# Patient Record
Sex: MALE | Race: WHITE | NOT HISPANIC OR LATINO | URBAN - METROPOLITAN AREA
[De-identification: names, ages, dates, MRNs, and addresses within clinical notes are randomized per-mention and may not be internally consistent; named-entity substitution may affect disease eponyms.]

---

## 2017-10-27 ENCOUNTER — OFFICE (OUTPATIENT)
Dept: URBAN - METROPOLITAN AREA CLINIC 33 | Facility: CLINIC | Age: 62
End: 2017-10-27

## 2017-10-27 VITALS
HEIGHT: 71 IN | WEIGHT: 189 LBS | HEART RATE: 64 BPM | DIASTOLIC BLOOD PRESSURE: 95 MMHG | SYSTOLIC BLOOD PRESSURE: 154 MMHG | TEMPERATURE: 97.7 F

## 2017-10-27 DIAGNOSIS — R10.13 EPIGASTRIC PAIN: ICD-10-CM

## 2017-10-27 DIAGNOSIS — R19.5 OTHER FECAL ABNORMALITIES: ICD-10-CM

## 2017-10-27 PROCEDURE — 00031: CPT

## 2017-10-27 PROCEDURE — 99214 OFFICE O/P EST MOD 30 MIN: CPT

## 2017-10-27 RX ORDER — OMEPRAZOLE 20 MG/1
CAPSULE, DELAYED RELEASE ORAL
Qty: 30 | Refills: 11 | Status: COMPLETED
Start: 2017-10-27 | End: 2021-11-10

## 2017-10-27 NOTE — SERVICEHPINOTES
JUDY MENDOZA   is a   62   male who complains of stomach pain and blood in stool. Patient states he did a take home Life Screen test and it came back positive for blood in the stool. He denies seeing blood in the stool or melena. He is also complaining of intermittent epigastric pain, which he has been experiencing for years but reports it has become more frequent recently. He reports he has a hx of a peptic ulcer when he was a teenager. He takes Zantac prn for heartburn, which he needs usually daily.  He states he was drinking apple cider vinegar which seemed to help his heartburn symptoms but has not taken it recently. He states the pain is worse when he doesn't eat. Denies dysphagia, N/V.  Denies any NSAID use. BRSmokes 1 ppd.He had an OV to get a colonoscopy in 2015 but never got one, he doesn't remember why. Denies family history of colon cancer or polyps. BM once/daily. Denies any weight loss, fever, chills.

## 2017-11-08 ENCOUNTER — ON CAMPUS - OUTPATIENT (OUTPATIENT)
Dept: URBAN - METROPOLITAN AREA HOSPITAL 14 | Facility: HOSPITAL | Age: 62
End: 2017-11-08
Payer: COMMERCIAL

## 2017-11-08 DIAGNOSIS — K20.8 OTHER ESOPHAGITIS: ICD-10-CM

## 2017-11-08 DIAGNOSIS — K63.5 POLYP OF COLON: ICD-10-CM

## 2017-11-08 DIAGNOSIS — K29.80 DUODENITIS WITHOUT BLEEDING: ICD-10-CM

## 2017-11-08 DIAGNOSIS — K29.60 OTHER GASTRITIS WITHOUT BLEEDING: ICD-10-CM

## 2017-11-08 DIAGNOSIS — R10.13 EPIGASTRIC PAIN: ICD-10-CM

## 2017-11-08 DIAGNOSIS — D12.2 BENIGN NEOPLASM OF ASCENDING COLON: ICD-10-CM

## 2017-11-08 DIAGNOSIS — Z12.11 ENCOUNTER FOR SCREENING FOR MALIGNANT NEOPLASM OF COLON: ICD-10-CM

## 2017-11-08 DIAGNOSIS — D12.5 BENIGN NEOPLASM OF SIGMOID COLON: ICD-10-CM

## 2017-11-08 PROCEDURE — 45380 COLONOSCOPY AND BIOPSY: CPT | Mod: 59

## 2017-11-08 PROCEDURE — 43239 EGD BIOPSY SINGLE/MULTIPLE: CPT

## 2017-11-08 PROCEDURE — 45385 COLONOSCOPY W/LESION REMOVAL: CPT

## 2020-11-29 ENCOUNTER — HOSPITAL ENCOUNTER (EMERGENCY)
Facility: HOSPITAL | Age: 65
Discharge: HOME | End: 2020-11-29
Attending: EMERGENCY MEDICINE
Payer: COMMERCIAL

## 2020-11-29 ENCOUNTER — APPOINTMENT (EMERGENCY)
Dept: CT IMAGING | Facility: HOSPITAL | Age: 65
End: 2020-11-29
Payer: COMMERCIAL

## 2020-11-29 ENCOUNTER — APPOINTMENT (EMERGENCY)
Dept: RADIOLOGY | Facility: HOSPITAL | Age: 65
End: 2020-11-29
Payer: COMMERCIAL

## 2020-11-29 VITALS
RESPIRATION RATE: 18 BRPM | OXYGEN SATURATION: 97 % | HEART RATE: 70 BPM | DIASTOLIC BLOOD PRESSURE: 84 MMHG | TEMPERATURE: 98.3 F | SYSTOLIC BLOOD PRESSURE: 149 MMHG

## 2020-11-29 DIAGNOSIS — J12.82 PNEUMONIA DUE TO COVID-19 VIRUS: Primary | ICD-10-CM

## 2020-11-29 DIAGNOSIS — U07.1 PNEUMONIA DUE TO COVID-19 VIRUS: Primary | ICD-10-CM

## 2020-11-29 LAB
ALBUMIN SERPL BCP-MCNC: 3.4 G/DL (ref 3.5–5)
ALP SERPL-CCNC: 67 U/L (ref 46–116)
ALT SERPL W P-5'-P-CCNC: 29 U/L (ref 12–78)
ANION GAP SERPL CALCULATED.3IONS-SCNC: 7 MMOL/L (ref 4–13)
APTT PPP: 27 SECONDS (ref 23–37)
AST SERPL W P-5'-P-CCNC: 17 U/L (ref 5–45)
ATRIAL RATE: 87 BPM
BASOPHILS # BLD AUTO: 0.02 THOUSANDS/ΜL (ref 0–0.1)
BASOPHILS NFR BLD AUTO: 1 % (ref 0–1)
BILIRUB SERPL-MCNC: 0.29 MG/DL (ref 0.2–1)
BUN SERPL-MCNC: 15 MG/DL (ref 5–25)
CALCIUM ALBUM COR SERPL-MCNC: 8.7 MG/DL (ref 8.3–10.1)
CALCIUM SERPL-MCNC: 8.2 MG/DL (ref 8.3–10.1)
CHLORIDE SERPL-SCNC: 103 MMOL/L (ref 100–108)
CO2 SERPL-SCNC: 27 MMOL/L (ref 21–32)
CREAT SERPL-MCNC: 0.88 MG/DL (ref 0.6–1.3)
D DIMER PPP FEU-MCNC: 0.41 UG/ML FEU
EOSINOPHIL # BLD AUTO: 0.11 THOUSAND/ΜL (ref 0–0.61)
EOSINOPHIL NFR BLD AUTO: 3 % (ref 0–6)
ERYTHROCYTE [DISTWIDTH] IN BLOOD BY AUTOMATED COUNT: 12 % (ref 11.6–15.1)
FLUAV RNA RESP QL NAA+PROBE: NEGATIVE
FLUBV RNA RESP QL NAA+PROBE: NEGATIVE
GFR SERPL CREATININE-BSD FRML MDRD: 91 ML/MIN/1.73SQ M
GLUCOSE SERPL-MCNC: 255 MG/DL (ref 65–140)
HCT VFR BLD AUTO: 45.5 % (ref 36.5–49.3)
HGB BLD-MCNC: 15 G/DL (ref 12–17)
IMM GRANULOCYTES # BLD AUTO: 0.01 THOUSAND/UL (ref 0–0.2)
IMM GRANULOCYTES NFR BLD AUTO: 0 % (ref 0–2)
INR PPP: 0.94 (ref 0.84–1.19)
LACTATE SERPL-SCNC: 1.5 MMOL/L (ref 0.5–2)
LYMPHOCYTES # BLD AUTO: 1.11 THOUSANDS/ΜL (ref 0.6–4.47)
LYMPHOCYTES NFR BLD AUTO: 34 % (ref 14–44)
MCH RBC QN AUTO: 33 PG (ref 26.8–34.3)
MCHC RBC AUTO-ENTMCNC: 33 G/DL (ref 31.4–37.4)
MCV RBC AUTO: 100 FL (ref 82–98)
MONOCYTES # BLD AUTO: 0.51 THOUSAND/ΜL (ref 0.17–1.22)
MONOCYTES NFR BLD AUTO: 16 % (ref 4–12)
NEUTROPHILS # BLD AUTO: 1.53 THOUSANDS/ΜL (ref 1.85–7.62)
NEUTS SEG NFR BLD AUTO: 46 % (ref 43–75)
NRBC BLD AUTO-RTO: 0 /100 WBCS
NT-PROBNP SERPL-MCNC: 20 PG/ML
P AXIS: 55 DEGREES
PLATELET # BLD AUTO: 166 THOUSANDS/UL (ref 149–390)
PMV BLD AUTO: 9.6 FL (ref 8.9–12.7)
POTASSIUM SERPL-SCNC: 3.9 MMOL/L (ref 3.5–5.3)
PR INTERVAL: 152 MS
PROT SERPL-MCNC: 7 G/DL (ref 6.4–8.2)
PROTHROMBIN TIME: 12.7 SECONDS (ref 11.6–14.5)
QRS AXIS: -52 DEGREES
QRSD INTERVAL: 94 MS
QT INTERVAL: 352 MS
QTC INTERVAL: 416 MS
RBC # BLD AUTO: 4.54 MILLION/UL (ref 3.88–5.62)
RSV RNA RESP QL NAA+PROBE: NEGATIVE
SARS-COV-2 RNA RESP QL NAA+PROBE: POSITIVE
SODIUM SERPL-SCNC: 137 MMOL/L (ref 136–145)
T WAVE AXIS: 29 DEGREES
TROPONIN I SERPL-MCNC: <0.02 NG/ML
VENTRICULAR RATE: 84 BPM
WBC # BLD AUTO: 3.29 THOUSAND/UL (ref 4.31–10.16)

## 2020-11-29 PROCEDURE — 84484 ASSAY OF TROPONIN QUANT: CPT | Performed by: EMERGENCY MEDICINE

## 2020-11-29 PROCEDURE — 71250 CT THORAX DX C-: CPT

## 2020-11-29 PROCEDURE — 36415 COLL VENOUS BLD VENIPUNCTURE: CPT

## 2020-11-29 PROCEDURE — 85025 COMPLETE CBC W/AUTO DIFF WBC: CPT | Performed by: EMERGENCY MEDICINE

## 2020-11-29 PROCEDURE — 71045 X-RAY EXAM CHEST 1 VIEW: CPT

## 2020-11-29 PROCEDURE — 99285 EMERGENCY DEPT VISIT HI MDM: CPT

## 2020-11-29 PROCEDURE — 87040 BLOOD CULTURE FOR BACTERIA: CPT | Performed by: EMERGENCY MEDICINE

## 2020-11-29 PROCEDURE — 0241U HB NFCT DS VIR RESP RNA 4 TRGT: CPT | Performed by: EMERGENCY MEDICINE

## 2020-11-29 PROCEDURE — 99285 EMERGENCY DEPT VISIT HI MDM: CPT | Performed by: EMERGENCY MEDICINE

## 2020-11-29 PROCEDURE — G1004 CDSM NDSC: HCPCS

## 2020-11-29 PROCEDURE — 83605 ASSAY OF LACTIC ACID: CPT | Performed by: EMERGENCY MEDICINE

## 2020-11-29 PROCEDURE — 85610 PROTHROMBIN TIME: CPT | Performed by: EMERGENCY MEDICINE

## 2020-11-29 PROCEDURE — 85730 THROMBOPLASTIN TIME PARTIAL: CPT | Performed by: EMERGENCY MEDICINE

## 2020-11-29 PROCEDURE — 85379 FIBRIN DEGRADATION QUANT: CPT | Performed by: EMERGENCY MEDICINE

## 2020-11-29 PROCEDURE — 93005 ELECTROCARDIOGRAM TRACING: CPT

## 2020-11-29 PROCEDURE — 83880 ASSAY OF NATRIURETIC PEPTIDE: CPT | Performed by: EMERGENCY MEDICINE

## 2020-11-29 PROCEDURE — 80053 COMPREHEN METABOLIC PANEL: CPT | Performed by: EMERGENCY MEDICINE

## 2020-11-29 PROCEDURE — 93010 ELECTROCARDIOGRAM REPORT: CPT | Performed by: INTERNAL MEDICINE

## 2020-11-29 RX ORDER — ZINC SULFATE 50(220)MG
220 CAPSULE ORAL DAILY
Qty: 14 CAPSULE | Refills: 0 | Status: SHIPPED | OUTPATIENT
Start: 2020-11-29 | End: 2021-07-07

## 2020-11-29 RX ORDER — MELATONIN
1000 DAILY
Qty: 14 TABLET | Refills: 0 | Status: SHIPPED | OUTPATIENT
Start: 2020-11-29 | End: 2021-07-07

## 2020-11-29 RX ORDER — MULTIVIT WITH MINERALS/LUTEIN
1000 TABLET ORAL DAILY
Qty: 14 TABLET | Refills: 0 | Status: SHIPPED | OUTPATIENT
Start: 2020-11-29 | End: 2021-07-07

## 2020-11-29 RX ORDER — ALBUTEROL SULFATE 90 UG/1
2 AEROSOL, METERED RESPIRATORY (INHALATION) ONCE
Status: COMPLETED | OUTPATIENT
Start: 2020-11-29 | End: 2020-11-29

## 2020-11-29 RX ORDER — AZITHROMYCIN 250 MG/1
500 TABLET, FILM COATED ORAL ONCE
Status: COMPLETED | OUTPATIENT
Start: 2020-11-29 | End: 2020-11-29

## 2020-11-29 RX ORDER — AZITHROMYCIN 250 MG/1
250 TABLET, FILM COATED ORAL DAILY
Qty: 4 TABLET | Refills: 0 | Status: SHIPPED | OUTPATIENT
Start: 2020-11-30 | End: 2020-12-04

## 2020-11-29 RX ADMIN — ALBUTEROL SULFATE 2 PUFF: 90 AEROSOL, METERED RESPIRATORY (INHALATION) at 03:34

## 2020-11-29 RX ADMIN — AZITHROMYCIN MONOHYDRATE 500 MG: 250 TABLET ORAL at 05:47

## 2020-12-04 LAB
BACTERIA BLD CULT: NORMAL
BACTERIA BLD CULT: NORMAL

## 2021-01-31 ENCOUNTER — HOSPITAL ENCOUNTER (EMERGENCY)
Facility: HOSPITAL | Age: 66
Discharge: HOME | End: 2021-01-31
Attending: EMERGENCY MEDICINE
Payer: COMMERCIAL

## 2021-01-31 VITALS
TEMPERATURE: 97.9 F | DIASTOLIC BLOOD PRESSURE: 68 MMHG | HEART RATE: 87 BPM | SYSTOLIC BLOOD PRESSURE: 143 MMHG | OXYGEN SATURATION: 98 % | RESPIRATION RATE: 20 BRPM | WEIGHT: 249.56 LBS

## 2021-01-31 DIAGNOSIS — S13.9XXA ACUTE SPRAIN OF LIGAMENT OF NECK, INITIAL ENCOUNTER: Primary | ICD-10-CM

## 2021-01-31 DIAGNOSIS — M62.838 TRAPEZIUS MUSCLE SPASM: ICD-10-CM
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
## 2021-01-31 PROCEDURE — 99283 EMERGENCY DEPT VISIT LOW MDM: CPT

## 2021-01-31 PROCEDURE — 99282 EMERGENCY DEPT VISIT SF MDM: CPT | Performed by: EMERGENCY MEDICINE

## 2021-01-31 RX ORDER — EMPAGLIFLOZIN 10 MG/1
25 TABLET, FILM COATED ORAL DAILY
COMMUNITY

## 2021-01-31 RX ORDER — LISINOPRIL 10 MG/1
10 TABLET ORAL DAILY
COMMUNITY
End: 2021-07-20 | Stop reason: HOSPADM

## 2021-01-31 RX ORDER — ROSUVASTATIN CALCIUM 10 MG/1
10 TABLET, COATED ORAL
COMMUNITY
End: 2021-07-07

## 2021-01-31 RX ORDER — EXENATIDE 2 MG/.85ML
INJECTION, SUSPENSION, EXTENDED RELEASE SUBCUTANEOUS WEEKLY
COMMUNITY
End: 2021-07-20 | Stop reason: HOSPADM
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
## 2021-07-07 ENCOUNTER — HOSPITAL ENCOUNTER (INPATIENT)
Facility: HOSPITAL | Age: 66
LOS: 1 days | DRG: 282 | End: 2021-07-09
Attending: EMERGENCY MEDICINE | Admitting: INTERNAL MEDICINE
Payer: COMMERCIAL

## 2021-07-07 ENCOUNTER — HOSPITAL ENCOUNTER (OUTPATIENT)
Dept: RADIOLOGY | Facility: HOSPITAL | Age: 66
Discharge: HOME | End: 2021-07-07
Attending: INTERNAL MEDICINE
Payer: COMMERCIAL

## 2021-07-07 ENCOUNTER — APPOINTMENT (OUTPATIENT)
Dept: LAB | Facility: HOSPITAL | Age: 66
End: 2021-07-07
Attending: INTERNAL MEDICINE
Payer: COMMERCIAL

## 2021-07-07 DIAGNOSIS — I25.10 ATHEROSCLEROSIS OF NATIVE CORONARY ARTERY WITHOUT ANGINA PECTORIS, UNSPECIFIED WHETHER NATIVE OR TRANSPLANTED HEART: ICD-10-CM

## 2021-07-07 DIAGNOSIS — R07.9 CHEST PAIN, UNSPECIFIED TYPE: ICD-10-CM

## 2021-07-07 DIAGNOSIS — E13.9 DIABETES MELLITUS OF OTHER TYPE WITHOUT COMPLICATION, UNSPECIFIED WHETHER LONG TERM INSULIN USE (HCC): ICD-10-CM

## 2021-07-07 DIAGNOSIS — R07.9 CHEST PAIN: Primary | ICD-10-CM

## 2021-07-07 DIAGNOSIS — E78.5 HYPERLIPIDEMIA, UNSPECIFIED HYPERLIPIDEMIA TYPE: ICD-10-CM

## 2021-07-07 DIAGNOSIS — I10 ESSENTIAL HYPERTENSION, MALIGNANT: ICD-10-CM

## 2021-07-07 DIAGNOSIS — R77.8 ELEVATED TROPONIN: ICD-10-CM

## 2021-07-07 PROBLEM — E11.9 TYPE 2 DIABETES MELLITUS (HCC): Status: ACTIVE | Noted: 2021-07-07

## 2021-07-07 PROBLEM — R79.89 ELEVATED TROPONIN: Status: ACTIVE | Noted: 2021-07-07

## 2021-07-07 LAB
ALBUMIN SERPL BCP-MCNC: 4.1 G/DL (ref 3.4–4.8)
ALP SERPL-CCNC: 45.8 U/L (ref 10–129)
ALT SERPL W P-5'-P-CCNC: 19 U/L (ref 5–63)
ANION GAP SERPL CALCULATED.3IONS-SCNC: 8 MMOL/L (ref 4–13)
APTT PPP: 24 SECONDS (ref 23–37)
AST SERPL W P-5'-P-CCNC: 14 U/L (ref 15–41)
ATRIAL RATE: 103 BPM
BASOPHILS # BLD AUTO: 0.02 THOUSANDS/ΜL (ref 0–0.1)
BASOPHILS NFR BLD AUTO: 0 % (ref 0–1)
BILIRUB SERPL-MCNC: 0.55 MG/DL (ref 0.3–1.2)
BNP SERPL-MCNC: 31.8 PG/ML (ref 1–100)
BUN SERPL-MCNC: 20 MG/DL (ref 6–20)
CALCIUM SERPL-MCNC: 9.4 MG/DL (ref 8.4–10.2)
CHLORIDE SERPL-SCNC: 102 MMOL/L (ref 96–108)
CHOLEST SERPL-MCNC: 210 MG/DL
CO2 SERPL-SCNC: 27 MMOL/L (ref 22–33)
CREAT SERPL-MCNC: 0.86 MG/DL (ref 0.5–1.2)
D DIMER PPP FEU-MCNC: 0.33 UG/ML FEU
EOSINOPHIL # BLD AUTO: 0.15 THOUSAND/ΜL (ref 0–0.61)
EOSINOPHIL NFR BLD AUTO: 2 % (ref 0–6)
ERYTHROCYTE [DISTWIDTH] IN BLOOD BY AUTOMATED COUNT: 12.6 % (ref 11.6–15.1)
EST. AVERAGE GLUCOSE BLD GHB EST-MCNC: 212 MG/DL
GFR SERPL CREATININE-BSD FRML MDRD: 92 ML/MIN/1.73SQ M
GLUCOSE SERPL-MCNC: 190 MG/DL (ref 65–140)
GLUCOSE SERPL-MCNC: 191 MG/DL (ref 65–140)
HBA1C MFR BLD: 9 %
HCT VFR BLD AUTO: 44.6 % (ref 36.5–49.3)
HDLC SERPL-MCNC: 44 MG/DL
HGB BLD-MCNC: 14.9 G/DL (ref 12–17)
IMM GRANULOCYTES # BLD AUTO: 0.02 THOUSAND/UL (ref 0–0.2)
IMM GRANULOCYTES NFR BLD AUTO: 0 % (ref 0–2)
INR PPP: 0.93 (ref 0.84–1.19)
LDLC SERPL CALC-MCNC: 123 MG/DL (ref 0–100)
LYMPHOCYTES # BLD AUTO: 1.59 THOUSANDS/ΜL (ref 0.6–4.47)
LYMPHOCYTES NFR BLD AUTO: 23 % (ref 14–44)
MCH RBC QN AUTO: 32.7 PG (ref 26.8–34.3)
MCHC RBC AUTO-ENTMCNC: 33.4 G/DL (ref 31.4–37.4)
MCV RBC AUTO: 98 FL (ref 82–98)
MONOCYTES # BLD AUTO: 0.77 THOUSAND/ΜL (ref 0.17–1.22)
MONOCYTES NFR BLD AUTO: 11 % (ref 4–12)
NEUTROPHILS # BLD AUTO: 4.38 THOUSANDS/ΜL (ref 1.85–7.62)
NEUTS SEG NFR BLD AUTO: 64 % (ref 43–75)
NONHDLC SERPL-MCNC: 166 MG/DL
P AXIS: 50 DEGREES
PLATELET # BLD AUTO: 235 THOUSANDS/UL (ref 149–390)
PMV BLD AUTO: 9.5 FL (ref 8.9–12.7)
POTASSIUM SERPL-SCNC: 4.3 MMOL/L (ref 3.5–5)
PR INTERVAL: 154 MS
PROT SERPL-MCNC: 7.1 G/DL (ref 6.4–8.3)
PROTHROMBIN TIME: 12.6 SECONDS (ref 11.6–14.5)
QRS AXIS: -58 DEGREES
QRSD INTERVAL: 94 MS
QT INTERVAL: 328 MS
QTC INTERVAL: 421 MS
RBC # BLD AUTO: 4.56 MILLION/UL (ref 3.88–5.62)
SODIUM SERPL-SCNC: 137 MMOL/L (ref 133–145)
T WAVE AXIS: 16 DEGREES
TRIGL SERPL-MCNC: 215.8 MG/DL
TROPONIN I SERPL-MCNC: 0.11 NG/ML
TROPONIN I SERPL-MCNC: 0.17 NG/ML (ref 0–0.07)
TSH SERPL DL<=0.05 MIU/L-ACNC: 2.49 UIU/ML (ref 0.34–5.6)
VENTRICULAR RATE: 99 BPM
WBC # BLD AUTO: 6.93 THOUSAND/UL (ref 4.31–10.16)

## 2021-07-07 PROCEDURE — 93005 ELECTROCARDIOGRAM TRACING: CPT

## 2021-07-07 PROCEDURE — 99285 EMERGENCY DEPT VISIT HI MDM: CPT | Performed by: EMERGENCY MEDICINE

## 2021-07-07 PROCEDURE — 80053 COMPREHEN METABOLIC PANEL: CPT

## 2021-07-07 PROCEDURE — 83880 ASSAY OF NATRIURETIC PEPTIDE: CPT

## 2021-07-07 PROCEDURE — 83036 HEMOGLOBIN GLYCOSYLATED A1C: CPT

## 2021-07-07 PROCEDURE — 85025 COMPLETE CBC W/AUTO DIFF WBC: CPT

## 2021-07-07 PROCEDURE — 99285 EMERGENCY DEPT VISIT HI MDM: CPT

## 2021-07-07 PROCEDURE — 84484 ASSAY OF TROPONIN QUANT: CPT | Performed by: EMERGENCY MEDICINE

## 2021-07-07 PROCEDURE — 85610 PROTHROMBIN TIME: CPT | Performed by: EMERGENCY MEDICINE

## 2021-07-07 PROCEDURE — 82948 REAGENT STRIP/BLOOD GLUCOSE: CPT

## 2021-07-07 PROCEDURE — 99220 PR INITIAL OBSERVATION CARE/DAY 70 MINUTES: CPT | Performed by: PHYSICIAN ASSISTANT

## 2021-07-07 PROCEDURE — 84443 ASSAY THYROID STIM HORMONE: CPT

## 2021-07-07 PROCEDURE — 85730 THROMBOPLASTIN TIME PARTIAL: CPT | Performed by: EMERGENCY MEDICINE

## 2021-07-07 PROCEDURE — 71046 X-RAY EXAM CHEST 2 VIEWS: CPT

## 2021-07-07 PROCEDURE — 85379 FIBRIN DEGRADATION QUANT: CPT | Performed by: EMERGENCY MEDICINE

## 2021-07-07 PROCEDURE — 84484 ASSAY OF TROPONIN QUANT: CPT

## 2021-07-07 PROCEDURE — 80061 LIPID PANEL: CPT

## 2021-07-07 PROCEDURE — 93010 ELECTROCARDIOGRAM REPORT: CPT | Performed by: INTERNAL MEDICINE

## 2021-07-07 PROCEDURE — 36415 COLL VENOUS BLD VENIPUNCTURE: CPT

## 2021-07-07 RX ORDER — ASPIRIN 81 MG/1
81 TABLET ORAL DAILY
Status: DISCONTINUED | OUTPATIENT
Start: 2021-07-08 | End: 2021-07-09

## 2021-07-07 RX ORDER — ASPIRIN 81 MG/1
324 TABLET, CHEWABLE ORAL ONCE
Status: COMPLETED | OUTPATIENT
Start: 2021-07-07 | End: 2021-07-07

## 2021-07-07 RX ORDER — NITROGLYCERIN 0.4 MG/1
0.4 TABLET SUBLINGUAL
Status: DISCONTINUED | OUTPATIENT
Start: 2021-07-07 | End: 2021-07-09

## 2021-07-07 RX ORDER — EMPAGLIFLOZIN 25 MG/1
25 TABLET, FILM COATED ORAL DAILY
COMMUNITY
Start: 2021-04-11 | End: 2021-07-07

## 2021-07-07 RX ORDER — HEPARIN SODIUM 5000 [USP'U]/ML
5000 INJECTION, SOLUTION INTRAVENOUS; SUBCUTANEOUS EVERY 8 HOURS SCHEDULED
Status: DISCONTINUED | OUTPATIENT
Start: 2021-07-07 | End: 2021-07-08

## 2021-07-07 RX ORDER — ROSUVASTATIN CALCIUM 20 MG/1
1 TABLET, COATED ORAL
COMMUNITY
End: 2021-07-07

## 2021-07-07 RX ORDER — ASPIRIN 81 MG/1
81 TABLET ORAL DAILY
COMMUNITY
End: 2021-07-20 | Stop reason: HOSPADM

## 2021-07-07 RX ORDER — PRAVASTATIN SODIUM 80 MG/1
80 TABLET ORAL DAILY
Status: DISCONTINUED | OUTPATIENT
Start: 2021-07-08 | End: 2021-07-09

## 2021-07-07 RX ORDER — PRAVASTATIN SODIUM 20 MG
20 TABLET ORAL DAILY
COMMUNITY
End: 2021-07-20 | Stop reason: HOSPADM

## 2021-07-07 RX ADMIN — ASPIRIN 324 MG: 81 TABLET, CHEWABLE ORAL at 19:59

## 2021-07-07 RX ADMIN — METOPROLOL TARTRATE 25 MG: 25 TABLET, FILM COATED ORAL at 22:09

## 2021-07-07 RX ADMIN — HEPARIN SODIUM 5000 UNITS: 5000 INJECTION INTRAVENOUS; SUBCUTANEOUS at 22:10

## 2021-07-07 RX ADMIN — INSULIN LISPRO 1 UNITS: 100 INJECTION, SOLUTION INTRAVENOUS; SUBCUTANEOUS at 22:12

## 2021-07-07 NOTE — ED PROVIDER NOTES
History  Chief Complaint   Patient presents with    Chest Pain     Patient sent from PCP for abnormal troponin  Pt c/o chest pain and sob since last week     This is a 61 y o  old male who presents to the ED for evaluation of chest pain  Exertional dyspnea with cutting grass - thought it was due to exercise intolerance  A few days ago, he felt the same way, but it felt worse  Mild chest discomfort with it, nonradiating, midsternal  Took break an improved, but has taken more breaks recently  Did have covid in November  No CP now  Has not taken any medications for this  PCP ordered labs and cardiac work up, troponin came back positive so he was sent to the ED  Otherwise, patient denies fevers, chills, night sweats, cough, congestion, rhinorrhea, abdominal pain, nausea, vomiting, diarrhea, constipation, urinary symptoms, leg pain or swelling  Prior to Admission Medications   Prescriptions Last Dose Informant Patient Reported? Taking? Empagliflozin (Jardiance) 10 MG TABS Past Month at Unknown time  Yes Yes   Sig: Take 10 mg by mouth daily    Exenatide ER (Bydureon BCise) 2 MG/0 85ML AUIJ Past Week at Unknown time  Yes Yes   Sig: once a week WEEKLY ON Monday   aspirin (ECOTRIN LOW STRENGTH) 81 mg EC tablet 7/6/2021 at Unknown time  Yes Yes   Sig: Take 81 mg by mouth daily   lisinopril (ZESTRIL) 10 mg tablet 7/7/2021 at Unknown time  Yes Yes   Sig: Take 10 mg by mouth daily    metFORMIN (GLUCOPHAGE) 1000 MG tablet 7/7/2021 at Unknown time  Yes Yes   Sig: Take 500 mg by mouth 2 (two) times a day with meals    pravastatin (PRAVACHOL) 20 mg tablet 7/7/2021 at Unknown time  Yes Yes   Sig: Take 20 mg by mouth daily      Facility-Administered Medications: None     Past Medical History:   Diagnosis Date    Diabetes mellitus ([facility] [address] )      History reviewed  No pertinent surgical history  History reviewed  No pertinent family history  I have reviewed and agree with the history as documented      E-Cigarette/Vaping    E-Cigarette Use Never User      E-Cigarette/Vaping Substances    Nicotine No     THC No     CBD No     Flavoring No     Other No     Unknown No      Social History     Tobacco Use    Smoking status: Current Some Day Smoker     Types: Cigars    Smokeless tobacco: Never Used   Vaping Use    Vaping Use: Never used   Substance Use Topics    Alcohol use: Yes    Drug use: Not on file     Review of Systems   Constitutional: Negative for chills, fatigue, fever and unexpected weight change  HENT: Negative for congestion, rhinorrhea and sore throat  Eyes: Negative for redness and visual disturbance  Respiratory: Positive for shortness of breath  Negative for cough  Cardiovascular: Positive for chest pain  Negative for leg swelling  Gastrointestinal: Negative for abdominal pain, constipation, diarrhea, nausea and vomiting  Endocrine: Negative for cold intolerance and heat intolerance  Genitourinary: Negative for dysuria, frequency and urgency  Musculoskeletal: Negative for back pain  Skin: Negative for rash  Neurological: Negative for dizziness, syncope and numbness  All other systems reviewed and are negative  Physical Exam  Physical Exam  Vitals and nursing note reviewed  Constitutional:       General: He is not in acute distress  Appearance: He is well-developed  He is not diaphoretic  HENT:      Head: Normocephalic and atraumatic  Right Ear: External ear normal       Left Ear: External ear normal       Nose: Nose normal       Mouth/Throat:      Mouth: Mucous membranes are moist    Eyes:      Conjunctiva/sclera: Conjunctivae normal       Pupils: Pupils are equal, round, and reactive to light  Cardiovascular:      Rate and Rhythm: Normal rate and regular rhythm  Heart sounds: Normal heart sounds  No murmur heard  No gallop  Pulmonary:      Effort: Pulmonary effort is normal  No respiratory distress  Breath sounds: Normal breath sounds  No wheezing or rales  Abdominal:      General: Bowel sounds are normal  There is no distension  Palpations: Abdomen is soft  There is no mass  Tenderness: There is no abdominal tenderness  There is no guarding or rebound  Musculoskeletal:         General: No deformity  Normal range of motion  Cervical back: Normal range of motion and neck supple  Lymphadenopathy:      Cervical: No cervical adenopathy  Skin:     General: Skin is warm and dry  Findings: No erythema or rash  Neurological:      Mental Status: He is alert and oriented to person, place, and time  Cranial Nerves: No cranial nerve deficit         Vital Signs  ED Triage Vitals   Temperature Pulse Respirations Blood Pressure SpO2   07/07/21 1906 07/07/21 1906 07/07/21 1906 07/07/21 1903 07/07/21 1906   98 8 °F (37 1 °C) 105 18 142/99 100 %      Temp Source Heart Rate Source Patient Position - Orthostatic VS BP Location FiO2 (%)   07/07/21 1906 07/07/21 1906 07/07/21 1903 07/07/21 1903 --   Oral Monitor Lying Right arm       Pain Score       07/07/21 2000 1         ED Medications  Medications   nitroglycerin (NITROSTAT) SL tablet 0 4 mg (has no administration in time range)   aspirin (ECOTRIN LOW STRENGTH) EC tablet 81 mg (81 mg Oral Given 7/8/21 0817)   pravastatin (PRAVACHOL) tablet 80 mg (80 mg Oral Given 7/8/21 0816)   nicotine (NICODERM CQ) 7 mg/24hr TD 24 hr patch 1 patch (1 patch Transdermal Not Given 7/8/21 0817)   insulin lispro (HumaLOG) 100 units/mL subcutaneous injection 1-6 Units (1 Units Subcutaneous Not Given 7/8/21 0603)   insulin lispro (HumaLOG) 100 units/mL subcutaneous injection 1-5 Units (1 Units Subcutaneous Given 7/7/21 2212)   heparin (porcine) 25,000 units in 0 45% NaCl 250 mL infusion (premix) (11 1 Units/kg/hr × 90 kg (Order-Specific) Intravenous New Bag 7/8/21 0728)   metoprolol tartrate (LOPRESSOR) partial tablet 12 5 mg (has no administration in time range)   aspirin chewable tablet 243 mg (has no administration in time range)   aspirin chewable tablet 324 mg (324 mg Oral Given 7/7/21 1959)     Diagnostic Studies  Results Reviewed     Procedure Component Value Units Date/Time    Troponin I [[id number]]  (Abnormal) Collected: 07/07/21 1948    Lab Status: Final result Specimen: Blood from Arm, Left Updated: 07/07/21 2024     Troponin I 0 11 ng/mL     D-dimer, quantitative [[id number]]  (Normal) Collected: 07/07/21 1948    Lab Status: Final result Specimen: Blood from Arm, Left Updated: 07/07/21 2019     D-Dimer, Quant 0 33 ug/ml FEU     Protime-INR [[id number]]  (Normal) Collected: 07/07/21 1948    Lab Status: Final result Specimen: Blood from Arm, Left Updated: 07/07/21 2018     Protime 12 6 seconds      INR 0 93    APTT [[id number]]  (Normal) Collected: 07/07/21 1948    Lab Status: Final result Specimen: Blood from Arm, Left Updated: 07/07/21 2018     PTT 24 seconds         No orders to display     Procedures  ECG 12 Lead Documentation Only    Date/Time: 7/8/2021 7:16 PM  Performed by: Reilly Velasco MD  Authorized by: Reilly Velasco MD     Indications / Diagnosis:  Chest pain  ECG reviewed by me, the ED Provider: yes    Patient location:  ED  Comments:      NSR 99, normal intervals, normal axis, no acute ST Twave changes  Compared to Nov 29 2020, no changes  ED Course       A/P: This is a 61 y o  male who presents to the ED for evaluation of chest pain  OP trop is positive  Add coags, repeat Trop now  EKG is nonischemic which is reassuring  Will admit to medicine, full dose asa  EKG non ischemic now, trop down trending, Does not need heparin - asa only       HEART Risk Score      Most Recent Value   Heart Score Risk Calculator   History  2 Filed at: 07/07/2021 1949   ECG  0 Filed at: 07/07/2021 1949   Age  1 Filed at: 07/07/2021 1949   Risk Factors  2 Filed at: 07/07/2021 1949   Troponin  1 Filed at: 07/07/2021 1949   HEART Score  6 Filed at: 07/07/2021 1949          MDM    Disposition  Final diagnoses:   Chest pain Elevated troponin     Time reflects when diagnosis was documented in both MDM as applicable and the Disposition within this note     Time User Action Codes Description Comment    7/7/2021  8:38 PM Brett Pool Add [R07 9] Chest pain     7/7/2021  8:38 PM Brett Pool Add [R77 8] Elevated troponin       ED Disposition     ED Disposition Condition Date/Time Comment    Admit Stable Wed Jul 7, 2021  8:38 PM Case was discussed with ARELIS and the patient's admission status was agreed to be Admission Status: observation status to the service of Dr Talbert Sicard  Follow-up Information    None       Current Discharge Medication List      CONTINUE these medications which have NOT CHANGED    Details   aspirin (ECOTRIN LOW STRENGTH) 81 mg EC tablet Take 81 mg by mouth daily      Empagliflozin (Jardiance) 10 MG TABS Take 10 mg by mouth daily       Exenatide ER (Bydureon BCise) 2 MG/0 85ML AUIJ once a week WEEKLY ON Monday      lisinopril (ZESTRIL) 10 mg tablet Take 10 mg by mouth daily       metFORMIN (GLUCOPHAGE) 1000 MG tablet Take 500 mg by mouth 2 (two) times a day with meals       pravastatin (PRAVACHOL) 20 mg tablet Take 20 mg by mouth daily           No discharge procedures on file      PDMP Review       Value Time User    PDMP Reviewed  Yes 11/29/2020  3:11 AM Markel Costello MD          ED Provider  Electronically Signed by           Mert Mcginnis MD  07/08/21 6734

## 2021-07-08 ENCOUNTER — APPOINTMENT (OUTPATIENT)
Dept: NON INVASIVE DIAGNOSTICS | Facility: HOSPITAL | Age: 66
DRG: 282 | End: 2021-07-08
Payer: COMMERCIAL

## 2021-07-08 ENCOUNTER — APPOINTMENT (OUTPATIENT)
Dept: NON INVASIVE DIAGNOSTICS | Facility: HOSPITAL | Age: 66
DRG: 282 | End: 2021-07-08
Attending: INTERNAL MEDICINE
Payer: COMMERCIAL

## 2021-07-08 ENCOUNTER — HOSPITAL ENCOUNTER (OUTPATIENT)
Dept: NON INVASIVE DIAGNOSTICS | Facility: HOSPITAL | Age: 66
Discharge: HOME | End: 2021-07-08

## 2021-07-08 LAB
ANION GAP SERPL CALCULATED.3IONS-SCNC: 5 MMOL/L (ref 4–13)
APTT PPP: 25 SECONDS (ref 23–37)
APTT PPP: 42 SECONDS (ref 23–37)
BUN SERPL-MCNC: 18 MG/DL (ref 5–25)
CALCIUM SERPL-MCNC: 8.4 MG/DL (ref 8.3–10.1)
CHLORIDE SERPL-SCNC: 107 MMOL/L (ref 100–108)
CO2 SERPL-SCNC: 28 MMOL/L (ref 21–32)
CREAT SERPL-MCNC: 0.87 MG/DL (ref 0.6–1.3)
ERYTHROCYTE [DISTWIDTH] IN BLOOD BY AUTOMATED COUNT: 12.2 % (ref 11.6–15.1)
GFR SERPL CREATININE-BSD FRML MDRD: 91 ML/MIN/1.73SQ M
GLUCOSE P FAST SERPL-MCNC: 154 MG/DL (ref 65–99)
GLUCOSE SERPL-MCNC: 131 MG/DL (ref 65–140)
GLUCOSE SERPL-MCNC: 148 MG/DL (ref 65–140)
GLUCOSE SERPL-MCNC: 154 MG/DL (ref 65–140)
GLUCOSE SERPL-MCNC: 184 MG/DL (ref 65–140)
GLUCOSE SERPL-MCNC: 203 MG/DL (ref 65–140)
GLUCOSE SERPL-MCNC: 227 MG/DL (ref 65–140)
HCT VFR BLD AUTO: 41.8 % (ref 36.5–49.3)
HGB BLD-MCNC: 13.6 G/DL (ref 12–17)
MCH RBC QN AUTO: 33.2 PG (ref 26.8–34.3)
MCHC RBC AUTO-ENTMCNC: 32.5 G/DL (ref 31.4–37.4)
MCV RBC AUTO: 102 FL (ref 82–98)
PLATELET # BLD AUTO: 187 THOUSANDS/UL (ref 149–390)
PLATELET # BLD AUTO: 193 THOUSANDS/UL (ref 149–390)
PMV BLD AUTO: 9.4 FL (ref 8.9–12.7)
PMV BLD AUTO: 9.5 FL (ref 8.9–12.7)
POTASSIUM SERPL-SCNC: 3.8 MMOL/L (ref 3.5–5.3)
RBC # BLD AUTO: 4.1 MILLION/UL (ref 3.88–5.62)
SODIUM SERPL-SCNC: 140 MMOL/L (ref 136–145)
TROPONIN I SERPL-MCNC: 0.18 NG/ML
TROPONIN I SERPL-MCNC: 0.23 NG/ML
TROPONIN I SERPL-MCNC: 0.26 NG/ML
WBC # BLD AUTO: 6.65 THOUSAND/UL (ref 4.31–10.16)

## 2021-07-08 PROCEDURE — C1894 INTRO/SHEATH, NON-LASER: HCPCS | Performed by: INTERNAL MEDICINE

## 2021-07-08 PROCEDURE — 85730 THROMBOPLASTIN TIME PARTIAL: CPT | Performed by: INTERNAL MEDICINE

## 2021-07-08 PROCEDURE — 99225 PR SBSQ OBSERVATION CARE/DAY 25 MINUTES: CPT | Performed by: INTERNAL MEDICINE

## 2021-07-08 PROCEDURE — 99153 MOD SED SAME PHYS/QHP EA: CPT | Performed by: INTERNAL MEDICINE

## 2021-07-08 PROCEDURE — 93454 CORONARY ARTERY ANGIO S&I: CPT | Performed by: INTERNAL MEDICINE

## 2021-07-08 PROCEDURE — 80048 BASIC METABOLIC PNL TOTAL CA: CPT | Performed by: PHYSICIAN ASSISTANT

## 2021-07-08 PROCEDURE — 93005 ELECTROCARDIOGRAM TRACING: CPT

## 2021-07-08 PROCEDURE — C1769 GUIDE WIRE: HCPCS | Performed by: INTERNAL MEDICINE

## 2021-07-08 PROCEDURE — 99205 OFFICE O/P NEW HI 60 MIN: CPT | Performed by: INTERNAL MEDICINE

## 2021-07-08 PROCEDURE — 82948 REAGENT STRIP/BLOOD GLUCOSE: CPT

## 2021-07-08 PROCEDURE — 99152 MOD SED SAME PHYS/QHP 5/>YRS: CPT | Performed by: INTERNAL MEDICINE

## 2021-07-08 PROCEDURE — 93306 TTE W/DOPPLER COMPLETE: CPT | Performed by: INTERNAL MEDICINE

## 2021-07-08 PROCEDURE — B2111ZZ FLUOROSCOPY OF MULTIPLE CORONARY ARTERIES USING LOW OSMOLAR CONTRAST: ICD-10-PCS | Performed by: INTERNAL MEDICINE

## 2021-07-08 PROCEDURE — 84484 ASSAY OF TROPONIN QUANT: CPT | Performed by: PHYSICIAN ASSISTANT

## 2021-07-08 PROCEDURE — 85049 AUTOMATED PLATELET COUNT: CPT | Performed by: PHYSICIAN ASSISTANT

## 2021-07-08 PROCEDURE — 93306 TTE W/DOPPLER COMPLETE: CPT

## 2021-07-08 PROCEDURE — 84484 ASSAY OF TROPONIN QUANT: CPT | Performed by: FAMILY MEDICINE

## 2021-07-08 PROCEDURE — 85027 COMPLETE CBC AUTOMATED: CPT | Performed by: PHYSICIAN ASSISTANT

## 2021-07-08 RX ORDER — FENTANYL CITRATE 50 UG/ML
INJECTION, SOLUTION INTRAMUSCULAR; INTRAVENOUS CODE/TRAUMA/SEDATION MEDICATION
Status: COMPLETED | OUTPATIENT
Start: 2021-07-08 | End: 2021-07-08

## 2021-07-08 RX ORDER — ACETAMINOPHEN 325 MG/1
650 TABLET ORAL EVERY 6 HOURS PRN
Status: DISCONTINUED | OUTPATIENT
Start: 2021-07-08 | End: 2021-07-09

## 2021-07-08 RX ORDER — NITROGLYCERIN 20 MG/100ML
INJECTION INTRAVENOUS CODE/TRAUMA/SEDATION MEDICATION
Status: COMPLETED | OUTPATIENT
Start: 2021-07-08 | End: 2021-07-08

## 2021-07-08 RX ORDER — VERAPAMIL HCL 2.5 MG/ML
AMPUL (ML) INTRAVENOUS CODE/TRAUMA/SEDATION MEDICATION
Status: COMPLETED | OUTPATIENT
Start: 2021-07-08 | End: 2021-07-08

## 2021-07-08 RX ORDER — ASPIRIN 81 MG/1
243 TABLET, CHEWABLE ORAL ONCE
Status: COMPLETED | OUTPATIENT
Start: 2021-07-08 | End: 2021-07-08

## 2021-07-08 RX ORDER — ACETAMINOPHEN 325 MG/1
650 TABLET ORAL EVERY 6 HOURS PRN
Status: CANCELLED | OUTPATIENT
Start: 2021-07-08

## 2021-07-08 RX ORDER — HEPARIN SODIUM 10000 [USP'U]/100ML
3-20 INJECTION, SOLUTION INTRAVENOUS
Status: CANCELLED | OUTPATIENT
Start: 2021-07-08

## 2021-07-08 RX ORDER — PRAVASTATIN SODIUM 80 MG/1
80 TABLET ORAL DAILY
Status: CANCELLED | OUTPATIENT
Start: 2021-07-09

## 2021-07-08 RX ORDER — HEPARIN SODIUM 10000 [USP'U]/100ML
3-20 INJECTION, SOLUTION INTRAVENOUS
Status: DISCONTINUED | OUTPATIENT
Start: 2021-07-08 | End: 2021-07-09

## 2021-07-08 RX ORDER — SODIUM CHLORIDE 9 MG/ML
100 INJECTION, SOLUTION INTRAVENOUS CONTINUOUS
Status: DISPENSED | OUTPATIENT
Start: 2021-07-08 | End: 2021-07-08

## 2021-07-08 RX ORDER — MIDAZOLAM HYDROCHLORIDE 2 MG/2ML
INJECTION, SOLUTION INTRAMUSCULAR; INTRAVENOUS CODE/TRAUMA/SEDATION MEDICATION
Status: COMPLETED | OUTPATIENT
Start: 2021-07-08 | End: 2021-07-08

## 2021-07-08 RX ORDER — HEPARIN SODIUM 1000 [USP'U]/ML
INJECTION, SOLUTION INTRAVENOUS; SUBCUTANEOUS CODE/TRAUMA/SEDATION MEDICATION
Status: COMPLETED | OUTPATIENT
Start: 2021-07-08 | End: 2021-07-08

## 2021-07-08 RX ORDER — ASPIRIN 81 MG/1
243 TABLET, CHEWABLE ORAL DAILY
Status: DISCONTINUED | OUTPATIENT
Start: 2021-07-08 | End: 2021-07-08

## 2021-07-08 RX ORDER — LIDOCAINE HYDROCHLORIDE 10 MG/ML
INJECTION, SOLUTION EPIDURAL; INFILTRATION; INTRACAUDAL; PERINEURAL CODE/TRAUMA/SEDATION MEDICATION
Status: COMPLETED | OUTPATIENT
Start: 2021-07-08 | End: 2021-07-08

## 2021-07-08 RX ORDER — ASPIRIN 81 MG/1
81 TABLET ORAL DAILY
Status: CANCELLED | OUTPATIENT
Start: 2021-07-09

## 2021-07-08 RX ORDER — NITROGLYCERIN 0.4 MG/1
0.4 TABLET SUBLINGUAL
Status: CANCELLED | OUTPATIENT
Start: 2021-07-08

## 2021-07-08 RX ADMIN — FENTANYL CITRATE 25 MCG: 50 INJECTION, SOLUTION INTRAMUSCULAR; INTRAVENOUS at 15:20

## 2021-07-08 RX ADMIN — HEPARIN SODIUM 11.1 UNITS/KG/HR: 10000 INJECTION, SOLUTION INTRAVENOUS at 18:59

## 2021-07-08 RX ADMIN — MIDAZOLAM HYDROCHLORIDE 2 MG: 1 INJECTION, SOLUTION INTRAMUSCULAR; INTRAVENOUS at 15:09

## 2021-07-08 RX ADMIN — SODIUM CHLORIDE 100 ML/HR: 0.9 INJECTION, SOLUTION INTRAVENOUS at 16:05

## 2021-07-08 RX ADMIN — IOHEXOL 75 ML: 350 INJECTION, SOLUTION INTRAVENOUS at 15:34

## 2021-07-08 RX ADMIN — ASPIRIN 243 MG: 81 TABLET, CHEWABLE ORAL at 10:41

## 2021-07-08 RX ADMIN — HEPARIN SODIUM 4000 UNITS: 1000 INJECTION INTRAVENOUS; SUBCUTANEOUS at 15:23

## 2021-07-08 RX ADMIN — NITROGLYCERIN 200 MCG: 20 INJECTION INTRAVENOUS at 15:23

## 2021-07-08 RX ADMIN — Medication 12.5 MG: at 22:03

## 2021-07-08 RX ADMIN — ASPIRIN 81 MG: 81 TABLET, COATED ORAL at 08:17

## 2021-07-08 RX ADMIN — HEPARIN SODIUM 5000 UNITS: 5000 INJECTION INTRAVENOUS; SUBCUTANEOUS at 06:04

## 2021-07-08 RX ADMIN — MIDAZOLAM HYDROCHLORIDE 1 MG: 1 INJECTION, SOLUTION INTRAMUSCULAR; INTRAVENOUS at 15:20

## 2021-07-08 RX ADMIN — PRAVASTATIN SODIUM 80 MG: 80 TABLET ORAL at 08:16

## 2021-07-08 RX ADMIN — INSULIN LISPRO 2 UNITS: 100 INJECTION, SOLUTION INTRAVENOUS; SUBCUTANEOUS at 00:14

## 2021-07-08 RX ADMIN — VERAPAMIL HYDROCHLORIDE 1.25 MG: 2.5 INJECTION, SOLUTION INTRAVENOUS at 15:23

## 2021-07-08 RX ADMIN — HEPARIN SODIUM 11.1 UNITS/KG/HR: 10000 INJECTION, SOLUTION INTRAVENOUS at 07:28

## 2021-07-08 RX ADMIN — LIDOCAINE HYDROCHLORIDE 0.1 ML: 10 INJECTION, SOLUTION EPIDURAL; INFILTRATION; INTRACAUDAL; PERINEURAL at 15:20

## 2021-07-08 RX ADMIN — FENTANYL CITRATE 50 MCG: 50 INJECTION, SOLUTION INTRAMUSCULAR; INTRAVENOUS at 15:09

## 2021-07-08 RX ADMIN — INSULIN LISPRO 2 UNITS: 100 INJECTION, SOLUTION INTRAVENOUS; SUBCUTANEOUS at 22:02

## 2021-07-08 NOTE — ASSESSMENT & PLAN NOTE
Lab Results   Component Value Date    HGBA1C 9 0 (H) 07/07/2021     · Very uncontrolled diabetes on oral medications  · Would recommend switching patient to long-acting insulin with t i d  Coverage  · For now will cover with sliding scale  · She will need follow-up with endocrinology

## 2021-07-08 NOTE — ASSESSMENT & PLAN NOTE
· Continue statin    · This will likely need to be increased given patient's uncontrolled lipid panel  · Recent lipid panel today showed:  · Cholesterol 210, triglycerides 215 8, , HDL 44

## 2021-07-08 NOTE — H&P
Erin  H&P- Keily Melendez 1955, 61 y o  male MRN: [id number]  Unit/Bed#: ED 26 Encounter: [id number]  Primary Care Provider: Wing Bharti MD   Date and time admitted to hospital: 7/7/2021  7:01 PM    * Elevated troponin  Assessment & Plan   Troponin elevation present on admission   Patient Presentation:  Presents from primary physician office secondary to elevated troponins   Likely etiology:  Unclear etiology at this time, however will rule out ACS given elevated troponin   MEDINA: 3   Initial Troponin:  0 14--0 11  o Trend x3 or to peak   Unclear etiology at this time, however we will rule out NSTEMI verses non MI troponin elevation   Initial EKG:  Nonischemic  o Monitor on telemetry   Pain Control:  Non   Consult cardiology    Npo for possible cath tomorrow      Admit under Observation Status   Check echocardiogram for possible myocarditis given patient's symptoms began shortly after he had COVID in November   Start beta-blocker    Hypertension  Assessment & Plan  · Reviewed and stable  · Will discontinue lisinopril to avoid contrast induced a KI  · Start beta-blocker 25 mg q 12 hours  Type 2 diabetes mellitus ([facility] [address] )  Assessment & Plan    Lab Results   Component Value Date    HGBA1C 9 0 (H) 07/07/2021     · Very uncontrolled diabetes on oral medications  · Would recommend switching patient to long-acting insulin with t i d  Coverage  · For now will cover with sliding scale  · She will need follow-up with endocrinology  Hyperlipidemia  Assessment & Plan  · Continue statin  · This will likely need to be increased given patient's uncontrolled lipid panel  · Recent lipid panel today showed:  · Cholesterol 210, triglycerides 215 8, , HDL 44      VTE Pharmacologic Prophylaxis: VTE Score: 4 Moderate Risk (Score 3-4) - Pharmacological DVT Prophylaxis Ordered: heparin    Code Status: No Order full code  Discussion with family: Patient declined call to   Anticipated Length of Stay: Patient will be admitted on an observation basis with an anticipated length of stay of less than 2 midnights secondary to Chest pain with elevated troponin  Total Time for Visit, including Counseling / Coordination of Care: 70 minutes Greater than 50% of this total time spent on direct patient counseling and coordination of care  Chief Complaint:  My doctor told me to come in    History of Present Illness:  Marce Chan is a 61 y o  male with a PMH of type 2 diabetes with elevated A1c, hyperlipidemia, hypertension who presents with elevated troponin  Patient was sent in by outpatient PCP after he went there for an episode of shortness of breath and chest tightness while he was mowing his grass  Patient states that symptoms of shortness of breath been ongoing since November when he had COVID-19 but was not hospitalized for it  Patient states that his shortness of breath has become increasingly worse and is progressed to slight chest pressure which he 1st noticed a couple days ago while mowing the grass thought that he was out of shape  Does have significant family history for cardiac disease including father was unsure of what  Denies smoking history  Review of Systems:  Review of Systems   Constitutional: Negative for chills, fatigue, fever and unexpected weight change  Respiratory: Positive for shortness of breath  Negative for cough and chest tightness  Cardiovascular: Positive for chest pain  Negative for palpitations and leg swelling  Gastrointestinal: Negative for abdominal pain, diarrhea, nausea and vomiting  Genitourinary: Negative for decreased urine volume, dysuria, frequency and urgency  Musculoskeletal: Negative for arthralgias  Neurological: Negative for dizziness, syncope, light-headedness and headaches  All other systems reviewed and are negative        Past Medical and Surgical History:   Past Medical History:   Diagnosis Date    Diabetes mellitus ([facility] [address] )        History reviewed  No pertinent surgical history  Meds/Allergies:  Prior to Admission medications    Medication Sig Start Date End Date Taking? Authorizing Provider   aspirin (ECOTRIN LOW STRENGTH) 81 mg EC tablet Take 81 mg by mouth daily   Yes Historical Provider, MD   Empagliflozin (Jardiance) 10 MG TABS Take 10 mg by mouth daily    Yes Historical Provider, MD   Exenatide ER (Bydureon BCise) 2 MG/0 85ML AUIJ once a week WEEKLY ON Monday   Yes Historical Provider, MD   lisinopril (ZESTRIL) 10 mg tablet Take 10 mg by mouth daily    Yes Historical Provider, MD   metFORMIN (GLUCOPHAGE) 1000 MG tablet Take 500 mg by mouth 2 (two) times a day with meals    Yes Historical Provider, MD   pravastatin (PRAVACHOL) 20 mg tablet Take 20 mg by mouth daily   Yes Historical Provider, MD   Ascorbic Acid (vitamin C) 1000 MG tablet Take 1 tablet (1,000 mg total) by mouth daily for 14 days 11/29/20 7/7/21  Gian Pappas MD   cholecalciferol (VITAMIN D3) 1,000 units tablet Take 1 tablet (1,000 Units total) by mouth daily for 14 days 11/29/20 7/7/21  Gian Pappas MD   Jardiance 25 MG TABS Take 25 mg by mouth daily 4/11/21 7/7/21  Historical Provider, MD   metFORMIN (GLUCOPHAGE) 500 mg tablet metformin 500 mg tablet  7/7/21  Historical Provider, MD   rosuvastatin (CRESTOR) 10 MG tablet Take 10 mg by mouth  7/7/21  Historical Provider, MD   rosuvastatin (CRESTOR) 20 MG tablet Take 1 tablet by mouth daily at bedtime  7/7/21  Historical Provider, MD   zinc sulfate (ZINCATE) 220 mg capsule Take 1 capsule (220 mg total) by mouth daily for 14 days 11/29/20 7/7/21  Gian Pappas MD     I have reviewed home medications with patient personally      Allergies: No Known Allergies    Social History:  Marital Status:    Occupation: unknown   Patient Pre-hospital Living Situation: Home  Patient Pre-hospital Level of Mobility: walks  Patient Pre-hospital Diet Restrictions: npo Substance Use History:   Social History     Substance and Sexual Activity   Alcohol Use Yes     Social History     Tobacco Use   Smoking Status Current Some Day Smoker    Types: Cigars   Smokeless Tobacco Never Used     Social History     Substance and Sexual Activity   Drug Use Not on file       Family History:  History reviewed  No pertinent family history  Physical Exam:     Vitals:   Blood Pressure: 119/71 (07/07/21 2015)  Pulse: 98 (07/07/21 2015)  Temperature: 98 8 °F (37 1 °C) (07/07/21 1906)  Temp Source: Oral (07/07/21 1906)  Respirations: 18 (07/07/21 2015)  Height: 5' 10 5" (179 1 cm) (07/07/21 2000)  Weight - Scale: 117 kg (259 lb) (07/07/21 2000)  SpO2: 96 % (07/07/21 2015)    Physical Exam  Constitutional:       General: He is not in acute distress  Appearance: He is obese  He is not diaphoretic  HENT:      Head: Normocephalic and atraumatic  Mouth/Throat:      Mouth: Mucous membranes are moist       Pharynx: Oropharynx is clear  No oropharyngeal exudate  Eyes:      General:         Right eye: No discharge  Left eye: No discharge  Conjunctiva/sclera: Conjunctivae normal       Pupils: Pupils are equal, round, and reactive to light  Cardiovascular:      Rate and Rhythm: Normal rate and regular rhythm  Pulses: Normal pulses  Heart sounds: Normal heart sounds  No murmur heard  Pulmonary:      Effort: Pulmonary effort is normal  No respiratory distress  Breath sounds: Normal breath sounds  No wheezing or rales  Abdominal:      General: Abdomen is flat  There is no distension  Palpations: Abdomen is soft  Tenderness: There is no abdominal tenderness  Musculoskeletal:         General: Normal range of motion  Cervical back: Neck supple  No muscular tenderness  Right lower leg: No edema  Left lower leg: No edema  Skin:     General: Skin is warm and dry  Capillary Refill: Capillary refill takes less than 2 seconds  Coloration: Skin is not jaundiced  Neurological:      General: No focal deficit present  Mental Status: He is alert and oriented to person, place, and time  Cranial Nerves: No cranial nerve deficit  Psychiatric:         Mood and Affect: Mood normal          Additional Data:     Lab Results:  Results from last 7 days   Lab Units 07/07/21  1257   WBC Thousand/uL 6 93   HEMOGLOBIN g/dL 14 9   HEMATOCRIT % 44 6   PLATELETS Thousands/uL 235   NEUTROS PCT % 64   LYMPHS PCT % 23   MONOS PCT % 11   EOS PCT % 2     Results from last 7 days   Lab Units 07/07/21  1257   SODIUM mmol/L 137   POTASSIUM mmol/L 4 3   CHLORIDE mmol/L 102   CO2 mmol/L 27   BUN mg/dL 20   CREATININE mg/dL 0 86   ANION GAP mmol/L 8   CALCIUM mg/dL 9 4   ALBUMIN g/dL 4 1   TOTAL BILIRUBIN mg/dL 0 55   ALK PHOS U/L 45 8   ALT U/L 19   AST U/L 14*   GLUCOSE RANDOM mg/dL 191*     Results from last 7 days   Lab Units 07/07/21  1948   INR  0 93         Results from last 7 days   Lab Units 07/07/21  1257   HEMOGLOBIN A1C % 9 0*           Imaging: Reviewed radiology reports from this admission including: chest xray  No orders to display       EKG and Other Studies Reviewed on Admission:   · EKG: NSR  HR 99  T wave inversion in II      ** Please Note: This note has been constructed using a voice recognition system   **

## 2021-07-08 NOTE — CASE MANAGEMENT
CM completed [facility] for ALS transportation, Pt will transfer to [place] today for triple vessel cardio/thoracic surgery

## 2021-07-08 NOTE — ASSESSMENT & PLAN NOTE
 Troponin elevation present on admission   Patient Presentation:  Presents from primary physician office secondary to elevated troponins   Likely etiology:  Unclear etiology at this time, however will rule out ACS given elevated troponin   MEDINA: 3   Initial Troponin:  0 14--0 11  o Trend x3 or to peak   Unclear etiology at this time, however we will rule out NSTEMI verses non MI troponin elevation   Initial EKG:  Nonischemic  o Monitor on telemetry   Pain Control:  Non   Consult cardiology    Npo for possible cath tomorrow      Admit under Observation Status     Check echocardiogram for possible myocarditis given patient's symptoms began shortly after he had COVID in November   Start beta-blocker

## 2021-07-08 NOTE — PLAN OF CARE
Problem: Potential for Falls  Goal: Patient will remain free of falls  Description: INTERVENTIONS:  - Educate patient/family on patient safety including physical limitations  - Instruct patient to call for assistance with activity   - Consult OT/PT to assist with strengthening/mobility   - Keep Call bell within reach  - Keep bed low and locked with side rails adjusted as appropriate  - Keep care items and personal belongings within reach  - Apply yellow socks and bracelet for high fall risk patients  - Consider moving patient to room near nurses station  Outcome: Progressing     Problem: PAIN - ADULT  Goal: Verbalizes/displays adequate comfort level or baseline comfort level  Description: Interventions:  - Encourage patient to monitor pain and request assistance  - Assess pain using appropriate pain scale  - Administer analgesics based on type and severity of pain and evaluate response  - Implement non-pharmacological measures as appropriate and evaluate response  - Consider cultural and social influences on pain and pain management  - Notify physician/advanced practitioner if interventions unsuccessful or patient reports new pain  Outcome: Progressing     Problem: INFECTION - ADULT  Goal: Absence or prevention of progression during hospitalization  Description: INTERVENTIONS:  - Assess and monitor for signs and symptoms of infection  - Monitor lab/diagnostic results  - Monitor all insertion sites, i e  indwelling lines, tubes, and drains  - Monitor endotracheal if appropriate and nasal secretions for changes in amount and color  - Wells appropriate cooling/warming therapies per order  - Administer medications as ordered  - Instruct and encourage patient and family to use good hand hygiene technique  - Identify and instruct in appropriate isolation precautions for identified infection/condition  Outcome: Progressing  Goal: Absence of fever/infection during neutropenic period  Description: INTERVENTIONS:  - Monitor WBC    Outcome: Progressing     Problem: SAFETY ADULT  Goal: Patient will remain free of falls  Description: INTERVENTIONS:  - Educate patient/family on patient safety including physical limitations  - Instruct patient to call for assistance with activity   - Consult OT/PT to assist with strengthening/mobility   - Keep Call bell within reach  - Keep bed low and locked with side rails adjusted as appropriate  - Keep care items and personal belongings within reach  - Initiate and maintain comfort rounds  - Make Fall Risk Sign visible to staff  - Apply yellow socks and bracelet for high fall risk patients  - Consider moving patient to room near nurses station  Outcome: Progressing  Goal: Maintain or return to baseline ADL function  Description: INTERVENTIONS:  -  Assess patient's ability to carry out ADLs; assess patient's baseline for ADL function and identify physical deficits which impact ability to perform ADLs (bathing, care of mouth/teeth, toileting, grooming, dressing, etc )  - Assess/evaluate cause of self-care deficits   - Assess range of motion  - Assess patient's mobility; develop plan if impaired  - Assess patient's need for assistive devices and provide as appropriate  - Encourage maximum independence but intervene and supervise when necessary  - Involve family in performance of ADLs  - Assess for home care needs following discharge   - Consider OT consult to assist with ADL evaluation and planning for discharge  - Provide patient education as appropriate  Outcome: Progressing  Goal: Maintains/Returns to pre admission functional level  Description: INTERVENTIONS:  - Perform BMAT or MOVE assessment daily    - Set and communicate daily mobility goal to care team and patient/family/caregiver     - Record patient progress and toleration of activity level   Outcome: Progressing     Problem: DISCHARGE PLANNING  Goal: Discharge to home or other facility with appropriate resources  Description: INTERVENTIONS:  - Identify barriers to discharge w/patient and caregiver  - Arrange for needed discharge resources and transportation as appropriate  - Identify discharge learning needs (meds, wound care, etc )  - Arrange for interpretive services to assist at discharge as needed  - Refer to Case Management Department for coordinating discharge planning if the patient needs post-hospital services based on physician/advanced practitioner order or complex needs related to functional status, cognitive ability, or social support system  Outcome: Progressing     Problem: Knowledge Deficit  Goal: Patient/family/caregiver demonstrates understanding of disease process, treatment plan, medications, and discharge instructions  Description: Complete learning assessment and assess knowledge base    Interventions:  - Provide teaching at level of understanding  - Provide teaching via preferred learning methods  Outcome: Progressing

## 2021-07-08 NOTE — ASSESSMENT & PLAN NOTE
· Reviewed and stable  · Will discontinue lisinopril to avoid contrast induced a KI  · Start beta-blocker 25 mg q 12 hours

## 2021-07-08 NOTE — UTILIZATION REVIEW
Initial Clinical Review    Admission: Date/Time/Statement:   Admission Orders (From admission, onward)     Ordered        07/07/21 2038  Place in Observation  Once                   Orders Placed This Encounter   Procedures    Place in Observation     Standing Status:   Standing     Number of Occurrences:   1     Order Specific Question:   Level of Care     Answer:   Med Surg [16]     ED Arrival Information     Expected Arrival Acuity    - 7/7/2021 18:57 Emergent         Means of arrival Escorted by Service Admission type    [facility] Emergency         Arrival complaint    abnormal labs        Chief Complaint   Patient presents with    Chest Pain     Patient sent from PCP for abnormal troponin  Pt c/o chest pain and sob since last week       Initial Presentation: 61 y o  male with a PMH of type 2 diabetes with elevated A1c, hyperlipidemia, hypertension who presents to ED from PCP with elevated troponin  Patient was sent in by outpatient PCP after he went there for an episode of shortness of breath and chest tightness while he was mowing his grass  Pt has had SOB since COVID in Nov 2020, it has become worse and progressed to chest pressure which was first notes a few days ago  On exam, pt has normal heart and lung sounds  Labs show elevated troponin of 0 17  Initial EKG:  Nonischemic  Pt admitted as OBS to telemetry with elevated troponin- of unclear etiology, r/o NSTEMI vs non MI elevation  Plan -telemetry, trend troponin, cardiology consult, NPO for possible cardiac cath tomorrow, echo, start Beta blocker, d/c Lisinopril, continue statin at increased dosage from 29 mg to 80 mg pravastatin  Date: 7/8   Day 2:   Medicine-NSTEMI-  Continue telemetry monitoring   Trending troponin- 0 17-->0 26 with latest troponin 0 23  Pt NPO for cath today by cardiology  , f/u echo   No SOB or chest pain    Cardiology-Likely NSTEMI - given elevated cardiac enzymes, ECG changes, and exertional symptoms, concerning for myocardial ischemia  Pt for cardiac cath today  7/8 post cath- Plan: transfer to [facility] for surgical consultation-severe 3 vessel disease    ED Triage Vitals   Temperature Pulse Respirations Blood Pressure SpO2   07/07/21 1906 07/07/21 1906 07/07/21 1906 07/07/21 1903 07/07/21 1906   98 8 °F (37 1 °C) 105 18 142/99 100 %      Temp Source Heart Rate Source Patient Position - Orthostatic VS BP Location FiO2 (%)   07/07/21 1906 07/07/21 1906 07/07/21 1903 07/07/21 1903 --   Oral Monitor Lying Right arm       Pain Score       07/07/21 2000 1          Wt Readings from Last 1 Encounters:   07/07/21 112 kg (246 lb 0 5 oz)     Additional Vital Signs:   Date/Time  Temp  Pulse  Resp  BP  MAP (mmHg)  SpO2    07/08/21 0738  98 4 °F (36 9 °C)  60  16  96/58  --  96 %    07/07/21 2156  98 6 °F (37 °C)  69  18  117/63  83  95 %    07/07/21 2120  --  92  --  127/79  --  97 %    07/07/21 2015  --  98  18  119/71  89  96 %    07/07/21 2000  --  99  18  129/86  98  96 %        Pertinent Labs/Diagnostic Test Results:   7/7  CXR-No acute cardiopulmonary disease  ECG-Normal sinus rhythm  Left axis deviation  Incomplete right bundle branch block  Anterior infarct , age undetermined  Abnormal ECG  7/8 echo-  LEFT VENTRICLE:  Size was normal   Systolic function was at the lower limits of normal  Ejection fraction was estimated to be 50 %  There was mild hypokinesis of the basal-mid inferior and inferolateral walls  Doppler parameters were consistent with abnormal left ventricular relaxation (grade 1 diastolic dysfunction)    RIGHT VENTRICLE:  The size was normal   Systolic function was normal    LEFT ATRIUM:  The atrium was mildly dilated    MITRAL VALVE:  There was trace to mild regurgitation  7/8  Cardiac cath-Summary:  Severe 3 vessel CAD  CORONARY VESSELS:   --  Left main: Normal   --  LAD: The vessel was normal sized  There was a 70% stenosis in the proximal vessel    --  Circumflex: The vessel was normal sized and gave rise to one major OM branch  There was a 95% proximal stenosis of the large OM branch  --  RCA: The vessel was normal sized and dominant, giving rise to the PDA and several posterolateral branches  There was a complex eccentric lesion in mid vessel with thrombus  There is MEDINA 3 flow into the distal vessel, but MEDINA 1 flow into the PDA  There was collateral flow to the PDA from the left system        Results from last 7 days   Lab Units 07/08/21  0601 07/08/21  0002 07/07/21  1257   WBC Thousand/uL 6 65  --  6 93   HEMOGLOBIN g/dL 13 6  --  14 9   HEMATOCRIT % 41 8  --  44 6   PLATELETS Thousands/uL 193 187 235   NEUTROS ABS Thousands/µL  --   --  4 38         Results from last 7 days   Lab Units 07/08/21  0601 07/07/21  1257   SODIUM mmol/L 140 137   POTASSIUM mmol/L 3 8 4 3   CHLORIDE mmol/L 107 102   CO2 mmol/L 28 27   ANION GAP mmol/L 5 8   BUN mg/dL 18 20   CREATININE mg/dL 0 87 0 86   EGFR ml/min/1 73sq m 91 92   CALCIUM mg/dL 8 4 9 4     Results from last 7 days   Lab Units 07/07/21  1257   AST U/L 14*   ALT U/L 19   ALK PHOS U/L 45 8   TOTAL PROTEIN g/dL 7 1   ALBUMIN g/dL 4 1   TOTAL BILIRUBIN mg/dL 0 55     Results from last 7 days   Lab Units 07/08/21  0603 07/08/21  0001 07/07/21  2153   POC GLUCOSE mg/dl 148* 203* 190*     Results from last 7 days   Lab Units 07/08/21  0601 07/07/21  1257   GLUCOSE RANDOM mg/dL 154* 191*         Results from last 7 days   Lab Units 07/07/21  1257   HEMOGLOBIN A1C % 9 0*   EAG mg/dl 212       Results from last 7 days   Lab Units 07/08/21  0601 07/08/21  0306 07/08/21  0002 07/07/21  1948 07/07/21  1257   TROPONIN I ng/mL 0 23* 0 26* 0 18* 0 11* 0 17*     Results from last 7 days   Lab Units 07/07/21 1948   D-DIMER QUANTITATIVE ug/ml FEU 0 33     Results from last 7 days   Lab Units 07/08/21  0710 07/07/21 1948   PROTIME seconds  --  12 6   INR   --  0 93   PTT seconds 25 24     Results from last 7 days   Lab Units 07/07/21  1257   TSH 3RD GENERATON uIU/mL 2 490                     Results from last 7 days   Lab Units 07/07/21  1257   BNP pg/mL 31 8             ED Treatment:   Medication Administration from 07/07/2021 1857 to 07/07/2021 2140       Date/Time Order Dose Route Action     07/07/2021 1959 aspirin chewable tablet 324 mg 324 mg Oral Given        Past Medical History:   Diagnosis Date    Diabetes mellitus ([facility] [address] )      Present on Admission:   NSTEMI      Admitting Diagnosis: Chest pain [R07 9]  Elevated troponin [R77 8]  Abnormal laboratory test result [R89 9]  Age/Sex: 61 y o  male  Admission Orders:  Scheduled Medications:  aspirin, 81 mg, Oral, Daily  insulin lispro, 1-5 Units, Subcutaneous, HS  insulin lispro, 1-6 Units, Subcutaneous, Q6H JAIMEE  metoprolol tartrate, 25 mg, Oral, Q12H [address]  nicotine, 1 patch, Transdermal, Daily  pravastatin, 80 mg, Oral, Daily      Continuous IV Infusions:  heparin (porcine), 3-20 Units/kg/hr (Order-Specific), Intravenous, Titrated      PRN Meds:  nitroglycerin, 0 4 mg, Sublingual, Q5 Min PRN    poct glucose q6h  Telemetry  OOb as tolerates  NPO     IP CONSULT TO CARDIOLOGY    Network Utilization Review Department  ATTENTION: Please call with any questions or concerns to [phone] and carefully listen to the prompts so that you are directed to the right person  All voicemails are confidential   Lakisha Query all requests for admission clinical reviews, approved or denied determinations and any other requests to dedicated fax number below belonging to the campus where the patient is receiving treatment   List of dedicated fax numbers for the Facilities:  [address] DENIALS (Administrative/Medical Necessity) [phone]   [address] (Maternity/NICU/Pediatrics) 261 [facility] [place] [address] [address] [address] [place] [address] Melissa Schmidt 1481 [address] [phone]

## 2021-07-08 NOTE — ASSESSMENT & PLAN NOTE
Lab Results   Component Value Date    HGBA1C 9 0 (H) 07/07/2021     · Very uncontrolled diabetes on oral medications  · Would recommend switching patient to long-acting insulin with t i d  Coverage  · For now cover with sliding scale  · Will need follow-up with endocrinology

## 2021-07-08 NOTE — ASSESSMENT & PLAN NOTE
POA, chest pain with elevated troponin     MEDINA: 3   EKG, no ischemic changes   Monitor on telemetry   Continue to trend troponin   Follow-up on echo   Started beta-blocker on admission  Samreen Soriano Cardiology consulted for possible catheterization    Patient is currently NPO

## 2021-07-08 NOTE — UTILIZATION REVIEW
Observation Admission Authorization Request   NOTIFICATION OF OBSERVATION ADMISSION/OBSERVATION AUTHORIZATION REQUEST   SERVICING FACILITY:   [address]  Tax ID: [id number]  NPI: [id number]  Place of Service: On [facility] Code: 22  CPT Code for Observation: CPT   CPT 29685     ATTENDING PROVIDER:  Attending Name and NPI#: Netta Jansen Md [[id number]]  Address: [address]  Phone: [phone]     UTILIZATION REVIEW CONTACT:  [facility] Utilization   Network Utilization Review Department  Phone: [phone]  Fax: [phone]  Email: [email or web address]     PHYSICIAN ADVISORY SERVICES:  FOR QOLI-ZL-HEXC REVIEW - MEDICAL NECESSITY DENIAL  Phone: [phone]  Fax: [phone]  Email: [email or web address]     TYPE OF REQUEST:  Observation  Status     ADMISSION INFORMATION:  ADMISSION DATE/TIME:   PATIENT DIAGNOSIS CODE/DESCRIPTION:  Chest pain [R07 9]  Elevated troponin [R77 8]  Abnormal laboratory test result [R89 9]  DISCHARGE DATE/TIME: No discharge date for patient encounter  DISCHARGE DISPOSITION (IF DISCHARGED): Home/Self Care     IMPORTANT INFORMATION:  Please contact the [facility] directly with any questions or concerns regarding this request  Department voicemails are confidential     Send requests for admission clinical reviews, concurrent reviews, approvals, and administrative denials due to lack of clinical to fax [phone]

## 2021-07-08 NOTE — CONSULTS
Consultation - Cardiology Team One  Quita Rizvi 61 y o  male MRN: [id number]  Unit/Bed#: S -01 Encounter: [id number]    Inpatient consult to Cardiology  Consult performed by: SANDRA Domínguez  Consult ordered by: Matteo Vilchis PA-C      Physician Requesting Consult: Marilu Barrientos MD  Reason for Consult / Principal Problem: chest pain, elevated troponin    Assessment/ Plan    1  Chest pain with elevated troponin concerning for NSTEMI  Exertional SOB for a few months, developed chest pain and dyspnea while mowing grass Monday  Troponin peak 0 26  ECG-NSR with inferior TWI   Continue IV heparin  Continue ASA and increased statin dose  Decrease metoprolol to 12 5 mg BID due to bradycardia  Check echocardiogram  Cardiac catheterization later today    2  HTN- controlled, average /76  Home lisinopril on hold pre cardiac cath  3  HLD- , , HDL 44,   On pravastatin 20 mg at home, agree with increasing to pravastatin 80 mg     4  Type 2 DM- poorly controlled, A1c 9 0%  Defer management to primary team      History of Present Illness   HPI: Quita Rizvi is a 61y o  year old male with HTN, hyperlipidemia, and type 2 diabetes who presented to the ED yesterday after being sent in from his PCP's office due to complaints of exertional chest pain and shortness of breath  He reports exertional shortness of breath over the past few months  He thought this was deconditioning or possibly related to delayed symptoms from COVID infection in November  On Monday, he developed burning chest pain and significant dyspnea while mowing the grass so he became more concerned  PCP saw him and ordered a troponin which was elevated so he was sent to the ED  Initial troponin 0 17 which peaked at 0 26  ECG shows NSR with nonspecific T wave abnormality which is more pronounced in inferior leads on repeat ECG  He was given 324 mg ASA and started on IV heparin    Cardiology has been consulted for further evaluation and management of possible NSTEMI  EKG reviewed personally:  NSR with inferior TWI    Telemetry reviewed personally: NSR, occasional sinus bradycardia     Review of Systems   Constitutional: Positive for malaise/fatigue  Negative for chills, diaphoresis and weight gain  Cardiovascular: Positive for chest pain and dyspnea on exertion  Negative for leg swelling, orthopnea, palpitations and syncope  Respiratory: Negative for cough, sleep disturbances due to breathing and sputum production  Gastrointestinal: Negative for bloating, nausea and vomiting  Neurological: Negative for dizziness, light-headedness and weakness  Psychiatric/Behavioral: Negative for altered mental status  All other systems reviewed and are negative  Historical Information   Past Medical History:   Diagnosis Date    Diabetes mellitus ([facility] [address] )      History reviewed  No pertinent surgical history  Social History     Substance and Sexual Activity   Alcohol Use Yes     Social History     Substance and Sexual Activity   Drug Use Not on file     Social History     Tobacco Use   Smoking Status Current Some Day Smoker    Types: Cigars   Smokeless Tobacco Never Used     Family History: History reviewed  No pertinent family history      Meds/Allergies   all current active meds have been reviewed and current meds:   Current Facility-Administered Medications   Medication Dose Route Frequency    aspirin (ECOTRIN LOW STRENGTH) EC tablet 81 mg  81 mg Oral Daily    heparin (porcine) 25,000 units in 0 45% NaCl 250 mL infusion (premix)  3-20 Units/kg/hr (Order-Specific) Intravenous Titrated    insulin lispro (HumaLOG) 100 units/mL subcutaneous injection 1-5 Units  1-5 Units Subcutaneous HS    insulin lispro (HumaLOG) 100 units/mL subcutaneous injection 1-6 Units  1-6 Units Subcutaneous Q6H [address]    metoprolol tartrate (LOPRESSOR) tablet 25 mg  25 mg Oral Q12H [address]    nicotine (NICODERM CQ) 7 mg/24hr TD 24 hr patch 1 patch  1 patch Transdermal Daily    nitroglycerin (NITROSTAT) SL tablet 0 4 mg  0 4 mg Sublingual Q5 Min PRN    pravastatin (PRAVACHOL) tablet 80 mg  80 mg Oral Daily     heparin (porcine), 3-20 Units/kg/hr (Order-Specific), Last Rate: 11 1 Units/kg/hr (07/08/21 0728)      No Known Allergies    Objective   Vitals: Blood pressure 96/58, pulse 60, temperature 98 4 °F (36 9 °C), temperature source Oral, resp  rate 16, height 5' 10 5" (1 791 m), weight 112 kg (246 lb 0 5 oz), SpO2 96 %  , Body mass index is 34 8 kg/m²  ,     Systolic (62VDC), PDM:740 , Min:96 , MSE:771     Diastolic (14IOE), JYQ:92, Min:58, Max:99    Intake/Output Summary (Last 24 hours) at 7/8/2021 0821  Last data filed at 7/8/2021 0738  Gross per 24 hour   Intake 0 ml   Output 0 ml   Net 0 ml     Wt Readings from Last 3 Encounters:   07/07/21 112 kg (246 lb 0 5 oz)   01/31/21 113 kg (249 lb 9 oz)     Invasive Devices     Peripheral Intravenous Line            Peripheral IV 07/07/21 Left Arm <1 day              Physical Exam  Vitals reviewed  Constitutional:       General: He is not in acute distress  Appearance: Normal appearance  Neck:      Vascular: No hepatojugular reflux or JVD  Cardiovascular:      Rate and Rhythm: Regular rhythm  Bradycardia present  Pulses: Normal pulses  Heart sounds: Normal heart sounds  No murmur heard  No friction rub  No gallop  Pulmonary:      Effort: Pulmonary effort is normal  No respiratory distress  Breath sounds: No rales  Comments: Clear, room air  Abdominal:      General: Bowel sounds are normal  There is no distension  Palpations: Abdomen is soft  Tenderness: There is no abdominal tenderness  Musculoskeletal:         General: No tenderness  Normal range of motion  Cervical back: Neck supple  Right lower leg: No edema  Left lower leg: No edema  Skin:     General: Skin is warm and dry  Capillary Refill: Capillary refill takes less than 2 seconds  Findings: No erythema  Neurological:      Mental Status: He is alert and oriented to person, place, and time  Psychiatric:         Mood and Affect: Mood normal      LABORATORY RESULTS:  Results from last 7 days   Lab Units 07/08/21  0601 07/08/21  0306 07/08/21  0002   TROPONIN I ng/mL 0 23* 0 26* 0 18*     CBC with diff:   Results from last 7 days   Lab Units 07/08/21  0601 07/08/21  0002 07/07/21  1257   WBC Thousand/uL 6 65  --  6 93   HEMOGLOBIN g/dL 13 6  --  14 9   HEMATOCRIT % 41 8  --  44 6   MCV fL 102*  --  98   PLATELETS Thousands/uL 193 187 235   MCH pg 33 2  --  32 7   MCHC g/dL 32 5  --  33 4   RDW % 12 2  --  12 6   MPV fL 9 4 9 5 9 5     CMP:  Results from last 7 days   Lab Units 07/08/21  0601 07/07/21  1257   POTASSIUM mmol/L 3 8 4 3   CHLORIDE mmol/L 107 102   CO2 mmol/L 28 27   BUN mg/dL 18 20   CREATININE mg/dL 0 87 0 86   CALCIUM mg/dL 8 4 9 4   AST U/L  --  14*   ALT U/L  --  19   ALK PHOS U/L  --  45 8   EGFR ml/min/1 73sq m 91 92     BMP:  Results from last 7 days   Lab Units 07/08/21  0601 07/07/21  1257   POTASSIUM mmol/L 3 8 4 3   CHLORIDE mmol/L 107 102   CO2 mmol/L 28 27   BUN mg/dL 18 20   CREATININE mg/dL 0 87 0 86   CALCIUM mg/dL 8 4 9 4     Lab Results   Component Value Date    CREATININE 0 87 07/08/2021    CREATININE 0 86 07/07/2021    CREATININE 0 88 11/29/2020     Lab Results   Component Value Date    NTBNP 20 11/29/2020     Results from last 7 days   Lab Units 07/07/21  1257   HEMOGLOBIN A1C % 9 0*     Results from last 7 days   Lab Units 07/07/21  1257   TSH 3RD GENERATON uIU/mL 2 490       Results from last 7 days   Lab Units 07/07/21  1948   INR  0 93     Lipid Profile:   No results found for: CHOL  Lab Results   Component Value Date    HDL 44 07/07/2021     Lab Results   Component Value Date    LDLCALC 123 (H) 07/07/2021     Lab Results   Component Value Date    TRIG 215 8 (H) 07/07/2021     Imaging: I have personally reviewed pertinent reports     and I have personally reviewed pertinent films in PACS    Counseling / Coordination of Care  Total floor / unit time spent today 45 minutes  Greater than 50% of total time was spent with the patient and / or family counseling and / or coordination of care  A description of the counseling / coordination of care: Review of history, current assessment, development of a plan  Code Status: Level 1 - Full Code    ** Please Note: Dragon 360 Dictation voice to text software may have been used in the creation of this document   **

## 2021-07-08 NOTE — ASSESSMENT & PLAN NOTE
· Reviewed and stable  · discontinued lisinopril to avoid contrast induced VALENTINE  · Start beta-blocker 25 mg q 12 hours    · Monitor blood pressure

## 2021-07-08 NOTE — PLAN OF CARE
Problem: Potential for Falls  Goal: Patient will remain free of falls  Description: INTERVENTIONS:  - Educate patient/family on patient safety including physical limitations  - Instruct patient to call for assistance with activity   - Consult OT/PT to assist with strengthening/mobility   - Keep Call bell within reach  - Keep bed low and locked with side rails adjusted as appropriate  - Keep care items and personal belongings within reach  - Initiate and maintain comfort rounds  - Make Fall Risk Sign visible to staff  - Offer Toileting every  Hours, in advance of need  - Initiate/Maintain alarm  - Obtain necessary fall risk management equipment:   - Apply yellow socks and bracelet for high fall risk patients  - Consider moving patient to room near nurses station  Outcome: Progressing     Problem: PAIN - ADULT  Goal: Verbalizes/displays adequate comfort level or baseline comfort level  Description: Interventions:  - Encourage patient to monitor pain and request assistance  - Assess pain using appropriate pain scale  - Administer analgesics based on type and severity of pain and evaluate response  - Implement non-pharmacological measures as appropriate and evaluate response  - Consider cultural and social influences on pain and pain management  - Notify physician/advanced practitioner if interventions unsuccessful or patient reports new pain  Outcome: Progressing     Problem: INFECTION - ADULT  Goal: Absence or prevention of progression during hospitalization  Description: INTERVENTIONS:  - Assess and monitor for signs and symptoms of infection  - Monitor lab/diagnostic results  - Monitor all insertion sites, i e  indwelling lines, tubes, and drains  - Monitor endotracheal if appropriate and nasal secretions for changes in amount and color  - S[place] appropriate cooling/warming therapies per order  - Administer medications as ordered  - Instruct and encourage patient and family to use good hand hygiene technique  - Identify and instruct in appropriate isolation precautions for identified infection/condition  Outcome: Progressing  Goal: Absence of fever/infection during neutropenic period  Description: INTERVENTIONS:  - Monitor WBC    Outcome: Progressing     Problem: SAFETY ADULT  Goal: Patient will remain free of falls  Description: INTERVENTIONS:  - Educate patient/family on patient safety including physical limitations  - Instruct patient to call for assistance with activity   - Consult OT/PT to assist with strengthening/mobility   - Keep Call bell within reach  - Keep bed low and locked with side rails adjusted as appropriate  - Keep care items and personal belongings within reach  - Initiate and maintain comfort rounds  - Make Fall Risk Sign visible to staff  - Offer Toileting every  Hours, in advance of need  - Initiate/Maintain alarm  - Obtain necessary fall risk management equipment:   - Apply yellow socks and bracelet for high fall risk patients  - Consider moving patient to room near nurses station  Outcome: Progressing  Goal: Maintain or return to baseline ADL function  Description: INTERVENTIONS:  -  Assess patient's ability to carry out ADLs; assess patient's baseline for ADL function and identify physical deficits which impact ability to perform ADLs (bathing, care of mouth/teeth, toileting, grooming, dressing, etc )  - Assess/evaluate cause of self-care deficits   - Assess range of motion  - Assess patient's mobility; develop plan if impaired  - Assess patient's need for assistive devices and provide as appropriate  - Encourage maximum independence but intervene and supervise when necessary  - Involve family in performance of ADLs  - Assess for home care needs following discharge   - Consider OT consult to assist with ADL evaluation and planning for discharge  - Provide patient education as appropriate  Outcome: Progressing  Goal: Maintains/Returns to pre admission functional level  Description: INTERVENTIONS:  - Perform BMAT or MOVE assessment daily    - Set and communicate daily mobility goal to care team and patient/family/caregiver  - Collaborate with rehabilitation services on mobility goals if consulted  - Perform Range of Motion  times a day  - Reposition patient every  hours  - Dangle patient  times a day  - Stand patient  times a day  - Ambulate patient  times a day  - Out of bed to chair  times a day   - Out of bed for meals times a day  - Out of bed for toileting  - Record patient progress and toleration of activity level   Outcome: Progressing     Problem: DISCHARGE PLANNING  Goal: Discharge to home or other facility with appropriate resources  Description: INTERVENTIONS:  - Identify barriers to discharge w/patient and caregiver  - Arrange for needed discharge resources and transportation as appropriate  - Identify discharge learning needs (meds, wound care, etc )  - Arrange for interpretive services to assist at discharge as needed  - Refer to Case Management Department for coordinating discharge planning if the patient needs post-hospital services based on physician/advanced practitioner order or complex needs related to functional status, cognitive ability, or social support system  Outcome: Progressing     Problem: Knowledge Deficit  Goal: Patient/family/caregiver demonstrates understanding of disease process, treatment plan, medications, and discharge instructions  Description: Complete learning assessment and assess knowledge base    Interventions:  - Provide teaching at level of understanding  - Provide teaching via preferred learning methods  Outcome: Progressing

## 2021-07-08 NOTE — PROGRESS NOTES
[facility]  Progress Note - Moira Thornton 1955, 61 y o  male MRN: [id number]  Unit/Bed#: S -01 Encounter: [id number]  Primary Care Provider: Maci Aquino MD   Date and time admitted to hospital: 2021  7:01 PM    * NSTEMI  Assessment & Plan  POA, chest pain with elevated troponin     MEDINA: 3   EKG, no ischemic changes   Monitor on telemetry   Continue to trend troponin   Follow-up on echo   Started beta-blocker on admission  [facility] consulted for possible catheterization  Patient is currently NPO    Hypertension  Assessment & Plan  · Reviewed and stable  · discontinued lisinopril to avoid contrast induced VALENTINE  · Start beta-blocker 25 mg q 12 hours  · Monitor blood pressure    Type 2 diabetes mellitus ([facility] [address] )  Assessment & Plan    Lab Results   Component Value Date    HGBA1C 9 0 (H) 2021     · Very uncontrolled diabetes on oral medications  · Would recommend switching patient to long-acting insulin with t i d  Coverage  · For now cover with sliding scale  · Will need follow-up with endocrinology  Hyperlipidemia  Assessment & Plan  · Patient on pravastatin 20 mg, increased to 80 mg on admission        VTE Pharmacologic Prophylaxis:   VTE Score: 4 Moderate Risk (Score 3-4) - Pharmacological DVT Prophylaxis Ordered: Heparin Drip  Mechanical VTE Prophylaxis in Place: Yes    Patient Centered Rounds: Updated nursing on plan    Discussions with Specialists or Other Care Team Provider:  Nursing    Education and Discussions with Family / Patient: Patient declined call to   Current Length of Stay: 0 day(s)    Current Patient Status: Observation     Discharge Plan / Estimated Discharge Date: Anticipate discharge tomorrow to home      Code Status: Level 1 - Full Code      Subjective:   Patient denies any complaints     Objective:     Vitals:   Temp (24hrs), Av 6 °F (37 °C), Min:98 4 °F (36 9 °C), Max:98 8 °F (37 1 °C)    Temp:  [98 4 °F (36 9 °C)-98 8 °F (37 1 °C)] 98 4 °F (36 9 °C)  HR:  [] 60  Resp:  [16-18] 16  BP: ()/(58-99) 96/58  SpO2:  [95 %-100 %] 96 %  Body mass index is 34 8 kg/m²  Input and Output Summary (last 24 hours): Intake/Output Summary (Last 24 hours) at 7/8/2021 0839  Last data filed at 7/8/2021 0738  Gross per 24 hour   Intake 0 ml   Output 0 ml   Net 0 ml       Physical Exam:     Physical Exam  Vitals and nursing note reviewed  Constitutional:       General: He is not in acute distress  Appearance: He is not ill-appearing or diaphoretic  HENT:      Head: Normocephalic and atraumatic  Mouth/Throat:      Mouth: Mucous membranes are moist       Pharynx: Oropharynx is clear  Eyes:      General: No scleral icterus  Extraocular Movements: Extraocular movements intact  Conjunctiva/sclera: Conjunctivae normal    Cardiovascular:      Rate and Rhythm: Normal rate and regular rhythm  Heart sounds: No murmur heard  Pulmonary:      Effort: Pulmonary effort is normal  No respiratory distress  Breath sounds: Normal breath sounds  No wheezing or rales  Abdominal:      General: Bowel sounds are normal       Palpations: Abdomen is soft  Tenderness: There is no abdominal tenderness  Musculoskeletal:      Cervical back: Normal range of motion and neck supple  Right lower leg: No edema  Left lower leg: No edema  Skin:     General: Skin is warm and dry  Capillary Refill: Capillary refill takes less than 2 seconds  Neurological:      General: No focal deficit present  Mental Status: He is alert and oriented to person, place, and time     Psychiatric:         Mood and Affect: Mood normal          Behavior: Behavior normal           Additional Data:     Labs:  Results from last 7 days   Lab Units 07/08/21  0601 07/07/21  1257   WBC Thousand/uL 6 65 6 93   HEMOGLOBIN g/dL 13 6 14 9   HEMATOCRIT % 41 8 44 6   PLATELETS Thousands/uL 193 235   NEUTROS PCT %  --  64 LYMPHS PCT %  --  23   MONOS PCT %  --  11   EOS PCT %  --  2     Results from last 7 days   Lab Units 07/08/21  0601 07/07/21  1257   SODIUM mmol/L 140 137   POTASSIUM mmol/L 3 8 4 3   CHLORIDE mmol/L 107 102   CO2 mmol/L 28 27   BUN mg/dL 18 20   CREATININE mg/dL 0 87 0 86   ANION GAP mmol/L 5 8   CALCIUM mg/dL 8 4 9 4   ALBUMIN g/dL  --  4 1   TOTAL BILIRUBIN mg/dL  --  0 55   ALK PHOS U/L  --  45 8   ALT U/L  --  19   AST U/L  --  14*   GLUCOSE RANDOM mg/dL 154* 191*     Results from last 7 days   Lab Units 07/07/21  1948   INR  0 93     Results from last 7 days   Lab Units 07/08/21  0603 07/08/21  0001 07/07/21  2153   POC GLUCOSE mg/dl 148* 203* 190*     Results from last 7 days   Lab Units 07/07/21  1257   HEMOGLOBIN A1C % 9 0*           Imaging: Reviewed radiology reports from this admission including: xray(s)    Recent Cultures (last 7 days):           Lines/Drains:  Invasive Devices     Peripheral Intravenous Line            Peripheral IV 07/07/21 Left Arm <1 day                Telemetry:   Telemetry Orders (From admission, onward)             48 Hour Telemetry Monitoring  Continuous x 48 hours     Question:  Reason for 48 Hour Telemetry  Answer:  Acute MI, chest pain - R/O MI, or unstable angina                    Last 24 Hours Medication List:   Current Facility-Administered Medications   Medication Dose Route Frequency Provider Last Rate    aspirin  81 mg Oral Daily Alton Patiño PA-C      heparin (porcine)  3-20 Units/kg/hr (Order-Specific) Intravenous Titrated Avani Howard MD 11 1 Units/kg/hr (07/08/21 0728)    insulin lispro  1-5 Units Subcutaneous HS Alton Patiño PA-C      insulin lispro  1-6 Units Subcutaneous Q6H [facility] Alton Patiño PA-C      metoprolol tartrate  25 mg Oral Q12H [facility] Alton Patiño PA-C      nicotine  1 patch Transdermal Daily Alton Day PA-C      nitroglycerin  0 4 mg Sublingual Q5 Min PRN Alton Patiño PA-C      pravastatin  80 mg Oral Daily Eyad Parikh BESSIE Patiño          Today, Patient Was Seen By: Geovanna Ott MD    ** Please Note: This note has been constructed using a voice recognition system   **

## 2021-07-09 ENCOUNTER — HOSPITAL ENCOUNTER (INPATIENT)
Facility: HOSPITAL | Age: 66
LOS: 11 days | Discharge: HOME HEALTH | DRG: 235 | End: 2021-07-20
Attending: INTERNAL MEDICINE | Admitting: INTERNAL MEDICINE
Payer: COMMERCIAL

## 2021-07-09 VITALS
OXYGEN SATURATION: 98 % | BODY MASS INDEX: 34.44 KG/M2 | HEIGHT: 71 IN | WEIGHT: 246.03 LBS | TEMPERATURE: 98.3 F | HEART RATE: 65 BPM | RESPIRATION RATE: 18 BRPM | DIASTOLIC BLOOD PRESSURE: 67 MMHG | SYSTOLIC BLOOD PRESSURE: 120 MMHG

## 2021-07-09 DIAGNOSIS — E11.9 TYPE 2 DIABETES MELLITUS (HCC): ICD-10-CM

## 2021-07-09 DIAGNOSIS — Z95.1 S/P CABG (CORONARY ARTERY BYPASS GRAFT): ICD-10-CM

## 2021-07-09 DIAGNOSIS — I25.10 CAD (CORONARY ARTERY DISEASE): Primary | ICD-10-CM

## 2021-07-09 LAB
ANION GAP SERPL CALCULATED.3IONS-SCNC: 6 MMOL/L (ref 4–13)
APTT PPP: 38 SECONDS (ref 23–37)
APTT PPP: 49 SECONDS (ref 23–37)
APTT PPP: 49 SECONDS (ref 23–37)
BUN SERPL-MCNC: 13 MG/DL (ref 5–25)
CALCIUM SERPL-MCNC: 8.4 MG/DL (ref 8.3–10.1)
CHLORIDE SERPL-SCNC: 107 MMOL/L (ref 100–108)
CO2 SERPL-SCNC: 28 MMOL/L (ref 21–32)
CREAT SERPL-MCNC: 0.8 MG/DL (ref 0.6–1.3)
GFR SERPL CREATININE-BSD FRML MDRD: 94 ML/MIN/1.73SQ M
GLUCOSE P FAST SERPL-MCNC: 161 MG/DL (ref 65–99)
GLUCOSE SERPL-MCNC: 156 MG/DL (ref 65–140)
GLUCOSE SERPL-MCNC: 161 MG/DL (ref 65–140)
GLUCOSE SERPL-MCNC: 164 MG/DL (ref 65–140)
GLUCOSE SERPL-MCNC: 222 MG/DL (ref 65–140)
POTASSIUM SERPL-SCNC: 4.2 MMOL/L (ref 3.5–5.3)
SODIUM SERPL-SCNC: 141 MMOL/L (ref 136–145)

## 2021-07-09 PROCEDURE — 82948 REAGENT STRIP/BLOOD GLUCOSE: CPT

## 2021-07-09 PROCEDURE — 80048 BASIC METABOLIC PNL TOTAL CA: CPT | Performed by: INTERNAL MEDICINE

## 2021-07-09 PROCEDURE — 85730 THROMBOPLASTIN TIME PARTIAL: CPT | Performed by: INTERNAL MEDICINE

## 2021-07-09 PROCEDURE — 99232 SBSQ HOSP IP/OBS MODERATE 35: CPT | Performed by: NURSE PRACTITIONER

## 2021-07-09 PROCEDURE — 99239 HOSP IP/OBS DSCHRG MGMT >30: CPT | Performed by: INTERNAL MEDICINE

## 2021-07-09 RX ORDER — ACETAMINOPHEN 325 MG/1
650 TABLET ORAL EVERY 6 HOURS PRN
Status: DISCONTINUED | OUTPATIENT
Start: 2021-07-09 | End: 2021-07-16

## 2021-07-09 RX ORDER — HEPARIN SODIUM 10000 [USP'U]/100ML
3-20 INJECTION, SOLUTION INTRAVENOUS
Status: DISCONTINUED | OUTPATIENT
Start: 2021-07-09 | End: 2021-07-16 | Stop reason: HOSPADM

## 2021-07-09 RX ORDER — PRAVASTATIN SODIUM 80 MG/1
80 TABLET ORAL DAILY
Status: DISCONTINUED | OUTPATIENT
Start: 2021-07-10 | End: 2021-07-16 | Stop reason: HOSPADM

## 2021-07-09 RX ORDER — NITROGLYCERIN 0.4 MG/1
0.4 TABLET SUBLINGUAL
Status: DISCONTINUED | OUTPATIENT
Start: 2021-07-09 | End: 2021-07-16 | Stop reason: HOSPADM

## 2021-07-09 RX ORDER — ASPIRIN 81 MG/1
81 TABLET ORAL DAILY
Status: DISCONTINUED | OUTPATIENT
Start: 2021-07-10 | End: 2021-07-16 | Stop reason: HOSPADM

## 2021-07-09 RX ADMIN — HEPARIN SODIUM 16 UNITS/KG/HR: 10000 INJECTION, SOLUTION INTRAVENOUS at 11:10

## 2021-07-09 RX ADMIN — HEPARIN SODIUM 19 UNITS/KG/HR: 10000 INJECTION, SOLUTION INTRAVENOUS at 19:50

## 2021-07-09 RX ADMIN — Medication 12.5 MG: at 21:59

## 2021-07-09 RX ADMIN — Medication 12.5 MG: at 08:44

## 2021-07-09 RX ADMIN — ASPIRIN 81 MG: 81 TABLET, COATED ORAL at 08:44

## 2021-07-09 RX ADMIN — PRAVASTATIN SODIUM 80 MG: 80 TABLET ORAL at 08:44

## 2021-07-09 NOTE — PROGRESS NOTES
General Cardiology   Progress Note -  Team One   Lavaughn Rubinstein 61 y o  male MRN: [id number]  Unit/Bed#: S -01 Encounter: [id number]    Assessment/ Plan    1  NSTEMI with MVCAD  Troponin peak 0 26  ECG showed NSR with inferior TWI  [facility] 7/9/21- 70% pLAD, 95% proximal OM, and a complex lesion in mid RCA with thrombus with MEDINA 3 flow into distal RCA but MEDINA 1 flow into PDA with collateral flow from left  LVEF 50% with hypokinesis of basal-mid inferior wall and inferolateral walls  Continue IV heparin, ASA, statin, and beta blocker  Currently symptom free  Transfer to [facility] when bed available for CABG evaluation  2  HTN- controlled, average /65  Home lisinopril on hold  3  HLD- , pravastatin increased to 80 mg daily    4  Type 2 DM with peripheral neuropathy- A1c 9 0%, management per primary team    Subjective  No complaints offered this morning  No cardiac complaints overnight  Review of Systems   Constitutional: Negative for chills, diaphoresis, malaise/fatigue and weight gain  Cardiovascular: Negative for chest pain, dyspnea on exertion, leg swelling, orthopnea, palpitations and syncope  Respiratory: Negative for cough, shortness of breath and sputum production  Gastrointestinal: Negative for bloating, nausea and vomiting  Neurological: Negative for dizziness, light-headedness and weakness  All other systems reviewed and are negative  Objective:   Vitals: Blood pressure 106/62, pulse 67, temperature 98 °F (36 7 °C), temperature source Oral, resp  rate 16, height 5' 10 5" (1 791 m), weight 112 kg (246 lb 0 5 oz), SpO2 95 %  , Body mass index is 34 8 kg/m²  ,     Systolic (33IDU), HXN:051 , Min:100 , FVQ:866     Diastolic (81OVK), KKT:88, Min:56, Max:78    Intake/Output Summary (Last 24 hours) at 7/9/2021 0829  Last data filed at 7/9/2021 0708  Gross per 24 hour   Intake 0 ml   Output 0 ml   Net 0 ml     Wt Readings from Last 3 Encounters:   07/07/21 112 kg (246 lb 0 5 oz) 01/31/21 113 kg (249 lb 9 oz)     Telemetry Review: NSR    Physical Exam  Vitals reviewed  Constitutional:       General: He is not in acute distress  Appearance: Normal appearance  Neck:      Vascular: No hepatojugular reflux or JVD  Cardiovascular:      Rate and Rhythm: Normal rate and regular rhythm  Pulses: Normal pulses  Heart sounds: Normal heart sounds  No murmur heard  No friction rub  No gallop  Pulmonary:      Effort: Pulmonary effort is normal  No respiratory distress  Breath sounds: Normal breath sounds  No rales  Comments: Room air  Abdominal:      General: Bowel sounds are normal  There is no distension  Palpations: Abdomen is soft  Tenderness: There is no abdominal tenderness  Musculoskeletal:         General: No tenderness  Normal range of motion  Cervical back: Neck supple  Right lower leg: No edema  Left lower leg: No edema  Skin:     General: Skin is warm and dry  Capillary Refill: Capillary refill takes less than 2 seconds  Findings: No erythema  Comments: Right radial cath site dressing intact  No active bleeding, ecchymosis, or hematoma  Neurological:      Mental Status: He is alert and oriented to person, place, and time     Psychiatric:         Mood and Affect: Mood normal        LABORATORY RESULTS  Results from last 7 days   Lab Units 07/08/21  0601 07/08/21  0306 07/08/21  0002   TROPONIN I ng/mL 0 23* 0 26* 0 18*     CBC with diff:   Results from last 7 days   Lab Units 07/08/21  0601 07/08/21  0002 07/07/21  1257   WBC Thousand/uL 6 65  --  6 93   HEMOGLOBIN g/dL 13 6  --  14 9   HEMATOCRIT % 41 8  --  44 6   MCV fL 102*  --  98   PLATELETS Thousands/uL 193 187 235   MCH pg 33 2  --  32 7   MCHC g/dL 32 5  --  33 4   RDW % 12 2  --  12 6   MPV fL 9 4 9 5 9 5       CMP:  Results from last 7 days   Lab Units 07/09/21  0609 07/08/21  0601 07/07/21  1257   POTASSIUM mmol/L 4 2 3 8 4 3   CHLORIDE mmol/L 107 107 102   CO2 mmol/L 28 28 27   BUN mg/dL 13 18 20   CREATININE mg/dL 0 80 0 87 0 86   CALCIUM mg/dL 8 4 8 4 9 4   AST U/L  --   --  14*   ALT U/L  --   --  19   ALK PHOS U/L  --   --  45 8   EGFR ml/min/1 73sq m 94 91 92       BMP:  Results from last 7 days   Lab Units 21  0609 21  0601 21  1257   POTASSIUM mmol/L 4 2 3 8 4 3   CHLORIDE mmol/L 107 107 102   CO2 mmol/L 28 28 27   BUN mg/dL 13 18 20   CREATININE mg/dL 0 80 0 87 0 86   CALCIUM mg/dL 8 4 8 4 9 4       Lab Results   Component Value Date    CREATININE 0 80 2021    CREATININE 0 87 2021    CREATININE 0 86 2021       Lab Results   Component Value Date    NTBNP 20 2020                  Results from last 7 days   Lab Units 21  1257   HEMOGLOBIN A1C % 9 0*          Results from last 7 days   Lab Units 21  1257   TSH 3RD GENERATON uIU/mL 2 490       Results from last 7 days   Lab Units 21  1948   INR  0 93       Lipid Profile:   No results found for: CHOL  Lab Results   Component Value Date    HDL 44 2021     Lab Results   Component Value Date    LDLCALC 123 (H) 2021     Lab Results   Component Value Date    TRIG 215 8 (H) 2021       Cardiac testing:   Results for orders placed during the hospital encounter of 21    Echo complete with contrast if indicated    Narrative  [place] [address], [address] [facility]  [phone]    Transthoracic Echocardiogram  2D, M-mode, Doppler, and Color Doppler    Study date:  2021    Patient: Abdi Pineda  MR number: [id number]  Account number: [de-identified]  : 1955  Age: 61 years  Gender: Male  Status: Outpatient  Location: Bedside  Height: 70 in  Weight: 246 lb  BP: 110/ 66 mmHg    Indications: NSTEMI    Diagnoses: I21 4 - Non-ST elevation (NSTEMI) myocardial infarction    Sonographer:  VLADISLAV Stanford  Primary Physician:  Chepe Rodriguez MD  Referring Physician:  Raúl Dean PA-C  Group:  [facility]  Interpreting Physician:  Arlin Ramos MD    SUMMARY    LEFT VENTRICLE:  Size was normal   Systolic function was at the lower limits of normal  Ejection fraction was estimated to be 50 %  There was mild hypokinesis of the basal-mid inferior and inferolateral walls  Doppler parameters were consistent with abnormal left ventricular relaxation (grade 1 diastolic dysfunction)  RIGHT VENTRICLE:  The size was normal   Systolic function was normal     LEFT ATRIUM:  The atrium was mildly dilated  MITRAL VALVE:  There was trace to mild regurgitation  TRICUSPID VALVE:  There was trace regurgitation  HISTORY: PRIOR HISTORY: HTN, HLD, DM2    PROCEDURE: The procedure was performed at the bedside  This was a routine study  The transthoracic approach was used  The study included complete 2D imaging, M-mode, complete spectral Doppler, and color Doppler  The heart rate was 70 bpm,  at the start of the study  Images were obtained from the parasternal, apical, subcostal, and suprasternal notch acoustic windows  Echocardiographic views were limited due to lung interference  This was a technically difficult study  LEFT VENTRICLE: Size was normal  Systolic function was at the lower limits of normal  Ejection fraction was estimated to be 50 %  There was mild hypokinesis of the basal-mid inferior and inferolateral walls  Wall thickness was normal   DOPPLER: Doppler parameters were consistent with abnormal left ventricular relaxation (grade 1 diastolic dysfunction)  RIGHT VENTRICLE: The size was normal  Systolic function was normal  Wall thickness was normal     LEFT ATRIUM: The atrium was mildly dilated  RIGHT ATRIUM: Size was normal     MITRAL VALVE: Valve structure was normal  There was normal leaflet separation  DOPPLER: The transmitral velocity was within the normal range  There was no evidence for stenosis  There was trace to mild regurgitation      AORTIC VALVE: The valve was trileaflet  Leaflets exhibited mildly increased thickness, mild calcification, normal cuspal separation, and sclerosis  DOPPLER: Transaortic velocity was within the normal range  There was no evidence for  stenosis  There was no regurgitation  TRICUSPID VALVE: The valve structure was normal  There was normal leaflet separation  DOPPLER: The transtricuspid velocity was within the normal range  There was no evidence for stenosis  There was trace regurgitation  The tricuspid jet  envelope definition was inadequate for estimation of RV systolic pressure  There are no indirect findings suggestive of moderate or severe pulmonary hypertension  PULMONIC VALVE: Leaflets exhibited normal thickness, no calcification, and normal cuspal separation  DOPPLER: The transpulmonic velocity was within the normal range  There was no regurgitation  PERICARDIUM: There was no pericardial effusion  The pericardium was normal in appearance  AORTA: The root exhibited normal size  SYSTEMIC VEINS: IVC: The inferior vena cava was normal in size and course   Respirophasic changes were normal     SYSTEM MEASUREMENT TABLES    2D  %FS: 24 71 %  Ao Diam: 3 41 cm  EDV(Teich): 173 64 ml  EF(Teich): 48 23 %  ESV(Teich): 89 89 ml  HR_4Ch_Q: 66 42 BPM  IVSd: 1 14 cm  LA Area: 23 21 cm2  LA Diam: 3 7 cm  LVCO_4Ch_Q: 2 84 L/min  LVEF_4Ch_Q: 41 39 %  LVIDd: 5 91 cm  LVIDs: 4 45 cm  LVLd_4Ch_Q: 8 6 cm  LVLs_4Ch_Q: 7 4 cm  LVPWd: 0 97 cm  LVSV_4Ch_Q: 42 77 ml  LVVED_4Ch_Q: 103 33 ml  LVVES_4Ch_Q: 60 56 ml  RA Area: 18 36 cm2  RVIDd: 3 33 cm  SV(Teich): 83 75 ml    MM  TAPSE: 2 59 cm    PW  E' Sept: 0 08 m/s  E/E' Sept: 9 37  MV A Abiel: 0 89 m/s  MV Dec Adams: 3 68 m/s2  MV DecT: 197 59 ms  MV E Abiel: 0 73 m/s  MV E/A Ratio: 0 82    [employer] Accredited Echocardiography Laboratory    Prepared and electronically signed by    Emilio Echevarria MD  Signed 08-Jul-2021 14:41:28    Meds/Allergies   all current active meds have been reviewed and current meds:   Current Facility-Administered Medications   Medication Dose Route Frequency    acetaminophen (TYLENOL) tablet 650 mg  650 mg Oral Q6H PRN    aspirin (ECOTRIN LOW STRENGTH) EC tablet 81 mg  81 mg Oral Daily    heparin (porcine) 25,000 units in 0 45% NaCl 250 mL infusion (premix)  3-20 Units/kg/hr (Order-Specific) Intravenous Titrated    insulin lispro (HumaLOG) 100 units/mL subcutaneous injection 1-5 Units  1-5 Units Subcutaneous HS    insulin lispro (HumaLOG) 100 units/mL subcutaneous injection 1-6 Units  1-6 Units Subcutaneous Q6H [address]    metoprolol tartrate (LOPRESSOR) partial tablet 12 5 mg  12 5 mg Oral Q12H [address]    nicotine (NICODERM CQ) 7 mg/24hr TD 24 hr patch 1 patch  1 patch Transdermal Daily    nitroglycerin (NITROSTAT) SL tablet 0 4 mg  0 4 mg Sublingual Q5 Min PRN    pravastatin (PRAVACHOL) tablet 80 mg  80 mg Oral Daily     Medications Prior to Admission   Medication    aspirin (ECOTRIN LOW STRENGTH) 81 mg EC tablet    Empagliflozin (Jardiance) 10 MG TABS    Exenatide ER (Bydureon BCise) 2 MG/0 85ML AUIJ    lisinopril (ZESTRIL) 10 mg tablet    metFORMIN (GLUCOPHAGE) 1000 MG tablet    pravastatin (PRAVACHOL) 20 mg tablet     heparin (porcine), 3-20 Units/kg/hr (Order-Specific), Last Rate: 15 1 Units/kg/hr (07/09/21 0323)    Counseling / Coordination of Care  Total floor / unit time spent today 20 minutes  Greater than 50% of total time was spent with the patient and / or family counseling and / or coordination of care  ** Please Note: Dragon 360 Dictation voice to text software may have been used in the creation of this document   **

## 2021-07-09 NOTE — ASSESSMENT & PLAN NOTE
POA, chest pain with elevated troponin    · MEDINA: 3   EKG, ischemic changes on the inferior lead   Status post cardiac catheterization on 07/08/2021 showing severe three-vessel CAD  Patient to be transferred to [facility] for CABG     Monitor on telemetry   Continue heparin drip, beta blocker, aspirin   Appreciate cardiology recommendations

## 2021-07-09 NOTE — DISCHARGE SUMMARY
[facility]  Discharge- Elizabeth Mcduffie 1955, 61 y o  male MRN: [id number]  Unit/Bed#: S -01 Encounter: [id number]  Primary Care Provider: Marcy Durand MD   Date and time admitted to hospital: 7/7/2021  7:01 PM    * NSTEMI  Assessment & Plan  POA, chest pain with elevated troponin    · MEDINA: 3   EKG, ischemic changes on the inferior lead   Status post cardiac catheterization on 07/08/2021 showing severe three-vessel CAD  Patient to be transferred to [facility] for CABG   Monitor on telemetry   Continue heparin drip, beta blocker, aspirin   Appreciate cardiology recommendations    Hypertension  Assessment & Plan  · Reviewed and stable  · Lisinopril on hold  · Continue beta-blocker 25 mg q 12 hours  · Monitor blood pressure    Type 2 diabetes mellitus ([facility] [address] )  Assessment & Plan    Lab Results   Component Value Date    HGBA1C 9 0 (H) 07/07/2021     · Very uncontrolled diabetes on oral medications  · Would recommend switching patient to long-acting insulin with t i d  Coverage  · For now cover with sliding scale  · Will need adjustment of home regimen on discharge and outpatient follow-up with endocrinology      Hyperlipidemia  Assessment & Plan  · Patient on pravastatin 20 mg, increased to 80 mg on admission    Medical Problems     Resolved Problems  Date Reviewed: 7/9/2021    None              Discharging Resident: Angelic Solis MD  Discharging Attending: Chepe Lopez MD  PCP: Marcy Durand MD  Admission Date:   Admission Orders (From admission, onward)     Ordered        07/07/21 2038  Place in Observation  Once                   Discharge/Transfer Date: 07/09/21    Disposition:   Transfer to:  [facility]  Reason for Transfer:  Severe 3 vessel disease, need for CABG  Accepting Provider at [facility] [place]:     [address] [place] [address] Stay:  · Cardiology    Procedures Performed:   · Cardiac catheterization 07/08/2021    Significant Findings / Test Results:   · Cardiac catheterization showing severe three-vessel CAD  · Elevated troponin on admission that trended up    Incidental Findings:   · None     Test Results Pending at Discharge (will require follow up): · None     Outpatient Tests Requested:  · None    Complications:  None    Reason for Admission:  Shortness of breath    Hospital Course:   Lupis Olivarez is a 61 y o  male patient who originally presented to the hospital on 7/7/2021 due to shortness of breath on exertion  Patient noted to have inferior lead ischemic changes with elevated troponin and thought to have NSTEMI therefore he underwent cardiac catheterization showing severe three-vessel CAD therefore patient will be transferred to [facility] for CABG  Patient is currently on heparin drip and on tele med tree monitoring  He is hemodynamically stable and denies any pain or shortness of breath at the meantime  Please see above list of diagnoses and related plan for additional information  Condition at Discharge: stable    Discharge Day Visit / Exam:   Subjective:    Vitals: Blood Pressure: 100/57 (07/09/21 0300)  Pulse: 60 (07/09/21 0300)  Temperature: 97 8 °F (36 6 °C) (07/09/21 0300)  Temp Source: Oral (07/09/21 0300)  Respirations: 16 (07/09/21 0300)  Height: 5' 10 5" (179 1 cm) (07/07/21 2156)  Weight - Scale: 112 kg (246 lb 0 5 oz) (07/07/21 2156)  SpO2: 93 % (07/09/21 0300)  Exam:   Physical Exam  Vitals and nursing note reviewed  Constitutional:       General: He is not in acute distress  Appearance: He is not ill-appearing or diaphoretic  HENT:      Head: Normocephalic and atraumatic  Mouth/Throat:      Mouth: Mucous membranes are moist       Pharynx: Oropharynx is clear  Eyes:      General: No scleral icterus  Extraocular Movements: Extraocular movements intact  Conjunctiva/sclera: Conjunctivae normal    Cardiovascular:      Rate and Rhythm: Normal rate and regular rhythm  Heart sounds: No murmur heard  Pulmonary:      Effort: Pulmonary effort is normal  No respiratory distress  Breath sounds: Normal breath sounds  No wheezing or rales  Abdominal:      General: Bowel sounds are normal       Palpations: Abdomen is soft  Tenderness: There is no abdominal tenderness  Musculoskeletal:      Cervical back: Normal range of motion and neck supple  Right lower leg: No edema  Left lower leg: No edema  Skin:     General: Skin is warm and dry  Capillary Refill: Capillary refill takes less than 2 seconds  Neurological:      General: No focal deficit present  Mental Status: He is alert and oriented to person, place, and time  Psychiatric:         Mood and Affect: Mood normal          Behavior: Behavior normal          Discussion with Family: Patient declined call to   ** Please Note: This note may have been constructed using a voice recognition system  **

## 2021-07-09 NOTE — PROGRESS NOTES
[facility]  Progress Note - Versa Ou 1955, 61 y o  male MRN: [id number]  Unit/Bed#: S -01 Encounter: [id number]  Primary Care Provider: Malissa Collet, MD   Date and time admitted to hospital: 7/7/2021  7:01 PM    * NSTEMI  Assessment & Plan  POA, chest pain with elevated troponin    · MEDINA: 3   EKG, ischemic changes on the inferior lead   Status post cardiac catheterization on 07/08/2021 showing severe three-vessel CAD  Patient to be transferred to [facility] for CABG   Monitor on telemetry   Continue heparin drip, beta blocker, aspirin   Appreciate cardiology recommendations    Hypertension  Assessment & Plan  · Reviewed and stable  · Lisinopril on hold  · Continue beta-blocker 25 mg q 12 hours  · Monitor blood pressure    Type 2 diabetes mellitus ([facility] [address] )  Assessment & Plan    Lab Results   Component Value Date    HGBA1C 9 0 (H) 07/07/2021     · Very uncontrolled diabetes on oral medications  · Would recommend switching patient to long-acting insulin with t i d  Coverage  · For now cover with sliding scale  · Will need adjustment of home regimen on discharge and outpatient follow-up with endocrinology  Hyperlipidemia  Assessment & Plan  · Patient on pravastatin 20 mg, increased to 80 mg on admission          VTE Pharmacologic Prophylaxis:   VTE Score: 4 Moderate Risk (Score 3-4) - Pharmacological DVT Prophylaxis Ordered: Heparin Drip  Mechanical VTE Prophylaxis in Place: Yes    Patient Centered Rounds: I have performed bedside rounds with nursing staff today  Discussions with Specialists or Other Care Team Provider:  Nursing, case management, Cardiology    Education and Discussions with Family / Patient: Patient declined call to       Current Length of Stay: 0 day(s)    Current Patient Status: Observation     Discharge Plan / Estimated Discharge Date: Discharge pending bed availability at [place], transfer orders completed    Code Status: Level 1 - Full Code      Subjective:   Patient denies any complaints at this time  No chest pain or shortness of    Objective:     Vitals:   Temp (24hrs), Av 9 °F (36 6 °C), Min:97 6 °F (36 4 °C), Max:98 2 °F (36 8 °C)    Temp:  [97 6 °F (36 4 °C)-98 2 °F (36 8 °C)] 98 °F (36 7 °C)  HR:  [60-71] 71  Resp:  [16-18] 18  BP: (100-127)/(56-78) 114/71  SpO2:  [93 %-100 %] 97 %  Body mass index is 34 8 kg/m²  Input and Output Summary (last 24 hours): Intake/Output Summary (Last 24 hours) at 2021 1421  Last data filed at 2021 1300  Gross per 24 hour   Intake 540 ml   Output 0 ml   Net 540 ml       Physical Exam:     Physical Exam  Vitals and nursing note reviewed  Constitutional:       General: He is not in acute distress  Appearance: He is not ill-appearing or diaphoretic  HENT:      Head: Normocephalic and atraumatic  Mouth/Throat:      Mouth: Mucous membranes are moist       Pharynx: Oropharynx is clear  Eyes:      General: No scleral icterus  Extraocular Movements: Extraocular movements intact  Conjunctiva/sclera: Conjunctivae normal    Cardiovascular:      Rate and Rhythm: Normal rate and regular rhythm  Heart sounds: No murmur heard  Pulmonary:      Effort: Pulmonary effort is normal  No respiratory distress  Breath sounds: Normal breath sounds  No wheezing or rales  Abdominal:      General: Bowel sounds are normal       Palpations: Abdomen is soft  Tenderness: There is no abdominal tenderness  Musculoskeletal:      Cervical back: Normal range of motion and neck supple  Right lower leg: No edema  Left lower leg: No edema  Skin:     General: Skin is warm and dry  Capillary Refill: Capillary refill takes less than 2 seconds  Neurological:      General: No focal deficit present  Mental Status: He is alert and oriented to person, place, and time     Psychiatric:         Mood and Affect: Mood normal          Behavior: Behavior normal           Additional Data:     Labs:  Results from last 7 days   Lab Units 07/08/21  0601 07/07/21  1257   WBC Thousand/uL 6 65 6 93   HEMOGLOBIN g/dL 13 6 14 9   HEMATOCRIT % 41 8 44 6   PLATELETS Thousands/uL 193 235   NEUTROS PCT %  --  64   LYMPHS PCT %  --  23   MONOS PCT %  --  11   EOS PCT %  --  2     Results from last 7 days   Lab Units 07/09/21  0609 07/07/21  1257   SODIUM mmol/L 141 137   POTASSIUM mmol/L 4 2 4 3   CHLORIDE mmol/L 107 102   CO2 mmol/L 28 27   BUN mg/dL 13 20   CREATININE mg/dL 0 80 0 86   ANION GAP mmol/L 6 8   CALCIUM mg/dL 8 4 9 4   ALBUMIN g/dL  --  4 1   TOTAL BILIRUBIN mg/dL  --  0 55   ALK PHOS U/L  --  45 8   ALT U/L  --  19   AST U/L  --  14*   GLUCOSE RANDOM mg/dL 161* 191*     Results from last 7 days   Lab Units 07/07/21  1948   INR  0 93     Results from last 7 days   Lab Units 07/09/21  1116 07/09/21  0711 07/08/21  2201 07/08/21  1604 07/08/21  1046 07/08/21  0603 07/08/21  0001 07/07/21  2153   POC GLUCOSE mg/dl 222* 164* 227* 131 184* 148* 203* 190*     Results from last 7 days   Lab Units 07/07/21  1257   HEMOGLOBIN A1C % 9 0*           Imaging: No pertinent imaging reviewed      Recent Cultures (last 7 days):           Lines/Drains:  Invasive Devices     Peripheral Intravenous Line            Peripheral IV 07/07/21 Left Arm 1 day    Peripheral IV 07/08/21 Proximal;Right;Ventral (anterior) Forearm <1 day                Telemetry:   Telemetry Orders (From admission, onward)             48 Hour Telemetry Monitoring  Continuous x 48 hours     Question:  Reason for 48 Hour Telemetry  Answer:  Acute MI, chest pain - R/O MI, or unstable angina                    Last 24 Hours Medication List:   Current Facility-Administered Medications   Medication Dose Route Frequency Provider Last Rate    acetaminophen  650 mg Oral Q6H PRN Wythe Jason, SANDRA      aspirin  81 mg Oral Daily Alton Patiño PA-C      heparin (porcine)  3-20 Units/kg/hr (Order-Specific) Intravenous Titrated Sherin Figueroa MD 16 Units/kg/hr (07/09/21 1110)    insulin lispro  1-5 Units Subcutaneous HS Alton Patiño PA-C      insulin lispro  1-6 Units Subcutaneous Q6H [facility] Sherin Figueroa MD      metoprolol tartrate  12 5 mg Oral Q12H [facility] SANDRA [address]      nicotine  1 patch Transdermal Daily Alton Patiño PA-C      nitroglycerin  0 4 mg Sublingual Q5 Min PRN Alton Patiño PA-C      pravastatin  80 mg Oral Daily Alton Patiño PA-C          Today, Patient Was Seen By: Barrington Aase, MD    ** Please Note: This note has been constructed using a voice recognition system   **

## 2021-07-09 NOTE — ASSESSMENT & PLAN NOTE
POA, chest pain with elevated troponin    · MEDINA: 3   EKG, ischemic changes on the inferior lead   Status post cardiac catheterization on 07/08/2021 showing severe three-vessel CAD  Patient to be transferred to [address] [place] [address] for CABG     Monitor on telemetry   Continue heparin drip, beta blocker, aspirin   Appreciate cardiology recommendations

## 2021-07-09 NOTE — ASSESSMENT & PLAN NOTE
Lab Results   Component Value Date    HGBA1C 9 0 (H) 07/07/2021     · Very uncontrolled diabetes on oral medications  · Would recommend switching patient to long-acting insulin with t i d  Coverage  · For now cover with sliding scale  · Will need adjustment of home regimen on discharge and outpatient follow-up with endocrinology

## 2021-07-09 NOTE — ASSESSMENT & PLAN NOTE
· Reviewed and stable  · Lisinopril on hold  · Continue beta-blocker 25 mg q 12 hours    · Monitor blood pressure

## 2021-07-10 ENCOUNTER — APPOINTMENT (INPATIENT)
Dept: NON INVASIVE DIAGNOSTICS | Facility: HOSPITAL | Age: 66
DRG: 235 | End: 2021-07-10
Payer: COMMERCIAL

## 2021-07-10 PROBLEM — M62.838 MUSCLE SPASM: Status: ACTIVE | Noted: 2021-07-10

## 2021-07-10 PROBLEM — Z72.0 TOBACCO USE: Status: ACTIVE | Noted: 2021-07-10

## 2021-07-10 PROBLEM — Z86.16 HISTORY OF COVID-19: Status: ACTIVE | Noted: 2020-11-01

## 2021-07-10 LAB
ANION GAP SERPL CALCULATED.3IONS-SCNC: 4 MMOL/L (ref 4–13)
APTT PPP: 69 SECONDS (ref 23–37)
APTT PPP: 72 SECONDS (ref 23–37)
BASOPHILS # BLD AUTO: 0.02 THOUSANDS/ΜL (ref 0–0.1)
BASOPHILS NFR BLD AUTO: 0 % (ref 0–1)
BILIRUB UR QL STRIP: NEGATIVE
BUN SERPL-MCNC: 14 MG/DL (ref 5–25)
CALCIUM SERPL-MCNC: 9 MG/DL (ref 8.3–10.1)
CHLORIDE SERPL-SCNC: 106 MMOL/L (ref 100–108)
CLARITY UR: CLEAR
CO2 SERPL-SCNC: 28 MMOL/L (ref 21–32)
COLOR UR: YELLOW
CREAT SERPL-MCNC: 0.81 MG/DL (ref 0.6–1.3)
EOSINOPHIL # BLD AUTO: 0.2 THOUSAND/ΜL (ref 0–0.61)
EOSINOPHIL NFR BLD AUTO: 3 % (ref 0–6)
ERYTHROCYTE [DISTWIDTH] IN BLOOD BY AUTOMATED COUNT: 11.9 % (ref 11.6–15.1)
GFR SERPL CREATININE-BSD FRML MDRD: 94 ML/MIN/1.73SQ M
GLUCOSE SERPL-MCNC: 149 MG/DL (ref 65–140)
GLUCOSE SERPL-MCNC: 161 MG/DL (ref 65–140)
GLUCOSE SERPL-MCNC: 168 MG/DL (ref 65–140)
GLUCOSE SERPL-MCNC: 180 MG/DL (ref 65–140)
GLUCOSE SERPL-MCNC: 196 MG/DL (ref 65–140)
GLUCOSE UR STRIP-MCNC: NEGATIVE MG/DL
HCT VFR BLD AUTO: 42.5 % (ref 36.5–49.3)
HGB BLD-MCNC: 14.4 G/DL (ref 12–17)
HGB UR QL STRIP.AUTO: NEGATIVE
IMM GRANULOCYTES # BLD AUTO: 0.03 THOUSAND/UL (ref 0–0.2)
IMM GRANULOCYTES NFR BLD AUTO: 0 % (ref 0–2)
KETONES UR STRIP-MCNC: NEGATIVE MG/DL
LEUKOCYTE ESTERASE UR QL STRIP: NEGATIVE
LYMPHOCYTES # BLD AUTO: 1.94 THOUSANDS/ΜL (ref 0.6–4.47)
LYMPHOCYTES NFR BLD AUTO: 28 % (ref 14–44)
MAGNESIUM SERPL-MCNC: 1.9 MG/DL (ref 1.6–2.6)
MCH RBC QN AUTO: 33.2 PG (ref 26.8–34.3)
MCHC RBC AUTO-ENTMCNC: 33.9 G/DL (ref 31.4–37.4)
MCV RBC AUTO: 98 FL (ref 82–98)
MONOCYTES # BLD AUTO: 0.79 THOUSAND/ΜL (ref 0.17–1.22)
MONOCYTES NFR BLD AUTO: 11 % (ref 4–12)
NEUTROPHILS # BLD AUTO: 3.93 THOUSANDS/ΜL (ref 1.85–7.62)
NEUTS SEG NFR BLD AUTO: 58 % (ref 43–75)
NITRITE UR QL STRIP: NEGATIVE
NRBC BLD AUTO-RTO: 0 /100 WBCS
PH UR STRIP.AUTO: 7 [PH]
PHOSPHATE SERPL-MCNC: 3.6 MG/DL (ref 2.3–4.1)
PLATELET # BLD AUTO: 184 THOUSANDS/UL (ref 149–390)
PMV BLD AUTO: 9.4 FL (ref 8.9–12.7)
POTASSIUM SERPL-SCNC: 4 MMOL/L (ref 3.5–5.3)
PROT UR STRIP-MCNC: NEGATIVE MG/DL
RBC # BLD AUTO: 4.34 MILLION/UL (ref 3.88–5.62)
SARS-COV-2 RNA RESP QL NAA+PROBE: NEGATIVE
SODIUM SERPL-SCNC: 138 MMOL/L (ref 136–145)
SP GR UR STRIP.AUTO: 1.01 (ref 1–1.03)
UROBILINOGEN UR QL STRIP.AUTO: 0.2 E.U./DL
WBC # BLD AUTO: 6.91 THOUSAND/UL (ref 4.31–10.16)

## 2021-07-10 PROCEDURE — 99223 1ST HOSP IP/OBS HIGH 75: CPT | Performed by: THORACIC SURGERY (CARDIOTHORACIC VASCULAR SURGERY)

## 2021-07-10 PROCEDURE — 80048 BASIC METABOLIC PNL TOTAL CA: CPT | Performed by: INTERNAL MEDICINE

## 2021-07-10 PROCEDURE — 93971 EXTREMITY STUDY: CPT

## 2021-07-10 PROCEDURE — 93880 EXTRACRANIAL BILAT STUDY: CPT

## 2021-07-10 PROCEDURE — 83735 ASSAY OF MAGNESIUM: CPT | Performed by: INTERNAL MEDICINE

## 2021-07-10 PROCEDURE — U0003 INFECTIOUS AGENT DETECTION BY NUCLEIC ACID (DNA OR RNA); SEVERE ACUTE RESPIRATORY SYNDROME CORONAVIRUS 2 (SARS-COV-2) (CORONAVIRUS DISEASE [COVID-19]), AMPLIFIED PROBE TECHNIQUE, MAKING USE OF HIGH THROUGHPUT TECHNOLOGIES AS DESCRIBED BY CMS-2020-01-R: HCPCS | Performed by: PHYSICIAN ASSISTANT

## 2021-07-10 PROCEDURE — 82948 REAGENT STRIP/BLOOD GLUCOSE: CPT

## 2021-07-10 PROCEDURE — 81003 URINALYSIS AUTO W/O SCOPE: CPT | Performed by: PHYSICIAN ASSISTANT

## 2021-07-10 PROCEDURE — 87081 CULTURE SCREEN ONLY: CPT | Performed by: PHYSICIAN ASSISTANT

## 2021-07-10 PROCEDURE — 85730 THROMBOPLASTIN TIME PARTIAL: CPT | Performed by: INTERNAL MEDICINE

## 2021-07-10 PROCEDURE — 85025 COMPLETE CBC W/AUTO DIFF WBC: CPT | Performed by: INTERNAL MEDICINE

## 2021-07-10 PROCEDURE — NC001 PR NO CHARGE: Performed by: INTERNAL MEDICINE

## 2021-07-10 PROCEDURE — 99222 1ST HOSP IP/OBS MODERATE 55: CPT | Performed by: INTERNAL MEDICINE

## 2021-07-10 PROCEDURE — 84100 ASSAY OF PHOSPHORUS: CPT | Performed by: INTERNAL MEDICINE

## 2021-07-10 PROCEDURE — 99233 SBSQ HOSP IP/OBS HIGH 50: CPT | Performed by: INTERNAL MEDICINE

## 2021-07-10 PROCEDURE — U0005 INFEC AGEN DETEC AMPLI PROBE: HCPCS | Performed by: PHYSICIAN ASSISTANT

## 2021-07-10 RX ORDER — CHLORHEXIDINE GLUCONATE 0.12 MG/ML
15 RINSE ORAL EVERY 12 HOURS SCHEDULED
Status: DISCONTINUED | OUTPATIENT
Start: 2021-07-10 | End: 2021-07-16

## 2021-07-10 RX ORDER — CYCLOBENZAPRINE HCL 10 MG
5 TABLET ORAL 3 TIMES DAILY PRN
Status: DISCONTINUED | OUTPATIENT
Start: 2021-07-10 | End: 2021-07-16 | Stop reason: HOSPADM

## 2021-07-10 RX ADMIN — DICLOFENAC SODIUM 2 G: 10 GEL TOPICAL at 21:03

## 2021-07-10 RX ADMIN — CHLORHEXIDINE GLUCONATE 15 ML: 1.2 SOLUTION ORAL at 14:23

## 2021-07-10 RX ADMIN — DICLOFENAC SODIUM 2 G: 10 GEL TOPICAL at 08:08

## 2021-07-10 RX ADMIN — INSULIN LISPRO 1 UNITS: 100 INJECTION, SOLUTION INTRAVENOUS; SUBCUTANEOUS at 17:20

## 2021-07-10 RX ADMIN — INSULIN LISPRO 2 UNITS: 100 INJECTION, SOLUTION INTRAVENOUS; SUBCUTANEOUS at 21:07

## 2021-07-10 RX ADMIN — HEPARIN SODIUM 19 UNITS/KG/HR: 10000 INJECTION, SOLUTION INTRAVENOUS at 02:32

## 2021-07-10 RX ADMIN — CHLORHEXIDINE GLUCONATE 15 ML: 1.2 SOLUTION ORAL at 21:00

## 2021-07-10 RX ADMIN — HEPARIN SODIUM 19 UNITS/KG/HR: 10000 INJECTION, SOLUTION INTRAVENOUS at 19:15

## 2021-07-10 RX ADMIN — ASPIRIN 81 MG: 81 TABLET, COATED ORAL at 08:07

## 2021-07-10 RX ADMIN — Medication 12.5 MG: at 08:07

## 2021-07-10 RX ADMIN — INSULIN LISPRO 1 UNITS: 100 INJECTION, SOLUTION INTRAVENOUS; SUBCUTANEOUS at 13:18

## 2021-07-10 RX ADMIN — INSULIN LISPRO 1 UNITS: 100 INJECTION, SOLUTION INTRAVENOUS; SUBCUTANEOUS at 08:48

## 2021-07-10 RX ADMIN — MUPIROCIN 1 APPLICATION: 20 OINTMENT TOPICAL at 21:00

## 2021-07-10 RX ADMIN — PRAVASTATIN SODIUM 80 MG: 20 TABLET ORAL at 08:06

## 2021-07-10 RX ADMIN — Medication 12.5 MG: at 21:00

## 2021-07-10 RX ADMIN — MUPIROCIN 1 APPLICATION: 20 OINTMENT TOPICAL at 14:23

## 2021-07-10 NOTE — ASSESSMENT & PLAN NOTE
Lab Results   Component Value Date    HGBA1C 9 0 (H) 07/07/2021       Recent Labs     07/08/21  2201 07/09/21  0711 07/09/21  1116 07/09/21  2159   POCGLU 227* 164* 222* 156*       Blood Sugar Average: Last 72 hrs:     Patient has history of uncontrolled diabetes on oral medications  · Continue Algorithm 3, Humalog 100 units/ml, 1-6 units 4 times daily before meals  · Will need outpatient follow-up with endocrinology

## 2021-07-10 NOTE — ASSESSMENT & PLAN NOTE
Reviewed and Stable    · Holding lisinopril per cardiology recommendation   · Metoprolol 12 5 mg BID

## 2021-07-10 NOTE — PROGRESS NOTES
Progress Note - Cardiology   Ora Pastures 61 y o  male MRN: [id number]  Unit/Bed#: -01 Encounter: [id number]    Assessment:  Principal Problem:    CAD (coronary artery disease)  Active Problems:    Hyperlipidemia    Type 2 diabetes mellitus ([address] )    Hypertension    Muscle spasm    Tobacco use    HTN (hypertension)    Type 1 and non-STEMI with thrombus in the RCA  Multivessel disease was found on cardiac catheterization  Low normal EF with basal to mid inferior hypokinesis  Transferred for CT surgical evaluation  Plan:    Remains on IV heparin  Aspirin, high-intensity statin  Low-dose beta-blocker  Currently without any symptoms  No arrhythmias noted on telemetry  Preoperative evaluation and workup per the surgical service  Subjective/Objective     Subjective:  Denies any significant complaints  Transferred from the [facility] for CT surgery evaluation  Denies further chest pain  No shortness of breath    Objective:    Vitals: /68   Pulse 66   Temp 99 9 °F (37 7 °C)   Resp 18   Ht 5' 10" (1 778 m)   Wt 110 kg (242 lb 8 1 oz)   SpO2 95%   BMI 34 80 kg/m²   Vitals:    07/09/21 2311   Weight: 110 kg (242 lb 8 1 oz)     Orthostatic Blood Pressures      Most Recent Value   Blood Pressure  115/68 filed at 07/10/2021 1100   Patient Position - Orthostatic VS  Lying filed at 07/10/2021 8468            Intake/Output Summary (Last 24 hours) at 7/10/2021 1111  Last data filed at 7/10/2021 0300  Gross per 24 hour   Intake 63 27 ml   Output 250 ml   Net -186 73 ml     Physical Exam:   General appearance: alert and in no acute distress  Head: Normocephalic, without obvious abnormality, atraumatic  Neck: no carotid bruit, no JVD and supple, symmetrical, trachea midline  Lungs: clear to auscultation bilaterally  Normal air entry  Normal effort  Heart: S1, S2 normal and no S3 or S4  No murmurs    Abdomen: soft, non-tender; bowel sounds normal; no masses,  no organomegaly  Extremities: extremities normal, atraumatic, no cyanosis or edema  Pulses: 2+ and symmetric bilaterally  Skin: Skin color, texture, turgor normal  No rashes or lesions  Neurologic: Grossly normal  Alert and oriented      Medications:    Current Facility-Administered Medications:     acetaminophen (TYLENOL) tablet 650 mg, 650 mg, Oral, Q6H PRN, Damian Brumfield MD    aspirin (ECOTRIN LOW STRENGTH) EC tablet 81 mg, 81 mg, Oral, Daily, Damian Brumfield MD, 81 mg at 07/10/21 0807    cyclobenzaprine (FLEXERIL) tablet 5 mg, 5 mg, Oral, TID PRN, Akash Donald DO    Diclofenac Sodium (VOLTAREN) 1 % topical gel 2 g, 2 g, Topical, BID, Akash Donald DO, 2 g at 07/10/21 0808    heparin (porcine) 25,000 units in 0 45% NaCl 250 mL infusion (premix), 3-20 Units/kg/hr (Order-Specific), Intravenous, Titrated, Damian Brumfield MD, Last Rate: 17 1 mL/hr at 07/10/21 0232, 19 Units/kg/hr at 07/10/21 0232    insulin lispro (HumaLOG) 100 units/mL subcutaneous injection 1-6 Units, 1-6 Units, Subcutaneous, 4x Daily (AC & HS), 1 Units at 07/10/21 0848 **AND** [CANCELED] Fingerstick Glucose (POCT), , , 4x Daily AC and at bedtime, Tho Patel DO    metoprolol tartrate (LOPRESSOR) partial tablet 12 5 mg, 12 5 mg, Oral, Q12H [facility], Damian Brumfield MD, 12 5 mg at 07/10/21 0807    nicotine (NICODERM CQ) 7 mg/24hr TD 24 hr patch 1 patch, 1 patch, Transdermal, Daily, Damian Brumfield MD    nitroglycerin (NITROSTAT) SL tablet 0 4 mg, 0 4 mg, Sublingual, Q5 Min PRN, Damian Brumfield MD    pravastatin (PRAVACHOL) tablet 80 mg, 80 mg, Oral, Daily, Damian Brumfield MD, 80 mg at 07/10/21 0806    Lab Results:  Results from last 7 days   Lab Units 07/08/21  0601 07/08/21  0306 07/08/21  0002   TROPONIN I ng/mL 0 23* 0 26* 0 18*     Results from last 7 days   Lab Units 07/10/21  0211 07/08/21  0601 07/08/21  0002 07/07/21  1257   WBC Thousand/uL 6 91 6 65  --  6 93   HEMOGLOBIN g/dL 14 4 13 6  --  14 9   HEMATOCRIT % 42 5 41 8  --  44 6   PLATELETS Thousands/uL 184 193 187 235     Results from last 7 days   Lab Units 07/07/21  1257   TRIGLYCERIDES mg/dL 215 8*   HDL mg/dL 44     Results from last 7 days   Lab Units 07/10/21  0239 07/09/21  0609 07/08/21  0601 07/07/21  1257   SODIUM mmol/L 138 141 140 137   POTASSIUM mmol/L 4 0 4 2 3 8 4 3   CHLORIDE mmol/L 106 107 107 102   CO2 mmol/L 28 28 28 27   BUN mg/dL 14 13 18 20   CREATININE mg/dL 0 81 0 80 0 87 0 86   CALCIUM mg/dL 9 0 8 4 8 4 9 4   ALK PHOS U/L  --   --   --  45 8   ALT U/L  --   --   --  19   AST U/L  --   --   --  14*     Results from last 7 days   Lab Units 07/10/21  0815 07/10/21  0236 07/09/21  1815 07/07/21  1948   INR   --   --   --  0 93   PTT seconds 72* 69* 49* 24     Results from last 7 days   Lab Units 07/10/21  0239   MAGNESIUM mg/dL 1 9       Telemetry: Personally reviewed  No significant arrhythmias    Cardiac Cath:  CORONARY CIRCULATION:  Left main: Normal   LAD: The vessel was normal sized  There was a 70% stenosis in the proximal vessel  Circumflex: The vessel was normal sized and gave rise to one major OM branch  There was a 95% proximal stenosis of the large OM branch  RCA: The vessel was normal sized and dominant, giving rise to the PDA and several posterolateral branches  There was a complex eccentric lesion in mid vessel with thrombus  There is MEDINA 3 flow into the distal vessel, but MEDINA 1 flow into  the PDA  There was collateral flow to the PDA from the left system      REPORT ELEMENT SELECTION:  Right radial access  There were no complications      Summary:  Severe 3 vessel CAD  Plan: [facility] consultation  Echo:  LEFT VENTRICLE:  Size was normal   Systolic function was at the lower limits of normal  Ejection fraction was estimated to be 50 %  There was mild hypokinesis of the basal-mid inferior and inferolateral walls    Doppler parameters were consistent with abnormal left ventricular relaxation (grade 1 diastolic dysfunction)      RIGHT VENTRICLE:  The size was normal   Systolic function was normal      LEFT ATRIUM:  The atrium was mildly dilated      MITRAL VALVE:  There was trace to mild regurgitation      TRICUSPID VALVE:  There was trace regurgitation

## 2021-07-10 NOTE — ASSESSMENT & PLAN NOTE
Patient's lipid panel revealed cholesterol of 210, triglycerides 215 8, , HDL 44  · Continue pravastatin 80 mg

## 2021-07-10 NOTE — ASSESSMENT & PLAN NOTE
Patient has history of HTN, HLD, and DM who presented to [address] [place] [address] ED for chest pain, dyspnea on exertion, and elevated troponin  In ED EKG showed NSR with nonspecific T wave abnormality which is more pronounced in inferior leads on repeat EKG and troponin peaked at 0 26  Echo showed EF of 50%, and a cardiac cath showed severe triple vessel disease  [facility] cardiothoracic surgery evaluation for possible CABG     · Continue IV heparin, aspirin, metoprolol, nitroglycerin prn, and pravastatin  · Continue to hold lisinopril per Cardiology recommendation  · Consulted cardiology and cardiothoracic surgery for CABG evaluation  · Cardiac Diet  · Monitor telemetry  · OT/PT evaluation

## 2021-07-10 NOTE — H&P
INTERNAL MEDICINE RESIDENCY ADMISSION H&P     Name: Kristian Colmenares   Age & Sex: 61 y o  male   MRN: [id number]  Unit/Bed#: -01   Encounter: [id number]  Primary Care Provider: Jose Eduardo Carrero MD    Code Status: Level 1 - Full Code  Admission Status: INPATIENT   Disposition: Patient requires Med/Surg with Telemetry    Admit to team: SOD Team C     ASSESSMENT/PLAN     Principal Problem:    CAD (coronary artery disease)  Active Problems:    Hyperlipidemia    Type 2 diabetes mellitus ([address] )    Hypertension    Muscle spasm    Tobacco use      * CAD (coronary artery disease)  Assessment & Plan  Patient has history of HTN, HLD, and DM who presented to [address] [place] [address] ED for chest pain, dyspnea on exertion, and elevated troponin  In ED EKG showed NSR with nonspecific T wave abnormality which is more pronounced in inferior leads on repeat EKG and troponin peaked at 0 26  Echo showed EF of 50%, and a cardiac cath showed severe triple vessel disease  Patient was transferred to [facility] for cardiothoracic surgery evaluation for possible CABG  · Continue IV heparin, aspirin, metoprolol, nitroglycerin prn, and pravastatin  · Continue to hold lisinopril per Cardiology recommendation  · Consulted cardiology and cardiothoracic surgery for CABG evaluation  · Cardiac Diet  · Monitor telemetry  · OT/PT evaluation    Hypertension  Assessment & Plan  Reviewed and Stable    · Holding lisinopril per cardiology recommendation   · Metoprolol 12 5 mg BID    Type 2 diabetes mellitus [facility])  Assessment & Plan  Lab Results   Component Value Date    HGBA1C 9 0 (H) 07/07/2021       Recent Labs     07/08/21  2201 07/09/21  0711 07/09/21  1116 07/09/21  2159   POCGLU 227* 164* 222* 156*       Blood Sugar Average: Last 72 hrs:     Patient has history of uncontrolled diabetes on oral medications  · Continue Algorithm 3, Humalog 100 units/ml, 1-6 units 4 times daily before meals  · Will need outpatient follow-up with endocrinology    Hyperlipidemia  Assessment & Plan  Patient's lipid panel revealed cholesterol of 210, triglycerides 215 8, , HDL 44  · Continue pravastatin 80 mg    Muscle spasm  Assessment & Plan  Patient complains of chronic posterior neck and left shoulder tenderness and spasms that is relieved with muscle relaxant  · Voltaren gel 1% 2g BID  · Flexeril 5 mg PRN    Tobacco use  Assessment & Plan  Patient occasionally smokes cigars  · Counseled patient on smoking cessation  · Nicotine 7mg/24hr 1 patch daily if needed      VTE Pharmacologic Prophylaxis: Heparin  VTE Mechanical Prophylaxis: sequential compression device    CHIEF COMPLAINT    Chest Pain   HISTORY OF PRESENT ILLNESS   Mr Rhea Cruz is a 61-year-old male with a PMH of T2DM with elevated A1c, hyperlipidemia, and hypertension who presented to [address] [place] [address] because his PCP found his troponin to be elevated  Patient was seen as an outpatient by his PCP after he had increasing difficulty mowing his grass due to shortness of breath on exertion  He states that he started experiencing this shortness of breath after his COVID-19 diagnosis (November 2020), for which he was not hospitalized for, but it has been increasing in severity  Now his shortness of breath has presented with slight chest pressure after mowing the grass  Rest improved the pressure sensation  During the patient's hospital stay at  [facility], the patient was found to have NSR with nonspecific T wave abnormality, which is more pronounced in inferior leads, troponin levels peaking at 0 26, an echocardiogram showing EF of 50%, and a cardiac cath which showed triple vessel disease  Cardiology was consulted, and they recommended that he be transferred to [place] [address]  for cardiothoracic surgery evaluation for a possible CABG       Patient was transferred and at [place] he was found to have no complaints aside from chronic posterior neck and left shoulder pain/muscle spasms  He denied chest pain, shortness of breath, palpitations, abdominal pain, nausea, vomiting, diarrhea, and constipation  He is in no apparent distress, hemodynamically stable, and labs were unremarkable  REVIEW OF SYSTEMS     Review of Systems   Constitutional: Negative for chills, fatigue and fever  HENT: Negative for congestion, ear pain, sinus pain and sore throat  Eyes: Negative for pain and redness  Respiratory: Negative for chest tightness, shortness of breath and wheezing  Cardiovascular: Negative for chest pain, palpitations and leg swelling  Gastrointestinal: Negative for abdominal distention, abdominal pain, constipation, diarrhea, nausea and vomiting  Endocrine: Negative for cold intolerance, heat intolerance and polydipsia  Genitourinary: Negative for difficulty urinating, flank pain, frequency and urgency  Musculoskeletal: Positive for neck pain (chronic posterior neck and left shoulder pain)  Negative for arthralgias  Skin: Negative for color change and wound  Neurological: Negative for dizziness, syncope, weakness, light-headedness, numbness and headaches  Psychiatric/Behavioral: Negative for agitation, behavioral problems and confusion  OBJECTIVE     Vitals:    21 2311 07/10/21 0207   BP: 137/89 108/63   Pulse: 69 58   Resp: 18 18   Temp: 97 9 °F (36 6 °C) 97 8 °F (36 6 °C)   TempSrc: Oral    SpO2: 97% 95%   Weight: 110 kg (242 lb 8 1 oz)    Height: 5' 10" (1 778 m)       Temperature:   Temp (24hrs), Av °F (36 7 °C), Min:97 8 °F (36 6 °C), Max:98 3 °F (36 8 °C)    Temperature: 97 8 °F (36 6 °C)  Intake & Output:  I/O        07 -  0700 701 - 07/10 0700    I V  (mL/kg)  63 3 (0 6)    Total Intake(mL/kg)  63 3 (0 6)    Urine (mL/kg/hr)  250    Total Output  250    Net  -186 7              Weights:   IBW (Ideal Body Weight): 73 kg    Body mass index is 34 8 kg/m²    Weight (last 2 days)     Date/Time   Weight    21 23:11:43   110 (242 51)            Physical Exam  Constitutional:       General: He is not in acute distress  Appearance: Normal appearance  He is not diaphoretic  HENT:      Head: Normocephalic and atraumatic  Mouth/Throat:      Mouth: Mucous membranes are moist    Eyes:      General: No scleral icterus  Extraocular Movements: Extraocular movements intact  Conjunctiva/sclera: Conjunctivae normal       Pupils: Pupils are equal, round, and reactive to light  Cardiovascular:      Rate and Rhythm: Normal rate and regular rhythm  Pulses: Normal pulses  Heart sounds: Normal heart sounds  No murmur heard  Pulmonary:      Effort: Pulmonary effort is normal  No respiratory distress  Breath sounds: Normal breath sounds  No wheezing, rhonchi or rales  Abdominal:      General: Abdomen is flat  Bowel sounds are normal  There is no distension  Palpations: Abdomen is soft  There is no mass  Tenderness: There is no abdominal tenderness  Musculoskeletal:         General: No swelling or tenderness  Right lower leg: No edema  Left lower leg: No edema  Skin:     General: Skin is warm and dry  Capillary Refill: Capillary refill takes less than 2 seconds  Neurological:      General: No focal deficit present  Mental Status: He is alert and oriented to person, place, and time  Cranial Nerves: No cranial nerve deficit  Sensory: No sensory deficit  Motor: No weakness  PAST MEDICAL HISTORY     Past Medical History:   Diagnosis Date    Diabetes mellitus ([facility] )      PAST SURGICAL HISTORY   No past surgical history on file    SOCIAL & FAMILY HISTORY     Social History     Substance and Sexual Activity   Alcohol Use Yes    Alcohol/week: 3 0 standard drinks    Types: 3 Cans of beer per week    Comment: social on a weekend     Substance and Sexual Activity   Alcohol Use Yes    Alcohol/week: 3 0 standard drinks    Types: 3 Cans of beer per week Comment: social on a weekend        Substance and Sexual Activity   Drug Use Never     Social History     Tobacco Use   Smoking Status Current Some Day Smoker    Types: Cigars   Smokeless Tobacco Never Used     No family history on file  LABORATORY DATA     Labs: I have personally reviewed pertinent reports  Results from last 7 days   Lab Units 07/10/21  0211 07/08/21  0601 07/08/21  0002 07/07/21  1257   WBC Thousand/uL 6 91 6 65  --  6 93   HEMOGLOBIN g/dL 14 4 13 6  --  14 9   HEMATOCRIT % 42 5 41 8  --  44 6   PLATELETS Thousands/uL 184 193 187 235   NEUTROS PCT % 58  --   --  64   MONOS PCT % 11  --   --  11      Results from last 7 days   Lab Units 07/10/21  0239 07/09/21  0609 07/08/21  0601 07/07/21  1257   POTASSIUM mmol/L 4 0 4 2 3 8 4 3   CHLORIDE mmol/L 106 107 107 102   CO2 mmol/L 28 28 28 27   BUN mg/dL 14 13 18 20   CREATININE mg/dL 0 81 0 80 0 87 0 86   CALCIUM mg/dL 9 0 8 4 8 4 9 4   ALK PHOS U/L  --   --   --  45 8   ALT U/L  --   --   --  19   AST U/L  --   --   --  14*     Results from last 7 days   Lab Units 07/10/21  0239   MAGNESIUM mg/dL 1 9     Results from last 7 days   Lab Units 07/10/21  0239   PHOSPHORUS mg/dL 3 6      Results from last 7 days   Lab Units 07/10/21  0236 07/09/21  1815 07/09/21  0940 07/07/21  1948   INR   --   --   --  0 93   PTT seconds 69* 49* 49* 24         Results from last 7 days   Lab Units 07/08/21  0601 07/08/21  0306 07/08/21  0002   TROPONIN I ng/mL 0 23* 0 26* 0 18*     Micro:  Lab Results   Component Value Date    BLOODCX No Growth After 5 Days  11/29/2020    BLOODCX No Growth After 5 Days  11/29/2020     IMAGING & DIAGNOSTIC TESTS     Imaging: I have personally reviewed pertinent reports  No results found  EKG, Pathology, and Other Studies: I have personally reviewed pertinent reports  ALLERGIES   No Known Allergies  MEDICATIONS PRIOR TO ARRIVAL     Prior to Admission medications    Medication Sig Start Date End Date Taking?  Authorizing Provider aspirin (ECOTRIN LOW STRENGTH) 81 mg EC tablet Take 81 mg by mouth daily    Historical Provider, MD   Empagliflozin (Jardiance) 10 MG TABS Take 10 mg by mouth daily     Historical Provider, MD   Exenatide ER (Bydureon BCise) 2 MG/0 85ML AUIJ once a week WEEKLY ON Monday    Historical Provider, MD   lisinopril (ZESTRIL) 10 mg tablet Take 10 mg by mouth daily     Historical Provider, MD   metFORMIN (GLUCOPHAGE) 1000 MG tablet Take 500 mg by mouth 2 (two) times a day with meals     Historical Provider, MD   pravastatin (PRAVACHOL) 20 mg tablet Take 20 mg by mouth daily    Historical Provider, MD     MEDICATIONS ADMINISTERED IN LAST 24 HOURS     Medication Administration - last 24 hours from 07/09/2021 0452 to 07/10/2021 0452       Date/Time Order Dose Route Action Action by     07/10/2021 0232 heparin (porcine) 25,000 units in 0 45% NaCl 250 mL infusion (premix) 19 Units/kg/hr Intravenous New Bag Rubens Plaacio     07/09/2021 2318 heparin (porcine) 25,000 units in 0 45% NaCl 250 mL infusion (premix) 19 Units/kg/hr Intravenous Continued from [address]     07/09/2021 2345 insulin lispro (HumaLOG) 100 units/mL subcutaneous injection 1-6 Units   Subcutaneous Canceled Entry Thelma Conway Palacio     07/10/2021 0158 Diclofenac Sodium (VOLTAREN) 1 % topical gel 2 g 0 g Topical Hold Rubens Palacio        CURRENT MEDICATIONS     Current Facility-Administered Medications   Medication Dose Route Frequency Provider Last Rate    acetaminophen  650 mg Oral Q6H PRN Dheeraj Velasco MD      aspirin  81 mg Oral Daily Dheeraj Velasco MD      cyclobenzaprine  5 mg Oral TID PRN Elvira Bathe, DO      Diclofenac Sodium  2 g Topical BID Elvira Bathe, DO      heparin (porcine)  3-20 Units/kg/hr (Order-Specific) Intravenous Titrated Dheeraj Velasco MD 19 Units/kg/hr (07/10/21 0232)    insulin lispro  1-6 Units Subcutaneous 4x Daily (AC & HS) Red Nelson DO      metoprolol tartrate  12 5 mg Oral Q12H 321 Ellen Mcarthur MD      nicotine  1 patch Transdermal Daily Tod Oneill MD      nitroglycerin  0 4 mg Sublingual Q5 Min PRN Tod Oneill MD      pravastatin  80 mg Oral Daily Tod Oneill MD       heparin (porcine), 3-20 Units/kg/hr (Order-Specific), Last Rate: 19 Units/kg/hr (07/10/21 0232)      acetaminophen, 650 mg, Q6H PRN  cyclobenzaprine, 5 mg, TID PRN  nitroglycerin, 0 4 mg, Q5 Min PRN        Admission Time  I spent 30 minutes admitting the patient  This involved direct patient contact where I performed a full history and physical, reviewing previous records, and reviewing laboratory and other diagnostic studies  Portions of the record may have been created with voice recognition software  Occasional wrong word or "sound a like" substitutions may have occurred due to the inherent limitations of voice recognition software    Read the chart carefully and recognize, using context, where substitutions have occurred     ==  Cliff Humphrey, DO  [address]  Internal Medicine Residency PGY-1

## 2021-07-10 NOTE — UTILIZATION REVIEW
Continued Stay Review    OBSERVATION  7/7/21 @2038 CONVERTED TO INPATIENT ADMISSION 7/9/21 @2058 DUE TO CONTINUED STAY REQUIRED TO EVALUATE AND TREAT PATIENT WITH NSTEMI, SEVERE 3 VESSEL DISEASE WITH HEPARIN DRIP AND CONTINUED TELEMETRY MONITORING WHILE AWAITING TRANSFER FOR A HIGHER LEVEL OF CARE/ CARDIOTHORACIC SURGERY EVALUATION    Date: 7/9/21                        Admission Orders (From admission, onward)     Ordered        07/09/21 2058  Inpatient Admission  Once         07/07/21 2038  Place in Observation  Once                   Orders Placed This Encounter   Procedures    Inpatient Admission     Standing Status:   Standing     Number of Occurrences:   1     Order Specific Question:   Level of Care     Answer:   Med Surg [16]     Order Specific Question:   Estimated length of stay     Answer:   More than 2 Midnights     Order Specific Question:   Certification     Answer:   I certify that inpatient services are medically necessary for this patient for a duration of greater than two midnights  See H&P and MD Progress Notes for additional information about the patient's course of treatment  HPI:62 y o  male initially admitted on 7/7/21 as OBServation with elevated troponin NSTEMI  Pt went to cath lab 7/8-severe 3 vessel disease  Converted to IP 7/9    Assessment/Plan: Converted to IP 7/9  Pt remains on heparin drip, and continued telemetry monitoring awaiting available bed at [facility] for transfer for higher level of care/cardiac surgery consultation  Pt denied chest pain, no SOB  Telemetry- sinus rhythm      Vital Signs: /68 (BP Location: Right arm)   Pulse 67   Temp 98 3 °F (36 8 °C) (Oral)   Resp 18   Ht 5' 10 5" (1 791 m)   Wt 112 kg (246 lb 0 5 oz)   SpO2 98%   BMI 34 80 kg/m²       Pertinent Labs/Diagnostic Results:       Results from last 7 days   Lab Units 07/08/21  0601 07/08/21  0002 07/07/21  1257   WBC Thousand/uL 6 65  --  6 93   HEMOGLOBIN g/dL 13 6  --  14 9   HEMATOCRIT % 41  8  --  44 6   PLATELETS Thousands/uL 193 187 235   NEUTROS ABS Thousands/µL  --   --  4 38         Results from last 7 days   Lab Units 07/09/21  0609 07/08/21  0601 07/07/21  1257   SODIUM mmol/L 141 140 137   POTASSIUM mmol/L 4 2 3 8 4 3   CHLORIDE mmol/L 107 107 102   CO2 mmol/L 28 28 27   ANION GAP mmol/L 6 5 8   BUN mg/dL 13 18 20   CREATININE mg/dL 0 80 0 87 0 86   EGFR ml/min/1 73sq m 94 91 92   CALCIUM mg/dL 8 4 8 4 9 4     Results from last 7 days   Lab Units 07/07/21  1257   AST U/L 14*   ALT U/L 19   ALK PHOS U/L 45 8   TOTAL PROTEIN g/dL 7 1   ALBUMIN g/dL 4 1   TOTAL BILIRUBIN mg/dL 0 55     Results from last 7 days   Lab Units 07/09/21  1116 07/09/21  0711 07/08/21  2201 07/08/21  1604 07/08/21  1046 07/08/21  0603 07/08/21  0001 07/07/21  2153   POC GLUCOSE mg/dl 222* 164* 227* 131 184* 148* 203* 190*     Results from last 7 days   Lab Units 07/09/21  0609 07/08/21  0601 07/07/21  1257   GLUCOSE RANDOM mg/dL 161* 154* 191*         Results from last 7 days   Lab Units 07/07/21  1257   HEMOGLOBIN A1C % 9 0*   EAG mg/dl 212       Results from last 7 days   Lab Units 07/08/21  0601 07/08/21  0306 07/08/21  0002 07/07/21  1948 07/07/21  1257   TROPONIN I ng/mL 0 23* 0 26* 0 18* 0 11* 0 17*     Results from last 7 days   Lab Units 07/07/21  1948   D-DIMER QUANTITATIVE ug/ml FEU 0 33     Results from last 7 days   Lab Units 07/09/21  1815 07/09/21  0940 07/09/21  0245 07/07/21  1948   PROTIME seconds  --   --   --  12 6   INR   --   --   --  0 93   PTT seconds 49* 49* 38* 24     Results from last 7 days   Lab Units 07/07/21  1257   TSH 3RD GENERATON uIU/mL 2 490           Results from last 7 days   Lab Units 07/07/21  1257   BNP pg/mL 31 8         Medications:   Scheduled Medications:  aspirin, 81 mg, Oral, Daily  insulin lispro, 1-5 Units, Subcutaneous, HS  insulin lispro, 1-6 Units, Subcutaneous, Q6H [address]  metoprolol tartrate, 12 5 mg, Oral, Q12H [address]  nicotine, 1 patch, Transdermal, Daily  pravastatin, 80 mg, Oral, Daily      Continuous IV Infusions:  heparin (porcine), 3-20 Units/kg/hr (Order-Specific), Intravenous, Titrated      PRN Meds:  acetaminophen, 650 mg, Oral, Q6H PRN  nitroglycerin, 0 4 mg, Sublingual, Q5 Min PRN        Discharge Plan: TBD  Network Utilization Review Department  ATTENTION: Please call with any questions or concerns to [phone] and carefully listen to the prompts so that you are directed to the right person  All voicemails are confidential   Hair Truong all requests for admission clinical reviews, approved or denied determinations and any other requests to dedicated fax number below belonging to the campus where the patient is receiving treatment   List of dedicated fax numbers for the Facilities:  [address] DENIALS (Administrative/Medical Necessity) [phone]   [address] (Maternity/NICU/Pediatrics) [phone]   [address] [place] [address] [id number] [address] [address] Melissa Dasilva [address] [facility] [phone]

## 2021-07-10 NOTE — PROGRESS NOTES
INTERNAL MEDICINE RESIDENCY SENIOR ADMISSION NOTE     Name: Moira Thornton   Age & Sex: 61 y o  male   MRN: [id number]  Unit/Bed#: -01   Encounter: [id number]  Primary Care Provider: Maci Aquino MD    Admit to team: SOD Team C     Patient seen and examined  Reviewed H&P per Dr Marie Leggett   Agree with the assessment and plan with any exception/addition as noted below:    Moira Thornton is a 61y o  year old male with HTN, HLD, and DM who presented to [facility] ED 07/07/2021 per  PCP's request due to elevated troponin  Three days prior to presentation, he developed burning chest pain and significant dyspnea while mowing the grass and this prompted him to seek help  He admits to progressively worsening substernal chest pain and dyspnea on exertion and initially thought his symptoms were 2/2 deconditioning or possibly  delayed symptoms from COVID infection in Nov 2020   In the ED, EKG showed NSR with nonspecific T wave abnormality which is more pronounced in inferior leads on repeat ECG,Troponin peaked at 0 26  Patient was started on heparin drip, cardiology was consulted, and he had cardiac catheterization which showed severe triple vessel disease  Echocardiogram showed EF of 50% with hypokinesis of basal mid inferior wall and inferolateral wall  Pt  Was transferred to [facility] for cardiothoracic surgery eval for CABG  Patient was seen and examined by bedside, he complained of chronic posterior neck tenderness which is relieved with muscle relaxant, he denies headaches, lightheadedness, chest pain, shortness breath, palpitation, abdominal pain, N/V/C/D  Patient looks stable on room air not in any obvious distress  He is hemodynamically stable, labs unremarkable at this time  Plan  · Cardiology and cardiothoracic surgery consulted recommendation appreciated  · Will continue IV heparin, aspirin, statin and beta-blocker  Continue to  Hold lisinopril per Cardiology      · Patient with A1c 9 0 on metformin 500 mg b i d , Jardiance and exenatide outpatient  Patient is likely noncompliance with his medication  At this time, his blood sugar is well controlled  I  will continue current regimen of SSI and adjust as appropriate  · Will order Flexeril 5 mg t i d  Daily p r n  For muscle spasm and Voltaren gel  · Nicotine patch ordered  Smoking cessation encouraged and patient verbalized understanding        Principal Problem:    CAD (coronary artery disease)      Code Status: Level 1 - Full Code  Admission Status: INPATIENT   Disposition: Patient requires Med/Surg with Telemetry  Expected Length of Stay: >2days

## 2021-07-10 NOTE — CONSULTS
Consultation - Cardiothoracic Surgery   Mejia Hall 61 y o  male MRN: [id number]  Unit/Bed#: -01 Encounter: [id number]    Physician Requesting Consult: Esha Medina DO    Reason for Consult / Principal Problem: MV CAD    Inpatient consult to Cardiothoracic Surgery  Consult performed by: María Lobo PA-C  Consult ordered by: Pritesh Huang DO        History of Present Illness: Mejia Hall is a 61y o  year old male who presented to [address] [place] [address] from his PCP's office for the evaluation of exertional chest pain and shortness of breath  The patient reports SOB over the past several months  He thought this was related to his history of COVID infection in November 2020 or due to deconditioning  On Monday, he developed burning chest pain and BOLAND while mowing the grass  He was evaluated by his PCP, who ordered a troponin, which was elevated, and sent to the ER for evaluation  He underwent cardiac catheterization, which revealed MV CAD  We have been consulted for surgical revascularization  The patient is an occasional cigar smoker and admits to drinking 4-6 alcoholic beverages on the weekend  He denies a family history of CAD  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  He has not received his COVID vaccinations  Past Medical History:  Past Medical History:   Diagnosis Date    Diabetes mellitus ([address] )     History of COVID-19 11/2020    HLD (hyperlipidemia)     HTN (hypertension)          Past Surgical History:   No past surgical history on file        Family History:  Family History   Problem Relation Age of Onset    Leukemia Mother          Social History:  Social History     Substance and Sexual Activity   Alcohol Use Yes    Alcohol/week: 4 0 - 6 0 standard drinks    Types: 4 - 6 Cans of beer per week    Comment: 4-6 drinks on weekend     Social History     Substance and Sexual Activity   Drug Use Never     Social History     Tobacco Use   Smoking Status Current Some Day Smoker    Types: Cigars   Smokeless Tobacco Never Used     Marital Status:       Home Medications:   Prior to Admission medications    Medication Sig Start Date End Date Taking? Authorizing Provider   aspirin (ECOTRIN LOW STRENGTH) 81 mg EC tablet Take 81 mg by mouth daily    Historical Provider, MD   Empagliflozin (Jardiance) 10 MG TABS Take 10 mg by mouth daily     Historical Provider, MD   Exenatide ER (Bydureon BCise) 2 MG/0 85ML AUIJ once a week WEEKLY ON Monday    Historical Provider, MD   lisinopril (ZESTRIL) 10 mg tablet Take 10 mg by mouth daily     Historical Provider, MD   metFORMIN (GLUCOPHAGE) 1000 MG tablet Take 500 mg by mouth 2 (two) times a day with meals     Historical Provider, MD   pravastatin (PRAVACHOL) 20 mg tablet Take 20 mg by mouth daily    Historical Provider, MD       Inpatient Medications:  Scheduled Meds:   Current Facility-Administered Medications   Medication Dose Route Frequency Provider Last Rate    acetaminophen  650 mg Oral Q6H PRN Rob Medina MD      aspirin  81 mg Oral Daily Rob Medina MD      cyclobenzaprine  5 mg Oral TID PRN Marsh Kocher, DO      Diclofenac Sodium  2 g Topical BID Marsh Kocher, DO      heparin (porcine)  3-20 Units/kg/hr (Order-Specific) Intravenous Titrated Rob Medina MD 19 Units/kg/hr (07/10/21 0232)    insulin lispro  1-6 Units Subcutaneous 4x Daily (AC & HS) Ferny Medina DO      metoprolol tartrate  12 5 mg Oral Q12H [address] Rob Medina MD      nitroglycerin  0 4 mg Sublingual Q5 Min PRN Rob Medina MD      pravastatin  80 mg Oral Daily Rob Medina MD       Continuous Infusions: heparin (porcine), 3-20 Units/kg/hr (Order-Specific), Last Rate: 19 Units/kg/hr (07/10/21 0232)      PRN Meds:  acetaminophen, 650 mg, Q6H PRN  cyclobenzaprine, 5 mg, TID PRN  nitroglycerin, 0 4 mg, Q5 Min PRN        Allergies:  No Known Allergies    Review of Systems:  Review of Systems   Constitutional: Negative    Negative for chills, diaphoresis, fatigue and fever  HENT: Negative  Eyes: Negative  Respiratory: Positive for chest tightness and shortness of breath  Cardiovascular: Negative  Negative for chest pain and leg swelling  Gastrointestinal: Negative  Endocrine: Negative  Genitourinary: Negative  Musculoskeletal: Negative  Skin: Negative  Allergic/Immunologic: Negative  Neurological: Negative  Negative for syncope  Hematological: Negative for adenopathy  Does not bruise/bleed easily  Psychiatric/Behavioral: Negative  All other systems reviewed and are negative  Vital Signs:     Vitals:    07/09/21 2311 07/10/21 0207 07/10/21 0717 07/10/21 1100   BP: 137/89 108/63 112/65 115/68   BP Location:   Right arm Right arm   Pulse: 69 58 59 66   Resp: 18 18 18 18   Temp: 97 9 °F (36 6 °C) 97 8 °F (36 6 °C) (!) 97 4 °F (36 3 °C) 99 9 °F (37 7 °C)   TempSrc: Oral  Oral Oral   SpO2: 97% 95% 93% 95%   Weight: 110 kg (242 lb 8 1 oz)      Height: 5' 10" (1 778 m)        Invasive Devices     Peripheral Intravenous Line            Peripheral IV 07/08/21 Proximal;Right;Ventral (anterior) Forearm 1 day                Physical Exam:  Physical Exam  Vitals and nursing note reviewed  Constitutional:       General: He is not in acute distress  Appearance: Normal appearance  He is well-developed  He is obese  He is not diaphoretic  HENT:      Head: Normocephalic and atraumatic  Right Ear: External ear normal       Left Ear: External ear normal       Nose: Nose normal       Mouth/Throat:      Pharynx: No oropharyngeal exudate  Eyes:      General: No scleral icterus  Right eye: No discharge  Left eye: No discharge  Conjunctiva/sclera: Conjunctivae normal       Pupils: Pupils are equal, round, and reactive to light  Neck:      Vascular: No JVD  Trachea: No tracheal deviation  Cardiovascular:      Rate and Rhythm: Normal rate and regular rhythm        Heart sounds: Normal heart sounds  No murmur heard  No friction rub  Pulmonary:      Effort: Pulmonary effort is normal  No respiratory distress  Breath sounds: Normal breath sounds  No stridor  No wheezing or rales  Abdominal:      General: Bowel sounds are normal  There is no distension  Palpations: Abdomen is soft  There is no mass  Tenderness: There is no abdominal tenderness  There is no guarding  Musculoskeletal:         General: No tenderness or deformity  Normal range of motion  Cervical back: Normal range of motion and neck supple  Skin:     General: Skin is warm and dry  Coloration: Skin is not pale  Findings: No erythema or rash  Neurological:      Mental Status: He is alert and oriented to person, place, and time  Cranial Nerves: No cranial nerve deficit  Sensory: No sensory deficit  Motor: No abnormal muscle tone  Coordination: Coordination normal       Deep Tendon Reflexes: Reflexes normal    Psychiatric:         Behavior: Behavior normal          Thought Content:  Thought content normal          Judgment: Judgment normal          Lab Results:     Results from last 7 days   Lab Units 07/10/21  0211 07/08/21  0601 07/08/21  0002 07/07/21  1257   WBC Thousand/uL 6 91 6 65  --  6 93   HEMOGLOBIN g/dL 14 4 13 6  --  14 9   HEMATOCRIT % 42 5 41 8  --  44 6   PLATELETS Thousands/uL 184 193 187 235     Results from last 7 days   Lab Units 07/10/21  0239 07/09/21  0609 07/08/21  0601   POTASSIUM mmol/L 4 0 4 2 3 8   CHLORIDE mmol/L 106 107 107   CO2 mmol/L 28 28 28   BUN mg/dL 14 13 18   CREATININE mg/dL 0 81 0 80 0 87   CALCIUM mg/dL 9 0 8 4 8 4     Results from last 7 days   Lab Units 07/10/21  0815 07/10/21  0236 07/09/21  1815 07/07/21  1948   INR   --   --   --  0 93   PTT seconds 72* 69* 49* 24     Lab Results   Component Value Date    HGBA1C 9 0 (H) 07/07/2021     Lab Results   Component Value Date    TROPONINI 0 23 (H) 07/08/2021       Imaging Studies:     Cardiac Catheterization: CORONARY CIRCULATION:  Left main: Normal   LAD: The vessel was normal sized  There was a 70% stenosis in the proximal vessel  Circumflex: The vessel was normal sized and gave rise to one major OM branch  There was a 95% proximal stenosis of the large OM branch  RCA: The vessel was normal sized and dominant, giving rise to the PDA and several posterolateral branches  There was a complex eccentric lesion in mid vessel with thrombus  There is MEDINA 3 flow into the distal vessel, but MEDINA 1 flow into  the PDA  There was collateral flow to the PDA from the left system  Echocardiogram: LEFT VENTRICLE:  Size was normal   Systolic function was at the lower limits of normal  Ejection fraction was estimated to be 50 %  There was mild hypokinesis of the basal-mid inferior and inferolateral walls  Doppler parameters were consistent with abnormal left ventricular relaxation (grade 1 diastolic dysfunction)      RIGHT VENTRICLE:  The size was normal   Systolic function was normal      LEFT ATRIUM:  The atrium was mildly dilated      MITRAL VALVE:  There was trace to mild regurgitation      TRICUSPID VALVE:  There was trace regurgitation    CXR: No acute cardiopulmonary disease  I have personally reviewed pertinent reports  and I have personally reviewed pertinent films in PACS    Assessment:  Principal Problem:    CAD (coronary artery disease)  Active Problems:    Hyperlipidemia    Type 2 diabetes mellitus (HCC)    Hypertension    Muscle spasm    Tobacco use    HTN (hypertension)    History of COVID-19    Severe coronary artery disease; Ongoing CABG workup    Plan:  Risks and benefits of coronary artery bypass grafting were discussed in detail today with the patient  They understand and wish to proceed with further workup and ultimately surgical intervention  We have ordered routine preoperative laboratory and vascular studies    Pending the results of these tests, they will be scheduled for surgery with Dr Emerita Philippe, Daylin Mckeon was comfortable with our recommendations, and their questions were answered to their satisfaction  We will continue to evaluate the patient daily with further recommendations as work up is completed  Thank you for allowing us to participate in the care of this patient  SIGNATURE: Caitlin Kilpatrick  DATE: July 10, 2021  TIME: 1:52 PM    * This note was completed in part utilizing [employer] direct voice recognition software  Grammatical errors, random word insertion, spelling mistakes, and incomplete sentences may be an occasional consequence of the system secondary to software limitations, ambient noise and hardware issues  At the time of dictation, efforts were made to edit, clarify and /or correct errors  Please read the chart carefully and recognize, using context, where substitutions have occurred  If you have any questions or concerns about the context, text or information contained within the body of this dictation, please contact myself, the provider, for further clarification

## 2021-07-10 NOTE — PLAN OF CARE
Problem: CARDIOVASCULAR - ADULT  Goal: Maintains optimal cardiac output and hemodynamic stability  Description: INTERVENTIONS:  - Monitor I/O, vital signs and rhythm  - Monitor for S/S and trends of decreased cardiac output  - Administer and titrate ordered vasoactive medications to optimize hemodynamic stability  - Assess quality of pulses, skin color and temperature  - Assess for signs of decreased coronary artery perfusion  - Instruct patient to report change in severity of symptoms  Outcome: Progressing  Goal: Absence of cardiac dysrhythmias or at baseline rhythm  Description: INTERVENTIONS:  - Continuous cardiac monitoring, vital signs, obtain 12 lead EKG if ordered  - Administer antiarrhythmic and heart rate control medications as ordered  - Monitor electrolytes and administer replacement therapy as ordered  Outcome: Progressing     Problem: METABOLIC, FLUID AND ELECTROLYTES - ADULT  Goal: Electrolytes maintained within normal limits  Description: INTERVENTIONS:  - Monitor labs and assess patient for signs and symptoms of electrolyte imbalances  - Administer electrolyte replacement as ordered  - Monitor response to electrolyte replacements, including repeat lab results as appropriate  - Instruct patient on fluid and nutrition as appropriate  Outcome: Progressing  Goal: Fluid balance maintained  Description: INTERVENTIONS:  - Monitor labs   - Monitor I/O and WT  - Instruct patient on fluid and nutrition as appropriate  - Assess for signs & symptoms of volume excess or deficit  Outcome: Progressing  Goal: Glucose maintained within target range  Description: INTERVENTIONS:  - Monitor Blood Glucose as ordered  - Assess for signs and symptoms of hyperglycemia and hypoglycemia  - Administer ordered medications to maintain glucose within target range  - Assess nutritional intake and initiate nutrition service referral as needed  Outcome: Progressing     Problem: HEMATOLOGIC - ADULT  Goal: Maintains hematologic stability  Description: INTERVENTIONS  - Assess for signs and symptoms of bleeding or hemorrhage  - Monitor labs  - Administer supportive blood products/factors as ordered and appropriate  Outcome: Progressing

## 2021-07-10 NOTE — ASSESSMENT & PLAN NOTE
Patient occasionally smokes cigars  · Counseled patient on smoking cessation  · Nicotine 7mg/24hr 1 patch daily if needed

## 2021-07-10 NOTE — ASSESSMENT & PLAN NOTE
Patient complains of chronic posterior neck and left shoulder tenderness and spasms that is relieved with muscle relaxant     · Voltaren gel 1% 2g BID  · Flexeril 5 mg PRN

## 2021-07-11 LAB
ANION GAP SERPL CALCULATED.3IONS-SCNC: 3 MMOL/L (ref 4–13)
APTT PPP: 65 SECONDS (ref 23–37)
ATRIAL RATE: 64 BPM
ATRIAL RATE: 65 BPM
BASOPHILS # BLD AUTO: 0.04 THOUSANDS/ΜL (ref 0–0.1)
BASOPHILS NFR BLD AUTO: 1 % (ref 0–1)
BUN SERPL-MCNC: 15 MG/DL (ref 5–25)
CALCIUM SERPL-MCNC: 9.4 MG/DL (ref 8.3–10.1)
CHLORIDE SERPL-SCNC: 106 MMOL/L (ref 100–108)
CO2 SERPL-SCNC: 27 MMOL/L (ref 21–32)
CREAT SERPL-MCNC: 0.8 MG/DL (ref 0.6–1.3)
EOSINOPHIL # BLD AUTO: 0.22 THOUSAND/ΜL (ref 0–0.61)
EOSINOPHIL NFR BLD AUTO: 3 % (ref 0–6)
ERYTHROCYTE [DISTWIDTH] IN BLOOD BY AUTOMATED COUNT: 12.2 % (ref 11.6–15.1)
GFR SERPL CREATININE-BSD FRML MDRD: 94 ML/MIN/1.73SQ M
GLUCOSE SERPL-MCNC: 162 MG/DL (ref 65–140)
GLUCOSE SERPL-MCNC: 180 MG/DL (ref 65–140)
GLUCOSE SERPL-MCNC: 181 MG/DL (ref 65–140)
GLUCOSE SERPL-MCNC: 186 MG/DL (ref 65–140)
GLUCOSE SERPL-MCNC: 243 MG/DL (ref 65–140)
HCT VFR BLD AUTO: 46 % (ref 36.5–49.3)
HGB BLD-MCNC: 15.4 G/DL (ref 12–17)
IMM GRANULOCYTES # BLD AUTO: 0.02 THOUSAND/UL (ref 0–0.2)
IMM GRANULOCYTES NFR BLD AUTO: 0 % (ref 0–2)
LYMPHOCYTES # BLD AUTO: 1.9 THOUSANDS/ΜL (ref 0.6–4.47)
LYMPHOCYTES NFR BLD AUTO: 27 % (ref 14–44)
MCH RBC QN AUTO: 33.1 PG (ref 26.8–34.3)
MCHC RBC AUTO-ENTMCNC: 33.5 G/DL (ref 31.4–37.4)
MCV RBC AUTO: 99 FL (ref 82–98)
MONOCYTES # BLD AUTO: 0.89 THOUSAND/ΜL (ref 0.17–1.22)
MONOCYTES NFR BLD AUTO: 13 % (ref 4–12)
NEUTROPHILS # BLD AUTO: 3.94 THOUSANDS/ΜL (ref 1.85–7.62)
NEUTS SEG NFR BLD AUTO: 56 % (ref 43–75)
NRBC BLD AUTO-RTO: 0 /100 WBCS
P AXIS: 35 DEGREES
P AXIS: 40 DEGREES
PLATELET # BLD AUTO: 204 THOUSANDS/UL (ref 149–390)
PMV BLD AUTO: 9.8 FL (ref 8.9–12.7)
POTASSIUM SERPL-SCNC: 4 MMOL/L (ref 3.5–5.3)
PR INTERVAL: 158 MS
PR INTERVAL: 170 MS
QRS AXIS: -47 DEGREES
QRS AXIS: -54 DEGREES
QRSD INTERVAL: 100 MS
QRSD INTERVAL: 96 MS
QT INTERVAL: 402 MS
QT INTERVAL: 408 MS
QTC INTERVAL: 418 MS
QTC INTERVAL: 420 MS
RBC # BLD AUTO: 4.65 MILLION/UL (ref 3.88–5.62)
SODIUM SERPL-SCNC: 136 MMOL/L (ref 136–145)
T WAVE AXIS: -15 DEGREES
T WAVE AXIS: -18 DEGREES
VENTRICULAR RATE: 64 BPM
VENTRICULAR RATE: 65 BPM
WBC # BLD AUTO: 7.01 THOUSAND/UL (ref 4.31–10.16)

## 2021-07-11 PROCEDURE — 85730 THROMBOPLASTIN TIME PARTIAL: CPT | Performed by: INTERNAL MEDICINE

## 2021-07-11 PROCEDURE — 80048 BASIC METABOLIC PNL TOTAL CA: CPT | Performed by: STUDENT IN AN ORGANIZED HEALTH CARE EDUCATION/TRAINING PROGRAM

## 2021-07-11 PROCEDURE — 82948 REAGENT STRIP/BLOOD GLUCOSE: CPT

## 2021-07-11 PROCEDURE — 85025 COMPLETE CBC W/AUTO DIFF WBC: CPT | Performed by: STUDENT IN AN ORGANIZED HEALTH CARE EDUCATION/TRAINING PROGRAM

## 2021-07-11 PROCEDURE — 93010 ELECTROCARDIOGRAM REPORT: CPT | Performed by: INTERNAL MEDICINE

## 2021-07-11 PROCEDURE — 93880 EXTRACRANIAL BILAT STUDY: CPT | Performed by: SURGERY

## 2021-07-11 PROCEDURE — 99232 SBSQ HOSP IP/OBS MODERATE 35: CPT | Performed by: INTERNAL MEDICINE

## 2021-07-11 PROCEDURE — 93971 EXTREMITY STUDY: CPT | Performed by: SURGERY

## 2021-07-11 PROCEDURE — 99233 SBSQ HOSP IP/OBS HIGH 50: CPT | Performed by: INTERNAL MEDICINE

## 2021-07-11 RX ADMIN — Medication 12.5 MG: at 21:13

## 2021-07-11 RX ADMIN — CHLORHEXIDINE GLUCONATE 15 ML: 1.2 SOLUTION ORAL at 08:43

## 2021-07-11 RX ADMIN — Medication 12.5 MG: at 08:55

## 2021-07-11 RX ADMIN — MUPIROCIN 1 APPLICATION: 20 OINTMENT TOPICAL at 08:43

## 2021-07-11 RX ADMIN — HEPARIN SODIUM 19 UNITS/KG/HR: 10000 INJECTION, SOLUTION INTRAVENOUS at 08:44

## 2021-07-11 RX ADMIN — INSULIN LISPRO 3 UNITS: 100 INJECTION, SOLUTION INTRAVENOUS; SUBCUTANEOUS at 13:15

## 2021-07-11 RX ADMIN — DICLOFENAC SODIUM 2 G: 10 GEL TOPICAL at 08:45

## 2021-07-11 RX ADMIN — ASPIRIN 81 MG: 81 TABLET, COATED ORAL at 08:44

## 2021-07-11 RX ADMIN — PRAVASTATIN SODIUM 80 MG: 20 TABLET ORAL at 08:44

## 2021-07-11 RX ADMIN — CHLORHEXIDINE GLUCONATE 15 ML: 1.2 SOLUTION ORAL at 21:13

## 2021-07-11 RX ADMIN — INSULIN LISPRO 1 UNITS: 100 INJECTION, SOLUTION INTRAVENOUS; SUBCUTANEOUS at 08:43

## 2021-07-11 RX ADMIN — DICLOFENAC SODIUM 2 G: 10 GEL TOPICAL at 21:15

## 2021-07-11 RX ADMIN — INSULIN LISPRO 1 UNITS: 100 INJECTION, SOLUTION INTRAVENOUS; SUBCUTANEOUS at 17:57

## 2021-07-11 RX ADMIN — INSULIN LISPRO 1 UNITS: 100 INJECTION, SOLUTION INTRAVENOUS; SUBCUTANEOUS at 21:33

## 2021-07-11 RX ADMIN — MUPIROCIN 1 APPLICATION: 20 OINTMENT TOPICAL at 21:13

## 2021-07-11 NOTE — UTILIZATION REVIEW
Inpatient Admission Authorization Request   NOTIFICATION OF INPATIENT ADMISSION/INPATIENT AUTHORIZATION REQUEST   SERVICING FACILITY:   [facility]  Address: [address] [place] [address], [address] [address]  Tax ID: [id number]  NPI: [id number]  Place of [facility]patient [facility]  [address]  Place of Service Code: 24     ATTENDING PROVIDER:  Attending Name and NPI#: Dolores King [[id number]]  Address: [address] [place] [address], [address] [place] [address]  Phone: [phone]     UTILIZATION REVIEW CONTACT:  [facility] Utilization   Network Utilization Review Department  Phone: [phone]  Fax: [phone]  Email: [email or web address]     PHYSICIAN ADVISORY SERVICES:  FOR [facility]BUG-[facility]-APTB REVIEW - MEDICAL NECESSITY DENIAL  Phone: [phone]  Fax: [phone]  Email: [email or web address]     TYPE OF REQUEST:  Inpatient Status     ADMISSION INFORMATION:  ADMISSION DATE/TIME: 7/9/21 10:55 PM  PATIENT DIAGNOSIS CODE/DESCRIPTION:  Triple vessel disease of the heart [I25 10]  DISCHARGE DATE/TIME: No discharge date for patient encounter  DISCHARGE DISPOSITION (IF DISCHARGED): [facility]     IMPORTANT INFORMATION:  Please contact the [facility] directly with any questions or concerns regarding this request  Department voicemails are confidential     Send requests for admission clinical reviews, concurrent reviews, approvals, and administrative denials due to lack of clinical to fax [phone]

## 2021-07-11 NOTE — UTILIZATION REVIEW
Initial Clinical Review    Admission: Date/Time/Statement:   Admission Orders (From admission, onward)     Ordered        07/09/21 2342  Inpatient Admission  Once                   Orders Placed This Encounter   Procedures    Inpatient Admission     Standing Status:   Standing     Number of Occurrences:   1     Order Specific Question:   Level of Care     Answer:   Med Surg [16]     Order Specific Question:   Estimated length of stay     Answer:   More than 2 Midnights     Order Specific Question:   Certification     Answer:   I certify that inpatient services are medically necessary for this patient for a duration of greater than two midnights  See H&P and MD Progress Notes for additional information about the patient's course of treatment  Initial Presentation:  62 y/o male with a PMHx of T2 DM, HLD, obesity, HTN, occasional nicotine use initially presented to [address] [place] [address] with NSTEMI and found to have triple-vessel coronary artery disease on cardiac catheterization  Tx'd to [facility] for CT surgery evaluation for possible CABG  Admitted inpatient to M/S/Tele unit -- He is currently asymptomatic  His troponin peaked at 0 26 and started down trending  Continue Telm monitoring  Continue Hep gtt, current po meds  Fingerstick glucose checks w/ ssi  Cardiac diet  SCD's  CTSx consulted    CTSx 7/10 -- A: Severe coronary artery disease  Case discussed with pt and wishes to proceed with surgery  Ongoing w/u with pre-op labs, studies ordered  Date: 7/10  Day 2:  Continue IV heparin, aspirin, statin and beta-blocker  Continue to  Hold lisinopril per Cardiology  blood sugars currently well controlled  Continue fingerstick glucose with ssi  Flexeril and voltaren gel ordered for muscle spasm  Nicotine patch  Continue cons carb diet   SCD's    Vital Signs:   Date/Time  Temp  Pulse  Resp  BP  MAP (mmHg)  SpO2  O2 Device  Patient Position - Orthostatic VS   07/10/21 23:26:26  97 6 °F (36 4 °C)  63  14  126/70  89  95 %  --  --   07/10/21 21:01:18  --  --  --  130/70  90  --  --  --   07/10/21 1935  --  --  --  --  --  --  None (Room air)  --   07/10/21 19:25:58  99 1 °F (37 3 °C)  --  14  136/69  91  --  --  --   07/10/21 14:56:12  98 9 °F (37 2 °C)  65  16  110/65  80  96 %  None (Room air)  Lying   07/10/21 11:00:40  99 9 °F (37 7 °C)  66  18  115/68  84  95 %  None (Room air)  Sitting   07/10/21 07:17:02  97 4 °F (36 3 °C)Abnormal   59  18  112/65  81  93 %  None (Room air)  Lying   07/10/21 02:07:57  97 8 °F (36 6 °C)  58  18  108/63  78  95 %  --  --   07/09/21 23:11:43  97 9 °F (36 6 °C)  69  18  137/89  105  97 %  --  --   07/09/21 2245  --  --  --  --  --  --  None (Room air)  --       Pertinent Labs/Diagnostic Test Results:   Results from last 7 days   Lab Units 07/10/21  1910   SARS-COV-2  Negative     Results from last 7 days   Lab Units 07/10/21  0211 07/08/21  0601 07/07/21  1257   WBC Thousand/uL 6 91 6 65 6 93   HEMOGLOBIN g/dL 14 4 13 6 14 9   HEMATOCRIT % 42 5 41 8 44 6   PLATELETS Thousands/uL 184 193 235   NEUTROS ABS Thousands/µL 3 93  --  4 38     Results from last 7 days   Lab Units 07/10/21  0239 07/09/21  0609 07/08/21  0601 07/07/21  1257   SODIUM mmol/L 138 141 140 137   POTASSIUM mmol/L 4 0 4 2 3 8 4 3   CHLORIDE mmol/L 106 107 107 102   CO2 mmol/L 28 28 28 27   ANION GAP mmol/L 4 6 5 8   BUN mg/dL 14 13 18 20   CREATININE mg/dL 0 81 0 80 0 87 0 86   EGFR ml/min/1 73sq m 94 94 91 92   CALCIUM mg/dL 9 0 8 4 8 4 9 4   MAGNESIUM mg/dL 1 9  --   --   --    PHOSPHORUS mg/dL 3 6  --   --   --      Results from last 7 days   Lab Units 07/07/21  1257   AST U/L 14*   ALT U/L 19   ALK PHOS U/L 45 8   TOTAL PROTEIN g/dL 7 1   ALBUMIN g/dL 4 1   TOTAL BILIRUBIN mg/dL 0 55     Results from last 7 days   Lab Units 07/10/21  2054 07/10/21  1625 07/10/21  1205 07/10/21  0819 07/09/21  2159 07/09/21  1116 07/09/21  0711 07/08/21  2201 07/08/21  1604 07/08/21  1046   POC GLUCOSE mg/dl 196* 168* 180* 161* 156* 222* 164* 227* 131 184*     Results from last 7 days   Lab Units 07/10/21  0239 07/09/21  0609 07/08/21  0601 07/07/21  1257   GLUCOSE RANDOM mg/dL 149* 161* 154* 191*     Results from last 7 days   Lab Units 07/07/21  1257   HEMOGLOBIN A1C % 9 0*   EAG mg/dl 212     Results from last 7 days   Lab Units 07/08/21  0601 07/08/21  0306 07/08/21  0002 07/07/21  1948 07/07/21  1257   TROPONIN I ng/mL 0 23* 0 26* 0 18* 0 11* 0 17*     Results from last 7 days   Lab Units 07/07/21  1948   D-DIMER QUANTITATIVE ug/ml FEU 0 33     Results from last 7 days   Lab Units 07/10/21  0815 07/10/21  0236 07/07/21  1948   PROTIME seconds  --   --  12 6   INR   --   --  0 93   PTT seconds 72* 69* 24     Results from last 7 days   Lab Units 07/07/21  1257   TSH 3RD GENERATON uIU/mL 2 490     Results from last 7 days   Lab Units 07/07/21  1257   BNP pg/mL 31 8     Results from last 7 days   Lab Units 07/10/21  1457   CLARITY UA  Clear   COLOR UA  Yellow   SPEC GRAV UA  1 011   PH UA  7 0   GLUCOSE UA mg/dl Negative   KETONES UA mg/dl Negative   BLOOD UA  Negative   PROTEIN UA mg/dl Negative   NITRITE UA  Negative   BILIRUBIN UA  Negative   UROBILINOGEN UA E U /dl 0 2   LEUKOCYTES UA  Negative       Past Medical History:   Diagnosis Date    Diabetes mellitus ([place] [address] )     History of COVID-19 11/2020    HLD (hyperlipidemia)     HTN (hypertension)      Present on Admission:   Hyperlipidemia   Type 2 diabetes mellitus ([facility] )   Hypertension   Muscle spasm   Tobacco use   HTN (hypertension)   History of COVID-19      Admitting Diagnosis: Triple vessel disease of the heart [I25 10]  Age/Sex: 61 y o  male  Admission Orders:  Scheduled Medications:  aspirin, 81 mg, Oral, Daily  chlorhexidine, 15 mL, Mouth/Throat, Q12H JAIMEE  Diclofenac Sodium, 2 g, Topical, BID  insulin lispro, 1-6 Units, Subcutaneous, 4x Daily (AC & HS)  metoprolol tartrate, 12 5 mg, Oral, Q12H JAIMEE  mupirocin, 1 application, Nasal, A72K JAIMEE  pravastatin, 80 mg, Oral, Daily    Continuous IV Infusions:  heparin (porcine), 3-20 Units/kg/hr (Order-Specific), Intravenous, Titrated    PRN Meds:  acetaminophen, 650 mg, Oral, Q6H PRN  cyclobenzaprine, 5 mg, Oral, TID PRN  nitroglycerin, 0 4 mg, Sublingual, Q5 Min PRN        IP CONSULT TO CARDIOTHORACIC SURGERY  IP CONSULT TO CASE MANAGEMENT    Network Utilization Review Department  ATTENTION: Please call with any questions or concerns to [phone] and carefully listen to the prompts so that you are directed to the right person  All voicemails are confidential   Alicia Holbrook all requests for admission clinical reviews, approved or denied determinations and any other requests to dedicated fax number below belonging to the campus where the patient is receiving treatment   List of dedicated fax numbers for the Facilities:  [address] DENIALS (Administrative/Medical Necessity) [phone]   [address] (Maternity/NICU/Pediatrics) [phone]   [address] [place] [address] [address] [place] [address] [address] [facility] [place] [address] [phone]

## 2021-07-11 NOTE — ASSESSMENT & PLAN NOTE
Patient has history of HTN, HLD, and DM who presented to [address] [place] [address] ED for chest pain, dyspnea on exertion, and elevated troponin  In ED EKG showed NSR with nonspecific T wave abnormality which is more pronounced in inferior leads on repeat EKG and troponin peaked at 0 26  Echo showed EF of 50%, and a cardiac cath showed severe triple vessel disease  Patient was transferred to [facility] for cardiothoracic surgery evaluation for possible CABG     · Continue IV heparin, aspirin, metoprolol, nitroglycerin prn, and pravastatin  · Continue to hold lisinopril per Cardiology recommendation  · Consulted cardiology and cardiothoracic surgery for CABG evaluation  · 7/10 VAS carotid: negative for disease; 7/10 VAS lower limb: bilateral saphenous patent  · CABG currently scheduled for Friday 7/16   · Jon/CHO Controlled Diet  · Monitor telemetry  · OT/PT evaluation

## 2021-07-11 NOTE — PROGRESS NOTES
INTERNAL MEDICINE RESIDENCY PROGRESS NOTE     PATIENT INFORMATION     Name: Haris Oconnell   Age & Sex: 61 y o  male   MRN: [id number]  Hospital Stay Days: 2  Unit/Bed#: -01   Encounter: [id number]  Team: SOD Team C     ASSESSMENT/PLAN     Principal Problem:    CAD (coronary artery disease)  Active Problems:    Hyperlipidemia    Type 2 diabetes mellitus ([address] )    Hypertension    Muscle spasm    Tobacco use    HTN (hypertension)    History of COVID-19    * CAD (coronary artery disease)  Assessment & Plan  Patient has history of HTN, HLD, and DM who presented to [address] [place] [address] ED for chest pain, dyspnea on exertion, and elevated troponin  In ED EKG showed NSR with nonspecific T wave abnormality which is more pronounced in inferior leads on repeat EKG and troponin peaked at 0 26  Echo showed EF of 50%, and a cardiac cath showed severe triple vessel disease  Patient was transferred to [facility] for cardiothoracic surgery evaluation for possible CABG  · Continue IV heparin, aspirin, metoprolol, nitroglycerin prn, and pravastatin  · Continue to hold lisinopril per Cardiology recommendation  · Consulted cardiology and cardiothoracic surgery for CABG evaluation  · Cardiac Diet  · Monitor telemetry  · OT/PT evaluation    Tobacco use  Assessment & Plan  Patient occasionally smokes cigars  · Counseled patient on smoking cessation  · Nicotine 7mg/24hr 1 patch daily if needed    Muscle spasm  Assessment & Plan  Patient complains of chronic posterior neck and left shoulder tenderness and spasms that is relieved with muscle relaxant  · Voltaren gel 1% 2g BID  · Flexeril 5 mg PRN    Hypertension  Assessment & Plan  Reviewed and Stable    · Holding lisinopril per cardiology recommendation   · Metoprolol 12 5 mg BID    Type 2 diabetes mellitus [facility])  Assessment & Plan  Lab Results   Component Value Date    HGBA1C 9 0 (H) 07/07/2021       Recent Labs     07/10/21  1625 07/10/21  2054 07/11/21  9393 21  1125   POCGLU 168* 196* 162* 243*       Blood Sugar Average: Last 72 hrs:  (P) 185   Patient has history of uncontrolled diabetes on oral medications  · Continue Algorithm 3, Humalog 100 units/ml, 1-6 units 4 times daily before meals  · Will need outpatient follow-up with endocrinology    Hyperlipidemia  Assessment & Plan  Patient's lipid panel revealed cholesterol of 210, triglycerides 215 8, , HDL 44  · Continue pravastatin 80 mg      Disposition: CABG eval; hep ggt; continue in pt care; cardiology following; tele monitoring    SUBJECTIVE     Patient seen and examined  No acute events overnight  Denies complaints  Doing well; denies palpitations, sob, cp  No tele events  ROS otherwise negative except per HPI  OBJECTIVE     Vitals:    21 0252 21 0743 21 0854 21 0855   BP: 98/63 102/64 (!) 131/109    BP Location:  Right arm     Pulse: 55 (!) 54  69   Resp: 18 18     Temp: 98 5 °F (36 9 °C) (!) 97 3 °F (36 3 °C)     TempSrc: Oral Oral     SpO2: 97% 96%     Weight:       Height:          Temperature:   Temp (24hrs), Av 1 °F (36 7 °C), Min:97 3 °F (36 3 °C), Max:99 1 °F (37 3 °C)    Temperature: (!) 97 3 °F (36 3 °C)  Intake & Output:  I/O        07 - 07/10 0700 07/10 07 -  0700  07 -  0700    P  O   1440     I V  (mL/kg) 63 3 (0 6) 359 1 (3 3)     Total Intake(mL/kg) 63 3 (0 6) 1799 1 (16 4)     Urine (mL/kg/hr) 250 1075 (0 4)     Total Output 250 1075     Net -186 7 +724 1            Unmeasured Urine Occurrence  2 x           Intake/Output Summary (Last 24 hours) at 2021 1526  Last data filed at 2021 0129  Gross per 24 hour   Intake 1079 1 ml   Output 975 ml   Net 104 1 ml     No intake/output data recorded  Weights:   IBW (Ideal Body Weight): 73 kg    Body mass index is 34 8 kg/m²  Weight (last 2 days)     Date/Time   Weight    21 23:11:43   110 (242 51)            Physical Exam  Vitals and nursing note reviewed  Constitutional:       General: He is not in acute distress  Appearance: Normal appearance  He is obese  He is not ill-appearing or diaphoretic  HENT:      Head: Normocephalic and atraumatic  Nose: Nose normal       Mouth/Throat:      Mouth: Mucous membranes are moist    Eyes:      Extraocular Movements: Extraocular movements intact  Conjunctiva/sclera: Conjunctivae normal       Pupils: Pupils are equal, round, and reactive to light  Cardiovascular:      Rate and Rhythm: Normal rate and regular rhythm  Pulses: Normal pulses  Heart sounds: Normal heart sounds  Pulmonary:      Effort: Pulmonary effort is normal  No respiratory distress  Breath sounds: Normal breath sounds  No wheezing  Abdominal:      General: Bowel sounds are normal  There is distension  Palpations: Abdomen is soft  Tenderness: There is no abdominal tenderness  Musculoskeletal:         General: Normal range of motion  Cervical back: Normal range of motion  Skin:     General: Skin is warm and dry  Capillary Refill: Capillary refill takes less than 2 seconds  Neurological:      General: No focal deficit present  Mental Status: He is alert and oriented to person, place, and time  Mental status is at baseline  Psychiatric:         Mood and Affect: Mood normal          Behavior: Behavior normal          Thought Content: Thought content normal          Judgment: Judgment normal         LABORATORY DATA     Labs: I have personally reviewed pertinent reports      Results from last 7 days   Lab Units 07/11/21  0438 07/10/21  0211 07/08/21  0601 07/07/21  1257   WBC Thousand/uL 7 01 6 91 6 65 6 93   HEMOGLOBIN g/dL 15 4 14 4 13 6 14 9   HEMATOCRIT % 46 0 42 5 41 8 44 6   PLATELETS Thousands/uL 204 184 193 235   NEUTROS PCT % 56 58  --  64   MONOS PCT % 13* 11  --  11      Results from last 7 days   Lab Units 07/11/21  0438 07/10/21  0239 07/09/21  0609 07/07/21  1257   POTASSIUM mmol/L 4 0 4 0 4 2 4 3   CHLORIDE mmol/L 106 106 107 102   CO2 mmol/L 27 28 28 27   BUN mg/dL 15 14 13 20   CREATININE mg/dL 0 80 0 81 0 80 0 86   CALCIUM mg/dL 9 4 9 0 8 4 9 4   ALK PHOS U/L  --   --   --  45 8   ALT U/L  --   --   --  19   AST U/L  --   --   --  14*     Serum creatinine: 0 8 mg/dL 07/11/21 0438  Estimated creatinine clearance: 115 8 mL/min   Results from last 7 days   Lab Units 07/10/21  0239   MAGNESIUM mg/dL 1 9     Results from last 7 days   Lab Units 07/10/21  0239   PHOSPHORUS mg/dL 3 6      Results from last 7 days   Lab Units 07/11/21  0439 07/10/21  0815 07/10/21  0236 07/07/21  1948   INR   --   --   --  0 93   PTT seconds 65* 72* 69* 24         Results from last 7 days   Lab Units 07/08/21  0601 07/08/21  0306 07/08/21  0002   TROPONIN I ng/mL 0 23* 0 26* 0 18*       IMAGING & DIAGNOSTIC TESTING     Radiology Results: I have personally reviewed pertinent reports  No results found  Other Diagnostic Testing: I have personally reviewed pertinent reports        ACTIVE MEDICATIONS     Current Facility-Administered Medications   Medication Dose Route Frequency    acetaminophen (TYLENOL) tablet 650 mg  650 mg Oral Q6H PRN    aspirin (ECOTRIN LOW STRENGTH) EC tablet 81 mg  81 mg Oral Daily    chlorhexidine (PERIDEX) 0 12 % oral rinse 15 mL  15 mL Mouth/Throat Q12H JAIMEE    cyclobenzaprine (FLEXERIL) tablet 5 mg  5 mg Oral TID PRN    Diclofenac Sodium (VOLTAREN) 1 % topical gel 2 g  2 g Topical BID    heparin (porcine) 25,000 units in 0 45% NaCl 250 mL infusion (premix)  3-20 Units/kg/hr (Order-Specific) Intravenous Titrated    insulin lispro (HumaLOG) 100 units/mL subcutaneous injection 1-6 Units  1-6 Units Subcutaneous 4x Daily (AC & HS)    metoprolol tartrate (LOPRESSOR) partial tablet 12 5 mg  12 5 mg Oral Q12H JAIMEE    mupirocin (BACTROBAN) 2 % nasal ointment 1 application  1 application Nasal Y24A [facility]    nitroglycerin (NITROSTAT) SL tablet 0 4 mg  0 4 mg Sublingual Q5 Min PRN    pravastatin (PRAVACHOL) tablet 80 mg  80 mg Oral Daily     VTE Pharmacologic Prophylaxis: Heparin  VTE Mechanical Prophylaxis: sequential compression device    ==  Live Bhatt MD  Chief Resident, PGY-3  [address] Internal Medicine Residency

## 2021-07-11 NOTE — PROGRESS NOTES
Progress Note - Cardiology   Manjeet Smoker 61 y o  male MRN: [id number]  Unit/Bed#: -01 Encounter: [id number]    Assessment:  Principal Problem:    CAD (coronary artery disease)  Active Problems:    Hyperlipidemia    Type 2 diabetes mellitus ([address] )    Hypertension    Muscle spasm    Tobacco use    HTN (hypertension)    History of COVID-19    Type 1 NSTEMI with thrombus in the RCA  Multivessel disease was found on cardiac catheterization  Low normal EF with basal to mid inferior hypokinesis  Transferred for CT surgical evaluation  Plan:  Remains on IV heparin  Aspirin, high-intensity statin  Low-dose beta-blocker  Currently without any symptoms  No arrhythmias noted on telemetry  Preoperative evaluation and workup per the surgical service, surgery is tentatively for the end of the week  Subjective/Objective     Subjective:  Feels well  No specific complaints  Denies any chest pain shortness of breath  Some pain in the neck, but this is improving with topical treatments      Objective:  Vitals: BP (!) 131/109   Pulse 69   Temp (!) 97 3 °F (36 3 °C) (Oral)   Resp 18   Ht 5' 10" (1 778 m)   Wt 110 kg (242 lb 8 1 oz)   SpO2 96%   BMI 34 80 kg/m²   Vitals:    07/09/21 2311   Weight: 110 kg (242 lb 8 1 oz)     Orthostatic Blood Pressures      Most Recent Value   Blood Pressure  (!) 131/109 filed at 07/11/2021 0854   Patient Position - Orthostatic VS  Lying filed at 07/11/2021 0743            Intake/Output Summary (Last 24 hours) at 7/11/2021 1006  Last data filed at 7/11/2021 0129  Gross per 24 hour   Intake 1079 1 ml   Output 1075 ml   Net 4 1 ml     Physical Exam:  GEN: Manjeet Smoker appears well, alert and oriented x 3, pleasant and cooperative   HEENT: pupils equal, round, and reactive to light; extraocular muscles intact  NECK: supple, no carotid bruits   HEART: regular rhythm, normal S1 and S2, no murmurs, clicks, gallops or rubs   LUNGS: clear to auscultation bilaterally; no wheezes, rales, or rhonchi   ABDOMEN: normal bowel sounds, soft, no tenderness, no distention  EXTREMITIES: peripheral pulses normal; no clubbing, cyanosis, or edema  NEURO: no focal findings   SKIN: normal without suspicious lesions on exposed skin    Medications:    Current Facility-Administered Medications:     acetaminophen (TYLENOL) tablet 650 mg, 650 mg, Oral, Q6H PRN, Dionicio Hanna MD    aspirin (ECOTRIN LOW STRENGTH) EC tablet 81 mg, 81 mg, Oral, Daily, Dionicio Hanna MD, 81 mg at 07/11/21 0844    chlorhexidine (PERIDEX) 0 12 % oral rinse 15 mL, 15 mL, Mouth/Throat, Q12H [address], Marcelina Suarez PA-C, 15 mL at 07/11/21 0843    cyclobenzaprine (FLEXERIL) tablet 5 mg, 5 mg, Oral, TID PRN, Piper Gray DO    Diclofenac Sodium (VOLTAREN) 1 % topical gel 2 g, 2 g, Topical, BID, Marco Randhawa DO, 2 g at 07/11/21 0845    heparin (porcine) 25,000 units in 0 45% NaCl 250 mL infusion (premix), 3-20 Units/kg/hr (Order-Specific), Intravenous, Titrated, Dionicio Hanna MD, Last Rate: 17 1 mL/hr at 07/11/21 0844, 19 Units/kg/hr at 07/11/21 0844    insulin lispro (HumaLOG) 100 units/mL subcutaneous injection 1-6 Units, 1-6 Units, Subcutaneous, 4x Daily (AC & HS), 1 Units at 07/11/21 0843 **AND** [CANCELED] Fingerstick Glucose (POCT), , , 4x Daily AC and at bedtime, Peyman Albright DO    metoprolol tartrate (LOPRESSOR) partial tablet 12 5 mg, 12 5 mg, Oral, Q12H [address], Dionicio Hanna MD, 12 5 mg at 07/11/21 0855    mupirocin (BACTROBAN) 2 % nasal ointment 1 application, 1 application, Nasal, O59B [address], Marcelina Suarez PA-C, 1 application at 46/37/10 0843    nitroglycerin (NITROSTAT) SL tablet 0 4 mg, 0 4 mg, Sublingual, Q5 Min PRN, Dionicio Hanna MD    pravastatin (PRAVACHOL) tablet 80 mg, 80 mg, Oral, Daily, Dionicio Hanna MD, 80 mg at 07/11/21 0844    Lab Results:  Results from last 7 days   Lab Units 07/08/21  0601 07/08/21  0306 07/08/21  0002   TROPONIN I ng/mL 0 23* 0 26* 0 18*     Results from last 7 days   Lab Units 07/11/21  0438 07/10/21  0211 07/08/21  0601   WBC Thousand/uL 7 01 6 91 6 65   HEMOGLOBIN g/dL 15 4 14 4 13 6   HEMATOCRIT % 46 0 42 5 41 8   PLATELETS Thousands/uL 204 184 193     Results from last 7 days   Lab Units 07/07/21  1257   TRIGLYCERIDES mg/dL 215 8*   HDL mg/dL 44     Results from last 7 days   Lab Units 07/11/21  0438 07/10/21  0239 07/09/21  0609 07/07/21  1257   SODIUM mmol/L 136 138 141 137   POTASSIUM mmol/L 4 0 4 0 4 2 4 3   CHLORIDE mmol/L 106 106 107 102   CO2 mmol/L 27 28 28 27   BUN mg/dL 15 14 13 20   CREATININE mg/dL 0 80 0 81 0 80 0 86   CALCIUM mg/dL 9 4 9 0 8 4 9 4   ALK PHOS U/L  --   --   --  45 8   ALT U/L  --   --   --  19   AST U/L  --   --   --  14*     Results from last 7 days   Lab Units 07/11/21  0439 07/10/21  0815 07/10/21  0236 07/07/21  1948   INR   --   --   --  0 93   PTT seconds 65* 72* 69* 24     Results from last 7 days   Lab Units 07/10/21  0239   MAGNESIUM mg/dL 1 9       Telemetry: Personally reviewed  No significant arrhythmias    Cardiac Cath:  CORONARY CIRCULATION:  Left main: Normal   LAD: The vessel was normal sized  There was a 70% stenosis in the proximal vessel  Circumflex: The vessel was normal sized and gave rise to one major OM branch  There was a 95% proximal stenosis of the large OM branch  RCA: The vessel was normal sized and dominant, giving rise to the PDA and several posterolateral branches  There was a complex eccentric lesion in mid vessel with thrombus  There is MEDINA 3 flow into the distal vessel, but MEDINA 1 flow into  the PDA  There was collateral flow to the PDA from the left system      REPORT ELEMENT SELECTION:  Right radial access  There were no complications      Summary:  Severe 3 vessel CAD  Plan: transfer [facility] for surgical consultation  Echo:  LEFT VENTRICLE:  Size was normal   Systolic function was at the lower limits of normal  Ejection fraction was estimated to be 50 %    There was mild hypokinesis of the basal-mid inferior and inferolateral walls  Doppler parameters were consistent with abnormal left ventricular relaxation (grade 1 diastolic dysfunction)      RIGHT VENTRICLE:  The size was normal   Systolic function was normal      LEFT ATRIUM:  The atrium was mildly dilated      MITRAL VALVE:  There was trace to mild regurgitation      TRICUSPID VALVE:  There was trace regurgitation

## 2021-07-12 ENCOUNTER — PREP FOR PROCEDURE (OUTPATIENT)
Dept: CARDIAC SURGERY | Facility: CLINIC | Age: 66
End: 2021-07-12

## 2021-07-12 DIAGNOSIS — I25.10 CORONARY ARTERY DISEASE, UNSPECIFIED VESSEL OR LESION TYPE, UNSPECIFIED WHETHER ANGINA PRESENT, UNSPECIFIED WHETHER NATIVE OR TRANSPLANTED HEART: Primary | ICD-10-CM

## 2021-07-12 LAB
ANION GAP SERPL CALCULATED.3IONS-SCNC: 3 MMOL/L (ref 4–13)
APTT PPP: 79 SECONDS (ref 23–37)
BASOPHILS # BLD AUTO: 0.03 THOUSANDS/ΜL (ref 0–0.1)
BASOPHILS NFR BLD AUTO: 0 % (ref 0–1)
BUN SERPL-MCNC: 15 MG/DL (ref 5–25)
CALCIUM SERPL-MCNC: 9 MG/DL (ref 8.3–10.1)
CHLORIDE SERPL-SCNC: 107 MMOL/L (ref 100–108)
CO2 SERPL-SCNC: 27 MMOL/L (ref 21–32)
CREAT SERPL-MCNC: 0.78 MG/DL (ref 0.6–1.3)
EOSINOPHIL # BLD AUTO: 0.18 THOUSAND/ΜL (ref 0–0.61)
EOSINOPHIL NFR BLD AUTO: 3 % (ref 0–6)
ERYTHROCYTE [DISTWIDTH] IN BLOOD BY AUTOMATED COUNT: 12.3 % (ref 11.6–15.1)
GFR SERPL CREATININE-BSD FRML MDRD: 95 ML/MIN/1.73SQ M
GLUCOSE SERPL-MCNC: 137 MG/DL (ref 65–140)
GLUCOSE SERPL-MCNC: 155 MG/DL (ref 65–140)
GLUCOSE SERPL-MCNC: 174 MG/DL (ref 65–140)
GLUCOSE SERPL-MCNC: 191 MG/DL (ref 65–140)
GLUCOSE SERPL-MCNC: 240 MG/DL (ref 65–140)
HCT VFR BLD AUTO: 45.1 % (ref 36.5–49.3)
HGB BLD-MCNC: 15.2 G/DL (ref 12–17)
IMM GRANULOCYTES # BLD AUTO: 0.04 THOUSAND/UL (ref 0–0.2)
IMM GRANULOCYTES NFR BLD AUTO: 1 % (ref 0–2)
LYMPHOCYTES # BLD AUTO: 2.1 THOUSANDS/ΜL (ref 0.6–4.47)
LYMPHOCYTES NFR BLD AUTO: 31 % (ref 14–44)
MCH RBC QN AUTO: 32.9 PG (ref 26.8–34.3)
MCHC RBC AUTO-ENTMCNC: 33.7 G/DL (ref 31.4–37.4)
MCV RBC AUTO: 98 FL (ref 82–98)
MONOCYTES # BLD AUTO: 0.88 THOUSAND/ΜL (ref 0.17–1.22)
MONOCYTES NFR BLD AUTO: 13 % (ref 4–12)
MRSA NOSE QL CULT: NORMAL
NEUTROPHILS # BLD AUTO: 3.66 THOUSANDS/ΜL (ref 1.85–7.62)
NEUTS SEG NFR BLD AUTO: 52 % (ref 43–75)
NRBC BLD AUTO-RTO: 0 /100 WBCS
PLATELET # BLD AUTO: 201 THOUSANDS/UL (ref 149–390)
PMV BLD AUTO: 10.1 FL (ref 8.9–12.7)
POTASSIUM SERPL-SCNC: 4.1 MMOL/L (ref 3.5–5.3)
RBC # BLD AUTO: 4.62 MILLION/UL (ref 3.88–5.62)
SODIUM SERPL-SCNC: 137 MMOL/L (ref 136–145)
WBC # BLD AUTO: 6.89 THOUSAND/UL (ref 4.31–10.16)

## 2021-07-12 PROCEDURE — 85025 COMPLETE CBC W/AUTO DIFF WBC: CPT | Performed by: STUDENT IN AN ORGANIZED HEALTH CARE EDUCATION/TRAINING PROGRAM

## 2021-07-12 PROCEDURE — 82948 REAGENT STRIP/BLOOD GLUCOSE: CPT

## 2021-07-12 PROCEDURE — 80048 BASIC METABOLIC PNL TOTAL CA: CPT | Performed by: STUDENT IN AN ORGANIZED HEALTH CARE EDUCATION/TRAINING PROGRAM

## 2021-07-12 PROCEDURE — 85730 THROMBOPLASTIN TIME PARTIAL: CPT | Performed by: INTERNAL MEDICINE

## 2021-07-12 PROCEDURE — 99233 SBSQ HOSP IP/OBS HIGH 50: CPT | Performed by: NURSE PRACTITIONER

## 2021-07-12 PROCEDURE — 99232 SBSQ HOSP IP/OBS MODERATE 35: CPT | Performed by: INTERNAL MEDICINE

## 2021-07-12 PROCEDURE — 99231 SBSQ HOSP IP/OBS SF/LOW 25: CPT | Performed by: PHYSICIAN ASSISTANT

## 2021-07-12 RX ADMIN — HEPARIN SODIUM 19 UNITS/KG/HR: 10000 INJECTION, SOLUTION INTRAVENOUS at 19:04

## 2021-07-12 RX ADMIN — CHLORHEXIDINE GLUCONATE 15 ML: 1.2 SOLUTION ORAL at 08:18

## 2021-07-12 RX ADMIN — INSULIN LISPRO 1 UNITS: 100 INJECTION, SOLUTION INTRAVENOUS; SUBCUTANEOUS at 08:17

## 2021-07-12 RX ADMIN — INSULIN LISPRO 2 UNITS: 100 INJECTION, SOLUTION INTRAVENOUS; SUBCUTANEOUS at 21:39

## 2021-07-12 RX ADMIN — HEPARIN SODIUM 19 UNITS/KG/HR: 10000 INJECTION, SOLUTION INTRAVENOUS at 00:28

## 2021-07-12 RX ADMIN — Medication 12.5 MG: at 08:15

## 2021-07-12 RX ADMIN — MUPIROCIN 1 APPLICATION: 20 OINTMENT TOPICAL at 08:15

## 2021-07-12 RX ADMIN — HEPARIN SODIUM 19 UNITS/KG/HR: 10000 INJECTION, SOLUTION INTRAVENOUS at 04:33

## 2021-07-12 RX ADMIN — DICLOFENAC SODIUM 2 G: 10 GEL TOPICAL at 08:18

## 2021-07-12 RX ADMIN — Medication 12.5 MG: at 21:40

## 2021-07-12 RX ADMIN — INSULIN LISPRO 3 UNITS: 100 INJECTION, SOLUTION INTRAVENOUS; SUBCUTANEOUS at 11:42

## 2021-07-12 RX ADMIN — PRAVASTATIN SODIUM 80 MG: 20 TABLET ORAL at 08:14

## 2021-07-12 RX ADMIN — ASPIRIN 81 MG: 81 TABLET, COATED ORAL at 08:15

## 2021-07-12 RX ADMIN — DICLOFENAC SODIUM 2 G: 10 GEL TOPICAL at 21:40

## 2021-07-12 RX ADMIN — CHLORHEXIDINE GLUCONATE 15 ML: 1.2 SOLUTION ORAL at 21:39

## 2021-07-12 RX ADMIN — MUPIROCIN 1 APPLICATION: 20 OINTMENT TOPICAL at 21:39

## 2021-07-12 NOTE — PROGRESS NOTES
General Cardiology   Progress Note -  Team One   Zain Saha 61 y o  male MRN: [id number]    Unit/Bed#: -01 Encounter: [id number]    Assessment/ Plan    1  Type I MI s/p cardiac cath with thrombus in RCA and multivessel CAD   Echocardiogram showed EF 50% with mild hypokinesis of the basal mid inferior and inferolateral walls   On heparin gtt  On aspirin, statin and BB   CT surgery following and plan for CABG     2  Tobacco abuse   Counseled on smoking cessation     3  Hypertension  BP stable: 118/72  On lisinopril at home, currently on hild     4  Hyperlipidemia   Continue statin       5  Type II diabetes  Hemoglobin A1C 9 0 (7/7/2021)   Followed by primary team     Subjective  No complaint of chest pain, SOB or palpitations  No fever or chills  Review of Systems   Constitutional: Negative for chills, fever and malaise/fatigue  HENT: Negative for congestion  Cardiovascular: Negative for chest pain, dyspnea on exertion, leg swelling, orthopnea and palpitations  Respiratory: Negative for cough and shortness of breath  Musculoskeletal: Negative for falls  Gastrointestinal: Negative for bloating, nausea and vomiting  Neurological: Negative for dizziness and light-headedness  Psychiatric/Behavioral: Negative for altered mental status  All other systems reviewed and are negative  Objective:   Vitals: Blood pressure 118/72, pulse 57, temperature 97 8 °F (36 6 °C), temperature source Oral, resp  rate 20, height 5' 10" (1 778 m), weight 110 kg (242 lb 8 1 oz), SpO2 96 %  ,       Body mass index is 34 8 kg/m²  ,     Systolic (21ZBQ), ZMX:752 , Min:91 , UXS:769     Diastolic (07QMW), NWO:28, Min:55, Max:72          Intake/Output Summary (Last 24 hours) at 7/12/2021 1008  Last data filed at 7/12/2021 0900  Gross per 24 hour   Intake 1353 01 ml   Output --   Net 1353 01 ml     Weight (last 2 days)     None            Telemetry Review: Normal sinus rhythm HR 60s    Physical Exam  Constitutional:       General: He is not in acute distress  Appearance: Normal appearance  HENT:      Head: Normocephalic  Mouth/Throat:      Mouth: Mucous membranes are moist    Cardiovascular:      Rate and Rhythm: Normal rate and regular rhythm  Pulses: Normal pulses  Heart sounds: No murmur heard  Pulmonary:      Effort: Pulmonary effort is normal       Breath sounds: Normal breath sounds  Abdominal:      General: Bowel sounds are normal       Palpations: Abdomen is soft  Musculoskeletal:         General: No swelling  Normal range of motion  Cervical back: Neck supple  Skin:     General: Skin is warm and dry  Capillary Refill: Capillary refill takes less than 2 seconds  Neurological:      General: No focal deficit present  Mental Status: He is alert and oriented to person, place, and time     Psychiatric:         Mood and Affect: Mood normal          LABORATORY RESULTS  Results from last 7 days   Lab Units 07/08/21  0601 07/08/21  0306 07/08/21  0002   TROPONIN I ng/mL 0 23* 0 26* 0 18*     CBC with diff:   Results from last 7 days   Lab Units 07/12/21  0439 07/11/21  0438 07/10/21  0211 07/08/21  0601 07/08/21  0002 07/07/21  1257   WBC Thousand/uL 6 89 7 01 6 91 6 65  --  6 93   HEMOGLOBIN g/dL 15 2 15 4 14 4 13 6  --  14 9   HEMATOCRIT % 45 1 46 0 42 5 41 8  --  44 6   MCV fL 98 99* 98 102*  --  98   PLATELETS Thousands/uL 201 204 184 193 187 235   MCH pg 32 9 33 1 33 2 33 2  --  32 7   MCHC g/dL 33 7 33 5 33 9 32 5  --  33 4   RDW % 12 3 12 2 11 9 12 2  --  12 6   MPV fL 10 1 9 8 9 4 9 4 9 5 9 5   NRBC AUTO /100 WBCs 0 0 0  --   --   --        CMP:  Results from last 7 days   Lab Units 07/12/21  0638 07/11/21  0438 07/10/21  0239 07/09/21  0609 07/08/21  0601 07/07/21  1257   POTASSIUM mmol/L 4 1 4 0 4 0 4 2 3 8 4 3   CHLORIDE mmol/L 107 106 106 107 107 102   CO2 mmol/L 27 27 28 28 28 27   BUN mg/dL 15 15 14 13 18 20   CREATININE mg/dL 0 78 0 80 0 81 0 80 0  87 0 86   CALCIUM mg/dL 9 0 9 4 9 0 8 4 8 4 9 4   AST U/L  --   --   --   --   --  14*   ALT U/L  --   --   --   --   --  19   ALK PHOS U/L  --   --   --   --   --  45 8   EGFR ml/min/1 73sq m 95 94 94 94 91 92       BMP:  Results from last 7 days   Lab Units 21  0638 21  0438 07/10/21  0239 21  0609 21  0601 21  1257   POTASSIUM mmol/L 4 1 4 0 4 0 4 2 3 8 4 3   CHLORIDE mmol/L 107 106 106 107 107 102   CO2 mmol/L 27 27 28 28 28 27   BUN mg/dL 15 15 14 13 18 20   CREATININE mg/dL 0 78 0 80 0 81 0 80 0 87 0 86   CALCIUM mg/dL 9 0 9 4 9 0 8 4 8 4 9 4       Lab Results   Component Value Date    NTBNP 20 2020             Results from last 7 days   Lab Units 07/10/21  0239   MAGNESIUM mg/dL 1 9          Results from last 7 days   Lab Units 21  1257   HEMOGLOBIN A1C % 9 0*          Results from last 7 days   Lab Units 21  1257   TSH 3RD GENERATON uIU/mL 2 490       Results from last 7 days   Lab Units 21  1948   INR  0 93       Lipid Profile:   No results found for: CHOL  Lab Results   Component Value Date    HDL 44 2021     Lab Results   Component Value Date    LDLCALC 123 (H) 2021     Lab Results   Component Value Date    TRIG 215 8 (H) 2021       Cardiac testing:   Results for orders placed during the hospital encounter of 21    Echo complete with contrast if indicated    Narrative  [facility] [address] [facility]  [phone]    Transthoracic Echocardiogram  2D, M-mode, Doppler, and Color Doppler    Study date:  2021    Patient: Anjelica Villegas  MR number: [id number]  Account number: [de-identified]  : 1955  Age: 61 years  Gender: Male  Status: Outpatient  Location: Bedside  Height: 70 in  Weight: 246 lb  BP: 110/ 66 mmHg    Indications: NSTEMI    Diagnoses: I21 4 - Non-ST elevation (NSTEMI) myocardial infarction    Sonographer:  VLADISLAV Hitchcock  Primary Physician:  Vimal Lopez MD  Referring Physician:  Cherl Libman, PA-C  Group:  [facility]  Interpreting Physician:  Jose Ramon Pagan MD    SUMMARY    LEFT VENTRICLE:  Size was normal   Systolic function was at the lower limits of normal  Ejection fraction was estimated to be 50 %  There was mild hypokinesis of the basal-mid inferior and inferolateral walls  Doppler parameters were consistent with abnormal left ventricular relaxation (grade 1 diastolic dysfunction)  RIGHT VENTRICLE:  The size was normal   Systolic function was normal     LEFT ATRIUM:  The atrium was mildly dilated  MITRAL VALVE:  There was trace to mild regurgitation  TRICUSPID VALVE:  There was trace regurgitation  HISTORY: PRIOR HISTORY: HTN, HLD, DM2    PROCEDURE: The procedure was performed at the bedside  This was a routine study  The transthoracic approach was used  The study included complete 2D imaging, M-mode, complete spectral Doppler, and color Doppler  The heart rate was 70 bpm,  at the start of the study  Images were obtained from the parasternal, apical, subcostal, and suprasternal notch acoustic windows  Echocardiographic views were limited due to lung interference  This was a technically difficult study  LEFT VENTRICLE: Size was normal  Systolic function was at the lower limits of normal  Ejection fraction was estimated to be 50 %  There was mild hypokinesis of the basal-mid inferior and inferolateral walls  Wall thickness was normal   DOPPLER: Doppler parameters were consistent with abnormal left ventricular relaxation (grade 1 diastolic dysfunction)  RIGHT VENTRICLE: The size was normal  Systolic function was normal  Wall thickness was normal     LEFT ATRIUM: The atrium was mildly dilated  RIGHT ATRIUM: Size was normal     MITRAL VALVE: Valve structure was normal  There was normal leaflet separation  DOPPLER: The transmitral velocity was within the normal range  There was no evidence for stenosis   There was trace to mild regurgitation  AORTIC VALVE: The valve was trileaflet  Leaflets exhibited mildly increased thickness, mild calcification, normal cuspal separation, and sclerosis  DOPPLER: Transaortic velocity was within the normal range  There was no evidence for  stenosis  There was no regurgitation  TRICUSPID VALVE: The valve structure was normal  There was normal leaflet separation  DOPPLER: The transtricuspid velocity was within the normal range  There was no evidence for stenosis  There was trace regurgitation  The tricuspid jet  envelope definition was inadequate for estimation of RV systolic pressure  There are no indirect findings suggestive of moderate or severe pulmonary hypertension  PULMONIC VALVE: Leaflets exhibited normal thickness, no calcification, and normal cuspal separation  DOPPLER: The transpulmonic velocity was within the normal range  There was no regurgitation  PERICARDIUM: There was no pericardial effusion  The pericardium was normal in appearance  AORTA: The root exhibited normal size  SYSTEMIC VEINS: IVC: The inferior vena cava was normal in size and course   Respirophasic changes were normal     SYSTEM MEASUREMENT TABLES    2D  %FS: 24 71 %  Ao Diam: 3 41 cm  EDV(Teich): 173 64 ml  EF(Teich): 48 23 %  ESV(Teich): 89 89 ml  HR_4Ch_Q: 66 42 BPM  IVSd: 1 14 cm  LA Area: 23 21 cm2  LA Diam: 3 7 cm  LVCO_4Ch_Q: 2 84 L/min  LVEF_4Ch_Q: 41 39 %  LVIDd: 5 91 cm  LVIDs: 4 45 cm  LVLd_4Ch_Q: 8 6 cm  LVLs_4Ch_Q: 7 4 cm  LVPWd: 0 97 cm  LVSV_4Ch_Q: 42 77 ml  LVVED_4Ch_Q: 103 33 ml  LVVES_4Ch_Q: 60 56 ml  RA Area: 18 36 cm2  RVIDd: 3 33 cm  SV(Teich): 83 75 ml    MM  TAPSE: 2 59 cm    PW  E' Sept: 0 08 m/s  E/E' Sept: 9 37  MV A Abiel: 0 89 m/s  MV Dec Bent: 3 68 m/s2  MV DecT: 197 59 ms  MV E Abiel: 0 73 m/s  MV E/A Ratio: 0 82    [employer] Accredited Echocardiography Laboratory    Prepared and electronically signed by    Amy Landaverde MD  Signed 08-Jul-2021 14:41:28    No results found for this or any previous visit  No results found for this or any previous visit  No valid procedures specified  No results found for this or any previous visit  Meds/Allergies   all current active meds have been reviewed and current meds:   Current Facility-Administered Medications   Medication Dose Route Frequency    acetaminophen (TYLENOL) tablet 650 mg  650 mg Oral Q6H PRN    aspirin (ECOTRIN LOW STRENGTH) EC tablet 81 mg  81 mg Oral Daily    chlorhexidine (PERIDEX) 0 12 % oral rinse 15 mL  15 mL Mouth/Throat Q12H JAIMEE    cyclobenzaprine (FLEXERIL) tablet 5 mg  5 mg Oral TID PRN    Diclofenac Sodium (VOLTAREN) 1 % topical gel 2 g  2 g Topical BID    heparin (porcine) 25,000 units in 0 45% NaCl 250 mL infusion (premix)  3-20 Units/kg/hr (Order-Specific) Intravenous Titrated    insulin lispro (HumaLOG) 100 units/mL subcutaneous injection 1-6 Units  1-6 Units Subcutaneous 4x Daily (AC & HS)    metoprolol tartrate (LOPRESSOR) partial tablet 12 5 mg  12 5 mg Oral Q12H JAIMEE    mupirocin (BACTROBAN) 2 % nasal ointment 1 application  1 application Nasal G00P [facility]    nitroglycerin (NITROSTAT) SL tablet 0 4 mg  0 4 mg Sublingual Q5 Min PRN    pravastatin (PRAVACHOL) tablet 80 mg  80 mg Oral Daily     Medications Prior to Admission   Medication    aspirin (ECOTRIN LOW STRENGTH) 81 mg EC tablet    Empagliflozin (Jardiance) 10 MG TABS    Exenatide ER (Bydureon BCise) 2 MG/0 85ML AUIJ    lisinopril (ZESTRIL) 10 mg tablet    metFORMIN (GLUCOPHAGE) 1000 MG tablet    pravastatin (PRAVACHOL) 20 mg tablet       heparin (porcine), 3-20 Units/kg/hr (Order-Specific), Last Rate: 19 Units/kg/hr (07/12/21 3439)      Counseling / Coordination of Care  Total floor / unit time spent today 20 minutes  Greater than 50% of total time was spent with the patient and / or family counseling and / or coordination of care        ** Please Note: Dragon 360 Dictation voice to text software may have been used in the creation of this document   **

## 2021-07-12 NOTE — OCCUPATIONAL THERAPY NOTE
OT CANCEL NOTE    OT orders received  Chart reviewed  Pt is pending CABG later this week; will hold OT evaluation and D/C OT  Please re-consult post-op  Thanks          07/12/21 4936   Note Type   Cancel Reasons Medical status         Bonny Hammond MS, OTR/L

## 2021-07-12 NOTE — PHYSICAL THERAPY NOTE
Physical Therapy Cancellation Note       07/12/21 0834   PT Last Visit   PT Visit Date 07/12/21   Note Type   Note type Evaluation   Cancel Reasons Medical status     PT orders received, chart reviewed  Pt with plans for CABG later this week  PT to DC pt from caseload at this time  Please re-consult post procedure  Thank you      Chary Lafleur, PT, DPT

## 2021-07-12 NOTE — PROGRESS NOTES
Progress Note - Cardiothoracic Surgery   Moira Thornton 61 y o  male MRN: [id number]  Unit/Bed#: -01 Encounter: [id number]      24 Hour Events: No issues overnight  Denies cardiac complaints  Questions answered and consent obtained       Medications:   Scheduled Meds:  Current Facility-Administered Medications   Medication Dose Route Frequency Provider Last Rate    acetaminophen  650 mg Oral Q6H PRN Vish Rogers MD      aspirin  81 mg Oral Daily Vish Rogers MD      chlorhexidine  15 mL Mouth/Throat Q12H [address] [place], [place]      cyclobenzaprine  5 mg Oral TID PRN Natasha Hylan, DO      Diclofenac Sodium  2 g Topical BID Natasha Hylan, DO      heparin (porcine)  3-20 Units/kg/hr (Order-Specific) Intravenous Titrated Vish Rogers MD 19 Units/kg/hr (07/12/21 0433)    insulin lispro  1-6 Units Subcutaneous 4x Daily (AC & HS) Rian Quach,       metoprolol tartrate  12 5 mg Oral Q12H [address] Vish Rogers MD      mupirocin  1 application Nasal X28Z [address] [place], [place]      nitroglycerin  0 4 mg Sublingual Q5 Min PRN Vish Rogers MD      pravastatin  80 mg Oral Daily Vish Rogers MD       Continuous Infusions:heparin (porcine), 3-20 Units/kg/hr (Order-Specific), Last Rate: 19 Units/kg/hr (07/12/21 0433)        Results:   Results from last 7 days   Lab Units 07/12/21  0439 07/11/21  0438 07/10/21  0211   WBC Thousand/uL 6 89 7 01 6 91   HEMOGLOBIN g/dL 15 2 15 4 14 4   HEMATOCRIT % 45 1 46 0 42 5   PLATELETS Thousands/uL 201 204 184     Results from last 7 days   Lab Units 07/12/21  0638 07/11/21 0438 07/10/21  0239   POTASSIUM mmol/L 4 1 4 0 4 0   CHLORIDE mmol/L 107 106 106   CO2 mmol/L 27 27 28   BUN mg/dL 15 15 14   CREATININE mg/dL 0 78 0 80 0 81   CALCIUM mg/dL 9 0 9 4 9 0     Results from last 7 days   Lab Units 07/12/21  0439 07/11/21  0439 07/10/21  0815 07/07/21  1948   INR   --   --   --  0 93   PTT seconds 79* 65* 72* 24       Studies:   Cardiac Cath: pLAD 70, pOM 95, mPDA thrombus     Echo: EF 50, mild MR, trace TR     Vein Mapping: adequate bilaterally     Carotid US: <50 bilateral  Antegrade flow  No SC disease  Vitals:   Vitals:    07/11/21 2156 07/12/21 0240 07/12/21 0755 07/12/21 1037   BP: 113/69 106/68 118/72 119/73   BP Location:   Right arm    Pulse: 64 (!) 52 57 62   Resp: 14  20 20   Temp: 97 7 °F (36 5 °C)  97 8 °F (36 6 °C) 98 3 °F (36 8 °C)   TempSrc:   Oral    SpO2: 95% 97% 96% 95%   Weight:       Height:           Physical Exam:    GENERAL: Awake and oriented  NAD  HEENT/NECK:  PERRLA  No jugular venous distention  Cardiac: Regular rate and rhythm  No rubs/murmurs/gallops  Pulmonary:  Breath sounds slightly diminished at the bases bilaterally  Abdomen:  Non-tender, Non-distended  Positive bowel sounds  Lower extremities: Extremities warm/dry  Radial/PT/DP pulses 2+ bilaterally  No edema B/L  Neuro: Alert and oriented X 3  Sensation is grossly intact  No focal deficits  Skin: Warm/Dry, without rashes or lesions  Assessment:    Severe coronary artery disease; Ongoing CABG workup    Plan:  Patient agreeable to proceed with surgery; Informed consent signed  Blood type and cross match ordered for Thursday  Continue Mupirocin 2% nasal ointment q 12 hrs  Continue topical chlorhexidine bath and mouth rise  CABG scheduled for Friday with MARY Flores Jesu: Angelia Jeans, PA-C  DATE: July 12, 2021  TIME: 12:31 PM    * This note was completed in part utilizing m-[employer] fluency direct voice recognition software  Grammatical errors, random word insertion, spelling mistakes, and incomplete sentences may be an occasional consequence of the system secondary to software limitations, ambient noise and hardware issues  At the time of dictation, efforts were made to edit, clarify and /or correct errors  Please read the chart carefully and recognize, using context, where substitutions have occurred    If you have any questions or concerns about the context, text or information contained within the body of this dictation, please contact myself, the provider, for further clarification

## 2021-07-12 NOTE — PROGRESS NOTES
INTERNAL MEDICINE RESIDENCY PROGRESS NOTE     Name: Dm Plata   Age & Sex: 61 y o  male   MRN: [id number]  Unit/Bed#: -01   Encounter: [id number]  Team: SOD Team C     PATIENT INFORMATION     Name: Dm Plata   Age & Sex: 61 y o  male   MRN: [id number]  Hospital Stay Days: 3    ASSESSMENT/PLAN     Principal Problem:    CAD (coronary artery disease)  Active Problems:    Hyperlipidemia    Type 2 diabetes mellitus ([facility] [address] )    Hypertension    Muscle spasm    Tobacco use    HTN (hypertension)    History of COVID-19      Tobacco use  Assessment & Plan  Patient occasionally smokes cigars  · Counseled patient on smoking cessation  · Nicotine 7mg/24hr 1 patch daily if needed    Muscle spasm  Assessment & Plan  Patient complains of chronic posterior neck and left shoulder tenderness and spasms that is relieved with muscle relaxant  · Voltaren gel 1% 2g BID  · Flexeril 5 mg PRN    Hypertension  Assessment & Plan  Reviewed and Stable  · Holding lisinopril per cardiology recommendation   · Metoprolol 12 5 mg BID    Type 2 diabetes mellitus [facility])  Assessment & Plan  Lab Results   Component Value Date    HGBA1C 9 0 (H) 07/07/2021       Recent Labs     07/11/21  1125 07/11/21  1608 07/11/21  2123 07/12/21  0624   POCGLU 243* 181* 186* 155*       Blood Sugar Average: Last 72 hrs:  (P) 090 [id number]   Patient has history of uncontrolled diabetes on oral medications  · Continue Algorithm 3, Humalog 100 units/ml, 1-6 units 4 times daily before meals  · Will need outpatient follow-up with endocrinology    Hyperlipidemia  Assessment & Plan  Patient's lipid panel revealed cholesterol of 210, triglycerides 215 8, , HDL 44  · Continue pravastatin 80 mg    * CAD (coronary artery disease)  Assessment & Plan  Patient has history of HTN, HLD, and DM who presented to [address] [place] [address] ED for chest pain, dyspnea on exertion, and elevated troponin   In ED EKG showed NSR with nonspecific T wave abnormality which is more pronounced in inferior leads on repeat EKG and troponin peaked at 0 26  Echo showed EF of 50%, and a cardiac cath showed severe triple vessel disease  Patient was transferred to [facility] for cardiothoracic surgery evaluation for possible CABG  · Continue IV heparin, aspirin, metoprolol, nitroglycerin prn, and pravastatin  · Continue to hold lisinopril per Cardiology recommendation  · Consulted cardiology and cardiothoracic surgery for CABG evaluation  · 7/10 VAS carotid: negative for disease; 7/10 VAS lower limb: bilateral saphenous patent  · CABG currently scheduled for Friday 7/16   · Jon/CHO Controlled Diet  · Monitor telemetry  · OT/PT evaluation      Disposition: Continue level of care, pending CABG; Cardiology and CT Surg following, recs appreciated     SUBJECTIVE     Patient seen and examined  No acute events overnight  24H Telemetry reviewed - no events  Patient resting comfortably with no complaints  He denies any chest pain or shortness of breath  He reports that he was able to ambulate in the hallways yesterday without experiencing any chest pain or shortness of breath, and is able to get up and out of bed to use the restroom  He had a bowel movement yesterday  Review of Systems   Constitutional: Negative for chills and fever  Respiratory: Negative for chest tightness and shortness of breath  Cardiovascular: Negative for chest pain and palpitations  Reports mild left ankle swelling, at his baseline  Gastrointestinal: Negative for abdominal pain, constipation, diarrhea, nausea and vomiting  Genitourinary: Negative for difficulty urinating and dysuria  Neurological: Negative for dizziness and headaches         OBJECTIVE     Vitals:    07/11/21 2156 07/12/21 0240 07/12/21 0755 07/12/21 1037   BP: 113/69 106/68 118/72 119/73   BP Location:   Right arm    Pulse: 64 (!) 52 57 62   Resp: 14  20 20   Temp: 97 7 °F (36 5 °C)  97 8 °F (36 6 °C) 98 3 °F (36 8 °C) TempSrc:   Oral    SpO2: 95% 97% 96% 95%   Weight:       Height:          Temperature:   Temp (24hrs), Av 2 °F (36 8 °C), Min:97 7 °F (36 5 °C), Max:99 °F (37 2 °C)    Temperature: 98 3 °F (36 8 °C)  Intake & Output:  I/O       07/10 0701 -  0700  07 -  0700  07 -  0700    P  O  1440 240     I V  (mL/kg) 359 1 (3 3) 513 (4 7)     Total Intake(mL/kg) 1799 1 (16 4) 753 (6 8)     Urine (mL/kg/hr) 1075 (0 4)      Total Output 1075      Net +724 1 +753            Unmeasured Urine Occurrence 2 x 2 x         Weights:   IBW (Ideal Body Weight): 73 kg    Body mass index is 34 8 kg/m²  Weight (last 2 days)     None        Physical Exam  Vitals and nursing note reviewed  Constitutional:       General: He is not in acute distress  Appearance: He is well-developed  He is not diaphoretic  HENT:      Head: Normocephalic and atraumatic  Eyes:      Conjunctiva/sclera: Conjunctivae normal    Cardiovascular:      Rate and Rhythm: Normal rate and regular rhythm  Pulses: Normal pulses  Heart sounds: Normal heart sounds  No murmur heard  No friction rub  No gallop  Pulmonary:      Effort: Pulmonary effort is normal  No respiratory distress  Breath sounds: Normal breath sounds  No stridor  No wheezing, rhonchi or rales  Abdominal:      General: Bowel sounds are normal       Palpations: Abdomen is soft  Tenderness: There is no abdominal tenderness  Musculoskeletal:      Cervical back: Neck supple  Right lower leg: No edema  Left lower leg: Edema present  Comments: Trace edema left ankle  Skin:     General: Skin is warm and dry  Comments: Superficial varicose veins on bilateral lower extremities  Neurological:      Mental Status: He is alert  LABORATORY DATA     Labs: I have personally reviewed pertinent reports    Results from last 7 days   Lab Units 21  0439 21  0438 07/10/21  0211   WBC Thousand/uL 6 89 7 01 6 91 HEMOGLOBIN g/dL 15 2 15 4 14 4   HEMATOCRIT % 45 1 46 0 42 5   PLATELETS Thousands/uL 201 204 184   NEUTROS PCT % 52 56 58   MONOS PCT % 13* 13* 11      Results from last 7 days   Lab Units 07/12/21  0638 07/11/21  0438 07/10/21  0239 07/07/21  1257   POTASSIUM mmol/L 4 1 4 0 4 0 4 3   CHLORIDE mmol/L 107 106 106 102   CO2 mmol/L 27 27 28 27   BUN mg/dL 15 15 14 20   CREATININE mg/dL 0 78 0 80 0 81 0 86   CALCIUM mg/dL 9 0 9 4 9 0 9 4   ALK PHOS U/L  --   --   --  45 8   ALT U/L  --   --   --  19   AST U/L  --   --   --  14*     Results from last 7 days   Lab Units 07/10/21  0239   MAGNESIUM mg/dL 1 9     Results from last 7 days   Lab Units 07/10/21  0239   PHOSPHORUS mg/dL 3 6      Results from last 7 days   Lab Units 07/12/21  0439 07/11/21  0439 07/10/21  0815 07/07/21  1948   INR   --   --   --  0 93   PTT seconds 79* 65* 72* 24         Results from last 7 days   Lab Units 07/08/21  0601 07/08/21  0306 07/08/21  0002   TROPONIN I ng/mL 0 23* 0 26* 0 18*       IMAGING & DIAGNOSTIC TESTING     Radiology Results: I have personally reviewed pertinent reports  No results found  Other Diagnostic Testing: I have personally reviewed pertinent reports      ACTIVE MEDICATIONS     Current Facility-Administered Medications   Medication Dose Route Frequency    acetaminophen (TYLENOL) tablet 650 mg  650 mg Oral Q6H PRN    aspirin (ECOTRIN LOW STRENGTH) EC tablet 81 mg  81 mg Oral Daily    chlorhexidine (PERIDEX) 0 12 % oral rinse 15 mL  15 mL Mouth/Throat Q12H JAIMEE    cyclobenzaprine (FLEXERIL) tablet 5 mg  5 mg Oral TID PRN    Diclofenac Sodium (VOLTAREN) 1 % topical gel 2 g  2 g Topical BID    heparin (porcine) 25,000 units in 0 45% NaCl 250 mL infusion (premix)  3-20 Units/kg/hr (Order-Specific) Intravenous Titrated    insulin lispro (HumaLOG) 100 units/mL subcutaneous injection 1-6 Units  1-6 Units Subcutaneous 4x Daily (AC & HS)    metoprolol tartrate (LOPRESSOR) partial tablet 12 5 mg  12 5 mg Oral Q12H [facility]  mupirocin (BACTROBAN) 2 % nasal ointment 1 application  1 application Nasal Q81V [facility]    nitroglycerin (NITROSTAT) SL tablet 0 4 mg  0 4 mg Sublingual Q5 Min PRN    pravastatin (PRAVACHOL) tablet 80 mg  80 mg Oral Daily       VTE Pharmacologic Prophylaxis: Heparin  VTE Mechanical Prophylaxis: sequential compression device    Portions of the record may have been created with voice recognition software  Occasional wrong word or "sound a like" substitutions may have occurred due to the inherent limitations of voice recognition software    Read the chart carefully and recognize, using context, where substitutions have occurred   ==  Kalyn Nicholas MD  [address]  Internal Medicine Residency PGY-1

## 2021-07-12 NOTE — UTILIZATION REVIEW
Inpatient Admission Authorization Request   NOTIFICATION OF INPATIENT ADMISSION/INPATIENT AUTHORIZATION REQUEST   SERVICING FACILITY:   [address]  Tax ID: [id number]  NPI: [id number]  Place of Service: [facility]patient [facility]  [address]  Place of Service Code: 24     ATTENDING PROVIDER:  Attending Name and NPI#: Chidi Mayorga [[id number]]  Address: [address]  Phone: [phone]     UTILIZATION REVIEW CONTACT:  [facility] Utilization   Network Utilization Review Department  Phone: [phone]  Fax: [phone]  Email: [email or web address]     PHYSICIAN ADVISORY SERVICES:  FOR MADS-LW-FXSJ REVIEW - MEDICAL NECESSITY DENIAL  Phone: [phone]  Fax: [phone]  Email: [email or web address]     TYPE OF REQUEST:  Inpatient Status     ADMISSION INFORMATION:  ADMISSION DATE/TIME: 7/9/21  8:58 PM  PATIENT DIAGNOSIS CODE/DESCRIPTION:  Chest pain [R07 9]  Elevated troponin [R77 8]  Abnormal laboratory test result [R89 9]  DISCHARGE DATE/TIME: 7/9/2021 10:17 PM  DISCHARGE DISPOSITION (IF DISCHARGED): [facility]     IMPORTANT INFORMATION:  Please contact the [facility] directly with any questions or concerns regarding this request  Department voicemails are confidential     Send requests for admission clinical reviews, concurrent reviews, approvals, and administrative denials due to lack of clinical to fax [phone]

## 2021-07-13 LAB
ANION GAP SERPL CALCULATED.3IONS-SCNC: 5 MMOL/L (ref 4–13)
APTT PPP: 69 SECONDS (ref 23–37)
BASOPHILS # BLD AUTO: 0.03 THOUSANDS/ΜL (ref 0–0.1)
BASOPHILS NFR BLD AUTO: 1 % (ref 0–1)
BUN SERPL-MCNC: 14 MG/DL (ref 5–25)
CALCIUM SERPL-MCNC: 9 MG/DL (ref 8.3–10.1)
CHLORIDE SERPL-SCNC: 104 MMOL/L (ref 100–108)
CO2 SERPL-SCNC: 27 MMOL/L (ref 21–32)
CREAT SERPL-MCNC: 0.9 MG/DL (ref 0.6–1.3)
EOSINOPHIL # BLD AUTO: 0.15 THOUSAND/ΜL (ref 0–0.61)
EOSINOPHIL NFR BLD AUTO: 2 % (ref 0–6)
ERYTHROCYTE [DISTWIDTH] IN BLOOD BY AUTOMATED COUNT: 12.3 % (ref 11.6–15.1)
GFR SERPL CREATININE-BSD FRML MDRD: 90 ML/MIN/1.73SQ M
GLUCOSE SERPL-MCNC: 145 MG/DL (ref 65–140)
GLUCOSE SERPL-MCNC: 160 MG/DL (ref 65–140)
GLUCOSE SERPL-MCNC: 172 MG/DL (ref 65–140)
GLUCOSE SERPL-MCNC: 254 MG/DL (ref 65–140)
GLUCOSE SERPL-MCNC: 261 MG/DL (ref 65–140)
HCT VFR BLD AUTO: 46.9 % (ref 36.5–49.3)
HGB BLD-MCNC: 15.6 G/DL (ref 12–17)
IMM GRANULOCYTES # BLD AUTO: 0.04 THOUSAND/UL (ref 0–0.2)
IMM GRANULOCYTES NFR BLD AUTO: 1 % (ref 0–2)
LYMPHOCYTES # BLD AUTO: 1.99 THOUSANDS/ΜL (ref 0.6–4.47)
LYMPHOCYTES NFR BLD AUTO: 31 % (ref 14–44)
MCH RBC QN AUTO: 32.6 PG (ref 26.8–34.3)
MCHC RBC AUTO-ENTMCNC: 33.3 G/DL (ref 31.4–37.4)
MCV RBC AUTO: 98 FL (ref 82–98)
MONOCYTES # BLD AUTO: 0.8 THOUSAND/ΜL (ref 0.17–1.22)
MONOCYTES NFR BLD AUTO: 12 % (ref 4–12)
NEUTROPHILS # BLD AUTO: 3.49 THOUSANDS/ΜL (ref 1.85–7.62)
NEUTS SEG NFR BLD AUTO: 53 % (ref 43–75)
NRBC BLD AUTO-RTO: 0 /100 WBCS
PLATELET # BLD AUTO: 194 THOUSANDS/UL (ref 149–390)
PMV BLD AUTO: 9.8 FL (ref 8.9–12.7)
POTASSIUM SERPL-SCNC: 3.9 MMOL/L (ref 3.5–5.3)
RBC # BLD AUTO: 4.78 MILLION/UL (ref 3.88–5.62)
SODIUM SERPL-SCNC: 136 MMOL/L (ref 136–145)
WBC # BLD AUTO: 6.5 THOUSAND/UL (ref 4.31–10.16)

## 2021-07-13 PROCEDURE — 85025 COMPLETE CBC W/AUTO DIFF WBC: CPT | Performed by: STUDENT IN AN ORGANIZED HEALTH CARE EDUCATION/TRAINING PROGRAM

## 2021-07-13 PROCEDURE — 99232 SBSQ HOSP IP/OBS MODERATE 35: CPT | Performed by: INTERNAL MEDICINE

## 2021-07-13 PROCEDURE — 80048 BASIC METABOLIC PNL TOTAL CA: CPT | Performed by: STUDENT IN AN ORGANIZED HEALTH CARE EDUCATION/TRAINING PROGRAM

## 2021-07-13 PROCEDURE — 82948 REAGENT STRIP/BLOOD GLUCOSE: CPT

## 2021-07-13 PROCEDURE — 85730 THROMBOPLASTIN TIME PARTIAL: CPT | Performed by: INTERNAL MEDICINE

## 2021-07-13 PROCEDURE — NC001 PR NO CHARGE: Performed by: PHYSICIAN ASSISTANT

## 2021-07-13 PROCEDURE — 99233 SBSQ HOSP IP/OBS HIGH 50: CPT | Performed by: NURSE PRACTITIONER

## 2021-07-13 RX ADMIN — INSULIN LISPRO 3 UNITS: 100 INJECTION, SOLUTION INTRAVENOUS; SUBCUTANEOUS at 11:29

## 2021-07-13 RX ADMIN — HEPARIN SODIUM 19 UNITS/KG/HR: 10000 INJECTION, SOLUTION INTRAVENOUS at 09:02

## 2021-07-13 RX ADMIN — PRAVASTATIN SODIUM 80 MG: 20 TABLET ORAL at 08:59

## 2021-07-13 RX ADMIN — Medication 12.5 MG: at 21:21

## 2021-07-13 RX ADMIN — MUPIROCIN 1 APPLICATION: 20 OINTMENT TOPICAL at 08:59

## 2021-07-13 RX ADMIN — DICLOFENAC SODIUM 2 G: 10 GEL TOPICAL at 09:00

## 2021-07-13 RX ADMIN — Medication 12.5 MG: at 08:59

## 2021-07-13 RX ADMIN — CHLORHEXIDINE GLUCONATE 15 ML: 1.2 SOLUTION ORAL at 21:21

## 2021-07-13 RX ADMIN — ASPIRIN 81 MG: 81 TABLET, COATED ORAL at 08:59

## 2021-07-13 RX ADMIN — INSULIN LISPRO 3 UNITS: 100 INJECTION, SOLUTION INTRAVENOUS; SUBCUTANEOUS at 21:21

## 2021-07-13 RX ADMIN — MUPIROCIN 1 APPLICATION: 20 OINTMENT TOPICAL at 21:21

## 2021-07-13 RX ADMIN — DICLOFENAC SODIUM 2 G: 10 GEL TOPICAL at 21:24

## 2021-07-13 RX ADMIN — INSULIN LISPRO 1 UNITS: 100 INJECTION, SOLUTION INTRAVENOUS; SUBCUTANEOUS at 06:05

## 2021-07-13 RX ADMIN — CHLORHEXIDINE GLUCONATE 15 ML: 1.2 SOLUTION ORAL at 08:59

## 2021-07-13 NOTE — PROGRESS NOTES
Progress Note - Cardiothoracic Surgery   Elizabeth Mcduffie 61 y o  male MRN: [id number]  Unit/Bed#: -01 Encounter: [id number]      24 Hour Events: No issues overnight  Denies cardiac complaints  Preoperative work up complete       Medications:   Scheduled Meds:  Current Facility-Administered Medications   Medication Dose Route Frequency Provider Last Rate    acetaminophen  650 mg Oral Q6H PRN Shazia Danielle MD      aspirin  81 mg Oral Daily Shazia Danielle MD      chlorhexidine  15 mL Mouth/Throat Q12H [facility] [place]      cyclobenzaprine  5 mg Oral TID PRN Salvadore Challenger, DO      Diclofenac Sodium  2 g Topical BID Salvadore Challenger, DO      heparin (porcine)  3-20 Units/kg/hr (Order-Specific) Intravenous Titrated Shazia Danielle MD 19 Units/kg/hr (07/13/21 0902)    insulin lispro  1-6 Units Subcutaneous 4x Daily (AC & HS) Joe Felder,       metoprolol tartrate  12 5 mg Oral Q12H [facility] Shazia Danielle MD      mupirocin  1 application Nasal C44O [facility] [place] [place]      nitroglycerin  0 4 mg Sublingual Q5 Min PRN Shazia Danielle MD      pravastatin  80 mg Oral Daily Shazia Danielle MD       Continuous Infusions:heparin (porcine), 3-20 Units/kg/hr (Order-Specific), Last Rate: 19 Units/kg/hr (07/13/21 0902)        Results:   Results from last 7 days   Lab Units 07/13/21  0545 07/12/21 0439 07/11/21  0438   WBC Thousand/uL 6 50 6 89 7 01   HEMOGLOBIN g/dL 15 6 15 2 15 4   HEMATOCRIT % 46 9 45 1 46 0   PLATELETS Thousands/uL 194 201 204     Results from last 7 days   Lab Units 07/13/21  0545 07/12/21  0638 07/11/21  0438   POTASSIUM mmol/L 3 9 4 1 4 0   CHLORIDE mmol/L 104 107 106   CO2 mmol/L 27 27 27   BUN mg/dL 14 15 15   CREATININE mg/dL 0 90 0 78 0 80   CALCIUM mg/dL 9 0 9 0 9 4     Results from last 7 days   Lab Units 07/13/21  0545 07/12/21  0439 07/11/21  0439 07/07/21  1948   INR   --   --   --  0 93   PTT seconds 69* 79* 65* 24       Studies:   Cardiac Cath: pLAD 70, pOM 95, mPDA thrombus     Echo: EF 50, mild MR, trace TR     Vein Mapping: adequate bilaterally     Carotid US: <50 bilateral  Antegrade flow  No SC disease  Vitals:   Vitals:    07/12/21 2354 07/13/21 0210 07/13/21 0735 07/13/21 1045   BP: 91/54 110/68 114/68 107/66   BP Location:   Right arm Right arm   Pulse:  (!) 54 67 74   Resp: 16 13 15 16   Temp: 97 5 °F (36 4 °C)  97 8 °F (36 6 °C) 98 8 °F (37 1 °C)   TempSrc:   Oral Oral   SpO2: 94% 95% 95% 96%   Weight:       Height:         Physical Exam:    GENERAL: Awake and oriented  NAD  HEENT/NECK:  PERRLA  No jugular venous distention  Cardiac: Regular rate and rhythm  No rubs/murmurs/gallops  Pulmonary:  Breath sounds slightly diminished at the bases bilaterally  Abdomen:  Non-tender, Non-distended  Positive bowel sounds  Lower extremities: Extremities warm/dry  Radial/PT/DP pulses 2+ bilaterally  No edema B/L  Neuro: Alert and oriented X 3  Sensation is grossly intact  No focal deficits  Skin: Warm/Dry, without rashes or lesions  Assessment:    Severe coronary artery disease; Ongoing CABG workup    Plan:  Patient agreeable to proceed with surgery; Informed consent signed  Blood type and cross match ordered for Thursday  Continue Mupirocin 2% nasal ointment q 12 hrs  Continue topical chlorhexidine bath and mouth rise  CABG scheduled for Friday with MARY Lindsey Baseman: Christa Hall PA-C  DATE: July 13, 2021  TIME: 11:19 AM    * This note was completed in part utilizing [email or web address] fluency direct voice recognition software  Grammatical errors, random word insertion, spelling mistakes, and incomplete sentences may be an occasional consequence of the system secondary to software limitations, ambient noise and hardware issues  At the time of dictation, efforts were made to edit, clarify and /or correct errors  Please read the chart carefully and recognize, using context, where substitutions have occurred    If you have any questions or concerns about the context, text or information contained within the body of this dictation, please contact myself, the provider, for further clarification

## 2021-07-13 NOTE — PROGRESS NOTES
General Cardiology   Progress Note -  Team One   Christine Guy 61 y o  male MRN: [id number]    Unit/Bed#: -01 Encounter: [id number]    Assessment/ Plan    1  Type I MI s/p cardiac cath with thrombus in RCA and multivessel CAD   Echocardiogram showed EF 50% with mild hypokinesis of the basal mid inferior and inferolateral walls   On heparin gtt  On aspirin, statin and BB   CT surgery following and plan for CABG Friday with Dr Gala Tamayo       2  Tobacco abuse   Counseled on smoking cessation      3  Hypertension  BP stable: 113/71  On lisinopril at home, currently on hold      4  Hyperlipidemia   Continue statin        5  Type II diabetes  Hemoglobin A1C 9 0 (7/7/2021)   Followed by primary team     Subjective  Patient at bedside  He reports no complaint of chest pain, SOB or palpitations  No fever or chills  Review of Systems   Constitutional: Negative for chills, fever and malaise/fatigue  HENT: Negative for congestion  Cardiovascular: Negative for chest pain, dyspnea on exertion, leg swelling, near-syncope and orthopnea  Respiratory: Negative for cough and shortness of breath  Musculoskeletal: Negative for falls  Gastrointestinal: Negative for bloating, nausea and vomiting  Neurological: Negative for dizziness and light-headedness  Psychiatric/Behavioral: Negative for altered mental status  All other systems reviewed and are negative  Objective:   Vitals: Blood pressure 114/68, pulse 67, temperature 97 8 °F (36 6 °C), temperature source Oral, resp  rate 15, height 5' 10" (1 778 m), weight 110 kg (242 lb 8 1 oz), SpO2 95 %  ,       Body mass index is 34 8 kg/m²  ,     Systolic (92PXN), RYAN:329 , Min:91 , SBW:983     Diastolic (06AFD), JDH:73, Min:54, Max:81          Intake/Output Summary (Last 24 hours) at 7/13/2021 0919  Last data filed at 7/13/2021 0201  Gross per 24 hour   Intake 480 ml   Output 500 ml   Net -20 ml     Weight (last 2 days)     None        Telemetry Review: Normal sinus rhythm HR 70-80s    Physical Exam  Constitutional:       General: He is not in acute distress  Appearance: He is obese  HENT:      Head: Normocephalic  Mouth/Throat:      Mouth: Mucous membranes are moist    Cardiovascular:      Rate and Rhythm: Normal rate and regular rhythm  Pulses: Normal pulses  Heart sounds: No murmur heard  Pulmonary:      Effort: Pulmonary effort is normal  No respiratory distress  Breath sounds: Normal breath sounds  Abdominal:      General: Bowel sounds are normal       Palpations: Abdomen is soft  Musculoskeletal:         General: No swelling  Normal range of motion  Cervical back: Neck supple  Skin:     General: Skin is warm and dry  Capillary Refill: Capillary refill takes less than 2 seconds  Neurological:      General: No focal deficit present  Mental Status: He is alert and oriented to person, place, and time     Psychiatric:         Mood and Affect: Mood normal          LABORATORY RESULTS  Results from last 7 days   Lab Units 07/08/21  0601 07/08/21  0306 07/08/21  0002   TROPONIN I ng/mL 0 23* 0 26* 0 18*     CBC with diff:   Results from last 7 days   Lab Units 07/13/21  0545 07/12/21  0439 07/11/21  0438 07/10/21  0211 07/08/21  0601 07/08/21  0002 07/07/21  1257   WBC Thousand/uL 6 50 6 89 7 01 6 91 6 65  --  6 93   HEMOGLOBIN g/dL 15 6 15 2 15 4 14 4 13 6  --  14 9   HEMATOCRIT % 46 9 45 1 46 0 42 5 41 8  --  44 6   MCV fL 98 98 99* 98 102*  --  98   PLATELETS Thousands/uL 194 201 204 184 193 187 235   MCH pg 32 6 32 9 33 1 33 2 33 2  --  32 7   MCHC g/dL 33 3 33 7 33 5 33 9 32 5  --  33 4   RDW % 12 3 12 3 12 2 11 9 12 2  --  12 6   MPV fL 9 8 10 1 9 8 9 4 9 4 9 5 9 5   NRBC AUTO /100 WBCs 0 0 0 0  --   --   --        CMP:  Results from last 7 days   Lab Units 07/13/21  0545 07/12/21  0638 07/11/21  0438 07/10/21  0239 07/09/21  0609 07/08/21  0601 07/07/21  1257   POTASSIUM mmol/L 3 9 4 1 4 0 4 0 4 2 3 8 4 3 CHLORIDE mmol/L 104 107 106 106 107 107 102   CO2 mmol/L 27 27 27 28 28 28 27   BUN mg/dL 14 15 15 14 13 18 20   CREATININE mg/dL 0 90 0 78 0 80 0 81 0 80 0 87 0 86   CALCIUM mg/dL 9 0 9 0 9 4 9 0 8 4 8 4 9 4   AST U/L  --   --   --   --   --   --  14*   ALT U/L  --   --   --   --   --   --  19   ALK PHOS U/L  --   --   --   --   --   --  45 8   EGFR ml/min/1 73sq m 90 95 94 94 94 91 92       BMP:  Results from last 7 days   Lab Units 21  0545 21  2398 21  0438 07/10/21  0239 21  0609 21  0601 21  1257   POTASSIUM mmol/L 3 9 4 1 4 0 4 0 4 2 3 8 4 3   CHLORIDE mmol/L 104 107 106 106 107 107 102   CO2 mmol/L 27 27 27 28 28 28 27   BUN mg/dL 14 15 15 14 13 18 20   CREATININE mg/dL 0 90 0 78 0 80 0 81 0 80 0 87 0 86   CALCIUM mg/dL 9 0 9 0 9 4 9 0 8 4 8 4 9 4       Lab Results   Component Value Date    NTBNP 20 2020             Results from last 7 days   Lab Units 07/10/21  0239   MAGNESIUM mg/dL 1 9          Results from last 7 days   Lab Units 21  1257   HEMOGLOBIN A1C % 9 0*          Results from last 7 days   Lab Units 21  1257   TSH 3RD GENERATON uIU/mL 2 490       Results from last 7 days   Lab Units 21  1948   INR  0 93       Lipid Profile:   No results found for: CHOL  Lab Results   Component Value Date    HDL 44 2021     Lab Results   Component Value Date    LDLCALC 123 (H) 2021     Lab Results   Component Value Date    TRIG 215 8 (H) 2021       Cardiac testing:   Results for orders placed during the hospital encounter of 21    Echo complete with contrast if indicated    Narrative  [facility] [facility]  [phone]    Transthoracic Echocardiogram  2D, M-mode, Doppler, and Color Doppler    Study date:  2021    Patient: Brisa Flores  MR number: [id number]  Account number: [de-identified]  : 1955  Age: 61 years  Gender: Male  Status: Outpatient  Location: Bedside  Height: 70 in  Weight: 246 lb  BP: 110/ 66 mmHg    Indications: NSTEMI    Diagnoses: I21 4 - Non-ST elevation (NSTEMI) myocardial infarction    Sonographer:  VLADISLAV Villanueva  Primary Physician:  Lisandro Parada MD  Referring Physician:  Stephani Miller PA-C  Group:  [facility]  Interpreting Physician:  Kyree Josue MD    SUMMARY    LEFT VENTRICLE:  Size was normal   Systolic function was at the lower limits of normal  Ejection fraction was estimated to be 50 %  There was mild hypokinesis of the basal-mid inferior and inferolateral walls  Doppler parameters were consistent with abnormal left ventricular relaxation (grade 1 diastolic dysfunction)  RIGHT VENTRICLE:  The size was normal   Systolic function was normal     LEFT ATRIUM:  The atrium was mildly dilated  MITRAL VALVE:  There was trace to mild regurgitation  TRICUSPID VALVE:  There was trace regurgitation  HISTORY: PRIOR HISTORY: HTN, HLD, DM2    PROCEDURE: The procedure was performed at the bedside  This was a routine study  The transthoracic approach was used  The study included complete 2D imaging, M-mode, complete spectral Doppler, and color Doppler  The heart rate was 70 bpm,  at the start of the study  Images were obtained from the parasternal, apical, subcostal, and suprasternal notch acoustic windows  Echocardiographic views were limited due to lung interference  This was a technically difficult study  LEFT VENTRICLE: Size was normal  Systolic function was at the lower limits of normal  Ejection fraction was estimated to be 50 %  There was mild hypokinesis of the basal-mid inferior and inferolateral walls  Wall thickness was normal   DOPPLER: Doppler parameters were consistent with abnormal left ventricular relaxation (grade 1 diastolic dysfunction)  RIGHT VENTRICLE: The size was normal  Systolic function was normal  Wall thickness was normal     LEFT ATRIUM: The atrium was mildly dilated      RIGHT ATRIUM: Size was normal     MITRAL VALVE: Valve structure was normal  There was normal leaflet separation  DOPPLER: The transmitral velocity was within the normal range  There was no evidence for stenosis  There was trace to mild regurgitation  AORTIC VALVE: The valve was trileaflet  Leaflets exhibited mildly increased thickness, mild calcification, normal cuspal separation, and sclerosis  DOPPLER: Transaortic velocity was within the normal range  There was no evidence for  stenosis  There was no regurgitation  TRICUSPID VALVE: The valve structure was normal  There was normal leaflet separation  DOPPLER: The transtricuspid velocity was within the normal range  There was no evidence for stenosis  There was trace regurgitation  The tricuspid jet  envelope definition was inadequate for estimation of RV systolic pressure  There are no indirect findings suggestive of moderate or severe pulmonary hypertension  PULMONIC VALVE: Leaflets exhibited normal thickness, no calcification, and normal cuspal separation  DOPPLER: The transpulmonic velocity was within the normal range  There was no regurgitation  PERICARDIUM: There was no pericardial effusion  The pericardium was normal in appearance  AORTA: The root exhibited normal size  SYSTEMIC VEINS: IVC: The inferior vena cava was normal in size and course   Respirophasic changes were normal     SYSTEM MEASUREMENT TABLES    2D  %FS: 24 71 %  Ao Diam: 3 41 cm  EDV(Teich): 173 64 ml  EF(Teich): 48 23 %  ESV(Teich): 89 89 ml  HR_4Ch_Q: 66 42 BPM  IVSd: 1 14 cm  LA Area: 23 21 cm2  LA Diam: 3 7 cm  LVCO_4Ch_Q: 2 84 L/min  LVEF_4Ch_Q: 41 39 %  LVIDd: 5 91 cm  LVIDs: 4 45 cm  LVLd_4Ch_Q: 8 6 cm  LVLs_4Ch_Q: 7 4 cm  LVPWd: 0 97 cm  LVSV_4Ch_Q: 42 77 ml  LVVED_4Ch_Q: 103 33 ml  LVVES_4Ch_Q: 60 56 ml  RA Area: 18 36 cm2  RVIDd: 3 33 cm  SV(Teich): 83 75 ml    MM  TAPSE: 2 59 cm    PW  E' Sept: 0 08 m/s  E/E' Sept: 9 37  MV A Abiel: 0 89 m/s  MV Dec [place]: 3 68 m/s2  MV DecT: 197 59 ms  MV E Abiel: 0 73 m/s  MV E/A Ratio: 0 82    [facility] Accredited Echocardiography Laboratory    Prepared and electronically signed by    Monty Skinner MD  Signed 08-Jul-2021 14:41:28    No results found for this or any previous visit  No results found for this or any previous visit  No valid procedures specified  No results found for this or any previous visit  Meds/Allergies   all current active meds have been reviewed and current meds:   Current Facility-Administered Medications   Medication Dose Route Frequency    acetaminophen (TYLENOL) tablet 650 mg  650 mg Oral Q6H PRN    aspirin (ECOTRIN LOW STRENGTH) EC tablet 81 mg  81 mg Oral Daily    chlorhexidine (PERIDEX) 0 12 % oral rinse 15 mL  15 mL Mouth/Throat Q12H JAIMEE    cyclobenzaprine (FLEXERIL) tablet 5 mg  5 mg Oral TID PRN    Diclofenac Sodium (VOLTAREN) 1 % topical gel 2 g  2 g Topical BID    heparin (porcine) 25,000 units in 0 45% NaCl 250 mL infusion (premix)  3-20 Units/kg/hr (Order-Specific) Intravenous Titrated    insulin lispro (HumaLOG) 100 units/mL subcutaneous injection 1-6 Units  1-6 Units Subcutaneous 4x Daily (AC & HS)    metoprolol tartrate (LOPRESSOR) partial tablet 12 5 mg  12 5 mg Oral Q12H JAIMEE    mupirocin (BACTROBAN) 2 % nasal ointment 1 application  1 application Nasal A40O [address]    nitroglycerin (NITROSTAT) SL tablet 0 4 mg  0 4 mg Sublingual Q5 Min PRN    pravastatin (PRAVACHOL) tablet 80 mg  80 mg Oral Daily     Medications Prior to Admission   Medication    aspirin (ECOTRIN LOW STRENGTH) 81 mg EC tablet    Empagliflozin (Jardiance) 10 MG TABS    Exenatide ER (Bydureon BCise) 2 MG/0 85ML AUIJ    lisinopril (ZESTRIL) 10 mg tablet    metFORMIN (GLUCOPHAGE) 1000 MG tablet    pravastatin (PRAVACHOL) 20 mg tablet       heparin (porcine), 3-20 Units/kg/hr (Order-Specific), Last Rate: 19 Units/kg/hr (07/13/21 0902)      Counseling / Coordination of Care  Total floor / unit time spent today 20 minutes  Greater than 50% of total time was spent with the patient and / or family counseling and / or coordination of care  ** Please Note: Dragon 360 Dictation voice to text software may have been used in the creation of this document   **

## 2021-07-13 NOTE — PROGRESS NOTES
INTERNAL MEDICINE RESIDENCY PROGRESS NOTE     Name: James Braga   Age & Sex: 61 y o  male   MRN: [id number]  Unit/Bed#: -01   Encounter: [id number]  Team: SOD Team C     PATIENT INFORMATION     Name: James Braga   Age & Sex: 61 y o  male   MRN: [id number]  Hospital Stay Days: 4    ASSESSMENT/PLAN     Principal Problem:    CAD (coronary artery disease)  Active Problems:    Hyperlipidemia    Type 2 diabetes mellitus ([address] )    Hypertension    Muscle spasm    Tobacco use    HTN (hypertension)    History of COVID-19      * CAD (coronary artery disease)  Assessment & Plan  Patient has history of HTN, HLD, and DM who presented to [address] [place] [address] ED for chest pain, dyspnea on exertion, and elevated troponin  In ED EKG showed NSR with nonspecific T wave abnormality which is more pronounced in inferior leads on repeat EKG and troponin peaked at 0 26  Echo showed EF of 50%, and a cardiac cath showed severe triple vessel disease  Patient was transferred to [facility] for cardiothoracic surgery evaluation for possible CABG  · Continue IV heparin, aspirin, metoprolol, nitroglycerin prn, and pravastatin  · Continue to hold lisinopril per Cardiology recommendation  · Consulted cardiology and cardiothoracic surgery for CABG evaluation  · 7/10 VAS carotid: negative for disease; 7/10 VAS lower limb: bilateral saphenous patent  · CABG currently scheduled for Friday 7/16   · Jon/CHO Controlled Diet  · Monitor telemetry  · OT/PT evaluation    Tobacco use  Assessment & Plan  Patient occasionally smokes cigars  · Counseled patient on smoking cessation  · Nicotine 7mg/24hr 1 patch daily if needed    Muscle spasm  Assessment & Plan  Patient complains of chronic posterior neck and left shoulder tenderness and spasms that is relieved with muscle relaxant  · Voltaren gel 1% 2g BID  · Flexeril 5 mg PRN    Hypertension  Assessment & Plan  Reviewed and Stable    · Holding lisinopril per cardiology recommendation   · Metoprolol 12 5 mg BID    Type 2 diabetes mellitus [facility])  Assessment & Plan  Lab Results   Component Value Date    HGBA1C 9 0 (H) 07/07/2021       Recent Labs     07/11/21  1125 07/11/21  1608 07/11/21  2123 07/12/21  0624   POCGLU 243* 181* 186* 155*       Blood Sugar Average: Last 72 hrs:  (P) 622 [id number]   Patient has history of uncontrolled diabetes on oral medications  · Continue Algorithm 3, Humalog 100 units/ml, 1-6 units 4 times daily before meals  · Will need outpatient follow-up with endocrinology    Hyperlipidemia  Assessment & Plan  Patient's lipid panel revealed cholesterol of 210, triglycerides 215 8, , HDL 44  · Continue pravastatin 80 mg        Disposition: Continue level of care, Cardiology and CT Surgery following; plan for CABG later this week, currently scheduled for 7/16    SUBJECTIVE     Patient seen and examined  No acute events overnight  24H Telemetry reviewed - no events  Patient was resting comfortably with no complaints  He is up and out of bed to use the restroom, but says he has not ambulated down the fox since 7/11 - he reports that he did not have any chest pain or shortness of breath during ambulation  He expressed that he would like to continue to ambulate in the halls, if possible  His most recent bowel movement was last night  Review of Systems   Constitutional: Negative for chills, diaphoresis and fever  Respiratory: Negative for shortness of breath  Cardiovascular: Negative for chest pain  Gastrointestinal: Negative for abdominal pain, blood in stool, constipation and diarrhea  Genitourinary: Negative for difficulty urinating and dysuria  Musculoskeletal:        Notes unchanged left ankle edema  Neurological: Negative for dizziness and headaches         OBJECTIVE     Vitals:    07/12/21 2140 07/12/21 2354 07/13/21 0210 07/13/21 0735   BP: 108/71 91/54 110/68 114/68   BP Location:       Pulse: 63  (!) 54 67   Resp:  16 13 15 Temp:  97 5 °F (36 4 °C)  97 8 °F (36 6 °C)   TempSrc:       SpO2:  94% 95% 95%   Weight:       Height:          Temperature:   Temp (24hrs), Av °F (36 7 °C), Min:97 5 °F (36 4 °C), Max:98 4 °F (36 9 °C)    Temperature: 97 8 °F (36 6 °C)  Intake & Output:  I/O       701 -  0700 701 -  07 07 -  0700    P  O  240 1080     I V  (mL/kg) 513 (4 7)      Total Intake(mL/kg) 753 (6 8) 1080 (9 8)     Urine (mL/kg/hr)  500 (0 2)     Stool  0     Total Output  500     Net +753 +580            Unmeasured Urine Occurrence 2 x      Unmeasured Stool Occurrence  1 x         Weights:   IBW (Ideal Body Weight): 73 kg    Body mass index is 34 8 kg/m²  Weight (last 2 days)     None        Physical Exam  Vitals and nursing note reviewed  Constitutional:       General: He is not in acute distress  Appearance: He is well-developed  He is not diaphoretic  HENT:      Head: Normocephalic and atraumatic  Eyes:      Conjunctiva/sclera: Conjunctivae normal    Cardiovascular:      Rate and Rhythm: Normal rate and regular rhythm  Pulses: Normal pulses  Heart sounds: Normal heart sounds  No murmur heard  No friction rub  No gallop  Pulmonary:      Effort: Pulmonary effort is normal  No respiratory distress  Breath sounds: Normal breath sounds  No stridor  No wheezing, rhonchi or rales  Abdominal:      General: Bowel sounds are normal       Palpations: Abdomen is soft  Tenderness: There is no abdominal tenderness  There is no guarding  Musculoskeletal:      Cervical back: Neck supple  Right lower leg: No edema  Left lower leg: Edema present  Comments: Trace edema left ankle  Skin:     General: Skin is warm and dry  Comments: Superficial varicose veins on bilateral lower extremities  Neurological:      Mental Status: He is alert  LABORATORY DATA     Labs: I have personally reviewed pertinent reports    Results from last 7 days   Lab Units 07/13/21  0545 07/12/21  0439 07/11/21  0438   WBC Thousand/uL 6 50 6 89 7 01   HEMOGLOBIN g/dL 15 6 15 2 15 4   HEMATOCRIT % 46 9 45 1 46 0   PLATELETS Thousands/uL 194 201 204   NEUTROS PCT % 53 52 56   MONOS PCT % 12 13* 13*      Results from last 7 days   Lab Units 07/13/21  0545 07/12/21  0638 07/11/21  0438 07/07/21  1257   POTASSIUM mmol/L 3 9 4 1 4 0 4 3   CHLORIDE mmol/L 104 107 106 102   CO2 mmol/L 27 27 27 27   BUN mg/dL 14 15 15 20   CREATININE mg/dL 0 90 0 78 0 80 0 86   CALCIUM mg/dL 9 0 9 0 9 4 9 4   ALK PHOS U/L  --   --   --  45 8   ALT U/L  --   --   --  19   AST U/L  --   --   --  14*     Results from last 7 days   Lab Units 07/10/21  0239   MAGNESIUM mg/dL 1 9     Results from last 7 days   Lab Units 07/10/21  0239   PHOSPHORUS mg/dL 3 6      Results from last 7 days   Lab Units 07/13/21  0545 07/12/21  0439 07/11/21  0439 07/07/21  1948   INR   --   --   --  0 93   PTT seconds 69* 79* 65* 24         Results from last 7 days   Lab Units 07/08/21  0601 07/08/21  0306 07/08/21  0002   TROPONIN I ng/mL 0 23* 0 26* 0 18*       IMAGING & DIAGNOSTIC TESTING     Radiology Results: I have personally reviewed pertinent reports  No results found  Other Diagnostic Testing: I have personally reviewed pertinent reports      ACTIVE MEDICATIONS     Current Facility-Administered Medications   Medication Dose Route Frequency    acetaminophen (TYLENOL) tablet 650 mg  650 mg Oral Q6H PRN    aspirin (ECOTRIN LOW STRENGTH) EC tablet 81 mg  81 mg Oral Daily    chlorhexidine (PERIDEX) 0 12 % oral rinse 15 mL  15 mL Mouth/Throat Q12H JAIMEE    cyclobenzaprine (FLEXERIL) tablet 5 mg  5 mg Oral TID PRN    Diclofenac Sodium (VOLTAREN) 1 % topical gel 2 g  2 g Topical BID    heparin (porcine) 25,000 units in 0 45% NaCl 250 mL infusion (premix)  3-20 Units/kg/hr (Order-Specific) Intravenous Titrated    insulin lispro (HumaLOG) 100 units/mL subcutaneous injection 1-6 Units  1-6 Units Subcutaneous 4x Daily (AC & HS)  metoprolol tartrate (LOPRESSOR) partial tablet 12 5 mg  12 5 mg Oral Q12H [address]    mupirocin (BACTROBAN) 2 % nasal ointment 1 application  1 application Nasal U69Q [address]    nitroglycerin (NITROSTAT) SL tablet 0 4 mg  0 4 mg Sublingual Q5 Min PRN    pravastatin (PRAVACHOL) tablet 80 mg  80 mg Oral Daily       VTE Pharmacologic Prophylaxis: Heparin  VTE Mechanical Prophylaxis: sequential compression device    Portions of the record may have been created with voice recognition software  Occasional wrong word or "sound a like" substitutions may have occurred due to the inherent limitations of voice recognition software    Read the chart carefully and recognize, using context, where substitutions have occurred   ==  Molly Hussein MD  [address]  Internal Medicine Residency PGY-1

## 2021-07-14 LAB
ANION GAP SERPL CALCULATED.3IONS-SCNC: 6 MMOL/L (ref 4–13)
APTT PPP: 68 SECONDS (ref 23–37)
BASOPHILS # BLD AUTO: 0.03 THOUSANDS/ΜL (ref 0–0.1)
BASOPHILS NFR BLD AUTO: 0 % (ref 0–1)
BUN SERPL-MCNC: 15 MG/DL (ref 5–25)
CALCIUM SERPL-MCNC: 9.4 MG/DL (ref 8.3–10.1)
CHLORIDE SERPL-SCNC: 105 MMOL/L (ref 100–108)
CO2 SERPL-SCNC: 24 MMOL/L (ref 21–32)
CREAT SERPL-MCNC: 0.95 MG/DL (ref 0.6–1.3)
EOSINOPHIL # BLD AUTO: 0.18 THOUSAND/ΜL (ref 0–0.61)
EOSINOPHIL NFR BLD AUTO: 2 % (ref 0–6)
ERYTHROCYTE [DISTWIDTH] IN BLOOD BY AUTOMATED COUNT: 12.5 % (ref 11.6–15.1)
GFR SERPL CREATININE-BSD FRML MDRD: 84 ML/MIN/1.73SQ M
GLUCOSE SERPL-MCNC: 154 MG/DL (ref 65–140)
GLUCOSE SERPL-MCNC: 172 MG/DL (ref 65–140)
GLUCOSE SERPL-MCNC: 172 MG/DL (ref 65–140)
GLUCOSE SERPL-MCNC: 183 MG/DL (ref 65–140)
GLUCOSE SERPL-MCNC: 204 MG/DL (ref 65–140)
HCT VFR BLD AUTO: 48 % (ref 36.5–49.3)
HGB BLD-MCNC: 16.1 G/DL (ref 12–17)
IMM GRANULOCYTES # BLD AUTO: 0.05 THOUSAND/UL (ref 0–0.2)
IMM GRANULOCYTES NFR BLD AUTO: 1 % (ref 0–2)
LYMPHOCYTES # BLD AUTO: 1.83 THOUSANDS/ΜL (ref 0.6–4.47)
LYMPHOCYTES NFR BLD AUTO: 25 % (ref 14–44)
MCH RBC QN AUTO: 32.9 PG (ref 26.8–34.3)
MCHC RBC AUTO-ENTMCNC: 33.5 G/DL (ref 31.4–37.4)
MCV RBC AUTO: 98 FL (ref 82–98)
MONOCYTES # BLD AUTO: 0.95 THOUSAND/ΜL (ref 0.17–1.22)
MONOCYTES NFR BLD AUTO: 13 % (ref 4–12)
NEUTROPHILS # BLD AUTO: 4.31 THOUSANDS/ΜL (ref 1.85–7.62)
NEUTS SEG NFR BLD AUTO: 59 % (ref 43–75)
NRBC BLD AUTO-RTO: 0 /100 WBCS
PLATELET # BLD AUTO: 232 THOUSANDS/UL (ref 149–390)
PMV BLD AUTO: 9.9 FL (ref 8.9–12.7)
POTASSIUM SERPL-SCNC: 4.2 MMOL/L (ref 3.5–5.3)
RBC # BLD AUTO: 4.89 MILLION/UL (ref 3.88–5.62)
SODIUM SERPL-SCNC: 135 MMOL/L (ref 136–145)
WBC # BLD AUTO: 7.35 THOUSAND/UL (ref 4.31–10.16)

## 2021-07-14 PROCEDURE — 99232 SBSQ HOSP IP/OBS MODERATE 35: CPT | Performed by: INTERNAL MEDICINE

## 2021-07-14 PROCEDURE — 99233 SBSQ HOSP IP/OBS HIGH 50: CPT | Performed by: NURSE PRACTITIONER

## 2021-07-14 PROCEDURE — 82948 REAGENT STRIP/BLOOD GLUCOSE: CPT

## 2021-07-14 PROCEDURE — NC001 PR NO CHARGE: Performed by: PHYSICIAN ASSISTANT

## 2021-07-14 PROCEDURE — 85025 COMPLETE CBC W/AUTO DIFF WBC: CPT | Performed by: STUDENT IN AN ORGANIZED HEALTH CARE EDUCATION/TRAINING PROGRAM

## 2021-07-14 PROCEDURE — 85730 THROMBOPLASTIN TIME PARTIAL: CPT | Performed by: INTERNAL MEDICINE

## 2021-07-14 PROCEDURE — 80048 BASIC METABOLIC PNL TOTAL CA: CPT | Performed by: STUDENT IN AN ORGANIZED HEALTH CARE EDUCATION/TRAINING PROGRAM

## 2021-07-14 RX ADMIN — INSULIN LISPRO 1 UNITS: 100 INJECTION, SOLUTION INTRAVENOUS; SUBCUTANEOUS at 16:46

## 2021-07-14 RX ADMIN — MUPIROCIN 1 APPLICATION: 20 OINTMENT TOPICAL at 21:52

## 2021-07-14 RX ADMIN — CHLORHEXIDINE GLUCONATE 15 ML: 1.2 SOLUTION ORAL at 08:29

## 2021-07-14 RX ADMIN — MUPIROCIN 1 APPLICATION: 20 OINTMENT TOPICAL at 08:29

## 2021-07-14 RX ADMIN — INSULIN LISPRO 1 UNITS: 100 INJECTION, SOLUTION INTRAVENOUS; SUBCUTANEOUS at 08:30

## 2021-07-14 RX ADMIN — CHLORHEXIDINE GLUCONATE 15 ML: 1.2 SOLUTION ORAL at 21:52

## 2021-07-14 RX ADMIN — HEPARIN SODIUM 19 UNITS/KG/HR: 10000 INJECTION, SOLUTION INTRAVENOUS at 00:41

## 2021-07-14 RX ADMIN — Medication 12.5 MG: at 21:52

## 2021-07-14 RX ADMIN — ASPIRIN 81 MG: 81 TABLET, COATED ORAL at 08:29

## 2021-07-14 RX ADMIN — INSULIN LISPRO 1 UNITS: 100 INJECTION, SOLUTION INTRAVENOUS; SUBCUTANEOUS at 21:51

## 2021-07-14 RX ADMIN — DICLOFENAC SODIUM 2 G: 10 GEL TOPICAL at 08:33

## 2021-07-14 RX ADMIN — INSULIN LISPRO 2 UNITS: 100 INJECTION, SOLUTION INTRAVENOUS; SUBCUTANEOUS at 12:10

## 2021-07-14 RX ADMIN — Medication 12.5 MG: at 08:29

## 2021-07-14 RX ADMIN — HEPARIN SODIUM 19 UNITS/KG/HR: 10000 INJECTION, SOLUTION INTRAVENOUS at 16:45

## 2021-07-14 RX ADMIN — PRAVASTATIN SODIUM 80 MG: 20 TABLET ORAL at 08:29

## 2021-07-14 NOTE — CASE MANAGEMENT
Pt is not a <30 day readmission or current bundle  Risk of unplanned readmission score is 10 (green)  Pt admitted due to CAD, transferred to [facility] from [facility]  Pt scheduled for CABG on Friday 7/16  Pt documented as alert and oriented x4  CM spoke with pt to introduce CM role and begin discharge planning  Pt lives alone in a 1 story house that has 0 RESHMA  Pt states that his significant other spends a lot of time at his home and will be able to assist with care at time of discharge  Pt reports being independent with ADLs PTA, no use of DME for ambulation  Pt reports no history of VNA, STR, inpatient MH treatment or D/A treatment  Pt drives and works  PCP is Dr Derrick Linares ([phone]) and pt uses [address] in [place] for prescriptions  Pt's sister, Cherise Dias ([phone]) to assist with transportation at time of discharge  CM department to remain available for discharge concerns or needs  CM reviewed d/c planning process including the following: identifying help at home, patient preference for d/c planning needs, Discharge Lounge, [facility] Meds to Bed program, availability of treatment team to discuss questions or concerns patient and/or family may have regarding understanding medications and recognizing signs and symptoms once discharged  CM also encouraged patient to follow up with all recommended appointments after discharge  Patient advised of importance for patient and family to participate in managing patients medical well being

## 2021-07-14 NOTE — PROGRESS NOTES
General Cardiology   Progress Note -  [facility]   Manjeet Smoker 61 y o  male MRN: [id number]    Unit/Bed#: -01 Encounter: [id number]    Assessment/ Plan    1  Type I MI s/p cardiac cath with thrombus in RCA and multivessel CAD   Echocardiogram showed EF 50% with mild hypokinesis of the basal mid inferior and inferolateral walls   On heparin gtt  On aspirin, statin and BB   CT surgery following and plan for CABG Friday with Dr Montserrat Nam  No complaint of chest pain      2  Tobacco abuse   Counseled on smoking cessation      3  Hypertension  BP stable: 112/68  On lisinopril at home, currently on hold      4  Hyperlipidemia   Continue statin        5  Type II diabetes  Hemoglobin A1C 9 0 (7/7/2021)   Followed by primary team        Subjective  Patient OOB eating breakfast  No complaint of chest pain, SOB or palpitations  No fever or chills  Review of Systems   Constitutional: Negative for chills, fever and malaise/fatigue  HENT: Negative for congestion  Cardiovascular: Negative for chest pain, dyspnea on exertion, leg swelling, orthopnea and palpitations  Respiratory: Negative for shortness of breath  Musculoskeletal: Negative for falls  Gastrointestinal: Negative for bloating, nausea and vomiting  Neurological: Negative for dizziness and light-headedness  Psychiatric/Behavioral: Negative for altered mental status  All other systems reviewed and are negative  Objective:   Vitals: Blood pressure 110/66, pulse (!) 53, temperature 97 8 °F (36 6 °C), resp  rate 18, height 5' 10" (1 778 m), weight 110 kg (242 lb 8 1 oz), SpO2 96 %  ,       Body mass index is 34 8 kg/m²  ,     Systolic (89FEV), BWA:408 , Min:96 , NIEVES:034     Diastolic (74IMA), CAM:21, Min:61, Max:74          Intake/Output Summary (Last 24 hours) at 7/14/2021 0837  Last data filed at 7/13/2021 2301  Gross per 24 hour   Intake 1060 ml   Output 600 ml   Net 460 ml     Weight (last 2 days)     None            Telemetry Review: Sinus bradycardia HR 50s    Physical Exam  Constitutional:       General: He is not in acute distress  HENT:      Head: Normocephalic  Mouth/Throat:      Mouth: Mucous membranes are moist    Cardiovascular:      Rate and Rhythm: Normal rate and regular rhythm  Pulmonary:      Effort: Pulmonary effort is normal  No respiratory distress  Breath sounds: Normal breath sounds  Comments: RA  Abdominal:      General: Bowel sounds are normal       Palpations: Abdomen is soft  Musculoskeletal:         General: No swelling  Normal range of motion  Cervical back: Neck supple  Skin:     General: Skin is warm and dry  Capillary Refill: Capillary refill takes less than 2 seconds  Neurological:      General: No focal deficit present  Mental Status: He is alert and oriented to person, place, and time     Psychiatric:         Mood and Affect: Mood normal          LABORATORY RESULTS  Results from last 7 days   Lab Units 07/08/21  0601 07/08/21  0306 07/08/21  0002   TROPONIN I ng/mL 0 23* 0 26* 0 18*     CBC with diff:   Results from last 7 days   Lab Units 07/13/21  0545 07/12/21  0439 07/11/21  0438 07/10/21  0211 07/08/21  0601 07/08/21  0002 07/07/21  1257   WBC Thousand/uL 6 50 6 89 7 01 6 91 6 65  --  6 93   HEMOGLOBIN g/dL 15 6 15 2 15 4 14 4 13 6  --  14 9   HEMATOCRIT % 46 9 45 1 46 0 42 5 41 8  --  44 6   MCV fL 98 98 99* 98 102*  --  98   PLATELETS Thousands/uL 194 201 204 184 193 187 235   MCH pg 32 6 32 9 33 1 33 2 33 2  --  32 7   MCHC g/dL 33 3 33 7 33 5 33 9 32 5  --  33 4   RDW % 12 3 12 3 12 2 11 9 12 2  --  12 6   MPV fL 9 8 10 1 9 8 9 4 9 4 9 5 9 5   NRBC AUTO /100 WBCs 0 0 0 0  --   --   --        CMP:  Results from last 7 days   Lab Units 07/13/21  0545 07/12/21 2000 07/11/21  0438 07/10/21  0239 07/09/21  0609 07/08/21  0601 07/07/21  1257   POTASSIUM mmol/L 3 9 4 1 4 0 4 0 4 2 3 8 4 3   CHLORIDE mmol/L 104 107 106 106 107 107 102   CO2 mmol/L 27 27 27 28 28 28 27 BUN mg/dL 14 15 15 14 13 18 20   CREATININE mg/dL 0 90 0 78 0 80 0 81 0 80 0 87 0 86   CALCIUM mg/dL 9 0 9 0 9 4 9 0 8 4 8 4 9 4   AST U/L  --   --   --   --   --   --  14*   ALT U/L  --   --   --   --   --   --  19   ALK PHOS U/L  --   --   --   --   --   --  45 8   EGFR ml/min/1 73sq m 90 95 94 94 94 91 92       BMP:  Results from last 7 days   Lab Units 21  0545 21  8744 21  0438 07/10/21  0239 21  0609 21  0601 21  1257   POTASSIUM mmol/L 3 9 4 1 4 0 4 0 4 2 3 8 4 3   CHLORIDE mmol/L 104 107 106 106 107 107 102   CO2 mmol/L 27 27 27 28 28 28 27   BUN mg/dL 14 15 15 14 13 18 20   CREATININE mg/dL 0 90 0 78 0 80 0 81 0 80 0 87 0 86   CALCIUM mg/dL 9 0 9 0 9 4 9 0 8 4 8 4 9 4       Lab Results   Component Value Date    NTBNP 20 2020             Results from last 7 days   Lab Units 07/10/21  0239   MAGNESIUM mg/dL 1 9          Results from last 7 days   Lab Units 21  1257   HEMOGLOBIN A1C % 9 0*          Results from last 7 days   Lab Units 21  1257   TSH 3RD GENERATON uIU/mL 2 490       Results from last 7 days   Lab Units 21  1948   INR  0 93       Lipid Profile:   No results found for: CHOL  Lab Results   Component Value Date    HDL 44 2021     Lab Results   Component Value Date    LDLCALC 123 (H) 2021     Lab Results   Component Value Date    TRIG 215 8 (H) 2021       Cardiac testing:   Results for orders placed during the hospital encounter of 21    Echo complete with contrast if indicated    Narrative  [address] [facility]  [phone]    Transthoracic Echocardiogram  2D, M-mode, Doppler, and Color Doppler    Study date:  2021    Patient: Dustin Driscoll  MR number: [id number]  Account number: [de-identified]  : 1955  Age: 61 years  Gender: Male  Status: Outpatient  Location: Bedside  Height: 70 in  Weight: 246 lb  BP: 110/ 66 mmHg    Indications: NSTEMI    Diagnoses: I21 4 - Non-ST elevation (NSTEMI) myocardial infarction    Sonographer:  VLADISLAV Benton  Primary Physician:  Elizabet Ram MD  Referring Physician:  María Snider PA-C  Group:  [facility]  Interpreting Physician:  Maddy Myrick MD    SUMMARY    LEFT VENTRICLE:  Size was normal   Systolic function was at the lower limits of normal  Ejection fraction was estimated to be 50 %  There was mild hypokinesis of the basal-mid inferior and inferolateral walls  Doppler parameters were consistent with abnormal left ventricular relaxation (grade 1 diastolic dysfunction)  RIGHT VENTRICLE:  The size was normal   Systolic function was normal     LEFT ATRIUM:  The atrium was mildly dilated  MITRAL VALVE:  There was trace to mild regurgitation  TRICUSPID VALVE:  There was trace regurgitation  HISTORY: PRIOR HISTORY: HTN, HLD, DM2    PROCEDURE: The procedure was performed at the bedside  This was a routine study  The transthoracic approach was used  The study included complete 2D imaging, M-mode, complete spectral Doppler, and color Doppler  The heart rate was 70 bpm,  at the start of the study  Images were obtained from the parasternal, apical, subcostal, and suprasternal notch acoustic windows  Echocardiographic views were limited due to lung interference  This was a technically difficult study  LEFT VENTRICLE: Size was normal  Systolic function was at the lower limits of normal  Ejection fraction was estimated to be 50 %  There was mild hypokinesis of the basal-mid inferior and inferolateral walls  Wall thickness was normal   DOPPLER: Doppler parameters were consistent with abnormal left ventricular relaxation (grade 1 diastolic dysfunction)  RIGHT VENTRICLE: The size was normal  Systolic function was normal  Wall thickness was normal     LEFT ATRIUM: The atrium was mildly dilated      RIGHT ATRIUM: Size was normal     MITRAL VALVE: Valve structure was normal  There was normal leaflet separation  DOPPLER: The transmitral velocity was within the normal range  There was no evidence for stenosis  There was trace to mild regurgitation  AORTIC VALVE: The valve was trileaflet  Leaflets exhibited mildly increased thickness, mild calcification, normal cuspal separation, and sclerosis  DOPPLER: Transaortic velocity was within the normal range  There was no evidence for  stenosis  There was no regurgitation  TRICUSPID VALVE: The valve structure was normal  There was normal leaflet separation  DOPPLER: The transtricuspid velocity was within the normal range  There was no evidence for stenosis  There was trace regurgitation  The tricuspid jet  envelope definition was inadequate for estimation of RV systolic pressure  There are no indirect findings suggestive of moderate or severe pulmonary hypertension  PULMONIC VALVE: Leaflets exhibited normal thickness, no calcification, and normal cuspal separation  DOPPLER: The transpulmonic velocity was within the normal range  There was no regurgitation  PERICARDIUM: There was no pericardial effusion  The pericardium was normal in appearance  AORTA: The root exhibited normal size  SYSTEMIC VEINS: IVC: The inferior vena cava was normal in size and course   Respirophasic changes were normal     SYSTEM MEASUREMENT TABLES    2D  %FS: 24 71 %  Ao Diam: 3 41 cm  EDV(Teich): 173 64 ml  EF(Teich): 48 23 %  ESV(Teich): 89 89 ml  HR_4Ch_Q: 66 42 BPM  IVSd: 1 14 cm  LA Area: 23 21 cm2  LA Diam: 3 7 cm  LVCO_4Ch_Q: 2 84 L/min  LVEF_4Ch_Q: 41 39 %  LVIDd: 5 91 cm  LVIDs: 4 45 cm  LVLd_4Ch_Q: 8 6 cm  LVLs_4Ch_Q: 7 4 cm  LVPWd: 0 97 cm  LVSV_4Ch_Q: 42 77 ml  LVVED_4Ch_Q: 103 33 ml  LVVES_4Ch_Q: 60 56 ml  RA Area: 18 36 cm2  RVIDd: 3 33 cm  SV(Teich): 83 75 ml    MM  TAPSE: 2 59 cm    PW  E' Sept: 0 08 m/s  E/E' Sept: 9 37  MV A Abiel: 0 89 m/s  MV Dec Radford: 3 68 m/s2  MV DecT: 197 59 ms  MV E Abiel: 0 73 m/s  MV E/A Ratio: 0 82    [facility]    Prepared and electronically signed by    Charlie Moncada MD  Signed 08-Jul-2021 14:41:28    No results found for this or any previous visit  No results found for this or any previous visit  No valid procedures specified  No results found for this or any previous visit  Meds/Allergies   all current active meds have been reviewed and current meds:   Current Facility-Administered Medications   Medication Dose Route Frequency    acetaminophen (TYLENOL) tablet 650 mg  650 mg Oral Q6H PRN    aspirin (ECOTRIN LOW STRENGTH) EC tablet 81 mg  81 mg Oral Daily    chlorhexidine (PERIDEX) 0 12 % oral rinse 15 mL  15 mL Mouth/Throat Q12H JAIMEE    cyclobenzaprine (FLEXERIL) tablet 5 mg  5 mg Oral TID PRN    Diclofenac Sodium (VOLTAREN) 1 % topical gel 2 g  2 g Topical BID    heparin (porcine) 25,000 units in 0 45% NaCl 250 mL infusion (premix)  3-20 Units/kg/hr (Order-Specific) Intravenous Titrated    insulin lispro (HumaLOG) 100 units/mL subcutaneous injection 1-6 Units  1-6 Units Subcutaneous 4x Daily (AC & HS)    metoprolol tartrate (LOPRESSOR) partial tablet 12 5 mg  12 5 mg Oral Q12H JAIMEE    mupirocin (BACTROBAN) 2 % nasal ointment 1 application  1 application Nasal N01B [facility]    nitroglycerin (NITROSTAT) SL tablet 0 4 mg  0 4 mg Sublingual Q5 Min PRN    pravastatin (PRAVACHOL) tablet 80 mg  80 mg Oral Daily     Medications Prior to Admission   Medication    aspirin (ECOTRIN LOW STRENGTH) 81 mg EC tablet    Empagliflozin (Jardiance) 10 MG TABS    Exenatide ER (Bydureon BCise) 2 MG/0 85ML AUIJ    lisinopril (ZESTRIL) 10 mg tablet    metFORMIN (GLUCOPHAGE) 1000 MG tablet    pravastatin (PRAVACHOL) 20 mg tablet       heparin (porcine), 3-20 Units/kg/hr (Order-Specific), Last Rate: 19 Units/kg/hr (07/14/21 0041)      Counseling / Coordination of Care  Total floor / unit time spent today 20 minutes    Greater than 50% of total time was spent with the patient and / or family counseling and / or coordination of care  ** Please Note: Dragon 360 Dictation voice to text software may have been used in the creation of this document   **

## 2021-07-14 NOTE — PROGRESS NOTES
Progress Note - Cardiothoracic Surgery   Marsha Waggoner 61 y o  male MRN: [id number]  Unit/Bed#: -01 Encounter: [id number]      24 Hour Events: Reports mild chest pressure after briskly walking 4 laps around the unit today  Denies CP, diaphoresis or nausea  Continues on Heparin gtt      Medications:   Scheduled Meds:  Current Facility-Administered Medications   Medication Dose Route Frequency Provider Last Rate    acetaminophen  650 mg Oral Q6H PRN Pancho Michelle MD      aspirin  81 mg Oral Daily Pancho Michelle MD      chlorhexidine  15 mL Mouth/Throat Q12H [address] [place], [place]      cyclobenzaprine  5 mg Oral TID PRN Shaq Tate DO      Diclofenac Sodium  2 g Topical BID Shaq Tate DO      heparin (porcine)  3-20 Units/kg/hr (Order-Specific) Intravenous Titrated Pancho Michelle MD 19 Units/kg/hr (07/14/21 0041)    insulin lispro  1-6 Units Subcutaneous 4x Daily (AC & HS) Obey Franz DO      metoprolol tartrate  12 5 mg Oral Q12H [address] Pancho Michelle MD      mupirocin  1 application Nasal O13H [address] [place], [place]      nitroglycerin  0 4 mg Sublingual Q5 Min PRN Pancho Michelle MD      pravastatin  80 mg Oral Daily Pancho Michelle MD       Continuous Infusions:heparin (porcine), 3-20 Units/kg/hr (Order-Specific), Last Rate: 19 Units/kg/hr (07/14/21 0041)        Results:   Results from last 7 days   Lab Units 07/14/21  0828 07/13/21  0545 07/12/21  0439   WBC Thousand/uL 7 35 6 50 6 89   HEMOGLOBIN g/dL 16 1 15 6 15 2   HEMATOCRIT % 48 0 46 9 45 1   PLATELETS Thousands/uL 232 194 201     Results from last 7 days   Lab Units 07/14/21  0828 07/13/21  0545 07/12/21  0638   POTASSIUM mmol/L 4 2 3 9 4 1   CHLORIDE mmol/L 105 104 107   CO2 mmol/L 24 27 27   BUN mg/dL 15 14 15   CREATININE mg/dL 0 95 0 90 0 78   CALCIUM mg/dL 9 4 9 0 9 0     Results from last 7 days   Lab Units 07/14/21  0511 07/13/21  0545 07/12/21  0439 07/07/21  1948   INR   --   --   --  0 93   PTT seconds 68* 69* 79* 24   MRSA negative    Studies:     Cardiac Cath: pLAD 70, pOM 95, mPDA thrombus      Echo: EF 50, mild MR, trace TR      Vein Mapping: adequate bilaterally      Carotid US: <50 bilateral  Antegrade flow  No SC disease  Vitals:   Vitals:    07/13/21 2120 07/13/21 2334 07/14/21 0306 07/14/21 0656   BP: 123/74 96/61 114/66 110/66   BP Location:  Right arm Right arm    Pulse:   (!) 53    Resp:  18 20 18   Temp:  97 6 °F (36 4 °C) 97 5 °F (36 4 °C) 97 8 °F (36 6 °C)   TempSrc:  Oral Oral    SpO2:  96%     Weight:       Height:           Physical Exam:    HEENT/NECK:  Normocephalic  Atraumatic  No jugular venous distention  Cardiac: Regular rate and rhythm and No murmurs/rubs/gallops  Pulmonary:  Breath sounds clear bilaterally and No rales/rhonchi/wheezes  Abdomen:  Non-tender, Non-distended and Normal bowel sounds  Extremities: Extremities warm/dry and No edema B/L  Neuro: Alert and oriented X 3, Sensation is grossly intact and No focal deficits  Skin: Warm/Dry, without rashes or lesions  Assessment:    Severe coronary artery disease; Ongoing CABG workup    Plan:  Patient agreeable to proceed with surgery; Informed consent signed  Continue Mupirocin 2% nasal ointment q 12 hrs  Pt instructed to decrease amount of walking and take a slower pace  Instructed to notify RN immediately if has recurrence of chest pain/pressure or worsening symptoms  Continue topical chlorhexidine bath and mouth rise  coronary artery bypass grafting scheduled for Friday with MARY Peterson Sinks: Chelsie Edwards PA-C  DATE: July 14, 2021  TIME: 10:21 AM    * This note was completed in part utilizing [employer] direct voice recognition software  Grammatical errors, random word insertion, spelling mistakes, and incomplete sentences may be an occasional consequence of the system secondary to software limitations, ambient noise and hardware issues   At the time of dictation, efforts were made to edit, clarify and /or correct errors  Please read the chart carefully and recognize, using context, where substitutions have occurred  If you have any questions or concerns about the context, text or information contained within the body of this dictation, please contact myself, the provider, for further clarification

## 2021-07-14 NOTE — PROGRESS NOTES
INTERNAL MEDICINE RESIDENCY PROGRESS NOTE     Name: Brittney Thomas   Age & Sex: 61 y o  male   MRN: [id number]  Unit/Bed#: -01   Encounter: [id number]  Team: SOD Team C     PATIENT INFORMATION     Name: Brittney Thomas   Age & Sex: 61 y o  male   MRN: [id number]  Hospital Stay Days: 5    ASSESSMENT/PLAN     Principal Problem:    CAD (coronary artery disease)  Active Problems:    Hyperlipidemia    Type 2 diabetes mellitus ([address] )    Hypertension    Muscle spasm    Tobacco use    HTN (hypertension)    History of COVID-19      * CAD (coronary artery disease)  Assessment & Plan  Patient has history of HTN, HLD, and DM who presented to [address] [place] [address] ED for chest pain, dyspnea on exertion, and elevated troponin  In ED EKG showed NSR with nonspecific T wave abnormality which is more pronounced in inferior leads on repeat EKG and troponin peaked at 0 26  Echo showed EF of 50%, and a cardiac cath showed severe triple vessel disease  Patient was transferred to [facility] for cardiothoracic surgery evaluation for possible CABG  · Continue IV heparin, aspirin, metoprolol, nitroglycerin prn, and pravastatin  · Continue to hold lisinopril per Cardiology recommendation  · Consulted cardiology and cardiothoracic surgery for CABG evaluation  · 7/10 VAS carotid: negative for disease; 7/10 VAS lower limb: bilateral saphenous patent  · CABG currently scheduled for Friday 7/16   · Jon/CHO Controlled Diet  · Monitor telemetry  · OT/PT evaluation    Tobacco use  Assessment & Plan  Patient occasionally smokes cigars  · Counseled patient on smoking cessation  · Nicotine 7mg/24hr 1 patch daily if needed    Muscle spasm  Assessment & Plan  Patient complains of chronic posterior neck and left shoulder tenderness and spasms that is relieved with muscle relaxant  · Voltaren gel 1% 2g BID  · Flexeril 5 mg PRN    Hypertension  Assessment & Plan  Reviewed and Stable    · Holding lisinopril per cardiology recommendation   · Metoprolol 12 5 mg BID    Type 2 diabetes mellitus [facility])  Assessment & Plan  Lab Results   Component Value Date    HGBA1C 9 0 (H) 07/07/2021       Recent Labs     07/11/21  1125 07/11/21  1608 07/11/21 2123 07/12/21  0624   POCGLU 243* 181* 186* 155*       Blood Sugar Average: Last 72 hrs:  (P) 616 [id number]   Patient has history of uncontrolled diabetes on oral medications  · Continue Algorithm 3, Humalog 100 units/ml, 1-6 units 4 times daily before meals  · Will need outpatient follow-up with endocrinology    Hyperlipidemia  Assessment & Plan  Patient's lipid panel revealed cholesterol of 210, triglycerides 215 8, , HDL 44  · Continue pravastatin 80 mg        Disposition: Continue level of care, Cardiology and CT Surgery following; plan for CABG on Friday 7/16     SUBJECTIVE     Patient seen and examined  No acute events overnight  24H Telemetry reviewed - no events  Patient was resting comfortably with no complaints  He was able to ambulate yesterday around the halls x4 without experiencing any chest discomfort or shortness of breath  He endorses some posterior neck and left shoulder pain for which he has been using diclofenac cream, but this is chronic and he says the pain is at his baseline  His most recent bowel movement was last night  Review of Systems   Constitutional: Negative for chills and fever  Respiratory: Negative for shortness of breath  Cardiovascular: Negative for chest pain and palpitations  Gastrointestinal: Negative for abdominal pain, constipation, diarrhea, nausea and vomiting  Genitourinary: Negative for difficulty urinating and dysuria  Musculoskeletal: Positive for neck pain  Neurological: Negative for dizziness and headaches         OBJECTIVE     Vitals:    07/13/21 2120 07/13/21 2334 07/14/21 0306 07/14/21 0656   BP: 123/74 96/61 114/66 110/66   BP Location:  Right arm Right arm    Pulse:   (!) 53    Resp:  18 20 18   Temp:  97 6 °F (36 4 °C) 97 5 °F (36 4 °C) 97 8 °F (36 6 °C)   TempSrc:  Oral Oral    SpO2:  96%     Weight:       Height:          Temperature:   Temp (24hrs), Av 1 °F (36 7 °C), Min:97 5 °F (36 4 °C), Max:98 8 °F (37 1 °C)    Temperature: 97 8 °F (36 6 °C)  Intake & Output:  I/O       701 -  0700 701 -  07 07 - 07/15 0700    P  O  1080 1060     I V  (mL/kg)       Total Intake(mL/kg) 1080 (9 8) 1060 (9 6)     Urine (mL/kg/hr) 500 (0 2) 600 (0 2)     Stool 0      Total Output 500 600     Net +580 +460            Unmeasured Stool Occurrence 1 x          Weights:   IBW (Ideal Body Weight): 73 kg    Body mass index is 34 8 kg/m²  Weight (last 2 days)     None        Physical Exam  Vitals and nursing note reviewed  Constitutional:       General: He is not in acute distress  Appearance: He is well-developed  He is not diaphoretic  HENT:      Head: Normocephalic and atraumatic  Eyes:      Conjunctiva/sclera: Conjunctivae normal    Cardiovascular:      Rate and Rhythm: Normal rate and regular rhythm  Pulses: Normal pulses  Heart sounds: Normal heart sounds  No murmur heard  No friction rub  No gallop  Pulmonary:      Effort: Pulmonary effort is normal  No respiratory distress  Breath sounds: Normal breath sounds  No stridor  No wheezing or rales  Abdominal:      General: Bowel sounds are normal  There is no distension  Palpations: Abdomen is soft  Tenderness: There is no abdominal tenderness  Musculoskeletal:      Cervical back: Neck supple  Right lower leg: No edema  Left lower leg: Edema present  Comments: Trace edema left ankle  Skin:     General: Skin is warm and dry  Comments: Superficial varicose veins on bilateral lower extremities  Neurological:      Mental Status: He is alert  LABORATORY DATA     Labs: I have personally reviewed pertinent reports    Results from last 7 days   Lab Units 21  0545 21  6912 07/11/21  0438   WBC Thousand/uL 6 50 6 89 7 01   HEMOGLOBIN g/dL 15 6 15 2 15 4   HEMATOCRIT % 46 9 45 1 46 0   PLATELETS Thousands/uL 194 201 204   NEUTROS PCT % 53 52 56   MONOS PCT % 12 13* 13*      Results from last 7 days   Lab Units 07/13/21  0545 07/12/21  0638 07/11/21  0438 07/07/21  1257   POTASSIUM mmol/L 3 9 4 1 4 0 4 3   CHLORIDE mmol/L 104 107 106 102   CO2 mmol/L 27 27 27 27   BUN mg/dL 14 15 15 20   CREATININE mg/dL 0 90 0 78 0 80 0 86   CALCIUM mg/dL 9 0 9 0 9 4 9 4   ALK PHOS U/L  --   --   --  45 8   ALT U/L  --   --   --  19   AST U/L  --   --   --  14*     Results from last 7 days   Lab Units 07/10/21  0239   MAGNESIUM mg/dL 1 9     Results from last 7 days   Lab Units 07/10/21  0239   PHOSPHORUS mg/dL 3 6      Results from last 7 days   Lab Units 07/14/21  0511 07/13/21  0545 07/12/21  0439 07/07/21  1948   INR   --   --   --  0 93   PTT seconds 68* 69* 79* 24         Results from last 7 days   Lab Units 07/08/21  0601 07/08/21  0306 07/08/21  0002   TROPONIN I ng/mL 0 23* 0 26* 0 18*       IMAGING & DIAGNOSTIC TESTING     Radiology Results: I have personally reviewed pertinent reports  No results found  Other Diagnostic Testing: I have personally reviewed pertinent reports      ACTIVE MEDICATIONS     Current Facility-Administered Medications   Medication Dose Route Frequency    acetaminophen (TYLENOL) tablet 650 mg  650 mg Oral Q6H PRN    aspirin (ECOTRIN LOW STRENGTH) EC tablet 81 mg  81 mg Oral Daily    chlorhexidine (PERIDEX) 0 12 % oral rinse 15 mL  15 mL Mouth/Throat Q12H JAIMEE    cyclobenzaprine (FLEXERIL) tablet 5 mg  5 mg Oral TID PRN    Diclofenac Sodium (VOLTAREN) 1 % topical gel 2 g  2 g Topical BID    heparin (porcine) 25,000 units in 0 45% NaCl 250 mL infusion (premix)  3-20 Units/kg/hr (Order-Specific) Intravenous Titrated    insulin lispro (HumaLOG) 100 units/mL subcutaneous injection 1-6 Units  1-6 Units Subcutaneous 4x Daily (AC & HS)    metoprolol tartrate (LOPRESSOR) partial tablet 12 5 mg  12 5 mg Oral Q12H [facility]    mupirocin (BACTROBAN) 2 % nasal ointment 1 application  1 application Nasal C51K [facility]    nitroglycerin (NITROSTAT) SL tablet 0 4 mg  0 4 mg Sublingual Q5 Min PRN    pravastatin (PRAVACHOL) tablet 80 mg  80 mg Oral Daily       VTE Pharmacologic Prophylaxis: Heparin  VTE Mechanical Prophylaxis: sequential compression device    Portions of the record may have been created with voice recognition software  Occasional wrong word or "sound a like" substitutions may have occurred due to the inherent limitations of voice recognition software    Read the chart carefully and recognize, using context, where substitutions have occurred   ==  Victor Hugo Fritz MD  [address]  Internal Medicine Residency PGY-1

## 2021-07-15 LAB
ABO GROUP BLD: NORMAL
ABO GROUP BLD: NORMAL
APTT PPP: 70 SECONDS (ref 23–37)
BLD GP AB SCN SERPL QL: NEGATIVE
GLUCOSE SERPL-MCNC: 150 MG/DL (ref 65–140)
GLUCOSE SERPL-MCNC: 160 MG/DL (ref 65–140)
GLUCOSE SERPL-MCNC: 163 MG/DL (ref 65–140)
GLUCOSE SERPL-MCNC: 173 MG/DL (ref 65–140)
RH BLD: POSITIVE
RH BLD: POSITIVE
SPECIMEN EXPIRATION DATE: NORMAL

## 2021-07-15 PROCEDURE — 85730 THROMBOPLASTIN TIME PARTIAL: CPT | Performed by: INTERNAL MEDICINE

## 2021-07-15 PROCEDURE — 86900 BLOOD TYPING SEROLOGIC ABO: CPT | Performed by: PHYSICIAN ASSISTANT

## 2021-07-15 PROCEDURE — 86901 BLOOD TYPING SEROLOGIC RH(D): CPT | Performed by: PHYSICIAN ASSISTANT

## 2021-07-15 PROCEDURE — 99233 SBSQ HOSP IP/OBS HIGH 50: CPT | Performed by: NURSE PRACTITIONER

## 2021-07-15 PROCEDURE — 86850 RBC ANTIBODY SCREEN: CPT | Performed by: PHYSICIAN ASSISTANT

## 2021-07-15 PROCEDURE — 99232 SBSQ HOSP IP/OBS MODERATE 35: CPT | Performed by: INTERNAL MEDICINE

## 2021-07-15 PROCEDURE — 82948 REAGENT STRIP/BLOOD GLUCOSE: CPT

## 2021-07-15 PROCEDURE — NC001 PR NO CHARGE: Performed by: PHYSICIAN ASSISTANT

## 2021-07-15 PROCEDURE — 86920 COMPATIBILITY TEST SPIN: CPT

## 2021-07-15 RX ORDER — HEPARIN SODIUM 1000 [USP'U]/ML
400 INJECTION, SOLUTION INTRAVENOUS; SUBCUTANEOUS ONCE
Status: CANCELLED | OUTPATIENT
Start: 2021-07-16

## 2021-07-15 RX ORDER — HEPARIN SODIUM 1000 [USP'U]/ML
10000 INJECTION, SOLUTION INTRAVENOUS; SUBCUTANEOUS ONCE
Status: CANCELLED | OUTPATIENT
Start: 2021-07-16

## 2021-07-15 RX ORDER — CHLORHEXIDINE GLUCONATE 0.12 MG/ML
15 RINSE ORAL ONCE
Status: COMPLETED | OUTPATIENT
Start: 2021-07-16 | End: 2021-07-16

## 2021-07-15 RX ADMIN — HEPARIN SODIUM 19 UNITS/KG/HR: 10000 INJECTION, SOLUTION INTRAVENOUS at 04:05

## 2021-07-15 RX ADMIN — CHLORHEXIDINE GLUCONATE 15 ML: 1.2 SOLUTION ORAL at 08:07

## 2021-07-15 RX ADMIN — INSULIN LISPRO 1 UNITS: 100 INJECTION, SOLUTION INTRAVENOUS; SUBCUTANEOUS at 11:35

## 2021-07-15 RX ADMIN — PRAVASTATIN SODIUM 80 MG: 20 TABLET ORAL at 08:06

## 2021-07-15 RX ADMIN — Medication 12.5 MG: at 22:11

## 2021-07-15 RX ADMIN — CHLORHEXIDINE GLUCONATE 15 ML: 1.2 SOLUTION ORAL at 22:11

## 2021-07-15 RX ADMIN — ASPIRIN 81 MG: 81 TABLET, COATED ORAL at 08:07

## 2021-07-15 RX ADMIN — INSULIN LISPRO 1 UNITS: 100 INJECTION, SOLUTION INTRAVENOUS; SUBCUTANEOUS at 06:36

## 2021-07-15 RX ADMIN — Medication 12.5 MG: at 08:08

## 2021-07-15 RX ADMIN — MUPIROCIN 1 APPLICATION: 20 OINTMENT TOPICAL at 08:06

## 2021-07-15 RX ADMIN — INSULIN LISPRO 1 UNITS: 100 INJECTION, SOLUTION INTRAVENOUS; SUBCUTANEOUS at 18:11

## 2021-07-15 RX ADMIN — MUPIROCIN 1 APPLICATION: 20 OINTMENT TOPICAL at 22:11

## 2021-07-15 RX ADMIN — HEPARIN SODIUM 19 UNITS/KG/HR: 10000 INJECTION, SOLUTION INTRAVENOUS at 18:13

## 2021-07-15 NOTE — PROGRESS NOTES
Progress Note - Cardiothoracic Surgery   Laina Muhammad 61 y o  male MRN: [id number]  Unit/Bed#: [facility] 412-01 Encounter: [id number]    24 Hour Events: No events overnight       Medications:   Scheduled Meds:  Current Facility-Administered Medications   Medication Dose Route Frequency Provider Last Rate    acetaminophen  650 mg Oral Q6H PRN Fern Beaver MD      aspirin  81 mg Oral Daily Fern Beaver MD      chlorhexidine  15 mL Mouth/Throat Q12H [address] [place], [place]      cyclobenzaprine  5 mg Oral TID PRN Reeda Pick, DO      Diclofenac Sodium  2 g Topical BID Reeda Pick, DO      heparin (porcine)  3-20 Units/kg/hr (Order-Specific) Intravenous Titrated Fern Beaver MD 19 Units/kg/hr (07/15/21 0405)    insulin lispro  1-6 Units Subcutaneous 4x Daily (AC & HS) Bud Ed,       metoprolol tartrate  12 5 mg Oral Q12H [address] Fern Beaver MD      mupirocin  1 application Nasal U65R [address] [place], [place]      nitroglycerin  0 4 mg Sublingual Q5 Min PRN Fern Beaver MD      pravastatin  80 mg Oral Daily Fern Beaver MD       Continuous Infusions:heparin (porcine), 3-20 Units/kg/hr (Order-Specific), Last Rate: 19 Units/kg/hr (07/15/21 0405)        Results:   Results from last 7 days   Lab Units 07/14/21  0828 07/13/21  0545 07/12/21  0439   WBC Thousand/uL 7 35 6 50 6 89   HEMOGLOBIN g/dL 16 1 15 6 15 2   HEMATOCRIT % 48 0 46 9 45 1   PLATELETS Thousands/uL 232 194 201     Results from last 7 days   Lab Units 07/14/21  0828 07/13/21  0545 07/12/21  0638   POTASSIUM mmol/L 4 2 3 9 4 1   CHLORIDE mmol/L 105 104 107   CO2 mmol/L 24 27 27   BUN mg/dL 15 14 15   CREATININE mg/dL 0 95 0 90 0 78   CALCIUM mg/dL 9 4 9 0 9 0     Results from last 7 days   Lab Units 07/15/21  0453 07/14/21  0511 07/13/21  0545   PTT seconds 70* 68* 69*       Studies:     Cardiac Cath: pLAD 70, pOM 95, mPDA thrombus      Echo: EF 50, mild MR, trace TR      Vein Mapping: adequate bilaterally      Carotid US: <50 bilateral  Antegrade flow  No SC disease  Vitals:   Vitals:    07/14/21 2001 07/14/21 2152 07/15/21 0301 07/15/21 0808   BP: 114/65 103/58 110/69 115/62   BP Location: Left arm  Left arm    Pulse: 63 66 61 69   Resp: 17  18 18   Temp: 98 4 °F (36 9 °C)  97 5 °F (36 4 °C) 97 7 °F (36 5 °C)   TempSrc: Oral  Oral Oral   SpO2: 93%  91% 96%   Weight:       Height:           Physical Exam:    HEENT/NECK:  Normocephalic  Atraumatic  No jugular venous distention  Cardiac: Regular rate and rhythm  Pulmonary:  Breath sounds clear bilaterally  Abdomen:  Non-tender and Non-distended  Extremities: Extremities warm/dry  Neuro: Alert and oriented X 3  Skin: Warm/Dry, without rashes or lesions  Assessment:  Coronary artery disease    Plan:  Patient agreeable to proceed with surgery; Informed consent signed  Blood type and cross match ordered; RBC prepared  Continue Mupirocin 2% nasal ointment q 12 hrs  Continue topical chlorhexidine bath and mouth rise  NPO after midnight  Discontinue heparin infusion on call to OR  Preoperative oxygen, beta blocker, insulin, and antibiotics ordered coronary artery bypass grafting scheduled for tomorrow with Dr Eluterio Spain, D O Collette Irani: Burgess Zepeda [place]  DATE: July 15, 2021  TIME: 8:31 AM    * This note was completed in part utilizing [employer] direct voice recognition software  Grammatical errors, random word insertion, spelling mistakes, and incomplete sentences may be an occasional consequence of the system secondary to software limitations, ambient noise and hardware issues  At the time of dictation, efforts were made to edit, clarify and /or correct errors  Please read the chart carefully and recognize, using context, where substitutions have occurred  If you have any questions or concerns about the context, text or information contained within the body of this dictation, please contact myself, the provider, for further clarification

## 2021-07-15 NOTE — PROGRESS NOTES
General Cardiology   Progress Note -  Team One   Dori Alba 61 y o  male MRN: [id number]    Unit/Bed#: [facility] 477-77 Encounter: [id number]    Assessment/ Plan    1  Type I MI s/p cardiac cath with thrombus in RCA and multivessel CAD   Echocardiogram showed EF 50% with mild hypokinesis of the basal mid inferior and inferolateral walls   Continueheparin gtt  On aspirin, statin and BB   CT surgery following and plan for CABG Friday with Dr Hussein Number     2  Tobacco abuse   Counseled on smoking cessation      3  Hypertension  BP stable: 113/69  On lisinopril at home, currently on hold      4  Hyperlipidemia   Continue statin        5  Type II diabetes  Hemoglobin A1C 9 0 (7/7/2021)   Followed by primary team        Subjective  Patient resting comfortable in bed  No complaint of palpitations, SOB or dizziness  He does report mild chest discomfort that is described as dull without associated symptoms  No fever or chills  Review of Systems   Constitutional: Negative for chills, fever and malaise/fatigue  HENT: Negative for congestion  Cardiovascular: Positive for chest pain  Negative for dyspnea on exertion, leg swelling and orthopnea  Respiratory: Negative for cough and shortness of breath  Musculoskeletal: Negative for falls  Gastrointestinal: Negative for bloating, nausea and vomiting  Neurological: Negative for dizziness and light-headedness  Psychiatric/Behavioral: Negative for altered mental status  All other systems reviewed and are negative  Objective:   Vitals: Blood pressure 115/62, pulse 69, temperature 97 7 °F (36 5 °C), temperature source Oral, resp  rate 18, height 5' 10" (1 778 m), weight 110 kg (242 lb 8 1 oz), SpO2 96 %  ,       Body mass index is 34 8 kg/m²  ,     Systolic (61MIB), OAT:286 , Min:103 , PBV:285     Diastolic (32ZSR), VCS:28, Min:58, Max:80          Intake/Output Summary (Last 24 hours) at 7/15/2021 0948  Last data filed at 7/15/2021 0808  Gross per 24 hour Intake 1007 66 ml   Output 500 ml   Net 507 66 ml     Weight (last 2 days)     None            Telemetry Review: Normal sinus rhythm, no ectopy     Physical Exam  Constitutional:       General: He is not in acute distress  HENT:      Head: Normocephalic  Mouth/Throat:      Mouth: Mucous membranes are moist    Cardiovascular:      Rate and Rhythm: Normal rate and regular rhythm  Pulses: Normal pulses  Heart sounds: No murmur heard  Pulmonary:      Effort: Pulmonary effort is normal  No respiratory distress  Breath sounds: Normal breath sounds  Abdominal:      General: Bowel sounds are normal       Palpations: Abdomen is soft  Musculoskeletal:         General: No swelling  Normal range of motion  Cervical back: Neck supple  Skin:     General: Skin is warm and dry  Capillary Refill: Capillary refill takes less than 2 seconds  Neurological:      General: No focal deficit present  Mental Status: He is alert and oriented to person, place, and time     Psychiatric:         Mood and Affect: Mood normal          LABORATORY RESULTS      CBC with diff:   Results from last 7 days   Lab Units 07/14/21  0828 07/13/21  0545 07/12/21  0439 07/11/21  0438 07/10/21  0211   WBC Thousand/uL 7 35 6 50 6 89 7 01 6 91   HEMOGLOBIN g/dL 16 1 15 6 15 2 15 4 14 4   HEMATOCRIT % 48 0 46 9 45 1 46 0 42 5   MCV fL 98 98 98 99* 98   PLATELETS Thousands/uL 232 194 201 204 184   MCH pg 32 9 32 6 32 9 33 1 33 2   MCHC g/dL 33 5 33 3 33 7 33 5 33 9   RDW % 12 5 12 3 12 3 12 2 11 9   MPV fL 9 9 9 8 10 1 9 8 9 4   NRBC AUTO /100 WBCs 0 0 0 0 0       CMP:  Results from last 7 days   Lab Units 07/14/21  0828 07/13/21  0545 07/12/21  0902 07/11/21  0438 07/10/21  0239 07/09/21  0609   POTASSIUM mmol/L 4 2 3 9 4 1 4 0 4 0 4 2   CHLORIDE mmol/L 105 104 107 106 106 107   CO2 mmol/L 24 27 27 27 28 28   BUN mg/dL 15 14 15 15 14 13   CREATININE mg/dL 0 95 0 90 0 78 0 80 0 81 0 80   CALCIUM mg/dL 9 4 9 0 9 0 9 4 9 0 8 4   EGFR ml/min/1 73sq m 84 90 95 94 94 94       BMP:  Results from last 7 days   Lab Units 21  0828 21  0545 21  7626 21  0438 07/10/21  0239 21  0609   POTASSIUM mmol/L 4 2 3 9 4 1 4 0 4 0 4 2   CHLORIDE mmol/L 105 104 107 106 106 107   CO2 mmol/L 24 27 27 27 28 28   BUN mg/dL 15 14 15 15 14 13   CREATININE mg/dL 0 95 0 90 0 78 0 80 0 81 0 80   CALCIUM mg/dL 9 4 9 0 9 0 9 4 9 0 8 4       Lab Results   Component Value Date    NTBNP 20 2020             Results from last 7 days   Lab Units 07/10/21  0239   MAGNESIUM mg/dL 1 9                           Lipid Profile:   No results found for: CHOL  Lab Results   Component Value Date    HDL 44 2021     Lab Results   Component Value Date    LDLCALC 123 (H) 2021     Lab Results   Component Value Date    TRIG 215 8 (H) 2021       Cardiac testing:   Results for orders placed during the hospital encounter of 21    Echo complete with contrast if indicated    Narrative  [place] [address], [address] [facility]  [phone]    Transthoracic Echocardiogram  2D, M-mode, Doppler, and Color Doppler    Study date:  2021    Patient: Ta Dhaliwal  MR number: [id number]  Account number: [de-identified]  : 1955  Age: 61 years  Gender: Male  Status: Outpatient  Location: Bedside  Height: 70 in  Weight: 246 lb  BP: 110/ 66 mmHg    Indications: NSTEMI    Diagnoses: I21 4 - Non-ST elevation (NSTEMI) myocardial infarction    Sonographer:  VLADISLAV Benton  Primary Physician:  Elizabet Ram MD  Referring Physician:  María Snider PA-C  Group:  [facility]  Interpreting Physician:  Maddy Myrick MD    SUMMARY    LEFT VENTRICLE:  Size was normal   Systolic function was at the lower limits of normal  Ejection fraction was estimated to be 50 %  There was mild hypokinesis of the basal-mid inferior and inferolateral walls    Doppler parameters were consistent with abnormal left ventricular relaxation (grade 1 diastolic dysfunction)  RIGHT VENTRICLE:  The size was normal   Systolic function was normal     LEFT ATRIUM:  The atrium was mildly dilated  MITRAL VALVE:  There was trace to mild regurgitation  TRICUSPID VALVE:  There was trace regurgitation  HISTORY: PRIOR HISTORY: HTN, HLD, DM2    PROCEDURE: The procedure was performed at the bedside  This was a routine study  The transthoracic approach was used  The study included complete 2D imaging, M-mode, complete spectral Doppler, and color Doppler  The heart rate was 70 bpm,  at the start of the study  Images were obtained from the parasternal, apical, subcostal, and suprasternal notch acoustic windows  Echocardiographic views were limited due to lung interference  This was a technically difficult study  LEFT VENTRICLE: Size was normal  Systolic function was at the lower limits of normal  Ejection fraction was estimated to be 50 %  There was mild hypokinesis of the basal-mid inferior and inferolateral walls  Wall thickness was normal   DOPPLER: Doppler parameters were consistent with abnormal left ventricular relaxation (grade 1 diastolic dysfunction)  RIGHT VENTRICLE: The size was normal  Systolic function was normal  Wall thickness was normal     LEFT ATRIUM: The atrium was mildly dilated  RIGHT ATRIUM: Size was normal     MITRAL VALVE: Valve structure was normal  There was normal leaflet separation  DOPPLER: The transmitral velocity was within the normal range  There was no evidence for stenosis  There was trace to mild regurgitation  AORTIC VALVE: The valve was trileaflet  Leaflets exhibited mildly increased thickness, mild calcification, normal cuspal separation, and sclerosis  DOPPLER: Transaortic velocity was within the normal range  There was no evidence for  stenosis  There was no regurgitation  TRICUSPID VALVE: The valve structure was normal  There was normal leaflet separation   DOPPLER: The transtricuspid velocity was within the normal range  There was no evidence for stenosis  There was trace regurgitation  The tricuspid jet  envelope definition was inadequate for estimation of RV systolic pressure  There are no indirect findings suggestive of moderate or severe pulmonary hypertension  PULMONIC VALVE: Leaflets exhibited normal thickness, no calcification, and normal cuspal separation  DOPPLER: The transpulmonic velocity was within the normal range  There was no regurgitation  PERICARDIUM: There was no pericardial effusion  The pericardium was normal in appearance  AORTA: The root exhibited normal size  SYSTEMIC VEINS: IVC: The inferior vena cava was normal in size and course  Respirophasic changes were normal     SYSTEM MEASUREMENT TABLES    2D  %FS: 24 71 %  Ao Diam: 3 41 cm  EDV(Teich): 173 64 ml  EF(Teich): 48 23 %  ESV(Teich): 89 89 ml  HR_4Ch_Q: 66 42 BPM  IVSd: 1 14 cm  LA Area: 23 21 cm2  LA Diam: 3 7 cm  LVCO_4Ch_Q: 2 84 L/min  LVEF_4Ch_Q: 41 39 %  LVIDd: 5 91 cm  LVIDs: 4 45 cm  LVLd_4Ch_Q: 8 6 cm  LVLs_4Ch_Q: 7 4 cm  LVPWd: 0 97 cm  LVSV_4Ch_Q: 42 77 ml  LVVED_4Ch_Q: 103 33 ml  LVVES_4Ch_Q: 60 56 ml  RA Area: 18 36 cm2  RVIDd: 3 33 cm  SV(Teich): 83 75 ml    MM  TAPSE: 2 59 cm    PW  E' Sept: 0 08 m/s  E/E' Sept: 9 37  MV A Abiel: 0 89 m/s  MV Dec Villalba: 3 68 m/s2  MV DecT: 197 59 ms  MV E Abiel: 0 73 m/s  MV E/A Ratio: 0 82    [employer] Accredited Echocardiography Laboratory    Prepared and electronically signed by    Marii Miller MD  Signed 08-Jul-2021 14:41:28    No results found for this or any previous visit  No results found for this or any previous visit  No valid procedures specified  No results found for this or any previous visit      Meds/Allergies   all current active meds have been reviewed and current meds:   Current Facility-Administered Medications   Medication Dose Route Frequency    acetaminophen (TYLENOL) tablet 650 mg  650 mg Oral Q6H PRN    aspirin (ECOTRIN LOW STRENGTH) EC tablet 81 mg  81 mg Oral Daily    chlorhexidine (PERIDEX) 0 12 % oral rinse 15 mL  15 mL Mouth/Throat Q12H JAIMEE    cyclobenzaprine (FLEXERIL) tablet 5 mg  5 mg Oral TID PRN    Diclofenac Sodium (VOLTAREN) 1 % topical gel 2 g  2 g Topical BID    heparin (porcine) 25,000 units in 0 45% NaCl 250 mL infusion (premix)  3-20 Units/kg/hr (Order-Specific) Intravenous Titrated    insulin lispro (HumaLOG) 100 units/mL subcutaneous injection 1-6 Units  1-6 Units Subcutaneous 4x Daily (AC & HS)    metoprolol tartrate (LOPRESSOR) partial tablet 12 5 mg  12 5 mg Oral Q12H [address]    mupirocin (BACTROBAN) 2 % nasal ointment 1 application  1 application Nasal N18Q [address]    nitroglycerin (NITROSTAT) SL tablet 0 4 mg  0 4 mg Sublingual Q5 Min PRN    pravastatin (PRAVACHOL) tablet 80 mg  80 mg Oral Daily     Medications Prior to Admission   Medication    aspirin (ECOTRIN LOW STRENGTH) 81 mg EC tablet    Empagliflozin (Jardiance) 10 MG TABS    Exenatide ER (Bydureon BCise) 2 MG/0 85ML AUIJ    lisinopril (ZESTRIL) 10 mg tablet    metFORMIN (GLUCOPHAGE) 1000 MG tablet    pravastatin (PRAVACHOL) 20 mg tablet       heparin (porcine), 3-20 Units/kg/hr (Order-Specific), Last Rate: 19 Units/kg/hr (07/15/21 0405)      Counseling / Coordination of Care  Total floor / unit time spent today 20 minutes  Greater than 50% of total time was spent with the patient and / or family counseling and / or coordination of care  ** Please Note: Dragon 360 Dictation voice to text software may have been used in the creation of this document   **

## 2021-07-15 NOTE — CASE MANAGEMENT
Spoke with pt re  HHC postop, discussed options, chose [facility], referral sent for nursing

## 2021-07-15 NOTE — PROGRESS NOTES
INTERNAL MEDICINE RESIDENCY PROGRESS NOTE     Name: Pavel Stanton   Age & Sex: 61 y o  male   MRN: [id number]  Unit/Bed#: [facility] 412-01   Encounter: [id number]  Team: SOD Team C     PATIENT INFORMATION     Name: Pavel Stanton   Age & Sex: 61 y o  male   MRN: [id number]  Hospital Stay Days: 6    ASSESSMENT/PLAN     Principal Problem:    CAD (coronary artery disease)  Active Problems:    Hyperlipidemia    Type 2 diabetes mellitus ([address] )    Hypertension    Muscle spasm    Tobacco use    HTN (hypertension)    History of COVID-19      * CAD (coronary artery disease)  Assessment & Plan  Patient has history of HTN, HLD, and DM who presented to [address] [place] [address] ED for chest pain, dyspnea on exertion, and elevated troponin  In ED EKG showed NSR with nonspecific T wave abnormality which is more pronounced in inferior leads on repeat EKG and troponin peaked at 0 26  Echo showed EF of 50%, and a cardiac cath showed severe triple vessel disease  Patient was transferred to [facility] for cardiothoracic surgery evaluation for possible CABG  · Continue IV heparin, aspirin, metoprolol, nitroglycerin prn, and pravastatin  · Continue to hold lisinopril per Cardiology recommendation  · Consulted cardiology and cardiothoracic surgery for CABG evaluation  · 7/10 VAS carotid: negative for disease; 7/10 VAS lower limb: bilateral saphenous patent  · CABG currently scheduled for Friday 7/16   · Jon/CHO Controlled Diet  · Monitor telemetry  · OT/PT evaluation    Tobacco use  Assessment & Plan  Patient occasionally smokes cigars  · Counseled patient on smoking cessation  · Nicotine 7mg/24hr 1 patch daily if needed    Muscle spasm  Assessment & Plan  Patient complains of chronic posterior neck and left shoulder tenderness and spasms that is relieved with muscle relaxant  · Voltaren gel 1% 2g BID  · Flexeril 5 mg PRN    Hypertension  Assessment & Plan  Reviewed and Stable    · Holding lisinopril per cardiology recommendation   · Metoprolol 12 5 mg BID    Type 2 diabetes mellitus [facility])  Assessment & Plan  Lab Results   Component Value Date    HGBA1C 9 0 (H) 07/07/2021       Recent Labs     07/11/21  1125 07/11/21  1608 07/11/21  2123 07/12/21  0624   POCGLU 243* 181* 186* 155*       Blood Sugar Average: Last 72 hrs:  (P) 612 [id number]   Patient has history of uncontrolled diabetes on oral medications  · Continue Algorithm 3, Humalog 100 units/ml, 1-6 units 4 times daily before meals  · Will need outpatient follow-up with endocrinology    Hyperlipidemia  Assessment & Plan  Patient's lipid panel revealed cholesterol of 210, triglycerides 215 8, , HDL 44  · Continue pravastatin 80 mg        Disposition: Continue level of care, Cardiology and CT Surgery following; plan for CABG tomorrow 7/16  SUBJECTIVE     Patient seen and examined  No acute events overnight  24H Telemetry reviewed - ~02:30: 1 episode sinus bradycardia (HR 49)  Patient was resting comfortably with no complaints  He denies feeling any chest pain or shortness of breath this morning  Yesterday he walked x4 laps around the halls briskly, and reports that he felt some chest "pressure" on the left side of his chest in the midclavicular line  He describes the pain as pressure and aching that felt "like after you work out"  The pain resolved with rest  He reports that later in the day he walked x5 laps around the halls "pretty quickly" but did not experience any chest discomfort  His most recent bowel movement was last night  Review of Systems   Constitutional: Negative for chills and fever  Respiratory: Negative for shortness of breath  Cardiovascular: Negative for chest pain and palpitations  Gastrointestinal: Negative for abdominal pain, constipation, diarrhea, nausea and vomiting  Musculoskeletal: Positive for neck pain (chronic, at baseline)  Neurological: Negative for dizziness and headaches         OBJECTIVE     Vitals: 21 1513 07/14/21 2001 07/14/21 2152 07/15/21 0301   BP: 115/77 114/65 103/58 110/69   BP Location:  Left arm  Left arm   Pulse: 64 63 66 61   Resp:  17  18   Temp: 98 5 °F (36 9 °C) 98 4 °F (36 9 °C)  97 5 °F (36 4 °C)   TempSrc:  Oral  Oral   SpO2: 96% 93%  91%   Weight:       Height:          Temperature:   Temp (24hrs), Av 1 °F (36 7 °C), Min:97 5 °F (36 4 °C), Max:98 5 °F (36 9 °C)    Temperature: 97 5 °F (36 4 °C)  Intake & Output:  I/O       701 -  07 07 - 07/15 0700 07/15 0701 -  0700    P  O  1060 520     I V  (mL/kg)  501 3 (4 6)     Total Intake(mL/kg) 1060 (9 6) 1021 3 (9 3)     Urine (mL/kg/hr) 600 (0 2) 500 (0 2)     Stool       Total Output 600 500     Net +460 +521 3                Weights:   IBW (Ideal Body Weight): 73 kg    Body mass index is 34 8 kg/m²  Weight (last 2 days)     None        Physical Exam  Vitals and nursing note reviewed  Constitutional:       General: He is not in acute distress  Appearance: He is well-developed  He is not diaphoretic  HENT:      Head: Normocephalic and atraumatic  Eyes:      Conjunctiva/sclera: Conjunctivae normal    Cardiovascular:      Rate and Rhythm: Normal rate and regular rhythm  Pulses: Normal pulses  Heart sounds: Normal heart sounds  No murmur heard  No friction rub  No gallop  Pulmonary:      Effort: Pulmonary effort is normal  No respiratory distress  Breath sounds: Normal breath sounds  No stridor  No wheezing, rhonchi or rales  Abdominal:      General: Bowel sounds are normal  There is no distension  Palpations: Abdomen is soft  Tenderness: There is no abdominal tenderness  There is no guarding  Musculoskeletal:         General: No tenderness  Cervical back: Neck supple  Right lower leg: No edema  Left lower leg: Edema (Trace edema left ankle, at baseline  ) present  Skin:     General: Skin is warm and dry  Findings: No lesion or rash        Comments: Superficial varicose veins on bilateral lower extremities  Neurological:      Mental Status: He is alert  Psychiatric:         Mood and Affect: Mood normal          Behavior: Behavior normal          Thought Content: Thought content normal        LABORATORY DATA     Labs: I have personally reviewed pertinent reports  Results from last 7 days   Lab Units 07/14/21  0828 07/13/21  0545 07/12/21  0439   WBC Thousand/uL 7 35 6 50 6 89   HEMOGLOBIN g/dL 16 1 15 6 15 2   HEMATOCRIT % 48 0 46 9 45 1   PLATELETS Thousands/uL 232 194 201   NEUTROS PCT % 59 53 52   MONOS PCT % 13* 12 13*      Results from last 7 days   Lab Units 07/14/21  0828 07/13/21  0545 07/12/21  0638   POTASSIUM mmol/L 4 2 3 9 4 1   CHLORIDE mmol/L 105 104 107   CO2 mmol/L 24 27 27   BUN mg/dL 15 14 15   CREATININE mg/dL 0 95 0 90 0 78   CALCIUM mg/dL 9 4 9 0 9 0     Results from last 7 days   Lab Units 07/10/21  0239   MAGNESIUM mg/dL 1 9     Results from last 7 days   Lab Units 07/10/21  0239   PHOSPHORUS mg/dL 3 6      Results from last 7 days   Lab Units 07/15/21  0453 07/14/21  0511 07/13/21  0545   PTT seconds 70* 68* 69*               IMAGING & DIAGNOSTIC TESTING     Radiology Results: I have personally reviewed pertinent reports  No results found  Other Diagnostic Testing: I have personally reviewed pertinent reports      ACTIVE MEDICATIONS     Current Facility-Administered Medications   Medication Dose Route Frequency    acetaminophen (TYLENOL) tablet 650 mg  650 mg Oral Q6H PRN    aspirin (ECOTRIN LOW STRENGTH) EC tablet 81 mg  81 mg Oral Daily    chlorhexidine (PERIDEX) 0 12 % oral rinse 15 mL  15 mL Mouth/Throat Q12H JAIMEE    cyclobenzaprine (FLEXERIL) tablet 5 mg  5 mg Oral TID PRN    Diclofenac Sodium (VOLTAREN) 1 % topical gel 2 g  2 g Topical BID    heparin (porcine) 25,000 units in 0 45% NaCl 250 mL infusion (premix)  3-20 Units/kg/hr (Order-Specific) Intravenous Titrated    insulin lispro (HumaLOG) 100 units/mL subcutaneous injection 1-6 Units  1-6 Units Subcutaneous 4x Daily (AC & HS)    metoprolol tartrate (LOPRESSOR) partial tablet 12 5 mg  12 5 mg Oral Q12H [address]    mupirocin (BACTROBAN) 2 % nasal ointment 1 application  1 application Nasal P29V [address]    nitroglycerin (NITROSTAT) SL tablet 0 4 mg  0 4 mg Sublingual Q5 Min PRN    pravastatin (PRAVACHOL) tablet 80 mg  80 mg Oral Daily       VTE Pharmacologic Prophylaxis: Heparin  VTE Mechanical Prophylaxis: sequential compression device    Portions of the record may have been created with voice recognition software  Occasional wrong word or "sound a like" substitutions may have occurred due to the inherent limitations of voice recognition software    Read the chart carefully and recognize, using context, where substitutions have occurred   ==  Deb Whalen MD  [address]  Internal Medicine Residency PGY-1

## 2021-07-16 ENCOUNTER — ANESTHESIA (INPATIENT)
Dept: PERIOP | Facility: HOSPITAL | Age: 66
DRG: 235 | End: 2021-07-16
Payer: COMMERCIAL

## 2021-07-16 ENCOUNTER — ANESTHESIA EVENT (INPATIENT)
Dept: PERIOP | Facility: HOSPITAL | Age: 66
DRG: 235 | End: 2021-07-16
Payer: COMMERCIAL

## 2021-07-16 ENCOUNTER — APPOINTMENT (INPATIENT)
Dept: NON INVASIVE DIAGNOSTICS | Facility: HOSPITAL | Age: 66
DRG: 235 | End: 2021-07-16
Payer: COMMERCIAL

## 2021-07-16 ENCOUNTER — APPOINTMENT (INPATIENT)
Dept: RADIOLOGY | Facility: HOSPITAL | Age: 66
DRG: 235 | End: 2021-07-16
Payer: COMMERCIAL

## 2021-07-16 PROBLEM — Z95.1 S/P CABG (CORONARY ARTERY BYPASS GRAFT): Status: ACTIVE | Noted: 2021-07-16

## 2021-07-16 LAB
ANION GAP SERPL CALCULATED.3IONS-SCNC: 4 MMOL/L (ref 4–13)
ANION GAP SERPL CALCULATED.3IONS-SCNC: 6 MMOL/L (ref 4–13)
APTT PPP: 44 SECONDS (ref 23–37)
APTT PPP: 79 SECONDS (ref 23–37)
BASE EXCESS BLDA CALC-SCNC: -1 MMOL/L (ref -2–3)
BASE EXCESS BLDA CALC-SCNC: 0 MMOL/L (ref -2–3)
BASE EXCESS BLDA CALC-SCNC: 0 MMOL/L (ref -2–3)
BASE EXCESS BLDA CALC-SCNC: 2 MMOL/L (ref -2–3)
BASE EXCESS BLDA CALC-SCNC: 2 MMOL/L (ref -2–3)
BASE EXCESS BLDA CALC-SCNC: 4 MMOL/L (ref -2–3)
BASOPHILS # BLD AUTO: 0.03 THOUSANDS/ΜL (ref 0–0.1)
BASOPHILS NFR BLD AUTO: 1 % (ref 0–1)
BUN SERPL-MCNC: 13 MG/DL (ref 5–25)
BUN SERPL-MCNC: 15 MG/DL (ref 5–25)
CA-I BLD-SCNC: 1 MMOL/L (ref 1.12–1.32)
CA-I BLD-SCNC: 1.08 MMOL/L (ref 1.12–1.32)
CA-I BLD-SCNC: 1.08 MMOL/L (ref 1.12–1.32)
CA-I BLD-SCNC: 1.22 MMOL/L (ref 1.12–1.32)
CA-I BLD-SCNC: 1.23 MMOL/L (ref 1.12–1.32)
CA-I BLD-SCNC: 1.28 MMOL/L (ref 1.12–1.32)
CALCIUM SERPL-MCNC: 7.4 MG/DL (ref 8.3–10.1)
CALCIUM SERPL-MCNC: 9.2 MG/DL (ref 8.3–10.1)
CHLORIDE SERPL-SCNC: 105 MMOL/L (ref 100–108)
CHLORIDE SERPL-SCNC: 114 MMOL/L (ref 100–108)
CO2 SERPL-SCNC: 24 MMOL/L (ref 21–32)
CO2 SERPL-SCNC: 27 MMOL/L (ref 21–32)
CREAT SERPL-MCNC: 0.65 MG/DL (ref 0.6–1.3)
CREAT SERPL-MCNC: 0.95 MG/DL (ref 0.6–1.3)
EOSINOPHIL # BLD AUTO: 0.16 THOUSAND/ΜL (ref 0–0.61)
EOSINOPHIL NFR BLD AUTO: 3 % (ref 0–6)
ERYTHROCYTE [DISTWIDTH] IN BLOOD BY AUTOMATED COUNT: 12.3 % (ref 11.6–15.1)
FIBRINOGEN PPP-MCNC: 254 MG/DL (ref 227–495)
GFR SERPL CREATININE-BSD FRML MDRD: 103 ML/MIN/1.73SQ M
GFR SERPL CREATININE-BSD FRML MDRD: 84 ML/MIN/1.73SQ M
GLUCOSE SERPL-MCNC: 124 MG/DL (ref 65–140)
GLUCOSE SERPL-MCNC: 125 MG/DL (ref 65–140)
GLUCOSE SERPL-MCNC: 133 MG/DL (ref 65–140)
GLUCOSE SERPL-MCNC: 136 MG/DL (ref 65–140)
GLUCOSE SERPL-MCNC: 153 MG/DL (ref 65–140)
GLUCOSE SERPL-MCNC: 159 MG/DL (ref 65–140)
GLUCOSE SERPL-MCNC: 182 MG/DL (ref 65–140)
GLUCOSE SERPL-MCNC: 196 MG/DL (ref 65–140)
GLUCOSE SERPL-MCNC: 221 MG/DL (ref 65–140)
GLUCOSE SERPL-MCNC: 231 MG/DL (ref 65–140)
GLUCOSE SERPL-MCNC: 231 MG/DL (ref 65–140)
GLUCOSE SERPL-MCNC: 235 MG/DL (ref 65–140)
GLUCOSE SERPL-MCNC: 235 MG/DL (ref 65–140)
GLUCOSE SERPL-MCNC: 243 MG/DL (ref 65–140)
GLUCOSE SERPL-MCNC: 258 MG/DL (ref 65–140)
GLUCOSE SERPL-MCNC: 276 MG/DL (ref 65–140)
HCO3 BLDA-SCNC: 24.8 MMOL/L (ref 22–28)
HCO3 BLDA-SCNC: 25.8 MMOL/L (ref 22–28)
HCO3 BLDA-SCNC: 27.6 MMOL/L (ref 22–28)
HCO3 BLDA-SCNC: 27.7 MMOL/L (ref 24–30)
HCO3 BLDA-SCNC: 29 MMOL/L (ref 24–30)
HCO3 BLDA-SCNC: 29.8 MMOL/L (ref 22–28)
HCT VFR BLD AUTO: 40.8 % (ref 36.5–49.3)
HCT VFR BLD AUTO: 43.2 % (ref 36.5–49.3)
HCT VFR BLD AUTO: 43.9 % (ref 36.5–49.3)
HCT VFR BLD CALC: 30 % (ref 36.5–49.3)
HCT VFR BLD CALC: 33 % (ref 36.5–49.3)
HCT VFR BLD CALC: 34 % (ref 36.5–49.3)
HCT VFR BLD CALC: 37 % (ref 36.5–49.3)
HCT VFR BLD CALC: 40 % (ref 36.5–49.3)
HCT VFR BLD CALC: 41 % (ref 36.5–49.3)
HGB BLD-MCNC: 13.7 G/DL (ref 12–17)
HGB BLD-MCNC: 14.6 G/DL (ref 12–17)
HGB BLD-MCNC: 14.7 G/DL (ref 12–17)
HGB BLDA-MCNC: 10.2 G/DL (ref 12–17)
HGB BLDA-MCNC: 11.2 G/DL (ref 12–17)
HGB BLDA-MCNC: 11.6 G/DL (ref 12–17)
HGB BLDA-MCNC: 12.6 G/DL (ref 12–17)
HGB BLDA-MCNC: 13.6 G/DL (ref 12–17)
HGB BLDA-MCNC: 13.9 G/DL (ref 12–17)
IMM GRANULOCYTES # BLD AUTO: 0.05 THOUSAND/UL (ref 0–0.2)
IMM GRANULOCYTES NFR BLD AUTO: 1 % (ref 0–2)
INR PPP: 1.11 (ref 0.84–1.19)
KCT BLD-ACNC: 110 SEC (ref 89–137)
KCT BLD-ACNC: 121 SEC (ref 89–137)
KCT BLD-ACNC: 364 SEC (ref 89–137)
KCT BLD-ACNC: 435 SEC (ref 89–137)
KCT BLD-ACNC: 460 SEC (ref 89–137)
KCT BLD-ACNC: 502 SEC (ref 89–137)
LYMPHOCYTES # BLD AUTO: 1.54 THOUSANDS/ΜL (ref 0.6–4.47)
LYMPHOCYTES NFR BLD AUTO: 24 % (ref 14–44)
MCH RBC QN AUTO: 33.3 PG (ref 26.8–34.3)
MCHC RBC AUTO-ENTMCNC: 33.5 G/DL (ref 31.4–37.4)
MCV RBC AUTO: 99 FL (ref 82–98)
MONOCYTES # BLD AUTO: 0.87 THOUSAND/ΜL (ref 0.17–1.22)
MONOCYTES NFR BLD AUTO: 13 % (ref 4–12)
NEUTROPHILS # BLD AUTO: 3.85 THOUSANDS/ΜL (ref 1.85–7.62)
NEUTS SEG NFR BLD AUTO: 58 % (ref 43–75)
NRBC BLD AUTO-RTO: 0 /100 WBCS
PCO2 BLD: 26 MMOL/L (ref 21–32)
PCO2 BLD: 27 MMOL/L (ref 21–32)
PCO2 BLD: 29 MMOL/L (ref 21–32)
PCO2 BLD: 29 MMOL/L (ref 21–32)
PCO2 BLD: 31 MMOL/L (ref 21–32)
PCO2 BLD: 31 MMOL/L (ref 21–32)
PCO2 BLD: 44.4 MM HG (ref 36–44)
PCO2 BLD: 46.4 MM HG (ref 42–50)
PCO2 BLD: 46.8 MM HG (ref 36–44)
PCO2 BLD: 50.5 MM HG (ref 36–44)
PCO2 BLD: 54.6 MM HG (ref 36–44)
PCO2 BLD: 57.6 MM HG (ref 42–50)
PH BLD: 7.31 [PH] (ref 7.35–7.45)
PH BLD: 7.31 [PH] (ref 7.3–7.4)
PH BLD: 7.35 [PH] (ref 7.35–7.45)
PH BLD: 7.36 [PH] (ref 7.35–7.45)
PH BLD: 7.38 [PH] (ref 7.35–7.45)
PH BLD: 7.38 [PH] (ref 7.3–7.4)
PLATELET # BLD AUTO: 159 THOUSANDS/UL (ref 149–390)
PLATELET # BLD AUTO: 178 THOUSANDS/UL (ref 149–390)
PLATELET # BLD AUTO: 187 THOUSANDS/UL (ref 149–390)
PLATELET # BLD AUTO: 209 THOUSANDS/UL (ref 149–390)
PMV BLD AUTO: 10 FL (ref 8.9–12.7)
PMV BLD AUTO: 10.4 FL (ref 8.9–12.7)
PMV BLD AUTO: 9.8 FL (ref 8.9–12.7)
PMV BLD AUTO: 9.9 FL (ref 8.9–12.7)
PO2 BLD: 107 MM HG (ref 75–129)
PO2 BLD: 206 MM HG (ref 75–129)
PO2 BLD: 322 MM HG (ref 75–129)
PO2 BLD: 38 MM HG (ref 35–45)
PO2 BLD: 53 MM HG (ref 35–45)
PO2 BLD: >400 MM HG (ref 75–129)
POTASSIUM BLD-SCNC: 4.2 MMOL/L (ref 3.5–5.3)
POTASSIUM BLD-SCNC: 4.4 MMOL/L (ref 3.5–5.3)
POTASSIUM BLD-SCNC: 5 MMOL/L (ref 3.5–5.3)
POTASSIUM BLD-SCNC: 5 MMOL/L (ref 3.5–5.3)
POTASSIUM BLD-SCNC: 5.1 MMOL/L (ref 3.5–5.3)
POTASSIUM BLD-SCNC: 5.5 MMOL/L (ref 3.5–5.3)
POTASSIUM SERPL-SCNC: 3.1 MMOL/L (ref 3.5–5.3)
POTASSIUM SERPL-SCNC: 3.3 MMOL/L (ref 3.5–5.3)
POTASSIUM SERPL-SCNC: 3.9 MMOL/L (ref 3.5–5.3)
POTASSIUM SERPL-SCNC: 4.2 MMOL/L (ref 3.5–5.3)
PROTHROMBIN TIME: 14.3 SECONDS (ref 11.6–14.5)
RBC # BLD AUTO: 4.42 MILLION/UL (ref 3.88–5.62)
SAO2 % BLD FROM PO2: 100 % (ref 60–85)
SAO2 % BLD FROM PO2: 100 % (ref 60–85)
SAO2 % BLD FROM PO2: 71 % (ref 60–85)
SAO2 % BLD FROM PO2: 83 % (ref 60–85)
SAO2 % BLD FROM PO2: 98 % (ref 60–85)
SODIUM BLD-SCNC: 135 MMOL/L (ref 136–145)
SODIUM BLD-SCNC: 136 MMOL/L (ref 136–145)
SODIUM BLD-SCNC: 137 MMOL/L (ref 136–145)
SODIUM BLD-SCNC: 138 MMOL/L (ref 136–145)
SODIUM BLD-SCNC: 139 MMOL/L (ref 136–145)
SODIUM BLD-SCNC: 140 MMOL/L (ref 136–145)
SODIUM SERPL-SCNC: 138 MMOL/L (ref 136–145)
SODIUM SERPL-SCNC: 142 MMOL/L (ref 136–145)
SPECIMEN SOURCE: ABNORMAL
SPECIMEN SOURCE: NORMAL
SPECIMEN SOURCE: NORMAL
WBC # BLD AUTO: 6.5 THOUSAND/UL (ref 4.31–10.16)

## 2021-07-16 PROCEDURE — 33533 CABG ARTERIAL SINGLE: CPT | Performed by: PHYSICIAN ASSISTANT

## 2021-07-16 PROCEDURE — 85384 FIBRINOGEN ACTIVITY: CPT | Performed by: PHYSICIAN ASSISTANT

## 2021-07-16 PROCEDURE — 85347 COAGULATION TIME ACTIVATED: CPT

## 2021-07-16 PROCEDURE — 94760 N-INVAS EAR/PLS OXIMETRY 1: CPT

## 2021-07-16 PROCEDURE — 94002 VENT MGMT INPAT INIT DAY: CPT

## 2021-07-16 PROCEDURE — 85018 HEMOGLOBIN: CPT | Performed by: PHYSICIAN ASSISTANT

## 2021-07-16 PROCEDURE — 85025 COMPLETE CBC W/AUTO DIFF WBC: CPT | Performed by: STUDENT IN AN ORGANIZED HEALTH CARE EDUCATION/TRAINING PROGRAM

## 2021-07-16 PROCEDURE — 33508 ENDOSCOPIC VEIN HARVEST: CPT | Performed by: THORACIC SURGERY (CARDIOTHORACIC VASCULAR SURGERY)

## 2021-07-16 PROCEDURE — 85049 AUTOMATED PLATELET COUNT: CPT | Performed by: THORACIC SURGERY (CARDIOTHORACIC VASCULAR SURGERY)

## 2021-07-16 PROCEDURE — 30233N0 TRANSFUSION OF AUTOLOGOUS RED BLOOD CELLS INTO PERIPHERAL VEIN, PERCUTANEOUS APPROACH: ICD-10-PCS | Performed by: THORACIC SURGERY (CARDIOTHORACIC VASCULAR SURGERY)

## 2021-07-16 PROCEDURE — 33518 CABG ARTERY-VEIN TWO: CPT | Performed by: PHYSICIAN ASSISTANT

## 2021-07-16 PROCEDURE — 85014 HEMATOCRIT: CPT | Performed by: PHYSICIAN ASSISTANT

## 2021-07-16 PROCEDURE — C1894 INTRO/SHEATH, NON-LASER: HCPCS | Performed by: THORACIC SURGERY (CARDIOTHORACIC VASCULAR SURGERY)

## 2021-07-16 PROCEDURE — 99223 1ST HOSP IP/OBS HIGH 75: CPT | Performed by: ANESTHESIOLOGY

## 2021-07-16 PROCEDURE — 5A1223Z PERFORMANCE OF CARDIAC PACING, CONTINUOUS: ICD-10-PCS | Performed by: THORACIC SURGERY (CARDIOTHORACIC VASCULAR SURGERY)

## 2021-07-16 PROCEDURE — 82330 ASSAY OF CALCIUM: CPT | Performed by: THORACIC SURGERY (CARDIOTHORACIC VASCULAR SURGERY)

## 2021-07-16 PROCEDURE — 93355 ECHO TRANSESOPHAGEAL (TEE): CPT

## 2021-07-16 PROCEDURE — 33508 ENDOSCOPIC VEIN HARVEST: CPT | Performed by: PHYSICIAN ASSISTANT

## 2021-07-16 PROCEDURE — 82948 REAGENT STRIP/BLOOD GLUCOSE: CPT

## 2021-07-16 PROCEDURE — 82803 BLOOD GASES ANY COMBINATION: CPT

## 2021-07-16 PROCEDURE — 33518 CABG ARTERY-VEIN TWO: CPT | Performed by: THORACIC SURGERY (CARDIOTHORACIC VASCULAR SURGERY)

## 2021-07-16 PROCEDURE — 82330 ASSAY OF CALCIUM: CPT

## 2021-07-16 PROCEDURE — 06BQ4ZZ EXCISION OF LEFT SAPHENOUS VEIN, PERCUTANEOUS ENDOSCOPIC APPROACH: ICD-10-PCS | Performed by: THORACIC SURGERY (CARDIOTHORACIC VASCULAR SURGERY)

## 2021-07-16 PROCEDURE — 80048 BASIC METABOLIC PNL TOTAL CA: CPT | Performed by: STUDENT IN AN ORGANIZED HEALTH CARE EDUCATION/TRAINING PROGRAM

## 2021-07-16 PROCEDURE — 5A1221Z PERFORMANCE OF CARDIAC OUTPUT, CONTINUOUS: ICD-10-PCS | Performed by: THORACIC SURGERY (CARDIOTHORACIC VASCULAR SURGERY)

## 2021-07-16 PROCEDURE — 85730 THROMBOPLASTIN TIME PARTIAL: CPT | Performed by: PHYSICIAN ASSISTANT

## 2021-07-16 PROCEDURE — 85049 AUTOMATED PLATELET COUNT: CPT | Performed by: PHYSICIAN ASSISTANT

## 2021-07-16 PROCEDURE — 85610 PROTHROMBIN TIME: CPT | Performed by: PHYSICIAN ASSISTANT

## 2021-07-16 PROCEDURE — 84132 ASSAY OF SERUM POTASSIUM: CPT | Performed by: PHYSICIAN ASSISTANT

## 2021-07-16 PROCEDURE — 82947 ASSAY GLUCOSE BLOOD QUANT: CPT

## 2021-07-16 PROCEDURE — 33533 CABG ARTERIAL SINGLE: CPT | Performed by: THORACIC SURGERY (CARDIOTHORACIC VASCULAR SURGERY)

## 2021-07-16 PROCEDURE — 02HV33Z INSERTION OF INFUSION DEVICE INTO SUPERIOR VENA CAVA, PERCUTANEOUS APPROACH: ICD-10-PCS | Performed by: ANESTHESIOLOGY

## 2021-07-16 PROCEDURE — 85014 HEMATOCRIT: CPT

## 2021-07-16 PROCEDURE — 84132 ASSAY OF SERUM POTASSIUM: CPT

## 2021-07-16 PROCEDURE — 85730 THROMBOPLASTIN TIME PARTIAL: CPT | Performed by: INTERNAL MEDICINE

## 2021-07-16 PROCEDURE — 02100Z9 BYPASS CORONARY ARTERY, ONE ARTERY FROM LEFT INTERNAL MAMMARY, OPEN APPROACH: ICD-10-PCS | Performed by: THORACIC SURGERY (CARDIOTHORACIC VASCULAR SURGERY)

## 2021-07-16 PROCEDURE — 93005 ELECTROCARDIOGRAM TRACING: CPT

## 2021-07-16 PROCEDURE — 80048 BASIC METABOLIC PNL TOTAL CA: CPT | Performed by: PHYSICIAN ASSISTANT

## 2021-07-16 PROCEDURE — 021109W BYPASS CORONARY ARTERY, TWO ARTERIES FROM AORTA WITH AUTOLOGOUS VENOUS TISSUE, OPEN APPROACH: ICD-10-PCS | Performed by: THORACIC SURGERY (CARDIOTHORACIC VASCULAR SURGERY)

## 2021-07-16 PROCEDURE — 84295 ASSAY OF SERUM SODIUM: CPT

## 2021-07-16 PROCEDURE — 71045 X-RAY EXAM CHEST 1 VIEW: CPT

## 2021-07-16 DEVICE — MARKER CORONARY BYPASS VOSS GRAFT: Type: IMPLANTABLE DEVICE | Site: AORTA | Status: FUNCTIONAL

## 2021-07-16 RX ORDER — ONDANSETRON 2 MG/ML
4 INJECTION INTRAMUSCULAR; INTRAVENOUS EVERY 6 HOURS PRN
Status: DISCONTINUED | OUTPATIENT
Start: 2021-07-16 | End: 2021-07-16 | Stop reason: HOSPADM

## 2021-07-16 RX ORDER — VANCOMYCIN HYDROCHLORIDE 1 G/20ML
INJECTION, POWDER, LYOPHILIZED, FOR SOLUTION INTRAVENOUS AS NEEDED
Status: DISCONTINUED | OUTPATIENT
Start: 2021-07-16 | End: 2021-07-16 | Stop reason: HOSPADM

## 2021-07-16 RX ORDER — PROTAMINE SULFATE 10 MG/ML
INJECTION, SOLUTION INTRAVENOUS AS NEEDED
Status: DISCONTINUED | OUTPATIENT
Start: 2021-07-16 | End: 2021-07-16

## 2021-07-16 RX ORDER — DOCUSATE SODIUM 100 MG/1
100 CAPSULE, LIQUID FILLED ORAL 2 TIMES DAILY
Status: DISCONTINUED | OUTPATIENT
Start: 2021-07-16 | End: 2021-07-18

## 2021-07-16 RX ORDER — CEFAZOLIN SODIUM 2 G/50ML
2000 SOLUTION INTRAVENOUS EVERY 8 HOURS
Status: DISCONTINUED | OUTPATIENT
Start: 2021-07-16 | End: 2021-07-16 | Stop reason: HOSPADM

## 2021-07-16 RX ORDER — OXYCODONE HYDROCHLORIDE 5 MG/1
2.5 TABLET ORAL EVERY 4 HOURS PRN
Status: DISCONTINUED | OUTPATIENT
Start: 2021-07-16 | End: 2021-07-16 | Stop reason: HOSPADM

## 2021-07-16 RX ORDER — OXYCODONE HYDROCHLORIDE 5 MG/1
5 TABLET ORAL EVERY 4 HOURS PRN
Status: DISCONTINUED | OUTPATIENT
Start: 2021-07-16 | End: 2021-07-18

## 2021-07-16 RX ORDER — MIDAZOLAM HYDROCHLORIDE 2 MG/2ML
INJECTION, SOLUTION INTRAMUSCULAR; INTRAVENOUS AS NEEDED
Status: DISCONTINUED | OUTPATIENT
Start: 2021-07-16 | End: 2021-07-16

## 2021-07-16 RX ORDER — POTASSIUM CHLORIDE 14.9 MG/ML
20 INJECTION INTRAVENOUS
Status: DISCONTINUED | OUTPATIENT
Start: 2021-07-16 | End: 2021-07-16 | Stop reason: HOSPADM

## 2021-07-16 RX ORDER — ONDANSETRON 2 MG/ML
4 INJECTION INTRAMUSCULAR; INTRAVENOUS EVERY 6 HOURS PRN
Status: DISCONTINUED | OUTPATIENT
Start: 2021-07-16 | End: 2021-07-20 | Stop reason: HOSPADM

## 2021-07-16 RX ORDER — POLYETHYLENE GLYCOL 3350 17 G/17G
17 POWDER, FOR SOLUTION ORAL DAILY
Status: DISCONTINUED | OUTPATIENT
Start: 2021-07-16 | End: 2021-07-16 | Stop reason: HOSPADM

## 2021-07-16 RX ORDER — CEFAZOLIN SODIUM 2 G/50ML
2000 SOLUTION INTRAVENOUS ONCE
Status: DISCONTINUED | OUTPATIENT
Start: 2021-07-16 | End: 2021-07-16 | Stop reason: HOSPADM

## 2021-07-16 RX ORDER — CEFAZOLIN SODIUM 2 G/50ML
2000 SOLUTION INTRAVENOUS EVERY 8 HOURS
Status: COMPLETED | OUTPATIENT
Start: 2021-07-16 | End: 2021-07-17

## 2021-07-16 RX ORDER — ACETAMINOPHEN 325 MG/1
975 TABLET ORAL EVERY 8 HOURS
Status: DISCONTINUED | OUTPATIENT
Start: 2021-07-16 | End: 2021-07-16 | Stop reason: HOSPADM

## 2021-07-16 RX ORDER — CHLORHEXIDINE GLUCONATE 0.12 MG/ML
15 RINSE ORAL 2 TIMES DAILY
Status: DISCONTINUED | OUTPATIENT
Start: 2021-07-16 | End: 2021-07-16 | Stop reason: HOSPADM

## 2021-07-16 RX ORDER — FENTANYL CITRATE 50 UG/ML
50 INJECTION, SOLUTION INTRAMUSCULAR; INTRAVENOUS
Status: DISCONTINUED | OUTPATIENT
Start: 2021-07-16 | End: 2021-07-16

## 2021-07-16 RX ORDER — HYDROMORPHONE HCL/PF 1 MG/ML
0.5 SYRINGE (ML) INJECTION ONCE
Status: COMPLETED | OUTPATIENT
Start: 2021-07-16 | End: 2021-07-16

## 2021-07-16 RX ORDER — PANTOPRAZOLE SODIUM 40 MG/1
40 TABLET, DELAYED RELEASE ORAL DAILY
Status: DISCONTINUED | OUTPATIENT
Start: 2021-07-16 | End: 2021-07-16 | Stop reason: HOSPADM

## 2021-07-16 RX ORDER — HYDROMORPHONE HCL/PF 1 MG/ML
0.5 SYRINGE (ML) INJECTION EVERY 4 HOURS PRN
Status: DISCONTINUED | OUTPATIENT
Start: 2021-07-16 | End: 2021-07-16 | Stop reason: HOSPADM

## 2021-07-16 RX ORDER — CEFAZOLIN SODIUM 2 G/50ML
SOLUTION INTRAVENOUS AS NEEDED
Status: DISCONTINUED | OUTPATIENT
Start: 2021-07-16 | End: 2021-07-16

## 2021-07-16 RX ORDER — ATORVASTATIN CALCIUM 80 MG/1
80 TABLET, FILM COATED ORAL
Status: DISCONTINUED | OUTPATIENT
Start: 2021-07-16 | End: 2021-07-20 | Stop reason: HOSPADM

## 2021-07-16 RX ORDER — OXYCODONE HYDROCHLORIDE 5 MG/1
5 TABLET ORAL EVERY 4 HOURS PRN
Status: DISCONTINUED | OUTPATIENT
Start: 2021-07-16 | End: 2021-07-16 | Stop reason: HOSPADM

## 2021-07-16 RX ORDER — FENTANYL CITRATE 50 UG/ML
INJECTION, SOLUTION INTRAMUSCULAR; INTRAVENOUS AS NEEDED
Status: DISCONTINUED | OUTPATIENT
Start: 2021-07-16 | End: 2021-07-16

## 2021-07-16 RX ORDER — FENTANYL CITRATE 50 UG/ML
50 INJECTION, SOLUTION INTRAMUSCULAR; INTRAVENOUS ONCE
Status: DISCONTINUED | OUTPATIENT
Start: 2021-07-16 | End: 2021-07-16 | Stop reason: HOSPADM

## 2021-07-16 RX ORDER — SODIUM CHLORIDE 450 MG/100ML
20 INJECTION, SOLUTION INTRAVENOUS CONTINUOUS
Status: DISCONTINUED | OUTPATIENT
Start: 2021-07-16 | End: 2021-07-20 | Stop reason: HOSPADM

## 2021-07-16 RX ORDER — ASPIRIN 325 MG
325 TABLET ORAL DAILY
Status: DISCONTINUED | OUTPATIENT
Start: 2021-07-16 | End: 2021-07-20 | Stop reason: HOSPADM

## 2021-07-16 RX ORDER — PROPOFOL 10 MG/ML
INJECTION, EMULSION INTRAVENOUS AS NEEDED
Status: DISCONTINUED | OUTPATIENT
Start: 2021-07-16 | End: 2021-07-16

## 2021-07-16 RX ORDER — CALCIUM CHLORIDE 100 MG/ML
1 INJECTION INTRAVENOUS; INTRAVENTRICULAR ONCE
Status: DISCONTINUED | OUTPATIENT
Start: 2021-07-16 | End: 2021-07-16 | Stop reason: HOSPADM

## 2021-07-16 RX ORDER — HYDROMORPHONE HCL/PF 1 MG/ML
0.5 SYRINGE (ML) INJECTION EVERY 2 HOUR PRN
Status: DISCONTINUED | OUTPATIENT
Start: 2021-07-16 | End: 2021-07-18

## 2021-07-16 RX ORDER — BISACODYL 10 MG
10 SUPPOSITORY, RECTAL RECTAL DAILY PRN
Status: DISCONTINUED | OUTPATIENT
Start: 2021-07-16 | End: 2021-07-16 | Stop reason: HOSPADM

## 2021-07-16 RX ORDER — OXYCODONE HYDROCHLORIDE 5 MG/1
2.5 TABLET ORAL EVERY 4 HOURS PRN
Status: DISCONTINUED | OUTPATIENT
Start: 2021-07-16 | End: 2021-07-18

## 2021-07-16 RX ORDER — POTASSIUM CHLORIDE 14.9 MG/ML
20 INJECTION INTRAVENOUS ONCE AS NEEDED
Status: DISCONTINUED | OUTPATIENT
Start: 2021-07-16 | End: 2021-07-17

## 2021-07-16 RX ORDER — BISACODYL 10 MG
10 SUPPOSITORY, RECTAL RECTAL DAILY PRN
Status: DISCONTINUED | OUTPATIENT
Start: 2021-07-16 | End: 2021-07-19

## 2021-07-16 RX ORDER — ALBUMIN, HUMAN INJ 5% 5 %
SOLUTION INTRAVENOUS
Status: COMPLETED
Start: 2021-07-16 | End: 2021-07-16

## 2021-07-16 RX ORDER — HEPARIN SODIUM 1000 [USP'U]/ML
INJECTION, SOLUTION INTRAVENOUS; SUBCUTANEOUS AS NEEDED
Status: DISCONTINUED | OUTPATIENT
Start: 2021-07-16 | End: 2021-07-16

## 2021-07-16 RX ORDER — AMIODARONE HYDROCHLORIDE 200 MG/1
200 TABLET ORAL EVERY 8 HOURS SCHEDULED
Status: DISCONTINUED | OUTPATIENT
Start: 2021-07-16 | End: 2021-07-20 | Stop reason: HOSPADM

## 2021-07-16 RX ORDER — FENTANYL CITRATE 50 UG/ML
50 INJECTION, SOLUTION INTRAMUSCULAR; INTRAVENOUS
Status: DISCONTINUED | OUTPATIENT
Start: 2021-07-16 | End: 2021-07-16 | Stop reason: HOSPADM

## 2021-07-16 RX ORDER — LIDOCAINE HYDROCHLORIDE 10 MG/ML
INJECTION, SOLUTION EPIDURAL; INFILTRATION; INTRACAUDAL; PERINEURAL
Status: COMPLETED | OUTPATIENT
Start: 2021-07-16 | End: 2021-07-16

## 2021-07-16 RX ORDER — FONDAPARINUX SODIUM 2.5 MG/.5ML
2.5 INJECTION SUBCUTANEOUS EVERY 24 HOURS
Status: DISCONTINUED | OUTPATIENT
Start: 2021-07-16 | End: 2021-07-20 | Stop reason: HOSPADM

## 2021-07-16 RX ORDER — POTASSIUM CHLORIDE 14.9 MG/ML
20 INJECTION INTRAVENOUS ONCE AS NEEDED
Status: DISCONTINUED | OUTPATIENT
Start: 2021-07-16 | End: 2021-07-16 | Stop reason: HOSPADM

## 2021-07-16 RX ORDER — NITROGLYCERIN 20 MG/100ML
INJECTION INTRAVENOUS CONTINUOUS PRN
Status: DISCONTINUED | OUTPATIENT
Start: 2021-07-16 | End: 2021-07-16

## 2021-07-16 RX ORDER — POTASSIUM CHLORIDE 14.9 MG/ML
20 INJECTION INTRAVENOUS
Status: DISCONTINUED | OUTPATIENT
Start: 2021-07-16 | End: 2021-07-17

## 2021-07-16 RX ORDER — DOCUSATE SODIUM 100 MG/1
100 CAPSULE, LIQUID FILLED ORAL 2 TIMES DAILY
Status: DISCONTINUED | OUTPATIENT
Start: 2021-07-16 | End: 2021-07-16 | Stop reason: HOSPADM

## 2021-07-16 RX ORDER — CALCIUM CHLORIDE 100 MG/ML
1 INJECTION INTRAVENOUS; INTRAVENTRICULAR ONCE
Status: DISCONTINUED | OUTPATIENT
Start: 2021-07-16 | End: 2021-07-16

## 2021-07-16 RX ORDER — PANTOPRAZOLE SODIUM 40 MG/1
40 TABLET, DELAYED RELEASE ORAL
Status: DISCONTINUED | OUTPATIENT
Start: 2021-07-17 | End: 2021-07-20 | Stop reason: HOSPADM

## 2021-07-16 RX ORDER — ROCURONIUM BROMIDE 10 MG/ML
INJECTION, SOLUTION INTRAVENOUS AS NEEDED
Status: DISCONTINUED | OUTPATIENT
Start: 2021-07-16 | End: 2021-07-16

## 2021-07-16 RX ORDER — ACETAMINOPHEN 650 MG/1
650 SUPPOSITORY RECTAL EVERY 4 HOURS PRN
Status: DISCONTINUED | OUTPATIENT
Start: 2021-07-16 | End: 2021-07-16

## 2021-07-16 RX ORDER — ATORVASTATIN CALCIUM 80 MG/1
80 TABLET, FILM COATED ORAL
Status: DISCONTINUED | OUTPATIENT
Start: 2021-07-16 | End: 2021-07-16 | Stop reason: HOSPADM

## 2021-07-16 RX ORDER — CEFAZOLIN SODIUM 2 G/50ML
2000 SOLUTION INTRAVENOUS ONCE
Status: DISCONTINUED | OUTPATIENT
Start: 2021-07-16 | End: 2021-07-16

## 2021-07-16 RX ORDER — FENTANYL CITRATE 50 UG/ML
50 INJECTION, SOLUTION INTRAMUSCULAR; INTRAVENOUS ONCE
Status: COMPLETED | OUTPATIENT
Start: 2021-07-16 | End: 2021-07-16

## 2021-07-16 RX ORDER — SODIUM CHLORIDE 450 MG/100ML
20 INJECTION, SOLUTION INTRAVENOUS CONTINUOUS
Status: DISCONTINUED | OUTPATIENT
Start: 2021-07-16 | End: 2021-07-16 | Stop reason: HOSPADM

## 2021-07-16 RX ORDER — AMINOCAPROIC ACID 250 MG/ML
INJECTION, SOLUTION INTRAVENOUS AS NEEDED
Status: DISCONTINUED | OUTPATIENT
Start: 2021-07-16 | End: 2021-07-16

## 2021-07-16 RX ORDER — SODIUM CHLORIDE 9 MG/ML
INJECTION, SOLUTION INTRAVENOUS CONTINUOUS PRN
Status: DISCONTINUED | OUTPATIENT
Start: 2021-07-16 | End: 2021-07-16

## 2021-07-16 RX ORDER — FENTANYL CITRATE 50 UG/ML
INJECTION, SOLUTION INTRAMUSCULAR; INTRAVENOUS
Status: COMPLETED
Start: 2021-07-16 | End: 2021-07-16

## 2021-07-16 RX ORDER — MAGNESIUM SULFATE HEPTAHYDRATE 40 MG/ML
2 INJECTION, SOLUTION INTRAVENOUS ONCE
Status: COMPLETED | OUTPATIENT
Start: 2021-07-16 | End: 2021-07-16

## 2021-07-16 RX ORDER — MAGNESIUM HYDROXIDE 1200 MG/15ML
LIQUID ORAL AS NEEDED
Status: DISCONTINUED | OUTPATIENT
Start: 2021-07-16 | End: 2021-07-16 | Stop reason: HOSPADM

## 2021-07-16 RX ORDER — CHLORHEXIDINE GLUCONATE 0.12 MG/ML
15 RINSE ORAL 2 TIMES DAILY
Status: DISCONTINUED | OUTPATIENT
Start: 2021-07-16 | End: 2021-07-16

## 2021-07-16 RX ORDER — BISACODYL 10 MG
10 SUPPOSITORY, RECTAL RECTAL DAILY PRN
Status: DISCONTINUED | OUTPATIENT
Start: 2021-07-16 | End: 2021-07-16

## 2021-07-16 RX ORDER — ASPIRIN 325 MG
325 TABLET ORAL DAILY
Status: DISCONTINUED | OUTPATIENT
Start: 2021-07-16 | End: 2021-07-16 | Stop reason: HOSPADM

## 2021-07-16 RX ORDER — HYDROMORPHONE HCL/PF 1 MG/ML
0.5 SYRINGE (ML) INJECTION EVERY 2 HOUR PRN
Status: DISCONTINUED | OUTPATIENT
Start: 2021-07-16 | End: 2021-07-16

## 2021-07-16 RX ORDER — POTASSIUM CHLORIDE 14.9 MG/ML
20 INJECTION INTRAVENOUS
Status: COMPLETED | OUTPATIENT
Start: 2021-07-16 | End: 2021-07-16

## 2021-07-16 RX ORDER — AMIODARONE HYDROCHLORIDE 200 MG/1
200 TABLET ORAL EVERY 8 HOURS SCHEDULED
Status: DISCONTINUED | OUTPATIENT
Start: 2021-07-16 | End: 2021-07-16 | Stop reason: HOSPADM

## 2021-07-16 RX ORDER — ALBUMIN, HUMAN INJ 5% 5 %
12.5 SOLUTION INTRAVENOUS ONCE
Status: COMPLETED | OUTPATIENT
Start: 2021-07-16 | End: 2021-07-17

## 2021-07-16 RX ORDER — POLYETHYLENE GLYCOL 3350 17 G/17G
17 POWDER, FOR SOLUTION ORAL DAILY
Status: DISCONTINUED | OUTPATIENT
Start: 2021-07-16 | End: 2021-07-20 | Stop reason: HOSPADM

## 2021-07-16 RX ORDER — MAGNESIUM SULFATE HEPTAHYDRATE 40 MG/ML
2 INJECTION, SOLUTION INTRAVENOUS ONCE
Status: DISCONTINUED | OUTPATIENT
Start: 2021-07-16 | End: 2021-07-16 | Stop reason: HOSPADM

## 2021-07-16 RX ORDER — ACETAMINOPHEN 325 MG/1
975 TABLET ORAL EVERY 8 HOURS
Status: DISCONTINUED | OUTPATIENT
Start: 2021-07-16 | End: 2021-07-20 | Stop reason: HOSPADM

## 2021-07-16 RX ADMIN — Medication 12.5 MG: at 05:33

## 2021-07-16 RX ADMIN — INSULIN LISPRO 1 UNITS: 100 INJECTION, SOLUTION INTRAVENOUS; SUBCUTANEOUS at 06:17

## 2021-07-16 RX ADMIN — FENTANYL CITRATE 50 MCG: 50 INJECTION, SOLUTION INTRAMUSCULAR; INTRAVENOUS at 12:46

## 2021-07-16 RX ADMIN — POTASSIUM CHLORIDE 20 MEQ: 14.9 INJECTION, SOLUTION INTRAVENOUS at 19:54

## 2021-07-16 RX ADMIN — CEFAZOLIN SODIUM 2000 MG: 2 SOLUTION INTRAVENOUS at 08:17

## 2021-07-16 RX ADMIN — CHLORHEXIDINE GLUCONATE 15 ML: 1.2 SOLUTION ORAL at 05:33

## 2021-07-16 RX ADMIN — NITROGLYCERIN 30 MCG/MIN: 20 INJECTION INTRAVENOUS at 10:44

## 2021-07-16 RX ADMIN — AMIODARONE HYDROCHLORIDE 200 MG: 200 TABLET ORAL at 21:22

## 2021-07-16 RX ADMIN — LIDOCAINE HYDROCHLORIDE 5 ML: 10 INJECTION, SOLUTION EPIDURAL; INFILTRATION; INTRACAUDAL; PERINEURAL at 08:54

## 2021-07-16 RX ADMIN — CEFAZOLIN SODIUM 2000 MG: 2 SOLUTION INTRAVENOUS at 15:37

## 2021-07-16 RX ADMIN — ALBUMIN, HUMAN INJ 5% 12.5 G: 5 SOLUTION at 23:13

## 2021-07-16 RX ADMIN — ACETAMINOPHEN 975 MG: 325 TABLET, FILM COATED ORAL at 21:22

## 2021-07-16 RX ADMIN — MIDAZOLAM 2 MG: 1 INJECTION INTRAMUSCULAR; INTRAVENOUS at 09:41

## 2021-07-16 RX ADMIN — HEPARIN SODIUM 39000 UNITS: 1000 INJECTION INTRAVENOUS; SUBCUTANEOUS at 09:10

## 2021-07-16 RX ADMIN — MAGNESIUM SULFATE HEPTAHYDRATE 2 G: 40 INJECTION, SOLUTION INTRAVENOUS at 12:20

## 2021-07-16 RX ADMIN — HEPARIN SODIUM 20000 UNITS: 1000 INJECTION INTRAVENOUS; SUBCUTANEOUS at 09:24

## 2021-07-16 RX ADMIN — HYDROMORPHONE HYDROCHLORIDE 0.5 MG: 1 INJECTION, SOLUTION INTRAMUSCULAR; INTRAVENOUS; SUBCUTANEOUS at 16:18

## 2021-07-16 RX ADMIN — ROCURONIUM BROMIDE 80 MG: 50 INJECTION, SOLUTION INTRAVENOUS at 07:45

## 2021-07-16 RX ADMIN — HEPARIN SODIUM 5000 UNITS: 1000 INJECTION INTRAVENOUS; SUBCUTANEOUS at 08:56

## 2021-07-16 RX ADMIN — HYDROMORPHONE HYDROCHLORIDE 0.5 MG: 1 INJECTION, SOLUTION INTRAMUSCULAR; INTRAVENOUS; SUBCUTANEOUS at 13:53

## 2021-07-16 RX ADMIN — POTASSIUM CHLORIDE 20 MEQ: 14.9 INJECTION, SOLUTION INTRAVENOUS at 17:33

## 2021-07-16 RX ADMIN — MIDAZOLAM 2 MG: 1 INJECTION INTRAMUSCULAR; INTRAVENOUS at 07:20

## 2021-07-16 RX ADMIN — PROTAMINE SULFATE 250 MG: 10 INJECTION, SOLUTION INTRAVENOUS at 10:33

## 2021-07-16 RX ADMIN — SODIUM CHLORIDE 4 UNITS/HR: 9 INJECTION, SOLUTION INTRAVENOUS at 10:35

## 2021-07-16 RX ADMIN — OXYCODONE HYDROCHLORIDE 5 MG: 5 TABLET ORAL at 19:52

## 2021-07-16 RX ADMIN — PROPOFOL 150 MG: 10 INJECTION, EMULSION INTRAVENOUS at 07:39

## 2021-07-16 RX ADMIN — SODIUM CHLORIDE 20 ML/HR: 0.45 INJECTION, SOLUTION INTRAVENOUS at 12:15

## 2021-07-16 RX ADMIN — MUPIROCIN 1 APPLICATION: 20 OINTMENT TOPICAL at 05:34

## 2021-07-16 RX ADMIN — FENTANYL CITRATE 250 MCG: 50 INJECTION INTRAMUSCULAR; INTRAVENOUS at 07:39

## 2021-07-16 RX ADMIN — HYDROMORPHONE HYDROCHLORIDE 0.5 MG: 1 INJECTION, SOLUTION INTRAMUSCULAR; INTRAVENOUS; SUBCUTANEOUS at 22:53

## 2021-07-16 RX ADMIN — HYDROMORPHONE HYDROCHLORIDE 0.5 MG: 1 INJECTION, SOLUTION INTRAMUSCULAR; INTRAVENOUS; SUBCUTANEOUS at 21:21

## 2021-07-16 RX ADMIN — FENTANYL CITRATE 50 MCG: 50 INJECTION INTRAMUSCULAR; INTRAVENOUS at 12:46

## 2021-07-16 RX ADMIN — ROCURONIUM BROMIDE 20 MG: 50 INJECTION, SOLUTION INTRAVENOUS at 09:36

## 2021-07-16 RX ADMIN — MUPIROCIN 1 APPLICATION: 20 OINTMENT TOPICAL at 21:22

## 2021-07-16 RX ADMIN — POTASSIUM CHLORIDE 20 MEQ: 14.9 INJECTION, SOLUTION INTRAVENOUS at 21:13

## 2021-07-16 RX ADMIN — FONDAPARINUX SODIUM 2.5 MG: 2.5 INJECTION, SOLUTION SUBCUTANEOUS at 15:32

## 2021-07-16 RX ADMIN — ALBUMIN (HUMAN) 12.5 G: 12.5 INJECTION, SOLUTION INTRAVENOUS at 23:13

## 2021-07-16 RX ADMIN — AMINOCAPROIC ACID 5 G: 250 INJECTION, SOLUTION INTRAVENOUS at 09:00

## 2021-07-16 RX ADMIN — SODIUM CHLORIDE: 0.9 INJECTION, SOLUTION INTRAVENOUS at 10:57

## 2021-07-16 NOTE — ANESTHESIA PROCEDURE NOTES
Introducer/[place]  Performed by: Ramakrishna Grayson MD  Authorized by: Ramakrishna Grayson MD     Date/Time: 7/16/2021 8:11 AM  Consent: Verbal consent obtained  Written consent obtained  Risks and benefits: risks, benefits and alternatives were discussed  Consent given by: patient  Patient understanding: patient states understanding of the procedure being performed  Patient consent: the patient's understanding of the procedure matches consent given  Procedure consent: procedure consent matches procedure scheduled  Relevant documents: relevant documents present and verified  Test results: test results available and properly labeled  Site marked: the operative site was marked  Radiology Images: Radiology Images displayed and confirmed  If images not available, report reviewed  Required items: required blood products, implants, devices, and special equipment available  Patient identity confirmed: verbally with patient, arm band and provided demographic data  Time out: Immediately prior to procedure a "time out" was called to verify the correct patient, procedure, equipment, support staff and site/side marked as required    Indications: central pressure monitoring  Location details: right internal jugular  Catheter size: 9 Fr  Patient position: Trendelenburg  Assessment: blood return through all ports and free fluid flow  Preparation: skin prepped with ChloraPrep  Skin prep agent dried: skin prep agent completely dried prior to procedure  Sterile barriers: all five maximum sterile barriers used - cap, mask, sterile gown, sterile gloves, and large sterile sheet  Hand hygiene: hand hygiene performed prior to central venous catheter insertion  Ultrasound guidance: yes  ultrasound permanent image saved  Pre-procedure: landmarks identified  Number of attempts: 1  Successful placement: yes  Post-procedure: line sutured and dressing applied  Patient tolerance: patient tolerated the procedure well with no immediate complications

## 2021-07-16 NOTE — OP NOTE
OPERATIVE REPORT  PATIENT NAME: Nicholas Fernandez    :  1955  MRN: [id number]  Pt Location: BE OR ROOM 16    SURGERY DATE: 2021    SURGEON: Ben Kate DO    ASSISTANT: Gertrudis Maldonado PA-C    ADDITIONAL ASSISTANT: N/A    PREOPERATIVE DIAGNOSIS:  Multivessel coronary artery disease    POSTOPERATIVE DIAGNOSIS:  Multivessel coronary artery disease    NYHA: 3  CCS: 3    PROCEDURE:   Coronary artery bypass grafting x 3 with left internal mammary artery to left anterior descending artery, saphenous vein graft to obtuse marginal 1 and saphenous vein graft to right posterior descending artery    CARDIOPULMONARY BYPASS TIME: 52 minutes  CROSSCLAMP TIME: 46 minutes  ANESTHESIA: General endotracheal anesthesia with transesophageal echocardiogram  guidance, Dr Roge Lafleur    INDICATIONS:  The patient is a 61y o  year-old male diagnosed with Multivessel coronary artery disease  FINDINGS:  1  Intraoperative transesophageal echocardiogram revealed normal Biventricular function  2  Epiaortic ultrasound showed no plaque or atheroma  3  Coronary anatomy as described in coronary angiography  4  Final transesophageal echocardiogram demonstrated normal Biventricular function  OPERATIVE TECHNIQUE:    The patient was taken to the operating room, properly identified and placed supine on the operating table  Following the satisfactory induction of general anesthesia and placement of monitoring lines, the patient was prepped and draped in the usual sterile fashion  A time-out procedure was performed  The patient underwent median sternotomy, pericardiotomy, left internal mammary artery harvest with the standard technique and endoscopic left greater saphenous vein harvest, systemic heparinization and conduit preparation  Epiaortic ultrasound showed no plaque or atheroma   Cannulation for cardiopulmonary bypass was performed with an arterial dispersion cannula, double stage venous cannula and a vented antegrade cardioplegia cannula  Once the ACT was greater than 480 seconds we placed the patient on cardiopulmonary bypass, the ascending aorta was crossclamped and cardiac arrest was obtained without complications with [place] cardioplegia  The following grafts were completed, left internal mamamry artery to left anterior descending artery with excellent flow, saphenous vein graft to obtuse marginal 1 with flow of 130 ml/min and saphenous vein graft to right posterior descending artery with flow of 170 ml/min  All the distal anastomoses were performed in end-to-side fashion with 7-0 Prolene  A total of 2 proximal anastomoses were completed on the ascending aorta in end-to-side fashion using running 6-0 Prolene suture  The patient was placed in the Trendelenburg position, the heart was de-aired, a hotshot was given and the crossclamp was removed  Atrial and ventricular pacing wires were placed  Following a period of reperfusion, the patient was weaned from cardiopulmonary bypass and decannulated  Protamine was administered with normalization of the ACT  Hemostasis was confirmed in all fields  Thoracostomy tubes were placed  The sternum was closed with stainless steel wires  The fascia, subdermis and skin were closed with multiple layers of running absorbable suture  Sushma Llanes PA-C assisted the entire surgery to harvest endovascular vein, help with graft construction, and cannulation and closure  COMPLICATIONS: None  PACKS/TUBES/DRAINS: Chest tubes x 3  EBL: 250mL  TRANSFUSION: None  SPECIMENS: None  As the attending surgeon, I was present and scrubbed for all critical portions of this procedure  There was no qualified surgical resident available  Sponge, needle and instrument counts were reported as correct by the nursing staff   The assistance of a PA was required to complete this case, specifically for assistance with endoscopic saphenous vein harvesting, cannulation, decannulation, and construction of the bypass grafts             SIGNATURE: Kandace Prader, DO  DATE: July 16, 2021  TIME: 11:06 AM

## 2021-07-16 NOTE — ANESTHESIA PROCEDURE NOTES
Arterial Line Insertion  Performed by: Priscilla Hines MD  Authorized by: Priscilla Hines MD   Consent: Verbal consent obtained  Written consent obtained  Risks and benefits: risks, benefits and alternatives were discussed  Consent given by: patient  Patient understanding: patient states understanding of the procedure being performed  Patient consent: the patient's understanding of the procedure matches consent given  Procedure consent: procedure consent matches procedure scheduled  Relevant documents: relevant documents present and verified  Test results: test results available and properly labeled  Site marked: the operative site was marked  Radiology Images: Radiology Images displayed and confirmed  If images not available, report reviewed  Required items: required blood products, implants, devices, and special equipment available  Patient identity confirmed: verbally with patient, arm band, provided demographic data and hospital-assigned identification number  Time out: Immediately prior to procedure a "time out" was called to verify the correct patient, procedure, equipment, support staff and site/side marked as required  Preparation: Patient was prepped and draped in the usual sterile fashion    Indications: hemodynamic monitoring  Orientation:  Right  Location: radial arterylidocaine (PF) (XYLOCAINE-MPF) 1 % infiltration, 5 mL  Procedure Details:  Jarret's test normal: yes  Needle gauge: 20  Seldinger technique: Seldinger technique used  Number of attempts: 1    Post-procedure:  Post-procedure: chlorhexidine patch applied,  dressing applied and line sutured  Waveform: good waveform  Post-procedure CNS: normal  Patient tolerance: patient tolerated the procedure well with no immediate complications

## 2021-07-16 NOTE — ANESTHESIA PREPROCEDURE EVALUATION
Procedure:  CORONARY ARTERY BYPASS GRAFT (CABG) 3 VESSELS (N/A Chest)  HARVEST VEIN ENDOSCOPIC (EVH) (N/A Abdomen)  TRANSESOPHAGEAL ECHOCARDIOGRAM (GENNA) (N/A Esophagus)    Relevant Problems   CARDIO   (+) CAD (coronary artery disease)   (+) HTN (hypertension)   (+) Hyperlipidemia   (+) Hypertension   (+) S/P CABG (coronary artery bypass graft)      ENDO   (+) Type 2 diabetes mellitus (HCC)      NEURO/PSYCH   (+) History of COVID-19        Physical Exam    Airway    Mallampati score: II  TM Distance: >3 FB  Neck ROM: full     Dental   No notable dental hx     Cardiovascular  Rhythm: regular, Rate: normal, Cardiovascular exam normal    Pulmonary  Pulmonary exam normal Breath sounds clear to auscultation,     Other Findings        Anesthesia Plan  ASA Score- 4     Anesthesia Type- general with ASA Monitors  Additional Monitors: arterial line, central venous line and pulmonary artery catheter  Airway Plan: ETT  Plan Factors-Exercise tolerance (METS): >4 METS  Chart reviewed  EKG reviewed  Imaging results reviewed  Existing labs reviewed  Patient summary reviewed  Induction- intravenous  Postoperative Plan- Plan for postoperative opioid use  Informed Consent- Anesthetic plan and risks discussed with patient

## 2021-07-16 NOTE — CONSULTS
[address] [facility] Robert 61 y o  male MRN: [id number]  Unit/Bed#: [facility] 418-01 Encounter: [id number]    Inpatient consult to Lorenzomansi Chinedu Antonio performed by: Estrellita Elliott PA-C  Consult ordered by: Kinga Carrasco PA-C          Physician Requesting Consult: Juan Alfred DO    Reason for Consult / Principal Problem: CAD (coronary artery disease)    HPI: Freddy Howell is a 61 y o  male w/ PMHx MVCAD who now presents s/p CABGx3  Patient has a several month history of chest pain and BOLAND which he attributed to deconditioning after a COVID infection that he had in November 2020  He was seen by his PCP who ordered a troponin  Troponin was positive and patient was sent to the ED at [facility]  Cardiac cath was revealing for MVCAD and patient was transferred for cardiac surgery evaluation  He now presents s/p CABGx3  PMHx: tobacco use, MVCAD, newly diagnosed DM 2    History obtained from chart review due to patient being intubated and sedated  ---------------------------------------------------------------------------------------------------------------------------------------------------------------------  Impressions:  1  MVCAD s/p CABGx3  2  DM 2  3  Hx Covid infection   4  Tobacco use    Plan:    Neuro: d/c continuous sedation  ATC tylenol, PRN oxy for pain  Trend neuro exam   Delirium precautions  CV: MAP goal >65  SBP goal <130  CI>2 2  Post-op medications: None  Volume resuscitation as needed  Monitor rhythm on telemetry  Epicardial pacing wires   Intra-op GENNA LVEF 55%  Lung: Check STAT post-op ABG and CXR  Wean vent with spontaneous breathing trial with goal to extubate   GI: GI prophylaxis with PPI  Bowel regimen  Zofran PRN for nausea  FEN: NPO  Replenish K >4 0, mag >2 0 and calcium >7 0  : Check STAT post-op BMP  Holder in place  Monitor UOP with goal >0 5cc/kg/hour   Lasix versus volume resuscitate as needed depending on hemodynamics and volume status  ID: Prophylactic post-op abx  Maintain normothermia  Trend temps  Heme: Check STAT post-op H/H and platelets  Monitor incision site, invasive lines, and chest tube outputs for bleeding  Send coag panel if needed  Endo: Insulin gtt for blood sugar control  Results from last 6 Months   Lab Units 07/07/21  1257   HEMOGLOBIN A1C % 9 0*     Disposition: ICU Care   ---------------------------------------------------------------------------------------------------------------------------------------------------------------------  Historical Information   Past Medical History:   Diagnosis Date    Diabetes mellitus ([facility] [address] )     History of COVID-19 11/2020    HLD (hyperlipidemia)     HTN (hypertension)      No past surgical history on file  Social History   Social History     Substance and Sexual Activity   Alcohol Use Yes    Alcohol/week: 4 0 - 6 0 standard drinks    Types: 4 - 6 Cans of beer per week    Comment: 4-6 drinks on weekend     Social History     Substance and Sexual Activity   Drug Use Never     Social History     Tobacco Use   Smoking Status Current Some Day Smoker    Types: Cigars   Smokeless Tobacco Never Used     Family History   Problem Relation Age of Onset    Leukemia Mother      I have reviewed this patient's family history and commented on sigificant items within the HPI    ROS: ROS unable to be obtained due to patient being intubated and sedated  Allergies: No Known Allergies    Home Medications:   Prior to Admission medications    Medication Sig Start Date End Date Taking?  Authorizing Provider   aspirin (ECOTRIN LOW STRENGTH) 81 mg EC tablet Take 81 mg by mouth daily    Historical Provider, MD   Empagliflozin (Jardiance) 10 MG TABS Take 10 mg by mouth daily     Historical Provider, MD   Exenatide ER (Bydureon BCise) 2 MG/0 85ML AUIJ once a week WEEKLY ON Monday    Historical Provider, MD   lisinopril (ZESTRIL) 10 mg tablet Take 10 mg by mouth daily     Historical Provider, MD   metFORMIN (GLUCOPHAGE) 1000 MG tablet Take 500 mg by mouth 2 (two) times a day with meals     Historical Provider, MD   pravastatin (PRAVACHOL) 20 mg tablet Take 20 mg by mouth daily    Historical Provider, MD     Inpatient Medications:  Scheduled Meds:  Current Facility-Administered Medications   Medication Dose Route Frequency Provider Last Rate    dexmedetomidine  0 1-0 7 mcg/kg/hr Intravenous Titrated Live Zapata PA-C      fentanyl citrate (PF)           fentanyl citrate (PF)  50 mcg Intravenous Once Bhavna R BESSIE Zapata      insulin regular (HumuLIN R,NovoLIN R) infusion  0 3-21 Units/hr Intravenous Titrated Liliane Maldonado PA-C 4 Units/hr (21 1035)    niCARdipine  2 5-15 mg/hr Intravenous Titrated Liliane Maldonado PA-C      sodium chloride  20 mL/hr Intravenous Continuous Liliane Maldonado PA-C       Continuous Infusions:dexmedetomidine, 0 1-0 7 mcg/kg/hr  insulin regular (HumuLIN R,NovoLIN R) infusion, 0 3-21 Units/hr, Last Rate: 4 Units/hr (21 1035)  niCARdipine, 2 5-15 mg/hr  sodium chloride, 20 mL/hr      PRN Meds:     ---------------------------------------------------------------------------------------------------------------------------------------------------------------------  Vitals:   Vitals:    07/15/21 1958 07/15/21 2319 21 0310 21 0533   BP: 121/84 125/60 104/69    BP Location: Left arm Left arm Left arm    Pulse: 72 75 56    Resp: 18 18 18    Temp: 98 °F (36 7 °C) 98 1 °F (36 7 °C) (!) 97 3 °F (36 3 °C)    TempSrc: Oral Oral Oral    SpO2: 95% 95% 97%    Weight:    109 kg (240 lb 1 3 oz)   Height:         Arterial Line:          Temperature: Temp (24hrs), Av °F (36 7 °C), Min:97 3 °F (36 3 °C), Max:98 4 °F (36 9 °C)  Current: Temperature: (!) 97 3 °F (36 3 °C)    Weights: IBW (Ideal Body Weight): 73 kg  Body mass index is 34 45 kg/m²      Hemodynamic Monitoring:  PAP:  , CVP:  , CO:  , CI:  , SVR:      Ventilator Settings:  Respiratory Lab Data (Last 4 hours)    None         O2/Vent Data (Last 4 hours)       1143           Vent Mode AC/VC       Resp Rate (BPM) (BPM) 14       Vt (mL) (mL) 500       FIO2 (%) (%) 50       PEEP (cmH2O) (cmH2O) 6       Patient safety screen outcome: Passed       MV                  Labs:   Results from last 7 days   Lab Units 21  1150 21  1103 21  1021 21  1010 21  0516 21  0828 21  0828 21  0545   WBC Thousand/uL  --   --   --   --  6 50  --  7 35 6 50   HEMOGLOBIN g/dL  --   --   --   --  14 7  --  16 1 15 6   I STAT HEMOGLOBIN g/dl 12 6 11 6* 11 2*  --   --    < >  --   --    HEMATOCRIT %  --   --   --   --  43 9  --  48 0 46 9   HEMATOCRIT, ISTAT % 37 34* 33*  --   --    < >  --   --    PLATELETS Thousands/uL  --   --   --  209 187  --  232 194   NEUTROS PCT %  --   --   --   --  58  --  59 53   MONOS PCT %  --   --   --   --  13*  --  13* 12    < > = values in this interval not displayed  Results from last 7 days   Lab Units 21  1150 21  1103 21  1021 21  0516 21  0828 21  0828 21  0545   SODIUM mmol/L  --   --   --  138  --  135* 136   POTASSIUM mmol/L  --   --   --  4 2  --  4 2 3 9   CHLORIDE mmol/L  --   --   --  105  --  105 104   CO2 mmol/L  --   --   --  27  --  24 27   CO2, I-STAT mmol/L 27 26 31  --    < >  --   --    BUN mg/dL  --   --   --  15  --  15 14   CREATININE mg/dL  --   --   --  0 95  --  0 95 0 90   CALCIUM mg/dL  --   --   --  9 2  --  9 4 9 0   GLUCOSE, ISTAT mg/dl 231* 258* 276*  --    < >  --   --     < > = values in this interval not displayed  Post-op AB 35/46/107/25/0/98%  Post-op CXR: lines and tubes in position, no PTX I have personally reviewed pertinent films in PACS  Post-op EKG: NSR This was personally reviewed by myself  Physical Exam  Invasive lines and devices:   Invasive Devices     Central Venous Catheter Line            CVC Central Lines 21 Triple <1 day    Introducer 07/16/21 <1 day          Peripheral Intravenous Line            Peripheral IV 07/12/21 Left Forearm 4 days    Peripheral IV 07/16/21 Right Antecubital <1 day          Arterial Line            Arterial Line 07/16/21 Right Femoral <1 day    Arterial Line 07/16/21 Right Radial <1 day          Line            Pacer Wires <1 day    Pacer Wires <1 day          Drain            Chest Tube 1 Left Pleural 32 Fr  <1 day    Chest Tube 2 Posterior Mediastinal 32 Fr  <1 day    Chest Tube 3 Anterior Mediastinal 32 Fr  <1 day    Urethral Catheter Non-latex;Straight-tip; Temperature probe 16 Fr  <1 day          Airway            ETT  Hi-Lo 8 5 mm <1 day              ---------------------------------------------------------------------------------------------------------------------------------------------------------------------  Care Time Delivered:   No Critical Care time spent     SIGNATURE: Oriana Medina PA-C  DATE: July 16, 2021  TIME: 12:01 PM

## 2021-07-16 NOTE — ANESTHESIA PROCEDURE NOTES
Central Line Insertion  Performed by: Gold Maher MD  Authorized by: Gold Maher MD     Date/Time: 7/16/2021 7:46 AM  Catheter Type:  triple lumen  Consent: Written consent obtained  Risks and benefits: risks, benefits and alternatives were discussed  Consent given by: patient  Patient understanding: patient states understanding of the procedure being performed  Patient consent: the patient's understanding of the procedure matches consent given  Procedure consent: procedure consent matches procedure scheduled  Relevant documents: relevant documents present and verified  Test results: test results available and properly labeled  Site marked: the operative site was marked  Radiology Images: Radiology Images displayed and confirmed  If images not available, report reviewed  Required items: required blood products, implants, devices, and special equipment available  Patient identity confirmed: verbally with patient, arm band, provided demographic data and hospital-assigned identification number  Time out: Immediately prior to procedure a "time out" was called to verify the correct patient, procedure, equipment, support staff and site/side marked as required    Indications: vascular access  Location details: right internal jugular  Catheter size: 7 Fr  Patient position: Trendelenburg  Assessment: blood return through all ports and free fluid flow  Preparation: skin prepped with ChloraPrep  Sterile barriers: all five maximum sterile barriers used - cap, mask, sterile gown, sterile gloves, and large sterile sheet  Hand hygiene: hand hygiene performed prior to central venous catheter insertion  Ultrasound guidance: yes  sterile gel and probe cover used in ultrasound-guided central venous catheter insertionultrasound permanent image saved  Pre-procedure: landmarks identified  Number of attempts: 1  Successful placement: yes  Post-procedure: chlorhexidine patch applied,  dressing applied and line sutured  Patient tolerance: patient tolerated the procedure well with no immediate complications

## 2021-07-16 NOTE — ANESTHESIA PROCEDURE NOTES
Procedure Performed: GENNA Anesthesia  Start Time:  7/16/2021 8:11 AM        Preanesthesia Checklist    Patient identified, IV assessed, risks and benefits discussed, monitors and equipment assessed, procedure being performed at surgeon's request and anesthesia consent obtained  Procedure    Diagnostic Indications for GENNA:  assessment of surgical repair  Type of GENNA: interventional GENNA with real time guidance of intracardiac procedure  Images Saved: ultrasound permanent image saved  Physician Requesting Echo: Juan Blount,   Location performed: OR  Intubated  Bite block not placed  Heart visualized  Insertion of GENNA Probe:  Atraumatic  Probe Type:  Multiplane  Modalities:  2D only, color flow mapping and continuous wave Doppler  Echocardiographic and Doppler Measurements    PREPROCEDURE    LEFT VENTRICLE:  Systolic Function: normal  Ejection Fraction: 50%  Cavity size: normal  Regional Wall Motion Abnormalities: none  RIGHT VENTRICLE:  Systolic Function: normal   Cavity size normal  No hypertrophy              AORTIC VALVE:  Leaflets: normal and trileaflet  Leaflet motions normal and normal  Stenosis: none  Regurgitation: none  MITRAL VALVE:  Leaflets: normal  Leaflet Motions: normal  Regurgitation: trace  Stenosis: none  TRICUSPID VALVE:  Leaflets: normal  Leaflet Motions: normal  Stenosis: none  Regurgitation: trace  PULMONIC VALVE:  Leaflets: normal  Regurgitation: none  Stenosis: none  ASCENDING AORTA:  Size:  normal  Dissection not present  AORTIC ARCH:  Size:  normal  dissection not present  Grade 1: normal to mild intimal thickening  DESCENDING AORTA:  Size: normal  Dissection not present           RIGHT ATRIUM:  Size:  normal  No spontaneous echo contrast     LEFT ATRIUM:  Size: normal  No spontaneous echo contrast     LEFT ATRIAL APPENDAGE:  Size: normal  No spontaneous echo contrast         ATRIAL SEPTUM:  Intra-atrial septal morphology: normal  VENTRICULAR SEPTUM:  Intra-ventricular septum morphology: normal          EPIAORTIC:  Plaque Thickness: 0-5 mm  OTHER FINDINGS:  Pericardium:  normal  Pleural Effusion:  none  POSTPROCEDURE    LEFT VENTRICLE: Unchanged   RIGHT VENTRICLE: Unchanged   AORTIC VALVE: Unchanged   TRICUSPID VALVE: Unchanged   PULMONIC VALVE: Unchanged           ATRIA: Unchanged   AORTA: Unchanged   REMOVAL:  Probe Removal: atraumatic

## 2021-07-16 NOTE — RESPIRATORY THERAPY NOTE
RT Ventilator Management Note  Iveth Edmondson 61 y o  male MRN: [id number]  Unit/Bed#: [facility] 418-01 Encounter: [id number]      Daily Screen       7/16/2021  1143             Patient safety screen outcome[de-identified]  Passed  (Pended)             Physical Exam:   Assessment Type: (P) Assess only  General Appearance: (P) Sedated  Respiratory Pattern: (P) Assisted  Chest Assessment: (P) Chest expansion symmetrical  Bilateral Breath Sounds: (P) Clear      Resp Comments: (P) Pt received from OR and placed on vent on AC settings  VS are stable and pt appears comfortable @ this time  Evaluated pt per resp protocol and ACP s/p CABG X3  Pt has no documented pulmonary hx nor does he have any documented pulmonary meds @ home  BS are CTA and there is no indication for bronchodilator therapy @ this time  Will start IS with pt upon extubation  Will continue to monitor and wean when appropriate

## 2021-07-17 ENCOUNTER — APPOINTMENT (INPATIENT)
Dept: RADIOLOGY | Facility: HOSPITAL | Age: 66
DRG: 235 | End: 2021-07-17
Payer: COMMERCIAL

## 2021-07-17 LAB
ABO GROUP BLD BPU: NORMAL
ANION GAP SERPL CALCULATED.3IONS-SCNC: 4 MMOL/L (ref 4–13)
ANION GAP SERPL CALCULATED.3IONS-SCNC: 4 MMOL/L (ref 4–13)
BPU ID: NORMAL
BUN SERPL-MCNC: 12 MG/DL (ref 5–25)
BUN SERPL-MCNC: 12 MG/DL (ref 5–25)
CALCIUM SERPL-MCNC: 8.1 MG/DL (ref 8.3–10.1)
CALCIUM SERPL-MCNC: 8.2 MG/DL (ref 8.3–10.1)
CHLORIDE SERPL-SCNC: 108 MMOL/L (ref 100–108)
CHLORIDE SERPL-SCNC: 110 MMOL/L (ref 100–108)
CO2 SERPL-SCNC: 25 MMOL/L (ref 21–32)
CO2 SERPL-SCNC: 26 MMOL/L (ref 21–32)
CREAT SERPL-MCNC: 0.75 MG/DL (ref 0.6–1.3)
CREAT SERPL-MCNC: 0.77 MG/DL (ref 0.6–1.3)
CROSSMATCH: NORMAL
ERYTHROCYTE [DISTWIDTH] IN BLOOD BY AUTOMATED COUNT: 13.1 % (ref 11.6–15.1)
GFR SERPL CREATININE-BSD FRML MDRD: 96 ML/MIN/1.73SQ M
GFR SERPL CREATININE-BSD FRML MDRD: 97 ML/MIN/1.73SQ M
GLUCOSE SERPL-MCNC: 111 MG/DL (ref 65–140)
GLUCOSE SERPL-MCNC: 112 MG/DL (ref 65–140)
GLUCOSE SERPL-MCNC: 113 MG/DL (ref 65–140)
GLUCOSE SERPL-MCNC: 115 MG/DL (ref 65–140)
GLUCOSE SERPL-MCNC: 121 MG/DL (ref 65–140)
GLUCOSE SERPL-MCNC: 124 MG/DL (ref 65–140)
GLUCOSE SERPL-MCNC: 124 MG/DL (ref 65–140)
GLUCOSE SERPL-MCNC: 128 MG/DL (ref 65–140)
GLUCOSE SERPL-MCNC: 135 MG/DL (ref 65–140)
GLUCOSE SERPL-MCNC: 138 MG/DL (ref 65–140)
GLUCOSE SERPL-MCNC: 141 MG/DL (ref 65–140)
GLUCOSE SERPL-MCNC: 143 MG/DL (ref 65–140)
GLUCOSE SERPL-MCNC: 149 MG/DL (ref 65–140)
HCT VFR BLD AUTO: 41.1 % (ref 36.5–49.3)
HGB BLD-MCNC: 13.8 G/DL (ref 12–17)
MAGNESIUM SERPL-MCNC: 2.3 MG/DL (ref 1.6–2.6)
MCH RBC QN AUTO: 33.6 PG (ref 26.8–34.3)
MCHC RBC AUTO-ENTMCNC: 33.6 G/DL (ref 31.4–37.4)
MCV RBC AUTO: 100 FL (ref 82–98)
PLATELET # BLD AUTO: 160 THOUSANDS/UL (ref 149–390)
PMV BLD AUTO: 10 FL (ref 8.9–12.7)
POTASSIUM SERPL-SCNC: 4.3 MMOL/L (ref 3.5–5.3)
POTASSIUM SERPL-SCNC: 5.3 MMOL/L (ref 3.5–5.3)
RBC # BLD AUTO: 4.11 MILLION/UL (ref 3.88–5.62)
SODIUM SERPL-SCNC: 138 MMOL/L (ref 136–145)
SODIUM SERPL-SCNC: 139 MMOL/L (ref 136–145)
UNIT DISPENSE STATUS: NORMAL
UNIT PRODUCT CODE: NORMAL
UNIT RH: NORMAL
WBC # BLD AUTO: 12.97 THOUSAND/UL (ref 4.31–10.16)

## 2021-07-17 PROCEDURE — 99232 SBSQ HOSP IP/OBS MODERATE 35: CPT | Performed by: INTERNAL MEDICINE

## 2021-07-17 PROCEDURE — 93005 ELECTROCARDIOGRAM TRACING: CPT

## 2021-07-17 PROCEDURE — 97163 PT EVAL HIGH COMPLEX 45 MIN: CPT

## 2021-07-17 PROCEDURE — 85027 COMPLETE CBC AUTOMATED: CPT | Performed by: PHYSICIAN ASSISTANT

## 2021-07-17 PROCEDURE — 99233 SBSQ HOSP IP/OBS HIGH 50: CPT | Performed by: PHYSICIAN ASSISTANT

## 2021-07-17 PROCEDURE — 80048 BASIC METABOLIC PNL TOTAL CA: CPT | Performed by: PHYSICIAN ASSISTANT

## 2021-07-17 PROCEDURE — 82948 REAGENT STRIP/BLOOD GLUCOSE: CPT

## 2021-07-17 PROCEDURE — 83735 ASSAY OF MAGNESIUM: CPT | Performed by: PHYSICIAN ASSISTANT

## 2021-07-17 PROCEDURE — 71045 X-RAY EXAM CHEST 1 VIEW: CPT

## 2021-07-17 PROCEDURE — 99024 POSTOP FOLLOW-UP VISIT: CPT | Performed by: THORACIC SURGERY (CARDIOTHORACIC VASCULAR SURGERY)

## 2021-07-17 RX ORDER — TEMAZEPAM 15 MG/1
15 CAPSULE ORAL
Status: DISCONTINUED | OUTPATIENT
Start: 2021-07-17 | End: 2021-07-20 | Stop reason: HOSPADM

## 2021-07-17 RX ORDER — LIDOCAINE 50 MG/G
1 PATCH TOPICAL DAILY
Status: DISCONTINUED | OUTPATIENT
Start: 2021-07-17 | End: 2021-07-20 | Stop reason: HOSPADM

## 2021-07-17 RX ORDER — POTASSIUM CHLORIDE 20 MEQ/1
20 TABLET, EXTENDED RELEASE ORAL DAILY
Status: DISCONTINUED | OUTPATIENT
Start: 2021-07-17 | End: 2021-07-18

## 2021-07-17 RX ORDER — FUROSEMIDE 10 MG/ML
40 INJECTION INTRAMUSCULAR; INTRAVENOUS DAILY
Status: DISCONTINUED | OUTPATIENT
Start: 2021-07-17 | End: 2021-07-18

## 2021-07-17 RX ORDER — DOCUSATE SODIUM 100 MG/1
100 CAPSULE, LIQUID FILLED ORAL 2 TIMES DAILY
Status: DISCONTINUED | OUTPATIENT
Start: 2021-07-17 | End: 2021-07-18

## 2021-07-17 RX ORDER — METHOCARBAMOL 500 MG/1
500 TABLET, FILM COATED ORAL EVERY 6 HOURS PRN
Status: DISCONTINUED | OUTPATIENT
Start: 2021-07-17 | End: 2021-07-18

## 2021-07-17 RX ADMIN — OXYCODONE HYDROCHLORIDE 5 MG: 5 TABLET ORAL at 00:36

## 2021-07-17 RX ADMIN — MUPIROCIN 1 APPLICATION: 20 OINTMENT TOPICAL at 09:09

## 2021-07-17 RX ADMIN — Medication 12.5 MG: at 13:36

## 2021-07-17 RX ADMIN — AMIODARONE HYDROCHLORIDE 200 MG: 200 TABLET ORAL at 21:56

## 2021-07-17 RX ADMIN — POTASSIUM CHLORIDE 20 MEQ: 1500 TABLET, EXTENDED RELEASE ORAL at 11:32

## 2021-07-17 RX ADMIN — ACETAMINOPHEN 975 MG: 325 TABLET, FILM COATED ORAL at 06:43

## 2021-07-17 RX ADMIN — POLYETHYLENE GLYCOL 3350 17 G: 17 POWDER, FOR SOLUTION ORAL at 09:09

## 2021-07-17 RX ADMIN — LIDOCAINE 1 PATCH: 50 PATCH TOPICAL at 11:26

## 2021-07-17 RX ADMIN — MUPIROCIN 1 APPLICATION: 20 OINTMENT TOPICAL at 21:57

## 2021-07-17 RX ADMIN — HYDROMORPHONE HYDROCHLORIDE 0.5 MG: 1 INJECTION, SOLUTION INTRAMUSCULAR; INTRAVENOUS; SUBCUTANEOUS at 02:30

## 2021-07-17 RX ADMIN — Medication 12.5 MG: at 21:56

## 2021-07-17 RX ADMIN — CEFAZOLIN SODIUM 2000 MG: 2 SOLUTION INTRAVENOUS at 09:09

## 2021-07-17 RX ADMIN — DOCUSATE SODIUM 100 MG: 100 CAPSULE ORAL at 18:06

## 2021-07-17 RX ADMIN — METHOCARBAMOL 500 MG: 500 TABLET, FILM COATED ORAL at 22:01

## 2021-07-17 RX ADMIN — DOCUSATE SODIUM 100 MG: 100 CAPSULE ORAL at 09:08

## 2021-07-17 RX ADMIN — OXYCODONE HYDROCHLORIDE 5 MG: 5 TABLET ORAL at 04:31

## 2021-07-17 RX ADMIN — ACETAMINOPHEN 975 MG: 325 TABLET, FILM COATED ORAL at 21:56

## 2021-07-17 RX ADMIN — CEFAZOLIN SODIUM 2000 MG: 2 SOLUTION INTRAVENOUS at 00:36

## 2021-07-17 RX ADMIN — ACETAMINOPHEN 975 MG: 325 TABLET, FILM COATED ORAL at 13:35

## 2021-07-17 RX ADMIN — FUROSEMIDE 40 MG: 10 INJECTION, SOLUTION INTRAMUSCULAR; INTRAVENOUS at 11:26

## 2021-07-17 RX ADMIN — FONDAPARINUX SODIUM 2.5 MG: 2.5 INJECTION, SOLUTION SUBCUTANEOUS at 18:06

## 2021-07-17 RX ADMIN — OXYCODONE HYDROCHLORIDE 5 MG: 5 TABLET ORAL at 09:09

## 2021-07-17 RX ADMIN — OXYCODONE HYDROCHLORIDE 5 MG: 5 TABLET ORAL at 14:41

## 2021-07-17 RX ADMIN — SODIUM CHLORIDE 2 UNITS/HR: 9 INJECTION, SOLUTION INTRAVENOUS at 09:10

## 2021-07-17 RX ADMIN — METHOCARBAMOL 500 MG: 500 TABLET, FILM COATED ORAL at 15:43

## 2021-07-17 RX ADMIN — AMIODARONE HYDROCHLORIDE 200 MG: 200 TABLET ORAL at 13:35

## 2021-07-17 RX ADMIN — ATORVASTATIN CALCIUM 80 MG: 80 TABLET, FILM COATED ORAL at 18:06

## 2021-07-17 RX ADMIN — AMIODARONE HYDROCHLORIDE 200 MG: 200 TABLET ORAL at 06:43

## 2021-07-17 RX ADMIN — PANTOPRAZOLE SODIUM 40 MG: 40 TABLET, DELAYED RELEASE ORAL at 06:43

## 2021-07-17 RX ADMIN — OXYCODONE HYDROCHLORIDE 5 MG: 5 TABLET ORAL at 19:12

## 2021-07-17 RX ADMIN — ASPIRIN 325 MG ORAL TABLET 325 MG: 325 PILL ORAL at 09:09

## 2021-07-17 NOTE — PROGRESS NOTES
Cardiology Progress Note - Moira Thornton 61 y o  male MRN: [id number]    Unit/Bed#: [facility] 418-01 Encounter: [id number]        ASSESSMENT/PLAN:    Principal Problem:    CAD (coronary artery disease)  Active Problems:    Hyperlipidemia    Type 2 diabetes mellitus (HCC)    Hypertension    Muscle spasm    Tobacco use    HTN (hypertension)    History of COVID-19    S/P CABG (coronary artery bypass graft)    CAD:  Type 1 non ST-elevation MI:  Coronary artery bypass grafting x 3 with left internal mammary artery to left anterior descending artery, saphenous vein graft to obtuse marginal 1 and saphenous vein graft to right posterior descending artery   Was drowsy this morning  Able to answer all questions  No angina reported  Does have discomfort at the incision site  Overall making satisfactory progress  Chest tubes are in place  Currently on optimal medical therapy  Ischemic cardiomyopathy:  EF 50%  No heart failure        Subjective:   No significant events overnight  Underwent bypass surgery yesterday with no complications  Feels tired  Reports incisional pain            Current Facility-Administered Medications:     acetaminophen (TYLENOL) tablet 975 mg, 975 mg, Oral, Q8H, Griseldaestine Oksana PA-C, 975 mg at 07/17/21 1335    amiodarone tablet 200 mg, 200 mg, Oral, Q8H JAIMEE, Florestine Oksana, PA-C, 200 mg at 07/17/21 1335    aspirin tablet 325 mg, 325 mg, Oral, Daily, Griseldaestine Oksana PA-C, 325 mg at 07/17/21 0909    atorvastatin (LIPITOR) tablet 80 mg, 80 mg, Oral, Daily With BALJINDER Cabello-ALIN    bisacodyl (DULCOLAX) rectal suppository 10 mg, 10 mg, Rectal, Daily PRN, Griseldaestine Oksana PA-C    docusate sodium (COLACE) capsule 100 mg, 100 mg, Oral, BID, Florestine Oksana, PA-C, 100 mg at 07/17/21 0908    docusate sodium (COLACE) capsule 100 mg, 100 mg, Oral, BID, Bhavna LEXII Zapata PA-C    fondaparinux (ARIXTRA) subcutaneous injection 2 5 mg, 2 5 mg, Subcutaneous, Q24H, Bhavna R Kendraon PA-C, 2 5 mg at 07/16/21 1532    furosemide (LASIX) injection 40 mg, 40 mg, Intravenous, Daily, Eliazar Zapata PA-C, 40 mg at 07/17/21 1126    HYDROmorphone (DILAUDID) injection 0 5 mg, 0 5 mg, Intravenous, Q2H PRN, Key Crocker PA-C, 0 5 mg at 07/17/21 0230    insulin regular (HumuLIN R,NovoLIN R) 1 Units/mL in sodium chloride 0 9 % 100 mL infusion, 0 3-21 Units/hr, Intravenous, Titrated, Key Crocker PA-C, Last Rate: 4 mL/hr at 07/17/21 1000, 4 Units/hr at 07/17/21 1000    lidocaine (cardiac) injection 100 mg, 100 mg, Intravenous, Q30 Min PRN, Key Crocker PA-C    lidocaine (LIDODERM) 5 % patch 1 patch, 1 patch, Topical, Daily, Eliazar Zapata PA-C, 1 patch at 07/17/21 1126    methocarbamol (ROBAXIN) tablet 500 mg, 500 mg, Oral, Q6H PRN, Eliazar Zapata PA-C    metoprolol tartrate (LOPRESSOR) partial tablet 12 5 mg, 12 5 mg, Oral, Q12H [address], Bhavna R BESSIE Zapata, 12 5 mg at 07/17/21 1336    mupirocin (BACTROBAN) 2 % nasal ointment 1 application, 1 application, Nasal, S73K [address], Key Crocker PA-C, 1 application at 93/01/60 0909    ondansetron (ZOFRAN) injection 4 mg, 4 mg, Intravenous, Q6H PRN, Key Crocker PA-C    oxyCODONE (ROXICODONE) IR tablet 2 5 mg, 2 5 mg, Oral, Q4H PRN, Key Crocker PA-C    oxyCODONE (ROXICODONE) IR tablet 5 mg, 5 mg, Oral, Q4H PRN, Key Crocker PA-C, 5 mg at 07/17/21 0909    pantoprazole (PROTONIX) EC tablet 40 mg, 40 mg, Oral, Early Morning, Key Crocker PA-C, 40 mg at 07/17/21 0420    polyethylene glycol (MIRALAX) packet 17 g, 17 g, Oral, Daily, Key Crocker PA-C, 17 g at 07/17/21 0909    potassium chloride (K-DUR,KLOR-CON) CR tablet 20 mEq, 20 mEq, Oral, Daily, Eliazar Zapata PA-C, 20 mEq at 07/17/21 1132    sodium chloride infusion 0 45 %, 20 mL/hr, Intravenous, Continuous, Key Crocker PA-C, Last Rate: 20 mL/hr at 07/17/21 0800, 20 mL/hr at 07/17/21 0800    temazepam (RESTORIL) capsule 15 mg, 15 mg, Oral, HS PRN, Eliazar Hua BESSIE Zapata     Objective:     Vitals:   Blood pressure 110/72, pulse 87, temperature 98 7 °F (37 1 °C), temperature source Core, resp  rate 21, height 5' 10" (1 778 m), weight 109 kg (240 lb 1 3 oz), SpO2 94 %  Body mass index is 34 45 kg/m²  Orthostatic Blood Pressures      Most Recent Value   Blood Pressure  110/72 filed at 07/17/2021 1336   Patient Position - Orthostatic VS  Lying filed at 07/17/2021 0091         Systolic (44UVM), GTQ:884 , Min:110 , JXZ:595     Diastolic (94CFD), TVX:04, Min:67, Max:75      Intake/Output Summary (Last 24 hours) at 7/17/2021 1418  Last data filed at 7/17/2021 1000  Gross per 24 hour   Intake 2524 5 ml   Output 1850 ml   Net 674 5 ml     Weight (last 2 days)     Date/Time   Weight    07/16/21 0533   109 (240 08)              Physical Exam  Constitutional:       General: He is not in acute distress  HENT:      Head: Normocephalic  Cardiovascular:      Rate and Rhythm: Normal rate and regular rhythm  Heart sounds: S1 normal and S2 normal       Comments:  Bilateral chest tube noted  Pulmonary:      Effort: No respiratory distress  Breath sounds: No wheezing  Skin:     General: Skin is warm  Neurological:      Comments: Drowsy  Oriented  Labs & Results:        Results from last 7 days   Lab Units 07/17/21 0418 07/16/21  2033 07/16/21  1150 07/16/21  1144 07/16/21  0802 07/16/21  0516 07/14/21  0828 07/14/21  0828   WBC Thousand/uL 12 97*  --   --   --   --  6 50  --  7 35   HEMOGLOBIN g/dL 13 8 14 6  --  13 7  --  14 7  --  16 1   I STAT HEMOGLOBIN g/dl  --   --  12 6  --   --   --    < >  --    HEMATOCRIT % 41 1 43 2  --  40 8  --  43 9  --  48 0   HEMATOCRIT, ISTAT %  --   --  37  --   --   --    < >  --    PLATELETS Thousands/uL 160 159  --  178   < > 187  --  232    < > = values in this interval not displayed           Results from last 7 days   Lab Units 07/17/21  0418 07/17/21  0041 07/16/21  1857 07/16/21  1150 07/16/21  1144 07/16/21  1103 21  1103 21  1021 21  0516   POTASSIUM mmol/L 4 3 5 3 3 1*  --  3 9   < >  --   --   --    CHLORIDE mmol/L 108 110*  --   --  114*  --   --   --   --    CO2 mmol/L 26 25  --   --  24  --   --   --   --    CO2, I-STAT mmol/L  --   --   --  27  --   --  26 31   < >   BUN mg/dL 12 12  --   --  13  --   --   --   --    CREATININE mg/dL 0 77 0 75  --   --  0 65  --   --   --   --    CALCIUM mg/dL 8 1* 8 2*  --   --  7 4*  --   --   --   --    GLUCOSE, ISTAT mg/dl  --   --   --  231*  --   --  258* 276*  --     < > = values in this interval not displayed  Results from last 7 days   Lab Units 21  1144 21  0516 07/15/21  0453   INR  1 11  --   --    PTT seconds 79* 44* 70*     Results from last 7 days   Lab Units 21  0418   MAGNESIUM mg/dL 2 3     No results for input(s): NTBNP in the last 72 hours  Cardiac testing:     Results for orders placed during the hospital encounter of 21    Echo complete with contrast if indicated    Narrative  [address], [address] [facility]  [phone]    Transthoracic Echocardiogram  2D, M-mode, Doppler, and Color Doppler    Study date:  2021    Patient: Nicholas Wetzel  MR number: [id number]  Account number: [de-identified]  : 1955  Age: 61 years  Gender: Male  Status: Outpatient  Location: Bedside  Height: 70 in  Weight: 246 lb  BP: 110/ 66 mmHg    Indications: NSTEMI    Diagnoses: I21 4 - Non-ST elevation (NSTEMI) myocardial infarction    Sonographer:  VLADISLAV Holloway  Primary Physician:  Link Pineda MD  Referring Physician:  Baltazar You PA-C  Group:  [facility]  Interpreting Physician:  Monty Skinner MD    SUMMARY    LEFT VENTRICLE:  Size was normal   Systolic function was at the lower limits of normal  Ejection fraction was estimated to be 50 %  There was mild hypokinesis of the basal-mid inferior and inferolateral walls    Doppler parameters were consistent with abnormal left ventricular relaxation (grade 1 diastolic dysfunction)  RIGHT VENTRICLE:  The size was normal   Systolic function was normal     LEFT ATRIUM:  The atrium was mildly dilated  MITRAL VALVE:  There was trace to mild regurgitation  TRICUSPID VALVE:  There was trace regurgitation  HISTORY: PRIOR HISTORY: HTN, HLD, DM2    PROCEDURE: The procedure was performed at the bedside  This was a routine study  The transthoracic approach was used  The study included complete 2D imaging, M-mode, complete spectral Doppler, and color Doppler  The heart rate was 70 bpm,  at the start of the study  Images were obtained from the parasternal, apical, subcostal, and suprasternal notch acoustic windows  Echocardiographic views were limited due to lung interference  This was a technically difficult study  LEFT VENTRICLE: Size was normal  Systolic function was at the lower limits of normal  Ejection fraction was estimated to be 50 %  There was mild hypokinesis of the basal-mid inferior and inferolateral walls  Wall thickness was normal   DOPPLER: Doppler parameters were consistent with abnormal left ventricular relaxation (grade 1 diastolic dysfunction)  RIGHT VENTRICLE: The size was normal  Systolic function was normal  Wall thickness was normal     LEFT ATRIUM: The atrium was mildly dilated  RIGHT ATRIUM: Size was normal     MITRAL VALVE: Valve structure was normal  There was normal leaflet separation  DOPPLER: The transmitral velocity was within the normal range  There was no evidence for stenosis  There was trace to mild regurgitation  AORTIC VALVE: The valve was trileaflet  Leaflets exhibited mildly increased thickness, mild calcification, normal cuspal separation, and sclerosis  DOPPLER: Transaortic velocity was within the normal range  There was no evidence for  stenosis  There was no regurgitation  TRICUSPID VALVE: The valve structure was normal  There was normal leaflet separation   DOPPLER: The transtricuspid velocity was within the normal range  There was no evidence for stenosis  There was trace regurgitation  The tricuspid jet  envelope definition was inadequate for estimation of RV systolic pressure  There are no indirect findings suggestive of moderate or severe pulmonary hypertension  PULMONIC VALVE: Leaflets exhibited normal thickness, no calcification, and normal cuspal separation  DOPPLER: The transpulmonic velocity was within the normal range  There was no regurgitation  PERICARDIUM: There was no pericardial effusion  The pericardium was normal in appearance  AORTA: The root exhibited normal size  SYSTEMIC VEINS: IVC: The inferior vena cava was normal in size and course  Respirophasic changes were normal     SYSTEM MEASUREMENT TABLES    2D  %FS: 24 71 %  Ao Diam: 3 41 cm  EDV(Teich): 173 64 ml  EF(Teich): 48 23 %  ESV(Teich): 89 89 ml  HR_4Ch_Q: 66 42 BPM  IVSd: 1 14 cm  LA Area: 23 21 cm2  LA Diam: 3 7 cm  LVCO_4Ch_Q: 2 84 L/min  LVEF_4Ch_Q: 41 39 %  LVIDd: 5 91 cm  LVIDs: 4 45 cm  LVLd_4Ch_Q: 8 6 cm  LVLs_4Ch_Q: 7 4 cm  LVPWd: 0 97 cm  LVSV_4Ch_Q: 42 77 ml  LVVED_4Ch_Q: 103 33 ml  LVVES_4Ch_Q: 60 56 ml  RA Area: 18 36 cm2  RVIDd: 3 33 cm  SV(Teich): 83 75 ml    MM  TAPSE: 2 59 cm    PW  E' Sept: 0 08 m/s  E/E' Sept: 9 37  MV A Abiel: 0 89 m/s  MV Dec Lenoir: 3 68 m/s2  MV DecT: 197 59 ms  MV E Abiel: 0 73 m/s  MV E/A Ratio: 0 82    [employer] Accredited Echocardiography Laboratory    Prepared and electronically signed by    Irwin Smalls MD  Signed 08-Jul-2021 14:41:28    No results found for this or any previous visit      Results for orders placed during the hospital encounter of 07/07/21    Cardiac catheterization    Narrative  [address], [address] [facility]  [phone]    [facility]    Invasive Cardiovascular Lab Complete Report    Patient: Dustin Driscoll  MR number: [id number]  Account number: [de-identified]  Study date: 2021  Gender: Male  : 1955  Height: 70 1 in  Weight: 246 4 lb  BSA: 2 28 mï¾²    Allergies: NO KNOWN ALLERGIES    Diagnostic Cardiologist:  Caprice Tadeo MD  Primary Physician:  Norman Billings MD    SUMMARY    CORONARY CIRCULATION:  Left main: Normal   LAD: The vessel was normal sized  There was a 70% stenosis in the proximal vessel  Circumflex: The vessel was normal sized and gave rise to one major OM branch  There was a 95% proximal stenosis of the large OM branch  RCA: The vessel was normal sized and dominant, giving rise to the PDA and several posterolateral branches  There was a complex eccentric lesion in mid vessel with thrombus  There is MEDINA 3 flow into the distal vessel, but MEDINA 1 flow into  the PDA  There was collateral flow to the PDA from the left system  REPORT ELEMENT SELECTION:  Right radial access  There were no complications  Summary:  Severe 3 vessel CAD  Plan: transfer to [facility] AND [facility] for surgical consultation  INDICATIONS:  --  Possible CAD: myocardial infarction without ST elevation (NSTEMI)  PROCEDURES PERFORMED    --  Left coronary angiography  --  Right coronary angiography  --  Inpatient  --  Mod Sedation Same Physician Initial 15min  --  Mod Sedation Same Physician Add 15min  --  Coronary Catheterization (w/o LHC)  PROCEDURE: The risks and alternatives of the procedures and conscious sedation were explained to the patient and informed consent was obtained  The patient was brought to the cath lab and placed on the table  The planned puncture sites  were prepped and draped in the usual sterile fashion  --  Right radial artery access  After performing an Jarret's test to verify adequate ulnar artery supply to the hand, the radial site was prepped  The puncture site was infiltrated with local anesthetic   The vessel was accessed using the  modified Seldinger technique, a wire was advanced into the vessel, and a sheath was advanced over the wire into the vessel  --  Left coronary artery angiography  A catheter was advanced over a guidewire into the aorta and positioned in the left coronary artery ostium under fluoroscopic guidance  Angiography was performed  --  Right coronary artery angiography  A catheter was advanced over a guidewire into the aorta and positioned in the right coronary artery ostium under fluoroscopic guidance  Angiography was performed  --  Inpatient  --  Mod Sedation Same Physician Initial 15min  --  Mod Sedation Same Physician Add 15min  --  Coronary Catheterization (w/o [facility])  PROCEDURE COMPLETION: The patient tolerated the procedure well and was discharged from the cath lab  TIMING: Test started at 15:17  Test concluded at 15:36  HEMOSTASIS: The sheath was removed  The site was compressed with a Hemoband  device  Hemostasis was obtained  MEDICATIONS GIVEN: Verapamil (Isoptin, Calan, Covera), 1 25 mg, IA, at 15:22  Fentanyl (1OOmcg/2 ml), 50 mcg, IV, at 15:05  Versed (2mg/2ml), 2 mg, IV, at 15:06  Fentanyl (1OOmcg/2 ml), 25 mcg, IV, at  15:17  Versed (2mg/2ml), 1 mg, IV, at 15:17  1% Lidocaine, 0 1 ml, subcutaneously, at 15:21  Nitroglycerin (200mcg/ml), 200 mcg, at 15:21  Heparin 1000 units/ml, 4,000 units, IV, at 15:22  CONTRAST GIVEN: 75 ml Omnipaque (350mg I /ml)  RADIATION EXPOSURE: Fluoroscopy time: 2 48 min  CORONARY VESSELS:   --  Left main: Normal   --  LAD: The vessel was normal sized  There was a 70% stenosis in the proximal vessel  --  Circumflex: The vessel was normal sized and gave rise to one major OM branch  There was a 95% proximal stenosis of the large OM branch  --  RCA: The vessel was normal sized and dominant, giving rise to the PDA and several posterolateral branches  There was a complex eccentric lesion in mid vessel with thrombus  There is MEDINA 3 flow into the distal vessel, but MEDINA 1 flow  into the PDA  There was collateral flow to the PDA from the left system      NOTE: Right radial access  There were no complications  Prepared and signed by    Tiana Batista MD  Signed 2021 15:53:35    CC: Dr Jose Daniel Walter  [facility]    Study diagram    Hemodynamic tables    Pressures:  Baseline  Pressures:  - HR: 76  Pressures:  - Rhythm:  Pressures:  -- Aortic Pressure (S/D/M): 92/59/70    Outputs:  Baseline  Outputs:  -- CALCULATIONS[age]  Outputs:  -- CALCULATIONS: Body Surface Area: 2 28  Outputs:  -- CALCULATIONS: Height in cm: 178 00  Outputs:  -- CALCULATIONS: Sex: Male  Outputs:  -- CALCULATIONS: Weight in k 00  Outputs:  -- OUTPUTS: O2 consumption: 285 59  Outputs:  -- OUTPUTS: Vo2 Indexed: 125 00    No results found for this or any previous visit

## 2021-07-17 NOTE — PROGRESS NOTES
Progress Note - Cardiothoracic Surgery   Leslye Calhoun 61 y o  male MRN: [id number]  Unit/Bed#: [facility] 418-01 Encounter: [id number]      POD # 1 s/p CABG    Pt seen/examined  Interval history and data reviewed with critical care team   Pt doing well  No specific complaints      No vasoactive gtts      Medications:   Scheduled Meds:  Current Facility-Administered Medications   Medication Dose Route Frequency Provider Last Rate    acetaminophen  975 mg Oral Q8H Thomasene Limb, PA-C      amiodarone  200 mg Oral [facility] Thomasene Limb, PA-C      aspirin  325 mg Oral Daily Thomasene Limb, PA-C      atorvastatin  80 mg Oral Daily With [employer] Pawan, PA-C      bisacodyl  10 mg Rectal Daily PRN Thomasene Limb, PA-C      docusate sodium  100 mg Oral BID Thomasene Limb, PA-C      fondaparinux  2 5 mg Subcutaneous Q24H Virlinda Bouche, PA-C      HYDROmorphone  0 5 mg Intravenous Q2H PRN Thomasene Limb, PA-C      insulin regular (HumuLIN R,NovoLIN R) infusion  0 3-21 Units/hr Intravenous Titrated Thomasene Limb, PA-C 2 Units/hr (07/17/21 0910)    lidocaine (cardiac)  100 mg Intravenous Q30 Min PRN Thomasene Limb, PA-C      mupirocin  1 application Nasal [address] Thomasene Limb, PA-C      ondansetron  4 mg Intravenous Q6H PRN Thomasene Limb, PA-C      oxyCODONE  2 5 mg Oral Q4H PRN Thomasene Limb, PA-C      oxyCODONE  5 mg Oral Q4H PRN Thomasene Limb, PA-C      pantoprazole  40 mg Oral Early Morning Thomasene Limb, PA-C      polyethylene glycol  17 g Oral Daily Thomasene Limb, PA-C      potassium chloride  20 mEq Intravenous Once PRN Thomasene Limb, PA-C      potassium chloride  20 mEq Intravenous Q30 Min PRN Thomasene Limb, PA-C      sodium chloride  20 mL/hr Intravenous Continuous Thomasene Limb, PA-C 20 mL/hr (07/16/21 1800)     Continuous Infusions:insulin regular (HumuLIN R,NovoLIN R) infusion, 0 3-21 Units/hr, Last Rate: 2 Units/hr (07/17/21 0910)  sodium chloride, 20 mL/hr, Last Rate: 20 mL/hr (07/16/21 1800)      PRN Meds: bisacodyl    HYDROmorphone    lidocaine (cardiac)    ondansetron    oxyCODONE    oxyCODONE    potassium chloride    potassium chloride    Vitals: Blood pressure 113/67, pulse 88, temperature 99 3 °F (37 4 °C), resp  rate 20, height 5' 10" (1 778 m), weight 109 kg (240 lb 1 3 oz), SpO2 96 %  ,Body mass index is 34 45 kg/m²  I/O last 24 hours: In: 3344 5 [P O :1360; I V :662; IV Piggyback:572 5]  Out: 2525 [HMYTN:2452; Chest Tube:590]  Invasive Devices     Central Venous Catheter Line            CVC Central Lines 07/16/21 Triple 1 day          Peripheral Intravenous Line            Peripheral IV 07/12/21 Left Forearm 5 days    Peripheral IV 07/16/21 Right Antecubital 1 day          Line            Pacer Wires 1 day    Pacer Wires 1 day          Drain            Chest Tube 1 Left Pleural 32 Fr  1 day    Chest Tube 2 Posterior Mediastinal 32 Fr  1 day    Chest Tube 3 Anterior Mediastinal 32 Fr  1 day    Urethral Catheter Non-latex;Straight-tip; Temperature probe 16 Fr  1 day                  Lab, Imaging and other studies:   Results from last 7 days   Lab Units 07/17/21  0418 07/16/21  2033 07/16/21  1150 07/16/21  1144 07/16/21  0802 07/16/21  0516 07/14/21  0828 07/14/21  0828   WBC Thousand/uL 12 97*  --   --   --   --  6 50  --  7 35   HEMOGLOBIN g/dL 13 8 14 6  --  13 7  --  14 7  --  16 1   I STAT HEMOGLOBIN g/dl  --   --  12 6  --   --   --    < >  --    HEMATOCRIT % 41 1 43 2  --  40 8  --  43 9  --  48 0   HEMATOCRIT, ISTAT %  --   --  37  --   --   --    < >  --    PLATELETS Thousands/uL 160 159  --  178   < > 187  --  232    < > = values in this interval not displayed       Results from last 7 days   Lab Units 07/17/21  0418 07/17/21  0041 07/16/21  1857 07/16/21  1150 07/16/21  1144 07/16/21  1103   POTASSIUM mmol/L 4 3 5 3 3 1*  --  3 9   < >   CHLORIDE mmol/L 108 110*  --   --  114*  --    CO2 mmol/L 26 25  --   --  24  --    CO2, I-STAT mmol/L  --   --   --  27  -- --    BUN mg/dL 12 12  --   --  13  --    CREATININE mg/dL 0 77 0 75  --   --  0 65  --    GLUCOSE, ISTAT mg/dl  --   --   --  231*  --   --    CALCIUM mg/dL 8 1* 8 2*  --   --  7 4*  --     < > = values in this interval not displayed  Results from last 7 days   Lab Units 07/16/21  1144 07/16/21  0516 07/15/21  0453   INR  1 11  --   --    PTT seconds 79* 44* 70*     No results for input(s): PHART, LUE9JMF, PO2ART, WEU6ZBZ, M1JUAPTL, BEART in the last 72 hours  Plan:    DC [place]/Barbara/Holder  Continue chest tubes, pacing wires  Transfer to floor  Ambulate  Incentive spirometry  Diuresis  PO ASA/Statin/B blocker          Marquis Morris MD  DATE: July 17, 2021  TIME: 10:30 AM

## 2021-07-17 NOTE — RESTORATIVE TECHNICIAN NOTE
Restorative Technician Note      Patient Name: Tommie Villalobos     Restorative Tech Visit Date: 07/17/21  Note Type: Mobility  Patient Position Upon Consult: Bedside chair  Activity Performed: Ambulated  Assistive Device: Roller walker  Patient Position at End of Consult: All needs within reach; Bedside chair

## 2021-07-17 NOTE — PHYSICAL THERAPY NOTE
Physical Therapy Evaluation    Patient's Name: Fernie Hill    Admitting Diagnosis  Triple vessel disease of the heart [I25 10]    Problem List  Patient Active Problem List   Diagnosis    NSTEMI    Hyperlipidemia    Type 2 diabetes mellitus ([facility] )    Hypertension    CAD (coronary artery disease)    Muscle spasm    Tobacco use    HTN (hypertension)    History of COVID-19    S/P CABG (coronary artery bypass graft)       Past Medical History  Past Medical History:   Diagnosis Date    Diabetes mellitus ([facility] )     History of COVID-19 11/2020    HLD (hyperlipidemia)     HTN (hypertension)        Past Surgical History  No past surgical history on file        07/17/21 0940   PT Last Visit   PT Visit Date 07/17/21   Note Type   Note type Evaluation   Pain Assessment   Pain Assessment Tool 0-10   Pain Score 9   Pain Location/Orientation Location: Chest;Location: Incision   Patient's Stated Pain Goal No pain   Hospital Pain Intervention(s) Ambulation/increased activity   Home Living   Type of Home House   Additional Comments Resides alone in 1 story home, 0 RESHMA   s/o can provide some assistance  Indep self care, ambulates w/ out device       Prior Function   Level of [place] Independent with ADLs and functional mobility   Falls in the last 6 months 0   Restrictions/Precautions   Weight Bearing Precautions Per Order No   Other Precautions Multiple lines;Telemetry;O2;Fall Risk;Pain;Cardiac/sternal   General   Family/Caregiver Present No   Cognition   Overall Cognitive Status Impaired   Arousal/Participation Responsive   Orientation Level Oriented X4   Memory Unable to assess   Following Commands Follows one step commands without difficulty   RLE Assessment   RLE Assessment   (strength grossly 3+/5)   LLE Assessment   LLE Assessment   (strength grossly 3+/5)   Coordination   Movements are Fluid and Coordinated 0   Coordination and Movement Description B LE ataxia   Transfers   Sit to Stand 3  Moderate assistance   Additional items Assist x 1   Stand to Sit 3  Moderate assistance   Additional items Assist x 1   Ambulation/Elevation   Gait pattern   (slow, ataxia, short step length)   Gait Assistance 3  Moderate assist   Additional items Assist x 1  (w/ assist of 2 others for lines, chair follow)   Assistive Device Rolling walker   Distance 25'x1   Balance   Static Sitting Fair   Dynamic Sitting Poor +   Static Standing Poor   Dynamic Standing Poor   Ambulatory Poor   Endurance Deficit   Endurance Deficit Yes   Endurance Deficit Description fatigue, weakness, pain   Activity Tolerance   Activity Tolerance Patient limited by fatigue;Patient limited by pain;Treatment limited secondary to medical complications (Comment)   Nurse Made Aware yes   Assessment   Prognosis Fair   Problem List Decreased strength;Decreased mobility; Decreased endurance; Impaired balance;Decreased coordination;Pain;Decreased cognition   Assessment Pt seen for high complexity physical therapy evaluation  Pt is a 60 y/o male w/ history/comorbidities of HTN, HLD, DM, COVID who is now admitted as a transfer from [facility]  Seen there w/ chest pain, BOLAND  Dx are CAD, elevated troponin, and pt underwent CABG x 3 7/16  Due to acute medical issues, acute surgery, need for transfer to higher level of care, need for continued ICU monitoring on P4, pain, fall risk, note unstable clinical picture  PT consulted to assess mobility, d/c needs  Pt presents w/ decreased functional mob, standing balance, endurance, B LE strength, barriers at home  Pt will benefit from skilled PT to correct for the above problems  Currently, lean towards rehab at d/c, but will follow for progression and ability to d/c home    will follow for d/c needs   Goals   Patient Goals to rest   STG Expiration Date 07/31/21   Short Term Goal #1 1-2 wks: bed mob and transfers w/ indep, standing balance to good/normal w/ device, ambulate 200-300 ft w/ RW and S/mod I, increase B LE strength by 1/2-1 grade   PT Treatment Day 0   Plan   Treatment/Interventions Functional transfer training;LE strengthening/ROM; Therapeutic exercise; Endurance training;Patient/family training;Gait training;Bed mobility; Equipment eval/education   PT Frequency Other (Comment)  (4-6x/wk)   Recommendation   PT Discharge Recommendation Post acute rehabilitation services  (for now, recommend rehab)   Equipment Recommended Dion walker   Change/add to [employer]?  No   PT - OK to Discharge No   AM-PAC Basic Mobility Inpatient   Turning in Bed Without Bedrails 2   Lying on Back to Sitting on Edge of Flat Bed 2   Moving Bed to Chair 2   Standing Up From Chair 2   Walk in Room 2   Climb 3-5 Stairs 2   Basic Mobility Inpatient Raw Score 12   Basic Mobility Standardized Score 32 23       Ariane Aquino PT, DPT, CSRS

## 2021-07-17 NOTE — PROGRESS NOTES
Progress Note - Critical Care   Leslye Calhoun 61 y o  male MRN: [id number]  Unit/Bed#: [facility] 418-01 Encounter: [id number]      Attending Physician: Yuki Barfield DO    24 Hour Events/HPI: POD # 1 s/p CABGx3  Received small fluid bolus overnight 2/2 low CI high SVR, low filling pressures  No further issues, de-lined this AM      ROS: Review of Systems   Constitutional: Positive for fatigue  Respiratory: Negative  Cardiovascular: Positive for chest pain      ---------------------------------------------------------------------------------------------------------------------------------------------------------------------  Impressions:  1  MVCAD s/p CABGx3  2  DM2  3  Tobacco use   4  Hx COVID    Plan:    Neuro:   · Pain controlled with: ATC tylenol, PRN oxy  · Regulate sleep/wake cycle  · Delirium precautions  · CAM-ICU daily  · Trend neuro exam    CV:   · Cardiac infusions: None  · Rhythm: NSR  · Follow rhythm on telemetry  · Lopressor 12 5 mg PO BID  · Maintain epicardial pacing wires for POD #1    Lung:   · Acute post-op pulmonary insufficiency; Requiring 2 Liters via nasal cannula, secondary to pulmonary vascular congestion  Continue incentive spirometry/coughing/deep breathing exercises    Wean supplemental oxygen as tolerated for saturation > 90%  · Chest tube output remains persistently high; Continue chest tubes to suction today    GI:   · Continue PPI for stress ulcer prophylaxis  · Continue bowel regimen  · Trend abdominal exam and bowel function    FEN:   · Diuretic plan: Lasix 40 mg IV q QD  · K-dur 20 mEQ PO q QD  · Nutrition/diet plan: carb controlled cardiac diet, 1800mL fluid restriction   · Replenish electrolytes with goals: K >4 0, Mag >2 0, and Phos >3 0    :   · Indwelling Holder present: yes   · D/c Holder  · Trend UOP and BUN/creat  · Strict I and O    ID:   · Trend temps and WBC count  · Maintain normothermia        Heme:   · Trend hgb and plts    Endo:   · Glycemic control plan: History of diabetes: Continue insulin infusion today   Consult Endocrinology for management    Results from last 6 Months   Lab Units 07/07/21  1257   HEMOGLOBIN A1C % 9 0*     MSK/Skin:  · Mobility goal: ambualte  · PT consult: yes  · OT consult: yes  · Frequent turning and pressure off-loading  · Local wound care as needed    Disposition:  Transfer to telemetry  ---------------------------------------------------------------------------------------------------------------------------------------------------------------------  Allergies: No Known Allergies  Medications:   Scheduled Meds:  Current Facility-Administered Medications   Medication Dose Route Frequency Provider Last Rate    acetaminophen  975 mg Oral Q8H [facility], BESSIE      amiodarone  200 mg Oral [facility]      aspirin  325 mg Oral Daily [facility]      atorvastatin  80 mg Oral Daily [employer]      bisacodyl  10 mg Rectal Daily PRN [facility]      cefazolin  2,000 mg Intravenous Q8H [facility] BESSIE Headley Stopped (07/17/21 0100)    docusate sodium  100 mg Oral BID [facility]      fondaparinux  2 5 mg Subcutaneous Q24H Miguel Ángel Kelly PA-C      HYDROmorphone  0 5 mg Intravenous Q2H PRN [facility]      insulin regular (HumuLIN R,NovoLIN R) infusion  0 3-21 Units/hr Intravenous Titrated [facility] 2 Units/hr (07/17/21 0400)    lidocaine (cardiac)  100 mg Intravenous Q30 Min PRN [facility]      mupirocin  1 application Nasal W29H [address] Thomasene Limb, PA-C      ondansetron  4 mg Intravenous Q6H PRN Thomasene Limb, PA-C      oxyCODONE  2 5 mg Oral Q4H PRN Thomasene Limb, PA-C      oxyCODONE  5 mg Oral Q4H PRN Thomasene Limb, PA-C      pantoprazole  40 mg Oral Early Morning Thomasene Limb, PA-C      polyethylene glycol  17 g Oral Daily [place] Limb, PA-C      potassium chloride  20 mEq Intravenous Once PRN Thomasene Limb, PA-C      potassium chloride  20 mEq Intravenous Q30 Min PRN Vitaly Argueta PA-C      sodium chloride  20 mL/hr Intravenous Continuous Vitaly Argueta PA-C 20 mL/hr (07/16/21 1800)     VTE Pharmacologic Prophylaxis: Fondaparinux (Arixtra)  VTE Mechanical Prophylaxis: sequential compression device    Continuous Infusions:insulin regular (HumuLIN R,NovoLIN R) infusion, 0 3-21 Units/hr, Last Rate: 2 Units/hr (07/17/21 0400)  sodium chloride, 20 mL/hr, Last Rate: 20 mL/hr (07/16/21 1800)      PRN Meds:  bisacodyl, 10 mg, Daily PRN  HYDROmorphone, 0 5 mg, Q2H PRN  lidocaine (cardiac), 100 mg, Q30 Min PRN  ondansetron, 4 mg, Q6H PRN  oxyCODONE, 2 5 mg, Q4H PRN  oxyCODONE, 5 mg, Q4H PRN  potassium chloride, 20 mEq, Once PRN  potassium chloride, 20 mEq, Q30 Min PRN      Home Medications:   Prior to Admission medications    Medication Sig Start Date End Date Taking?  Authorizing Provider   aspirin (ECOTRIN LOW STRENGTH) 81 mg EC tablet Take 81 mg by mouth daily    Historical Provider, MD   Empagliflozin (Jardiance) 10 MG TABS Take 10 mg by mouth daily     Historical Provider, MD   Exenatide ER (Bydureon BCise) 2 MG/0 85ML AUIJ once a week WEEKLY ON Monday    Historical Provider, MD   lisinopril (ZESTRIL) 10 mg tablet Take 10 mg by mouth daily     Historical Provider, MD   metFORMIN (GLUCOPHAGE) 1000 MG tablet Take 500 mg by mouth 2 (two) times a day with meals     Historical Provider, MD   pravastatin (PRAVACHOL) 20 mg tablet Take 20 mg by mouth daily    Historical Provider, MD     ---------------------------------------------------------------------------------------------------------------------------------------------------------------------  Vitals:   Vitals:    07/17/21 0200 07/17/21 0300 07/17/21 0400 07/17/21 0500   BP:       BP Location:       Pulse: 90 88 88 88   Resp: 17 16 12 17   Temp: 99 5 °F (37 5 °C) 99 5 °F (37 5 °C) 99 3 °F (37 4 °C) 99 5 °F (37 5 °C)   TempSrc:   Core    SpO2: 96% 96% 97% 95%   Weight:       Height: Arterial Line:  Arterial Line BP: 124/64  Arterial Line MAP (mmHg): 82 mmHg    Tele Rhythm: NSR This was personally reviewed by myself  Respiratory:  SpO2: SpO2: 95 %, SpO2 Activity: SpO2 Activity: At Rest, SpO2 Device: O2 Device: Nasal cannula  Nasal Cannula O2 Flow Rate (L/min): 2 L/min    Ventilator:  Respiratory    Lab Data (Last 4 hours)    None         O2/Vent Data (Last 4 hours)    None              Temperature: Temp (24hrs), Av 1 °F (37 3 °C), Min:97 5 °F (36 4 °C), Max:99 9 °F (37 7 °C)  Current: Temperature: 99 5 °F (37 5 °C)    Weights:   Weight (last 2 days)     Date/Time   Weight    21 0533   109 (240 08)            Body mass index is 34 45 kg/m²  Hemodynamic Monitoring:  PAP: PAP: , CVP: CVP (mean): 12 mmHg, CO: CO (L/min): 6 1 L/min, CI: CI (L/min/m2): 2 6 L/min/m2, SVR: SVR (dyne*sec)/cm5: 839 (dyne*sec)/cm5  PAP: (15-33)/(5-23)   CO:  [4 6 L/min-7 1 L/min] 6 1 L/min  CI:  [2 L/min/m2-3 1 L/min/m2] 2 6 L/min/m2    Intake and Outputs:    Intake/Output Summary (Last 24 hours) at 2021 0721  Last data filed at 2021 0400  Gross per 24 hour   Intake 1920 53 ml   Output 2285 ml   Net -364 47 ml     I/O last 24 hours: In: 1920 5 [I V :598; IV Piggyback:572 5]  Out: 1684 [Urine:1785;  Chest Tube:500]    UOP: 74/hour   BM: 0 in the last 24 hours    Chest tube Output:  Mediastinal tubes: 80 mL/8 hours  380 mL/24 hours   Pleural tubes: 30 mL/8 hours  120 mL/24 hours     Labs:  Results from last 7 days   Lab Units 21  0418 21  2033 21  1150 21  1144 21  0802 21  0516 21  0828 21  0828 21  0545   WBC Thousand/uL 12 97*  --   --   --   --  6 50  --  7 35 6 50   HEMOGLOBIN g/dL 13 8 14 6  --  13 7  --  14 7  --  16 1 15 6   I STAT HEMOGLOBIN g/dl  --   --  12 6  --   --   --    < >  --   --    HEMATOCRIT % 41 1 43 2  --  40 8  --  43 9  --  48 0 46 9   HEMATOCRIT, ISTAT %  --   --  37  --   --   --    < >  --   -- PLATELETS Thousands/uL 160 159  --  178   < > 187  --  232 194   NEUTROS PCT %  --   --   --   --   --  58  --  59 53   MONOS PCT %  --   --   --   --   --  13*  --  13* 12    < > = values in this interval not displayed  Results from last 7 days   Lab Units 07/17/21  0418 07/17/21  0041 07/16/21  1857 07/16/21  1150 07/16/21  1144 07/16/21  1103 07/16/21  1103 07/16/21  1021 07/16/21  0516   SODIUM mmol/L 138 139  --   --  142  --   --   --   --    POTASSIUM mmol/L 4 3 5 3 3 1*  --  3 9   < >  --   --   --    CHLORIDE mmol/L 108 110*  --   --  114*  --   --   --   --    CO2 mmol/L 26 25  --   --  24  --   --   --   --    CO2, I-STAT mmol/L  --   --   --  27  --   --  26 31   < >   BUN mg/dL 12 12  --   --  13  --   --   --   --    CREATININE mg/dL 0 77 0 75  --   --  0 65  --   --   --   --    CALCIUM mg/dL 8 1* 8 2*  --   --  7 4*  --   --   --   --    GLUCOSE, ISTAT mg/dl  --   --   --  231*  --   --  258* 276*  --     < > = values in this interval not displayed  Results from last 7 days   Lab Units 07/17/21  0418   MAGNESIUM mg/dL 2 3          Results from last 7 days   Lab Units 07/16/21  1144 07/16/21  0516 07/15/21  0453   INR  1 11  --   --    PTT seconds 79* 44* 70*         Micro:   Blood Culture:   Lab Results   Component Value Date    BLOODCX No Growth After 5 Days  11/29/2020    BLOODCX No Growth After 5 Days  11/29/2020     Urine Culture: No results found for: URINECX  Sputum Culture: No components found for: SPUTUMCX  Wound Culure: No results found for: WOUNDCULT    Diagnositic Studies:  07/17/21 CXR: Lines and tubes in position, no PTX, enlarged gastric bubble   This was personally reviewed by myself in PACS   07/17/21 EKG: NSR  This was personally reviewed by myself       Nutrition:        Diet Orders   (From admission, onward)             Start     Ordered    07/17/21 0000  Diet Cardiovascular; Cardiac; Fluid Restriction 1800 ML  Diet effective 0500     Question Answer Comment   Diet Type Cardiovascular    Cardiac Cardiac    Other Restriction(s): Fluid Restriction 1800 ML    RD to adjust diet per protocol? Yes        07/16/21 1213              Physical Exam  Constitutional:       Appearance: He is obese  HENT:      Head: Normocephalic  Mouth/Throat:      Mouth: Mucous membranes are moist    Eyes:      Pupils: Pupils are equal, round, and reactive to light  Cardiovascular:      Rate and Rhythm: Normal rate and regular rhythm  Comments: ANAIS   Pulmonary:      Effort: Pulmonary effort is normal       Breath sounds: Normal breath sounds  Abdominal:      General: Abdomen is flat  Palpations: Abdomen is soft  Musculoskeletal:      Cervical back: Normal range of motion  Skin:     General: Skin is warm  Capillary Refill: Capillary refill takes less than 2 seconds  Neurological:      General: No focal deficit present  Mental Status: He is alert  Invasive lines and devices: Invasive Devices     Central Venous Catheter Line            CVC Central Lines 07/16/21 Triple <1 day    Introducer 07/16/21 <1 day          Peripheral Intravenous Line            Peripheral IV 07/12/21 Left Forearm 5 days    Peripheral IV 07/16/21 Right Antecubital <1 day          Arterial Line            Arterial Line 07/16/21 Right Femoral <1 day    Arterial Line 07/16/21 Right Radial <1 day          Line            Pacer Wires <1 day    Pacer Wires <1 day          Drain            Chest Tube 1 Left Pleural 32 Fr  <1 day    Chest Tube 2 Posterior Mediastinal 32 Fr  <1 day    Chest Tube 3 Anterior Mediastinal 32 Fr  <1 day    Urethral Catheter Non-latex;Straight-tip; Temperature probe 16 Fr  <1 day              ---------------------------------------------------------------------------------------------------------------------------------------------------------------------  Code Status: Level 1 - Full Code    Care Time Delivered:   No Critical Care time spent     Collaborative bedside rounds performed with cardiac surgery attending, critical care attending and bedside RN      SIGNATURE: Shellie Desai PA-C  DATE: July 17, 2021  TIME: 7:21 AM

## 2021-07-17 NOTE — PLAN OF CARE
Problem: PHYSICAL THERAPY ADULT  Goal: Performs mobility at highest level of function for planned discharge setting  See evaluation for individualized goals  Description: Treatment/Interventions: Functional transfer training, LE strengthening/ROM, Therapeutic exercise, Endurance training, Patient/family training, Gait training, Bed mobility, Equipment eval/education  Equipment Recommended: Cintia Aguillon       See flowsheet documentation for full assessment, interventions and recommendations  7/17/2021 1241 by Magalsy Beatty, PT  Note: Prognosis: Fair  Problem List: Decreased strength, Decreased mobility, Decreased endurance, Impaired balance, Decreased coordination, Pain, Decreased cognition  Assessment: Pt seen for high complexity physical therapy evaluation  Pt is a 60 y/o male w/ history/comorbidities of HTN, HLD, DM, COVID who is now admitted as a transfer from [facility]  Seen there w/ chest pain, BOLAND  Dx are CAD, elevated troponin, and pt underwent CABG x 3 7/16  Due to acute medical issues, acute surgery, need for transfer to higher level of care, need for continued ICU monitoring on P4, pain, fall risk, note unstable clinical picture  PT consulted to assess mobility, d/c needs  Pt presents w/ decreased functional mob, standing balance, endurance, B LE strength, barriers at home  Pt will benefit from skilled PT to correct for the above problems  Currently, lean towards rehab at d/c, but will follow for progression and ability to d/c home  will follow for d/c needs           PT Discharge Recommendation: Post acute rehabilitation services (for now, recommend rehab)     PT - OK to Discharge: No    See flowsheet documentation for full assessment

## 2021-07-18 LAB
ANION GAP SERPL CALCULATED.3IONS-SCNC: 5 MMOL/L (ref 4–13)
BUN SERPL-MCNC: 18 MG/DL (ref 5–25)
CALCIUM SERPL-MCNC: 8.5 MG/DL (ref 8.3–10.1)
CHLORIDE SERPL-SCNC: 102 MMOL/L (ref 100–108)
CO2 SERPL-SCNC: 25 MMOL/L (ref 21–32)
CREAT SERPL-MCNC: 0.92 MG/DL (ref 0.6–1.3)
ERYTHROCYTE [DISTWIDTH] IN BLOOD BY AUTOMATED COUNT: 13.2 % (ref 11.6–15.1)
GFR SERPL CREATININE-BSD FRML MDRD: 88 ML/MIN/1.73SQ M
GLUCOSE SERPL-MCNC: 102 MG/DL (ref 65–140)
GLUCOSE SERPL-MCNC: 109 MG/DL (ref 65–140)
GLUCOSE SERPL-MCNC: 116 MG/DL (ref 65–140)
GLUCOSE SERPL-MCNC: 117 MG/DL (ref 65–140)
GLUCOSE SERPL-MCNC: 127 MG/DL (ref 65–140)
GLUCOSE SERPL-MCNC: 129 MG/DL (ref 65–140)
GLUCOSE SERPL-MCNC: 131 MG/DL (ref 65–140)
GLUCOSE SERPL-MCNC: 137 MG/DL (ref 65–140)
GLUCOSE SERPL-MCNC: 143 MG/DL (ref 65–140)
GLUCOSE SERPL-MCNC: 154 MG/DL (ref 65–140)
GLUCOSE SERPL-MCNC: 171 MG/DL (ref 65–140)
GLUCOSE SERPL-MCNC: 174 MG/DL (ref 65–140)
GLUCOSE SERPL-MCNC: 210 MG/DL (ref 65–140)
HCT VFR BLD AUTO: 39.6 % (ref 36.5–49.3)
HGB BLD-MCNC: 12.9 G/DL (ref 12–17)
MAGNESIUM SERPL-MCNC: 2.1 MG/DL (ref 1.6–2.6)
MCH RBC QN AUTO: 33.4 PG (ref 26.8–34.3)
MCHC RBC AUTO-ENTMCNC: 32.6 G/DL (ref 31.4–37.4)
MCV RBC AUTO: 103 FL (ref 82–98)
PLATELET # BLD AUTO: 168 THOUSANDS/UL (ref 149–390)
PMV BLD AUTO: 10.5 FL (ref 8.9–12.7)
POTASSIUM SERPL-SCNC: 4.4 MMOL/L (ref 3.5–5.3)
RBC # BLD AUTO: 3.86 MILLION/UL (ref 3.88–5.62)
SODIUM SERPL-SCNC: 132 MMOL/L (ref 136–145)
WBC # BLD AUTO: 13.09 THOUSAND/UL (ref 4.31–10.16)

## 2021-07-18 PROCEDURE — 83735 ASSAY OF MAGNESIUM: CPT | Performed by: PHYSICIAN ASSISTANT

## 2021-07-18 PROCEDURE — 82948 REAGENT STRIP/BLOOD GLUCOSE: CPT

## 2021-07-18 PROCEDURE — 80048 BASIC METABOLIC PNL TOTAL CA: CPT | Performed by: PHYSICIAN ASSISTANT

## 2021-07-18 PROCEDURE — 97116 GAIT TRAINING THERAPY: CPT

## 2021-07-18 PROCEDURE — 97167 OT EVAL HIGH COMPLEX 60 MIN: CPT

## 2021-07-18 PROCEDURE — 99024 POSTOP FOLLOW-UP VISIT: CPT | Performed by: PHYSICIAN ASSISTANT

## 2021-07-18 PROCEDURE — 99222 1ST HOSP IP/OBS MODERATE 55: CPT | Performed by: INTERNAL MEDICINE

## 2021-07-18 PROCEDURE — 85027 COMPLETE CBC AUTOMATED: CPT | Performed by: PHYSICIAN ASSISTANT

## 2021-07-18 PROCEDURE — 99232 SBSQ HOSP IP/OBS MODERATE 35: CPT | Performed by: INTERNAL MEDICINE

## 2021-07-18 RX ORDER — AMOXICILLIN 250 MG
1 CAPSULE ORAL 2 TIMES DAILY
Status: DISCONTINUED | OUTPATIENT
Start: 2021-07-18 | End: 2021-07-20 | Stop reason: HOSPADM

## 2021-07-18 RX ORDER — OXYCODONE HYDROCHLORIDE 5 MG/1
5 TABLET ORAL EVERY 4 HOURS PRN
Status: DISCONTINUED | OUTPATIENT
Start: 2021-07-18 | End: 2021-07-20 | Stop reason: HOSPADM

## 2021-07-18 RX ORDER — INSULIN GLARGINE 100 [IU]/ML
40 INJECTION, SOLUTION SUBCUTANEOUS
Status: DISCONTINUED | OUTPATIENT
Start: 2021-07-19 | End: 2021-07-19

## 2021-07-18 RX ORDER — FUROSEMIDE 10 MG/ML
40 INJECTION INTRAMUSCULAR; INTRAVENOUS
Status: DISCONTINUED | OUTPATIENT
Start: 2021-07-18 | End: 2021-07-20 | Stop reason: HOSPADM

## 2021-07-18 RX ORDER — METHOCARBAMOL 500 MG/1
500 TABLET, FILM COATED ORAL EVERY 6 HOURS SCHEDULED
Status: DISCONTINUED | OUTPATIENT
Start: 2021-07-18 | End: 2021-07-20 | Stop reason: HOSPADM

## 2021-07-18 RX ORDER — OXYCODONE HYDROCHLORIDE 10 MG/1
10 TABLET ORAL EVERY 4 HOURS PRN
Status: DISCONTINUED | OUTPATIENT
Start: 2021-07-18 | End: 2021-07-20 | Stop reason: HOSPADM

## 2021-07-18 RX ORDER — POTASSIUM CHLORIDE 20 MEQ/1
20 TABLET, EXTENDED RELEASE ORAL 2 TIMES DAILY
Status: DISCONTINUED | OUTPATIENT
Start: 2021-07-18 | End: 2021-07-20 | Stop reason: HOSPADM

## 2021-07-18 RX ADMIN — AMIODARONE HYDROCHLORIDE 200 MG: 200 TABLET ORAL at 13:32

## 2021-07-18 RX ADMIN — FONDAPARINUX SODIUM 2.5 MG: 2.5 INJECTION, SOLUTION SUBCUTANEOUS at 15:43

## 2021-07-18 RX ADMIN — MUPIROCIN 1 APPLICATION: 20 OINTMENT TOPICAL at 22:00

## 2021-07-18 RX ADMIN — DOCUSATE SODIUM 100 MG: 100 CAPSULE ORAL at 10:04

## 2021-07-18 RX ADMIN — POLYETHYLENE GLYCOL 3350 17 G: 17 POWDER, FOR SOLUTION ORAL at 10:05

## 2021-07-18 RX ADMIN — METOPROLOL TARTRATE 25 MG: 25 TABLET, FILM COATED ORAL at 22:00

## 2021-07-18 RX ADMIN — MUPIROCIN 1 APPLICATION: 20 OINTMENT TOPICAL at 10:05

## 2021-07-18 RX ADMIN — DOCUSATE SODIUM AND SENNOSIDES 1 TABLET: 8.6; 5 TABLET ORAL at 18:15

## 2021-07-18 RX ADMIN — ACETAMINOPHEN 975 MG: 325 TABLET, FILM COATED ORAL at 06:15

## 2021-07-18 RX ADMIN — PANTOPRAZOLE SODIUM 40 MG: 40 TABLET, DELAYED RELEASE ORAL at 06:15

## 2021-07-18 RX ADMIN — FUROSEMIDE 40 MG: 10 INJECTION, SOLUTION INTRAMUSCULAR; INTRAVENOUS at 15:43

## 2021-07-18 RX ADMIN — AMIODARONE HYDROCHLORIDE 200 MG: 200 TABLET ORAL at 22:00

## 2021-07-18 RX ADMIN — POTASSIUM CHLORIDE 20 MEQ: 1500 TABLET, EXTENDED RELEASE ORAL at 18:15

## 2021-07-18 RX ADMIN — ACETAMINOPHEN 975 MG: 325 TABLET, FILM COATED ORAL at 22:00

## 2021-07-18 RX ADMIN — AMIODARONE HYDROCHLORIDE 200 MG: 200 TABLET ORAL at 06:15

## 2021-07-18 RX ADMIN — METHOCARBAMOL 500 MG: 500 TABLET ORAL at 12:43

## 2021-07-18 RX ADMIN — ASPIRIN 325 MG ORAL TABLET 325 MG: 325 PILL ORAL at 10:05

## 2021-07-18 RX ADMIN — FUROSEMIDE 40 MG: 10 INJECTION, SOLUTION INTRAMUSCULAR; INTRAVENOUS at 10:05

## 2021-07-18 RX ADMIN — METHOCARBAMOL 500 MG: 500 TABLET, FILM COATED ORAL at 06:15

## 2021-07-18 RX ADMIN — POTASSIUM CHLORIDE 20 MEQ: 1500 TABLET, EXTENDED RELEASE ORAL at 10:05

## 2021-07-18 RX ADMIN — DOCUSATE SODIUM AND SENNOSIDES 1 TABLET: 8.6; 5 TABLET ORAL at 12:43

## 2021-07-18 RX ADMIN — LIDOCAINE 1 PATCH: 50 PATCH TOPICAL at 10:05

## 2021-07-18 RX ADMIN — OXYCODONE HYDROCHLORIDE 5 MG: 5 TABLET ORAL at 01:31

## 2021-07-18 RX ADMIN — ATORVASTATIN CALCIUM 80 MG: 80 TABLET, FILM COATED ORAL at 15:43

## 2021-07-18 RX ADMIN — Medication 12.5 MG: at 10:05

## 2021-07-18 RX ADMIN — ACETAMINOPHEN 975 MG: 325 TABLET, FILM COATED ORAL at 13:32

## 2021-07-18 RX ADMIN — METHOCARBAMOL 500 MG: 500 TABLET ORAL at 18:15

## 2021-07-18 RX ADMIN — OXYCODONE HYDROCHLORIDE 5 MG: 5 TABLET ORAL at 22:00

## 2021-07-18 NOTE — PLAN OF CARE
Problem: PHYSICAL THERAPY ADULT  Goal: Performs mobility at highest level of function for planned discharge setting  See evaluation for individualized goals  Description: Treatment/Interventions: Functional transfer training, LE strengthening/ROM, Therapeutic exercise, Endurance training, Patient/family training, Gait training, Bed mobility, Equipment eval/education  Equipment Recommended: Carina Cleveland       See flowsheet documentation for full assessment, interventions and recommendations  Outcome: Progressing  Note: Prognosis: Fair  Problem List: Decreased strength, Impaired balance, Decreased mobility, Decreased endurance, Decreased coordination, Pain  Assessment: Pt seen for session for setup, transfers, gait training  Pt cooperative w/ session  Improving alertness, and needed less assist for mobility tasks  able to ambulate an increased distance, and also did better w/ OT evaluation later in the day  as pt is making progress, now lean towards d/c home w  home PT, RW   will follow           PT Discharge Recommendation: Post acute rehabilitation services     PT - OK to Discharge: No    See flowsheet documentation for full assessment

## 2021-07-18 NOTE — PROGRESS NOTES
Cardiology Progress Note - Kristian Colmenares 61 y o  male MRN: [id number]    Unit/Bed#: [facility] 409-01 Encounter: [id number]        ASSESSMENT/PLAN:    Principal Problem:    CAD (coronary artery disease)  Active Problems:    Hyperlipidemia    Type 2 diabetes mellitus ([address] )    Hypertension    Muscle spasm    Tobacco use    HTN (hypertension)    History of COVID-19    S/P CABG (coronary artery bypass graft)    CAD:  Type 1 non STEMI, status post CABG x3  Appetite remains poor  Continues to have incisional pain  Did ambulate some  Appears a bit depressed  Medical therapy reviewed  Blood pressure remains low normal     Ischemic cardiomyopathy:  Pre CABG EF 50%  No clinical heart failure  Overall negative balance  Renal function stable on Lasix  Dyslipidemia:  High-intensity statins  Subjective:   No significant events overnight  Reports incisional chest pain  Reports some nausea and poor appetite  Is able to ambulate  States he feels better after chest tube removal     Telemetry Review: No significant arrhythmias seen on telemetry review  Review of Systems   Constitutional: Positive for appetite change and fatigue  Respiratory: Negative for chest tightness and shortness of breath  Gastrointestinal: Positive for nausea  Musculoskeletal: Positive for myalgias  Neurological: Positive for weakness  Hematological: Does not bruise/bleed easily  Psychiatric/Behavioral: Positive for dysphoric mood           Current Facility-Administered Medications:     acetaminophen (TYLENOL) tablet 975 mg, 975 mg, Oral, Q8H, Bhavna Zapata PA-C, 975 mg at 07/18/21 0615    amiodarone tablet 200 mg, 200 mg, Oral, Q8H [address], Bhavna Zapata PA-C, 200 mg at 07/18/21 1423    aspirin tablet 325 mg, 325 mg, Oral, Daily, Nataliia Zapata PA-C, 325 mg at 07/18/21 1005    atorvastatin (LIPITOR) tablet 80 mg, 80 mg, Oral, Daily With Levi Rees PA-C, 80 mg at 07/17/21 1806    bisacodyl (DULCOLAX) rectal suppository 10 mg, 10 mg, Rectal, Daily PRN, Carlos Li PA-C    fondaparinux (ARIXTRA) subcutaneous injection 2 5 mg, 2 5 mg, Subcutaneous, Q24H, Bhavna LEXII Zapata PA-C, 2 5 mg at 07/17/21 1806    furosemide (LASIX) injection 40 mg, 40 mg, Intravenous, BID (diuretic), Rolly Lopez PA-C    insulin regular (HumuLIN R,NovoLIN R) 1 Units/mL in sodium chloride 0 9 % 100 mL infusion, 0 3-21 Units/hr, Intravenous, Titrated, René Zapata PA-C, Last Rate: 4 mL/hr at 07/18/21 0850, 4 Units/hr at 07/18/21 0850    lidocaine (LIDODERM) 5 % patch 1 patch, 1 patch, Topical, Daily, René Zapata PA-C, 1 patch at 07/18/21 1005    methocarbamol (ROBAXIN) tablet 500 mg, 500 mg, Oral, Q6H [address], Katie Mccabe PA-C    metoprolol tartrate (LOPRESSOR) partial tablet 12 5 mg, 12 5 mg, Oral, Once, Katie Carmen PA-C    metoprolol tartrate (LOPRESSOR) tablet 25 mg, 25 mg, Oral, Q12H [address], Katie Mccabe PA-C    mupirocin (BACTROBAN) 2 % nasal ointment 1 application, 1 application, Nasal, B78C [address], René Zapata PA-C, 1 application at 24/49/55 1005    ondansetron (ZOFRAN) injection 4 mg, 4 mg, Intravenous, Q6H PRN, René aZpata PA-C    oxyCODONE (ROXICODONE) immediate release tablet 10 mg, 10 mg, Oral, Q4H PRN, Katie Carmen PA-C    oxyCODONE (ROXICODONE) IR tablet 5 mg, 5 mg, Oral, Q4H PRN, Katie Carmen PA-C    pantoprazole (PROTONIX) EC tablet 40 mg, 40 mg, Oral, Early Morning, René Zapata PA-C, 40 mg at 07/18/21 0615    polyethylene glycol (MIRALAX) packet 17 g, 17 g, Oral, Daily, René Zaapta PA-C, 17 g at 07/18/21 1005    potassium chloride (K-DUR,KLOR-CON) CR tablet 20 mEq, 20 mEq, Oral, BID, Katie Mccabe PA-C    senna-docusate sodium (SENOKOT S) 8 6-50 mg per tablet 1 tablet, 1 tablet, Oral, BID, Katie Mccabe PA-C    sodium chloride infusion 0 45 %, 20 mL/hr, Intravenous, Continuous, Carlos Li, BESSIE, Last Rate: 20 mL/hr at 07/17/21 0800, 20 mL/hr at 07/17/21 0800    temazepam (RESTORIL) capsule 15 mg, 15 mg, Oral, HS PRN, Miracle Zapata PA-C     Objective:     Vitals:   Blood pressure 103/72, pulse 93, temperature 99 2 °F (37 3 °C), temperature source Oral, resp  rate 16, height 5' 10" (1 778 m), weight 114 kg (251 lb 1 7 oz), SpO2 92 %  Body mass index is 36 03 kg/m²  Orthostatic Blood Pressures      Most Recent Value   Blood Pressure  103/72 filed at 07/18/2021 0700   Patient Position - Orthostatic VS  Sitting filed at 07/18/2021 8277         Systolic (96GEM), OFZ:486 , Min:93 , GLT:589     Diastolic (05PIA), DZH:54, Min:62, Max:81      Intake/Output Summary (Last 24 hours) at 7/18/2021 1036  Last data filed at 7/18/2021 0659  Gross per 24 hour   Intake 1035 62 ml   Output 920 ml   Net 115 62 ml     Weight (last 2 days)     Date/Time   Weight    07/18/21 0600   114 (251 1)    07/16/21 0533   109 (240 08)              Physical Exam  Constitutional:       Comments: Appears weak, somnolent   HENT:      Head: Normocephalic  Mouth/Throat:      Mouth: Mucous membranes are dry  Cardiovascular:      Rate and Rhythm: Normal rate and regular rhythm  Heart sounds: Murmur heard  Pulmonary:      Breath sounds: No wheezing or rhonchi  Abdominal:      Tenderness: There is abdominal tenderness  Skin:     General: Skin is warm  Neurological:      Mental Status: Mental status is at baseline  Psychiatric:      Comments: Appears depressed           Labs & Results:        Results from last 7 days   Lab Units 07/18/21  0436 07/17/21  0418 07/16/21  2033 07/16/21  0802 07/16/21  0516   WBC Thousand/uL 13 09* 12 97*  --   --  6 50   HEMOGLOBIN g/dL 12 9 13 8 14 6   < > 14 7   HEMATOCRIT % 39 6 41 1 43 2   < > 43 9   PLATELETS Thousands/uL 168 160 159   < > 187    < > = values in this interval not displayed           Results from last 7 days   Lab Units 07/18/21  0436 07/17/21  0418 07/17/21  0041 07/16/21  1150 07/16/21  1103 07/16/21  1021 07/16/21  1021   POTASSIUM mmol/L 4 4 4 3 5 3  --   --    < >  --    CHLORIDE mmol/L 102 108 110*  --   --    < >  --    CO2 mmol/L 25 26 25  --   --    < >  --    CO2, I-STAT mmol/L  --   --   --  27 26  --  31   BUN mg/dL 18 12 12  --   --    < >  --    CREATININE mg/dL 0 92 0 77 0 75  --   --    < >  --    CALCIUM mg/dL 8 5 8 1* 8 2*  --   --    < >  --    GLUCOSE, ISTAT mg/dl  --   --   --  231* 258*  --  276*    < > = values in this interval not displayed  Results from last 7 days   Lab Units 07/16/21  1144 07/16/21  0516 07/15/21  0453   INR  1 11  --   --    PTT seconds 79* 44* 70*     Results from last 7 days   Lab Units 07/18/21  0436 07/17/21  0418   MAGNESIUM mg/dL 2 1 2 3     No results for input(s): NTBNP in the last 72 hours

## 2021-07-18 NOTE — OCCUPATIONAL THERAPY NOTE
Occupational Therapy Evaluation     Patient Name: Marsha Waggoner  JCRBZ'Y Date: 7/18/2021  Problem List  Principal Problem:    CAD (coronary artery disease)  Active Problems:    Hyperlipidemia    Type 2 diabetes mellitus ([facility] [address] )    Hypertension    Muscle spasm    Tobacco use    HTN (hypertension)    History of COVID-19    S/P CABG (coronary artery bypass graft)    Past Medical History  Past Medical History:   Diagnosis Date    Diabetes mellitus ([facility] [address] )     History of COVID-19 11/2020    HLD (hyperlipidemia)     HTN (hypertension)      Past Surgical History  No past surgical history on file             07/18/21 1340   OT Last Visit   OT Visit Date 07/18/21   Note Type   Note type Evaluation   Restrictions/Precautions   Other Precautions Multiple lines;Telemetry   Pain Assessment   Pain Assessment Tool 0-10   Pain Score 5   Pain Location/Orientation Location: Chest   Home Living   Type of [address] [place] [address] One level   Bathroom Shower/Tub Tub/shower unit   Bathroom Toilet Standard   Bathroom Equipment Grab bars in shower   [address]   (denies)   Additional Comments Pt lives in a 1 story home with 0 RESHMA      Prior Function   Level of [place] Independent with ADLs and functional mobility   Lives With Alone   Receives Help From Family   ADL Assistance Independent   IADLs Independent   Falls in the last 6 months 0   Vocational Full time employment   Lifestyle   Autonomy Pt reports being I with ADLS, IADLS and mobility without device PTA  (+)    Reciprocal Relationships Pt lives alone but reports his significant other is able to assist as needed upon d/c    Service 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   ADL   Where [address] [place] [address] [address]  [address] [place] [address]  4  [address] Dressing Assistance 5  Supervision/Setup   LB Dressing Assistance 4  Minimal Assistance   Toileting Assistance  4  Minimal Assistance   Transfers   Sit to Stand 4  Minimal assistance   Additional items Assist x 1; Increased time required   Stand to Sit 4  Minimal assistance   Additional items Assist x 1; Increased time required   Toilet transfer 5  Supervision   Functional Mobility   Functional Mobility 4  Minimal assistance  (CGA)   Additional Comments Pt demonstrated household mobility with no rest breaks  Additional items Rolling walker   Balance   Static Sitting Good   Dynamic Sitting Fair +   Static Standing Fair   Dynamic Standing Fair   Ambulatory Fair -   Activity Tolerance   Activity Tolerance Patient limited by fatigue   Nurse Made Aware RN confirmed okay to see pt   RUE Assessment   RUE Assessment WFL   LUE Assessment   LUE Assessment WFL   Cognition   Overall Cognitive Status WFL   Arousal/Participation Alert; Cooperative   Attention Within functional limits   Orientation Level Oriented X4   Memory Decreased recall of precautions   Following Commands Follows all commands and directions without difficulty   Comments Pt is very pleasant and cooperative  Pt participated in cardiac packet this session  Assessment   Limitation Decreased ADL status; Decreased endurance;Decreased self-care trans;Decreased high-level ADLs   Prognosis Good   Assessment Pt is a 61 y o  male admitted to [facility] on 7/9/2021 w/ CAD s/p CABG x3 on 7/16/2021  Pt  has a past medical history of Diabetes mellitus ([facility] [address] ), History of COVID-19 (11/2020), HLD (hyperlipidemia), and HTN (hypertension)  Pt with active OT orders and ambulate  orders  Pt resides in a 1 story home with 0 RESHMA  Pt lives alone but has a significant other who is able to assist upon d/c  Pt was I w/  ADLS and IADLS, (+) drove, & required no use of DME PTA  Currently pt is min a for functional transfers, functional mobility, and ADLS overall   Pt is limited at this time 2*: pain, endurance, activity tolerance, functional mobility, forward functional reach, functional standing tolerance and decreased I w/ ADLS/IADLS  The following Occupational Performance Areas to address include: grooming, bathing/shower, toilet hygiene, dressing, functional mobility and clothing management  Based on the aforementioned OT evaluation, functional performance deficits, and assessments, pt has been identified as a high complexity evaluation  From OT standpoint, anticipate d/c home with family support pending progress  Pt to continue to benefit from acute immediate OT services to address the following goals 3-5x/week to  w/in 10-14 days: Alyce Ped Goals   Patient Goals To feel better   LTG Time Frame 10-   Long Term Goal #1 See goals below   Plan   Treatment Interventions ADL retraining;Functional transfer training; Endurance training;Patient/family training;Equipment evaluation/education; Compensatory technique education;Continued evaluation;Cardiac education; Energy conservation; Activityengagement   Goal Expiration Date 21   OT Frequency 3-5x/wk   Recommendation   OT Discharge Recommendation No rehabilitation needs  (Home with increased social support pending progress)   OT - OK to Discharge Yes  (When medically appropriate)   AM-PAC Daily Activity Inpatient   Lower Body Dressing 3   Bathing 3   Toileting 3   Upper Body Dressing 3   Grooming 4   Eating 4   Daily Activity Raw Score 20   Daily Activity Standardized Score (Calc for Raw Score >=11) 42 03   AM-PAC Applied Cognition Inpatient   Following a Speech/Presentation 4   Understanding Ordinary Conversation 4   Taking Medications 4   Remembering Where Things Are Placed or Put Away 4   Remembering List of 4-5 Errands 4   Taking Care of Complicated Tasks 4   Applied Cognition Raw Score 24   Applied Cognition Standardized Score 62 21   Modified [place] Scale   Modified Wm Scale 4       GOALS    1) Pt will increase activity tolerance to G for 30 min txment sessions    2) Pt will complete UB/LB dressing/self care w/ mod I using adaptive device and DME as needed    3) Pt will complete bathing w/ Mod I w/ use of AE and DME as needed    4) Pt will complete toileting w/ mod I w/ G hygiene/thoroughness using DME as needed    5) Pt will improve functional transfers to Mod I on/off all surfaces using DME as needed w/ G balance/safety     6) Pt will improve functional mobility during ADL/IADL/leisure tasks to Mod I using DME as needed w/ G balance/safety     7) Pt will demonstrate G carryover of pt/caregiver education and training as appropriate w/ mod I     8) Pt will engage in cardiac education using the Recovering After Cardiac Surgery packet w/ G participation and G carryover  9) Pt will demonstrate 100% carryover of precautions s/p review w/ mod I w/ G tolerance/participation t/o functional ADL/IADL/leisure tasks      10) Pt will independently identify and utilize 2-3 coping strategies to increase positive affect and promote overall well-being      11) Pt will engage in ongoing cognitive assessment w/ G participation to assist w/ safe d/c planning/recommendations (as needed)    DAVE Jennings, OTR/L

## 2021-07-18 NOTE — QUICK NOTE
07/18/21    Procedure: Epicardial Pacing Wire removal    Manjeet Woodward was returned to bed and informed of mandatory one hour post-procedure bed rest   The assigned nurse was notified  Epicardial pacing wires removed in routine fashion, without incident  The patient tolerated the procedure well  Vital signs ordered  q 15 minutes for one hour, as per protocol  SIGNATURE: Caitlin Humphreys  DATE: July 18, 2021  TIME: 11:06 AM    07/18/21    Procedure: Chest tube removal    Chest tubes removed in routine fashion without incident  Insertion site dressed with Acticoat  Phillip Banana Jones tolerated the procedure well  Nurse notified      SIGNATURE: Caitlin Humphreys  DATE: July 18, 2021  TIME: 11:06 AM

## 2021-07-18 NOTE — CONSULTS
Consultation - Manjeet Smoker 61 y o  male MRN: [id number]    [facility] Encounter: [id number]      Assessment/Plan     Assessment: This is a 61 y o  male with type 2 diabetes, hypertension, hyperlipidemia, tobacco abuse was hospitalized for coronary artery disease, status post CABG  Plan:  Diabetes mellitus type 2 - most recent A1c 9 0%  - patient requires 2 units of insulin an hour as a basal rate  - transition patient to 40 units of Lantus, 10 units of Humalog before meals + ISS with diabetic diet  - He can follow up with PCP or endocrinology  Contact info in d/c instructions  CC: Diabetes Consult    History of Present Illness     HPI: This is a 61 y o  male with type 2 diabetes, hypertension, hyperlipidemia, tobacco abuse was hospitalized for coronary artery disease, status post CABG  He was diagnosed with type 2 diabetes in 2007  He is currently on Jardiance, Bydureon and metformin  He stop taking Bydureon about 3 months ago for unknown reason, just lack of knowledge and lack of self care, no other reason for stopping bydureon  Patient has complications of neuropathy, denies retinopathy, nephropathy, history of stroke  He denies any polyuria, polydipsia, blurry vision prior to hospitalization  He follows up with his primary care physician  He does not measure his blood sugars at home  He says his appetite is poor, eats about 20% of his meals  He denies any family history of diabetes  Inpatient consult to Endocrinology     Performed by  Navya Gates MD     Authorized by Don Lester PA-C              Review of Systems   Constitutional: Negative for fatigue and unexpected weight change  HENT: Negative for congestion, postnasal drip and sore throat  Eyes: Negative for pain and redness  Respiratory: Positive for shortness of breath  Negative for cough, chest tightness and wheezing  Cardiovascular: Positive for chest pain   Negative for palpitations and leg swelling  Gastrointestinal: Negative for abdominal pain, constipation, diarrhea, nausea and vomiting  Endocrine: Negative for polydipsia and polyuria  Genitourinary: Negative for dysuria  Musculoskeletal: Negative for arthralgias and back pain  Neurological: Negative for dizziness, light-headedness and headaches  Psychiatric/Behavioral: Negative for agitation  The patient is not nervous/anxious  All other systems reviewed and are negative  Historical Information   Past Medical History:   Diagnosis Date    Diabetes mellitus ([facility] [address] )     History of COVID-19 11/2020    HLD (hyperlipidemia)     HTN (hypertension)      No past surgical history on file    Social History   Social History     Substance and Sexual Activity   Alcohol Use Yes    Alcohol/week: 4 0 - 6 0 standard drinks    Types: 4 - 6 Cans of beer per week    Comment: 4-6 drinks on weekend     Social History     Substance and Sexual Activity   Drug Use Never     Social History     Tobacco Use   Smoking Status Current Some Day Smoker    Types: Cigars   Smokeless Tobacco Never Used     Family History:   Family History   Problem Relation Age of Onset    Leukemia Mother        Meds/Allergies   Current Facility-Administered Medications   Medication Dose Route Frequency Provider Last Rate Last Admin    acetaminophen (TYLENOL) tablet 975 mg  975 mg Oral Q8H Bhavna Zapata PA-C   975 mg at 07/18/21 0615    amiodarone tablet 200 mg  200 mg Oral [facility] Bhavna Zapata PA-C   200 mg at 07/18/21 0615    aspirin tablet 325 mg  325 mg Oral Daily Conrado Zapata PA-C   325 mg at 07/17/21 1580    atorvastatin (LIPITOR) tablet 80 mg  80 mg Oral Daily With Kishan Nichols PA-C   80 mg at 07/17/21 1806    bisacodyl (DULCOLAX) rectal suppository 10 mg  10 mg Rectal Daily PRN Waldemar Byrnes PA-C        docusate sodium (COLACE) capsule 100 mg  100 mg Oral BID Conrado Zapata PA-C   100 mg at 07/17/21 1806    docusate sodium (COLACE) capsule 100 mg  100 mg Oral BID Jesu Bradford Rasmuson, PA-C        fondaparinux (ARIXTRA) subcutaneous injection 2 5 mg  2 5 mg Subcutaneous Q24H Jesu Bradford Rasmuson, PA-C   2 5 mg at 07/17/21 1806    furosemide (LASIX) injection 40 mg  40 mg Intravenous Daily Jesu Bradford Rasmuson, PA-C   40 mg at 07/17/21 1126    HYDROmorphone (DILAUDID) injection 0 5 mg  0 5 mg Intravenous Q2H PRN Jesu Bradford Rasmuson, PA-C   0 5 mg at 07/17/21 0230    insulin regular (HumuLIN R,NovoLIN R) 1 Units/mL in sodium chloride 0 9 % 100 mL infusion  0 3-21 Units/hr Intravenous Titrated Jesu Bradford Rasmuson, PA-C 2 mL/hr at 07/17/21 1600 2 Units/hr at 07/17/21 1600    lidocaine (cardiac) injection 100 mg  100 mg Intravenous Q30 Min PRN Jesu Bradford Rasvasquezon, PA-C        lidocaine (LIDODERM) 5 % patch 1 patch  1 patch Topical Daily Jesu Bradford Rasmuson, PA-C   1 patch at 07/17/21 1126    methocarbamol (ROBAXIN) tablet 500 mg  500 mg Oral Q6H PRN Darlean Forward, PA-C   500 mg at 07/18/21 0615    metoprolol tartrate (LOPRESSOR) partial tablet 12 5 mg  12 5 mg Oral Q12H [address] Jesu Bradford Rasmuson, PA-C   12 5 mg at 07/17/21 2156    mupirocin (BACTROBAN) 2 % nasal ointment 1 application  1 application Nasal N52H [address] Darlean Forward, PA-C   1 application at 24/10/73 2157    ondansetron (ZOFRAN) injection 4 mg  4 mg Intravenous Q6H PRN Darlean Forward, PA-C        oxyCODONE (ROXICODONE) IR tablet 2 5 mg  2 5 mg Oral Q4H PRN Darlean Forward, PA-C        oxyCODONE (ROXICODONE) IR tablet 5 mg  5 mg Oral Q4H PRN Darlean Forward, PA-C   5 mg at 07/18/21 0131    pantoprazole (PROTONIX) EC tablet 40 mg  40 mg Oral Early Morning Jesu Bradford Rasmuson, PA-C   40 mg at 07/18/21 0615    polyethylene glycol (MIRALAX) packet 17 g  17 g Oral Daily Jesu Bradford Rasmuson, PA-C   17 g at 07/17/21 0909    potassium chloride (K-DUR,KLOR-CON) CR tablet 20 mEq  20 mEq Oral Daily Jesu Bradford Rasmuson, PA-C   20 mEq at 07/17/21 1132    sodium chloride infusion 0 45 %  20 mL/hr Intravenous Continuous Stan Zapata PA-C 20 mL/hr at 07/17/21 0800 20 mL/hr at 07/17/21 0800    temazepam (RESTORIL) capsule 15 mg  15 mg Oral HS PRN Stan Perlaroberth Zapata PA-C         No Known Allergies    Objective   Vitals: Blood pressure 103/72, pulse 93, temperature 99 2 °F (37 3 °C), temperature source Oral, resp  rate 16, height 5' 10" (1 778 m), weight 114 kg (251 lb 1 7 oz), SpO2 92 %  Intake/Output Summary (Last 24 hours) at 7/18/2021 0952  Last data filed at 7/18/2021 0659  Gross per 24 hour   Intake 1539 62 ml   Output 920 ml   Net 619 62 ml     Invasive Devices     Central Venous Catheter Line            CVC Central Lines 07/16/21 Triple 2 days          Peripheral Intravenous Line            Peripheral IV 07/16/21 Right Antecubital 2 days          Line            Pacer Wires 1 day    Pacer Wires 1 day          Drain            Chest Tube 1 Left Pleural 32 Fr  1 day    Chest Tube 2 Posterior Mediastinal 32 Fr  1 day    Chest Tube 3 Anterior Mediastinal 32 Fr  1 day                Physical Exam  Constitutional:       Appearance: He is well-developed  HENT:      Head: Normocephalic and atraumatic  Mouth/Throat:      Pharynx: No oropharyngeal exudate  Eyes:      Conjunctiva/sclera: Conjunctivae normal       Pupils: Pupils are equal, round, and reactive to light  Cardiovascular:      Rate and Rhythm: Normal rate and regular rhythm  Heart sounds: No murmur heard  Pulmonary:      Effort: Pulmonary effort is normal  No respiratory distress  Breath sounds: Normal breath sounds  Abdominal:      General: There is no distension  Palpations: Abdomen is soft  Tenderness: There is no abdominal tenderness  Musculoskeletal:      Cervical back: Neck supple  Skin:     General: Skin is warm and dry  Findings: No erythema  Neurological:      Mental Status: He is alert     Psychiatric:         Mood and Affect: Mood normal          The history was obtained from the review of the chart, patient  Lab Results:       Lab Results   Component Value Date    WBC 13 09 (H) 07/18/2021    HGB 12 9 07/18/2021    HCT 39 6 07/18/2021     (H) 07/18/2021     07/18/2021     Lab Results   Component Value Date/Time    BUN 18 07/18/2021 04:36 AM    K 4 4 07/18/2021 04:36 AM     07/18/2021 04:36 AM    CO2 25 07/18/2021 04:36 AM    CO2 27 07/16/2021 11:50 AM    CREATININE 0 92 07/18/2021 04:36 AM    AST 14 (L) 07/07/2021 12:57 PM    ALT 19 07/07/2021 12:57 PM    ALB 4 1 07/07/2021 12:57 PM     No results for input(s): CHOL, HDL, LDL, TRIG, VLDL in the last 72 hours  No results found for: Gurwinder Sarabia  POC Glucose (mg/dl)   Date Value   07/18/2021 154 (H)   07/18/2021 127   07/18/2021 137   07/18/2021 131   07/18/2021 116   07/17/2021 138   07/17/2021 135   07/17/2021 115   07/17/2021 112   07/17/2021 124       Imaging Studies: I have personally reviewed pertinent reports  Portions of the record may have been created with voice recognition software

## 2021-07-18 NOTE — PHYSICAL THERAPY NOTE
Physical Therapy Treatment Note       07/18/21 1045   PT Last Visit   PT Visit Date 07/18/21   Note Type   Note Type Treatment   Pain Assessment   Pain Assessment Tool 0-10   Pain Score 8   Pain Location/Orientation Location: Chest   Patient's Stated Pain Goal No pain   Hospital Pain Intervention(s) Ambulation/increased activity   Restrictions/Precautions   Weight Bearing Precautions Per Order No   Other Precautions Multiple lines; Fall Risk;Pain;Telemetry;Cardiac/sternal   General   Chart Reviewed Yes   Family/Caregiver Present No   Cognition   Overall Cognitive Status Impaired   Arousal/Participation Responsive   Attention Attends with cues to redirect   Orientation Level Oriented X4   Memory Unable to assess   Following Commands Follows one step commands without difficulty   Subjective   Subjective states he feels OK   willing to mobilize    Transfers   Sit to Stand 4  Minimal assistance   Additional items Assist x 1; Increased time required   Stand to Sit 4  Minimal assistance   Additional items Assist x 1   Toilet transfer 4  Minimal assistance   Additional items Assist x 1   Ambulation/Elevation   Gait pattern Excessively slow  (mild ataxia)   Gait Assistance 4  Minimal assist   Additional items Assist x 1  (w/ 2nd for chair follow)   Assistive Device Rolling walker   Distance 120'x1   Balance   Static Sitting Good   Dynamic Sitting Fair -   Static Standing Fair -   Dynamic Standing Fair -   Ambulatory Fair -   Endurance Deficit   Endurance Deficit Yes   Endurance Deficit Description fatigue, weakness, pain   Activity Tolerance   Activity Tolerance Patient limited by fatigue;Patient limited by pain;Treatment limited secondary to medical complications (Comment)   Nurse Made Aware yes   Assessment   Prognosis Fair   Problem List Decreased strength; Impaired balance;Decreased mobility; Decreased endurance;Decreased coordination;Pain   Assessment Pt seen for session for setup, transfers, gait training    Pt cooperative w/ session  Improving alertness, and needed less assist for mobility tasks  able to ambulate an increased distance, and also did better w/ OT evaluation later in the day  as pt is making progress, now lean towards d/c home w  home PT, RW   will follow   Goals   Patient Goals to get better   STG Expiration Date 07/31/21   PT Treatment Day 1   Plan   Treatment/Interventions Functional transfer training;LE strengthening/ROM; Therapeutic exercise; Endurance training;Patient/family training;Equipment eval/education; Bed mobility;Gait training   Progress Progressing toward goals   PT Frequency Other (Comment)  (4-6x/wk)   Recommendation   PT Discharge Recommendation Post acute rehabilitation services   Equipment Recommended [address] [facility] Recommended Wheeled walker   Change/add to InflowControl?  No   PT - OK to Discharge No   AM-PAC Basic Mobility Inpatient   Turning in Bed Without Bedrails 3   Lying on Back to Sitting on Edge of Flat Bed 3   Moving Bed to Chair 3   Standing Up From Chair 3   Walk in Room 3   Climb 3-5 Stairs 3   Basic Mobility Inpatient Raw Score 18   Basic Mobility Standardized Score 41 05   Jose Antonio Mcduffie PT, DPT CSRS

## 2021-07-18 NOTE — PLAN OF CARE
Problem: OCCUPATIONAL THERAPY ADULT  Goal: Performs self-care activities at highest level of function for planned discharge setting  See evaluation for individualized goals  Description: Treatment Interventions: ADL retraining, Functional transfer training, Endurance training, Patient/family training, Equipment evaluation/education, Compensatory technique education, Continued evaluation, Cardiac education, Energy conservation, Activityengagement          See flowsheet documentation for full assessment, interventions and recommendations  Note: Limitation: Decreased ADL status, Decreased endurance, Decreased self-care trans, Decreased high-level ADLs  Prognosis: Good  Assessment: Pt is a 61 y o  male admitted to [facility] on 2021 w/ CAD s/p CABG x3 on 2021  Pt  has a past medical history of Diabetes mellitus ([facility] [address] ), History of COVID-19 (2020), HLD (hyperlipidemia), and HTN (hypertension)  Pt with active OT orders and ambulate  orders  Pt resides in a 1 story home with 0 RESHMA  Pt lives alone but has a significant other who is able to assist upon d/c  Pt was I w/  ADLS and IADLS, (+) drove, & required no use of DME PTA  Currently pt is min a for functional transfers, functional mobility, and ADLS overall  Pt is limited at this time 2*: pain, endurance, activity tolerance, functional mobility, forward functional reach, functional standing tolerance and decreased I w/ ADLS/IADLS  The following Occupational Performance Areas to address include: grooming, bathing/shower, toilet hygiene, dressing, functional mobility and clothing management  Based on the aforementioned OT evaluation, functional performance deficits, and assessments, pt has been identified as a high complexity evaluation  From OT standpoint, anticipate d/c home with family support pending progress  Pt to continue to benefit from acute immediate OT services to address the following goals 3-5x/week to  w/in 10-14 days:        OT Discharge Recommendation: No rehabilitation needs (Home with increased social support pending progress)  OT - OK to Discharge: Yes (When medically appropriate)

## 2021-07-18 NOTE — PROGRESS NOTES
Progress Note - Cardiothoracic Surgery   Rogerio Anderson 61 y o  male MRN: [id number]  Unit/Bed#: [facility] 409-01 Encounter: [id number]    Coronary artery disease  S/P coronary artery bypass grafting; POD # 2    24 Hour Events: Complaints of incisional pain this morning, guarding with movements due to pain  Appetite is poor due to pain  Ambulating somewhat      Medications:   Scheduled Meds:  Current Facility-Administered Medications   Medication Dose Route Frequency Provider Last Rate    acetaminophen  975 mg Oral Q8H Bhavna Zapata PA-C      amiodarone  200 mg Oral [facility] Carlos Li PA-C      aspirin  325 mg Oral Daily Carlos Li PA-C      atorvastatin  80 mg Oral Daily With Kishan Nichols PA-C      bisacodyl  10 mg Rectal Daily PRN Carlos Li PA-C      docusate sodium  100 mg Oral BID René Zapata PA-C      docusate sodium  100 mg Oral BID René Zapata PA-C      fondaparinux  2 5 mg Subcutaneous Q24H Bhavna Zapata PA-C      furosemide  40 mg Intravenous Daily Carlos Li PA-C      HYDROmorphone  0 5 mg Intravenous Q2H PRN Carlos Li PA-C      insulin regular (HumuLIN R,NovoLIN R) infusion  0 3-21 Units/hr Intravenous Titrated Carlos Li PA-C 2 Units/hr (07/17/21 1600)    lidocaine (cardiac)  100 mg Intravenous Q30 Min PRN Carlos Li PA-C      lidocaine  1 patch Topical Daily Carlos Li PA-C      methocarbamol  500 mg Oral Q6H PRN Carlos Li PA-C      metoprolol tartrate  12 5 mg Oral Q12H [address] Carlos Li PA-C      mupirocin  1 application Nasal G[address] Carlos Li PA-C      ondansetron  4 mg Intravenous Q6H PRN Carlos Li PA-C      oxyCODONE  2 5 mg Oral Q4H PRN Carlos Li PA-C      oxyCODONE  5 mg Oral Q4H PRN Carlos Li PA-C      pantoprazole  40 mg Oral Early Morning René Zapata PA-C      polyethylene glycol  17 g Oral Daily Earl Zapata PA-C      potassium chloride  20 mEq Oral Daily Phan Gale PA-C      sodium chloride  20 mL/hr Intravenous Continuous Cloyce BESSIE Gale 20 mL/hr (07/17/21 0800)    temazepam  15 mg Oral HS PRN Earl Zapata PA-C       Continuous Infusions:insulin regular (HumuLIN R,NovoLIN R) infusion, 0 3-21 Units/hr, Last Rate: 2 Units/hr (07/17/21 1600)  sodium chloride, 20 mL/hr, Last Rate: 20 mL/hr (07/17/21 0800)      PRN Meds: bisacodyl    HYDROmorphone    lidocaine (cardiac)    methocarbamol    ondansetron    oxyCODONE    oxyCODONE    temazepam    Vitals:   Vitals:    07/17/21 1938 07/17/21 2243 07/18/21 0238 07/18/21 0600   BP: 122/72 103/62 100/62    BP Location: Left arm Left arm Left arm    Pulse: 84 89 87    Resp: 20 18 18    Temp: (!) 97 3 °F (36 3 °C) 99 °F (37 2 °C) 98 5 °F (36 9 °C)    TempSrc: Oral Oral Oral    SpO2: 98% 94% 94%    Weight:    114 kg (251 lb 1 7 oz)   Height:           Telemetry: NSR; Heart Rate: 95    Respiratory:   SpO2: SpO2: 94 %; Room Air    Intake/Output:   I/O       07/16 0701 - 07/17 0700 07/17 0701 - 07/18 0700 07/18 0701 - 07/19 0700    P  O  1360      I V  (mL/kg) 662 (6 1) 312 4 (2 7)     IV Piggyback 572 5 50     Cell Saver 750      Total Intake(mL/kg) 3344 5 (30 7) 362 4 (3 2)     Urine (mL/kg/hr) 1935 (0 7) 825 (0 3)     Chest Tube 590 200     Total Output 2525 1025     Net +819 5 -662 6            Unmeasured Urine Occurrence  1 x           Chest tube Output:nothing docuemnted from last night will verify with RN staff    eights:   Weight (last 2 days)     Date/Time   Weight    07/18/21 0600   114 (251 1)    07/16/21 0533   109 (240 08)            Results:   Results from last 7 days   Lab Units 07/18/21  0436 07/17/21  0418 07/16/21  2033 07/16/21  0802 07/16/21  0516   WBC Thousand/uL 13 09* 12 97*  --   --  6 50   HEMOGLOBIN g/dL 12 9 13 8 14 6   < > 14 7   HEMATOCRIT % 39 6 41 1 43 2   < > 43 9   PLATELETS Thousands/uL 168 160 159 < > 187    < > = values in this interval not displayed  Results from last 7 days   Lab Units 07/18/21  0436 07/17/21  0418 07/17/21  0041 07/16/21  1150   SODIUM mmol/L 132* 138 139  --    POTASSIUM mmol/L 4 4 4 3 5 3  --    CHLORIDE mmol/L 102 108 110*  --    CO2 mmol/L 25 26 25  --    CO2, I-STAT mmol/L  --   --   --  27   BUN mg/dL 18 12 12  --    CREATININE mg/dL 0 92 0 77 0 75  --    GLUCOSE, ISTAT mg/dl  --   --   --  231*   CALCIUM mg/dL 8 5 8 1* 8 2*  --      Results from last 7 days   Lab Units 07/16/21  1144 07/16/21  0516 07/15/21  0453   INR  1 11  --   --    PTT seconds 79* 44* 70*     Point of care glucose: normal range    Invasive Lines/Tubes:  Invasive Devices     Central Venous Catheter Line            CVC Central Lines 07/16/21 Triple 1 day          Peripheral Intravenous Line            Peripheral IV 07/16/21 Right Antecubital 1 day          Line            Pacer Wires 1 day    Pacer Wires 1 day          Drain            Chest Tube 1 Left Pleural 32 Fr  1 day    Chest Tube 2 Posterior Mediastinal 32 Fr  1 day    Chest Tube 3 Anterior Mediastinal 32 Fr  1 day                Physical Exam:    HEENT/NECK:  Normocephalic  Atraumatic  NO jugular venous distention  Cardiac: Regular rate and rhythm  Pulmonary:  Crackles bilaterally  Abdomen:  Non-tender, Normal bowel sounds and Distended  Incisions: Sternum is stable  Incision dressed with Acticoat  No erythema or drainage and Saphenectomy incision dressed with Acticoat  No erythema or drainage  Extremities: Extremities warm/dry and 1+ edema B/L  Neuro: Alert and oriented X 3  Skin: Warm/Dry, without rashes or lesions  Assessment:  Principal Problem:    CAD (coronary artery disease)  Active Problems:    Hyperlipidemia    Type 2 diabetes mellitus (HCC)    Hypertension    Muscle spasm    Tobacco use    HTN (hypertension)    History of COVID-19    S/P CABG (coronary artery bypass graft)       Coronary artery disease   S/P coronary artery bypass grafting; POD # 2    Plan:    1  Cardiac:   NSR; Tachycardic  Lopressor, 25mg PO BID  Continue ASA and Statin therapy  Epicardial pacing wires no longer required  Remove today  Maintain central IV access today for today  Continue DVT prophylaxis    2  Pulmonary:   Good Room air oxygen saturation; Continue incentive spirometry/Coughing/Deep breathing exercises  d/c CT pending output    3  Renal:   Intake/Output net: -662 mL/24 hours  Continue diuresis   Lasix 40 mg IV BID  Potassium Chloride 20 mEq PO BID  Post op Creatinine stable; Follow up labs prn    4  Neuro:  Neurologically intact; No active issues  Incisional pain well-controlled  Continue Tylenol, 975 mg PO q 8, standing dose  Continue Oxycodone, 2 5 to 5 mg PO q 4 hours prn pain    5  GI:  Tolerating TLC 2 3 gm sodium diet  Maintain 1800 mL daily fluid restriction   Continue stool softeners and prn suppository  Continue GI prophylaxis    6  Endo:   History of diabetes; Continue SQ insulin therapy as directed by endocrinology physician  Insulin administration teaching for home therapy ordered    7    Hematology:    Post-operative blood count acceptable; Trend prn    8  Disposition:      Anticipate discharge to home when medically ready potentially tomorrow     VTE Pharmacologic Prophylaxis: Fondaparinux (Arixtra)  VTE Mechanical Prophylaxis: sequential compression device    Collaborative rounds completed with MARY Partida    Plan of care discussed with bedside nurse    SIGNATURE: Pooja Moses PA-C  DATE: July 18, 2021  TIME: 7:15 AM

## 2021-07-19 LAB
ANION GAP SERPL CALCULATED.3IONS-SCNC: 8 MMOL/L (ref 4–13)
ATRIAL RATE: 77 BPM
ATRIAL RATE: 88 BPM
BUN SERPL-MCNC: 22 MG/DL (ref 5–25)
CALCIUM SERPL-MCNC: 8.4 MG/DL (ref 8.3–10.1)
CHLORIDE SERPL-SCNC: 100 MMOL/L (ref 100–108)
CO2 SERPL-SCNC: 26 MMOL/L (ref 21–32)
CREAT SERPL-MCNC: 0.79 MG/DL (ref 0.6–1.3)
GFR SERPL CREATININE-BSD FRML MDRD: 95 ML/MIN/1.73SQ M
GLUCOSE SERPL-MCNC: 149 MG/DL (ref 65–140)
GLUCOSE SERPL-MCNC: 151 MG/DL (ref 65–140)
GLUCOSE SERPL-MCNC: 166 MG/DL (ref 65–140)
GLUCOSE SERPL-MCNC: 196 MG/DL (ref 65–140)
GLUCOSE SERPL-MCNC: 222 MG/DL (ref 65–140)
P AXIS: 32 DEGREES
P AXIS: 45 DEGREES
POTASSIUM SERPL-SCNC: 4.3 MMOL/L (ref 3.5–5.3)
PR INTERVAL: 150 MS
PR INTERVAL: 175 MS
QRS AXIS: -32 DEGREES
QRS AXIS: -73 DEGREES
QRSD INTERVAL: 88 MS
QRSD INTERVAL: 96 MS
QT INTERVAL: 342 MS
QT INTERVAL: 396 MS
QTC INTERVAL: 414 MS
QTC INTERVAL: 449 MS
SODIUM SERPL-SCNC: 134 MMOL/L (ref 136–145)
T WAVE AXIS: 1 DEGREES
T WAVE AXIS: 9 DEGREES
VENTRICULAR RATE: 77 BPM
VENTRICULAR RATE: 88 BPM

## 2021-07-19 PROCEDURE — 80048 BASIC METABOLIC PNL TOTAL CA: CPT | Performed by: PHYSICIAN ASSISTANT

## 2021-07-19 PROCEDURE — 97530 THERAPEUTIC ACTIVITIES: CPT

## 2021-07-19 PROCEDURE — 82948 REAGENT STRIP/BLOOD GLUCOSE: CPT

## 2021-07-19 PROCEDURE — 99232 SBSQ HOSP IP/OBS MODERATE 35: CPT | Performed by: INTERNAL MEDICINE

## 2021-07-19 PROCEDURE — 93010 ELECTROCARDIOGRAM REPORT: CPT | Performed by: INTERNAL MEDICINE

## 2021-07-19 PROCEDURE — 97116 GAIT TRAINING THERAPY: CPT

## 2021-07-19 PROCEDURE — 99024 POSTOP FOLLOW-UP VISIT: CPT | Performed by: THORACIC SURGERY (CARDIOTHORACIC VASCULAR SURGERY)

## 2021-07-19 RX ORDER — INSULIN GLARGINE 100 [IU]/ML
15 INJECTION, SOLUTION SUBCUTANEOUS EVERY MORNING
Status: DISCONTINUED | OUTPATIENT
Start: 2021-07-19 | End: 2021-07-20 | Stop reason: HOSPADM

## 2021-07-19 RX ORDER — BISACODYL 10 MG
10 SUPPOSITORY, RECTAL RECTAL DAILY PRN
Status: DISCONTINUED | OUTPATIENT
Start: 2021-07-19 | End: 2021-07-20 | Stop reason: HOSPADM

## 2021-07-19 RX ADMIN — DEXTROSE 150 MG: 50 INJECTION, SOLUTION INTRAVENOUS at 14:25

## 2021-07-19 RX ADMIN — DOCUSATE SODIUM AND SENNOSIDES 1 TABLET: 8.6; 5 TABLET ORAL at 10:06

## 2021-07-19 RX ADMIN — MUPIROCIN 1 APPLICATION: 20 OINTMENT TOPICAL at 21:49

## 2021-07-19 RX ADMIN — FUROSEMIDE 40 MG: 10 INJECTION, SOLUTION INTRAMUSCULAR; INTRAVENOUS at 18:18

## 2021-07-19 RX ADMIN — METHOCARBAMOL 500 MG: 500 TABLET ORAL at 12:00

## 2021-07-19 RX ADMIN — INSULIN LISPRO 10 UNITS: 100 INJECTION, SOLUTION INTRAVENOUS; SUBCUTANEOUS at 12:02

## 2021-07-19 RX ADMIN — METHOCARBAMOL 500 MG: 500 TABLET ORAL at 01:00

## 2021-07-19 RX ADMIN — ACETAMINOPHEN 975 MG: 325 TABLET, FILM COATED ORAL at 21:48

## 2021-07-19 RX ADMIN — POTASSIUM CHLORIDE 20 MEQ: 1500 TABLET, EXTENDED RELEASE ORAL at 18:18

## 2021-07-19 RX ADMIN — ACETAMINOPHEN 975 MG: 325 TABLET, FILM COATED ORAL at 05:36

## 2021-07-19 RX ADMIN — INSULIN LISPRO 1 UNITS: 100 INJECTION, SOLUTION INTRAVENOUS; SUBCUTANEOUS at 18:19

## 2021-07-19 RX ADMIN — INSULIN GLARGINE 15 UNITS: 100 INJECTION, SOLUTION SUBCUTANEOUS at 12:01

## 2021-07-19 RX ADMIN — AMIODARONE HYDROCHLORIDE 200 MG: 200 TABLET ORAL at 21:49

## 2021-07-19 RX ADMIN — INSULIN LISPRO 10 UNITS: 100 INJECTION, SOLUTION INTRAVENOUS; SUBCUTANEOUS at 18:20

## 2021-07-19 RX ADMIN — FONDAPARINUX SODIUM 2.5 MG: 2.5 INJECTION, SOLUTION SUBCUTANEOUS at 14:32

## 2021-07-19 RX ADMIN — AMIODARONE HYDROCHLORIDE 200 MG: 200 TABLET ORAL at 14:26

## 2021-07-19 RX ADMIN — ATORVASTATIN CALCIUM 80 MG: 80 TABLET, FILM COATED ORAL at 18:18

## 2021-07-19 RX ADMIN — POTASSIUM CHLORIDE 20 MEQ: 1500 TABLET, EXTENDED RELEASE ORAL at 10:07

## 2021-07-19 RX ADMIN — INSULIN LISPRO 10 UNITS: 100 INJECTION, SOLUTION INTRAVENOUS; SUBCUTANEOUS at 10:01

## 2021-07-19 RX ADMIN — POLYETHYLENE GLYCOL 3350 17 G: 17 POWDER, FOR SOLUTION ORAL at 10:06

## 2021-07-19 RX ADMIN — METOPROLOL TARTRATE 25 MG: 25 TABLET, FILM COATED ORAL at 10:07

## 2021-07-19 RX ADMIN — METHOCARBAMOL 500 MG: 500 TABLET ORAL at 05:36

## 2021-07-19 RX ADMIN — ACETAMINOPHEN 975 MG: 325 TABLET, FILM COATED ORAL at 14:25

## 2021-07-19 RX ADMIN — FUROSEMIDE 40 MG: 10 INJECTION, SOLUTION INTRAMUSCULAR; INTRAVENOUS at 10:08

## 2021-07-19 RX ADMIN — INSULIN LISPRO 2 UNITS: 100 INJECTION, SOLUTION INTRAVENOUS; SUBCUTANEOUS at 06:42

## 2021-07-19 RX ADMIN — PANTOPRAZOLE SODIUM 40 MG: 40 TABLET, DELAYED RELEASE ORAL at 05:36

## 2021-07-19 RX ADMIN — INSULIN LISPRO 2 UNITS: 100 INJECTION, SOLUTION INTRAVENOUS; SUBCUTANEOUS at 12:01

## 2021-07-19 RX ADMIN — ASPIRIN 325 MG ORAL TABLET 325 MG: 325 PILL ORAL at 10:07

## 2021-07-19 RX ADMIN — MUPIROCIN 1 APPLICATION: 20 OINTMENT TOPICAL at 10:06

## 2021-07-19 RX ADMIN — METHOCARBAMOL 500 MG: 500 TABLET ORAL at 18:18

## 2021-07-19 RX ADMIN — AMIODARONE HYDROCHLORIDE 200 MG: 200 TABLET ORAL at 05:36

## 2021-07-19 NOTE — RESTORATIVE TECHNICIAN NOTE
Restorative Technician Note      Patient Name: Rogerio Anderson     Restorative Tech Visit Date: 07/19/21  Note Type: Mobility  Patient Position Upon Consult: Bedside chair  Activity Performed: Ambulated  Assistive Device: Roller walker  Patient Position at End of Consult: All needs within reach; Bedside chair

## 2021-07-19 NOTE — PROGRESS NOTES
Progress Note - Cardiothoracic Surgery   Cheryl Shepherd 61 y o  male MRN: [id number]  Unit/Bed#: [facility] 409-01 Encounter: [id number]    Coronary artery disease  S/P coronary artery bypass grafting; POD # 3    24 Hour Events: Incisional pain improving  PO intake remains poor (240), no appetite, slightly nauseous, +flatus, +distended  no BM  Ambulating, using IS  Endocrine transitioned to SSI yesterday        Medications:   Scheduled Meds:  Current Facility-Administered Medications   Medication Dose Route Frequency Provider Last Rate    acetaminophen  975 mg Oral Q8H Bhavna Zapata PA-C      amiodarone  200 mg Oral Counts include [facility] at the [facility] Agnes Navarrete PA-C      aspirin  325 mg Oral Daily Agnes Navarrete PA-C      atorvastatin  80 mg Oral Daily With [employer] InternationalBESSIE      bisacodyl  10 mg Rectal Daily PRN Agnes Navarrete PA-C      fondaparinux  2 5 mg Subcutaneous Q24H Bhavna Zapata PA-C      furosemide  40 mg Intravenous BID (diuretic) Ellelilibeth Smalls PA-C      insulin glargine  40 Units Subcutaneous HS Ramona Leal MD      insulin lispro  1-5 Units Subcutaneous HS Ramona Leal MD      insulin lispro  1-6 Units Subcutaneous TID AC Ramona Leal MD      insulin lispro  10 Units Subcutaneous TID With Meals Ramona Leal MD      lidocaine  1 patch Topical Daily Agnes Navarrete PA-C      methocarbamol  500 mg Oral [address] [place], [place]      metoprolol tartrate  12 5 mg Oral Once [facility]      metoprolol tartrate  25 mg Oral Q12H [address] [place], [place]      mupirocin  1 application Nasal [address] Agnes Navarrete PA-C      ondansetron  4 mg Intravenous Q6H PRN Agnes Navarrete PA-C      oxyCODONE  10 mg Oral Q4H PRN [address] [facility] BESSIE Mccabe      oxyCODONE  5 mg Oral Q4H PRN [address] [facility] BESSIE Mccabe      pantoprazole  40 mg Oral Early Morning Agnes Navarrete PA-C      polyethylene glycol  17 g Oral Daily Preeti Sin BESSIE Zapata      potassium chloride  20 mEq Oral BID [facility] [place], [place]      senna-docusate sodium  1 tablet Oral BID [place], [place]      sodium chloride  20 mL/hr Intravenous Continuous [place] BESSIE Gregorio 20 mL/hr (07/17/21 0800)    temazepam  15 mg Oral HS PRN Romi More BESSIE Zapata       Continuous Infusions:sodium chloride, 20 mL/hr, Last Rate: 20 mL/hr (07/17/21 0800)      PRN Meds: bisacodyl    ondansetron    oxyCODONE    oxyCODONE    temazepam    Vitals:   Vitals:    07/18/21 2243 07/19/21 0200 07/19/21 0245 07/19/21 0537   BP: 105/60  98/65    BP Location: Left arm  Left arm    Pulse: 88  83    Resp: 18  16    Temp: 99 1 °F (37 3 °C)  98 2 °F (36 8 °C)    TempSrc: Oral  Oral    SpO2: 95% 95% 96%    Weight:    112 kg (247 lb 5 7 oz)   Height:       BP: 98/65 - 60-72  R 83    Telemetry: NSR; Heart Rate: 86    Respiratory:   SpO2: SpO2: 96 %; Room Air    Intake/Output:   I/O       07/16 0701 - 07/17 0700 07/17 0701 - 07/18 0700 07/18 0701 - 07/19 0700    P  O  1360      I V  (mL/kg) 662 (6 1) 312 4 (2 7)     IV Piggyback 572 5 50     Cell Saver 750      Total Intake(mL/kg) 3344 5 (30 7) 362 4 (3 2)     Urine (mL/kg/hr) 1935 (0 7) 825 (0 3)     Chest Tube 590 200     Total Output 2525 1025     Net +819 5 -662 6            Unmeasured Urine Occurrence  1 x         Net -1 5L, UOP/24hrs 1 9L (0 7mL/kg/hr) + 1 unmeasured urine occurence      eights:   Weight (last 2 days)     Date/Time   Weight    07/19/21 0537   112 (247 36)    07/18/21 0600   114 (251 1)          Up 2 kg from admission    Results:   Results from last 7 days   Lab Units 07/18/21  0436 07/17/21  0418 07/16/21  2033 07/16/21  0802 07/16/21  0516   WBC Thousand/uL 13 09* 12 97*  --   --  6 50   HEMOGLOBIN g/dL 12 9 13 8 14 6   < > 14 7   HEMATOCRIT % 39 6 41 1 43 2   < > 43 9   PLATELETS Thousands/uL 168 160 159   < > 187    < > = values in this interval not displayed       Results from last 7 days   Lab Units 07/19/21  0435 07/18/21  0436 07/17/21  0418 07/16/21  1559 07/16/21  1150   SODIUM mmol/L 134* 132* 138   < >  --    POTASSIUM mmol/L 4 3 4 4 4 3  --   --    CHLORIDE mmol/L 100 102 108   < >  --    CO2 mmol/L 26 25 26   < >  --    CO2, I-STAT mmol/L  --   --   --   --  27   BUN mg/dL 22 18 12   < >  --    CREATININE mg/dL 0 79 0 92 0 77   < >  --    GLUCOSE, ISTAT mg/dl  --   --   --   --  231*   CALCIUM mg/dL 8 4 8 5 8 1*   < >  --     < > = values in this interval not displayed  Results from last 7 days   Lab Units 07/16/21  1144 07/16/21  0516 07/15/21  0453   INR  1 11  --   --    PTT seconds 79* 44* 70*     Point of care glucose: 102-210 (endocrine following)    Invasive Lines/Tubes:  Invasive Devices     Central Venous Catheter Line            CVC Central Lines 07/16/21 Triple 2 days          Peripheral Intravenous Line            Peripheral IV 07/16/21 Right Antecubital 2 days          Drain            Chest Tube 1 Left Pleural 32 Fr  2 days    Chest Tube 2 Posterior Mediastinal 32 Fr  2 days    Chest Tube 3 Anterior Mediastinal 32 Fr  2 days              Physical Exam:    HEENT/NECK:  Normocephalic  Atraumatic  No jugular venous distention  Cardiac: Regular rate and rhythm and No murmurs/rubs/gallops  Pulmonary:  Crackles bilaterally (mild)  Abdomen:  Non-tender, Normal bowel sounds and Distended  Incisions: Sternum is stable  Incision is clean, dry, and intact  and Saphenectomy incison is clean, dry, and intact  Extremities: Extremities warm/dry, Radial pulses 2+ bilaterally and 1+ edema B/L  Neuro: Alert and oriented X 3, Sensation is grossly intact and No focal deficits  Skin: Warm/Dry, without rashes or lesions        Assessment:  Principal Problem:    CAD (coronary artery disease)  Active Problems:    Hyperlipidemia    Type 2 diabetes mellitus (HCC)    Hypertension    Muscle spasm    Tobacco use    HTN (hypertension)    History of COVID-19    S/P CABG (coronary artery bypass graft) Coronary artery disease  S/P coronary artery bypass grafting; POD # 3    Plan:    1  Cardiac:   NSR; HR/BP well controlled  Lopressor, 25mg PO BID  Continue ASA and Statin therapy  Epicardial pacing wires out  Maintain central IV access today for today  Continue DVT prophylaxis    2  Pulmonary:   Good Room air oxygen saturation; Continue incentive spirometry/Coughing/Deep breathing exercises  Chest tubes have been discontinued    3  Renal:   Intake/Output net: -1 5 L/24 hours  Continue diuresis   Lasix 40 mg IV BID  Potassium Chloride 20 mEq PO BID  Post op Creatinine stable; Follow up labs prn    4  Neuro:  Neurologically intact; No active issues  Incisional pain well-controlled  Continue Tylenol, 975 mg PO q 8, standing dose  Continue Oxycodone, 2 5 to 5 mg PO q 4 hours prn pain    5  GI:  Tolerating TLC 2 3 gm sodium diet/diebetic  Maintain 1800 mL daily fluid restriction   Continue stool softeners and prn suppository, add MOM  Continue GI prophylaxis  Add ensure    6  Endo:   History of diabetes; Continue SQ insulin therapy as directed by endocrinology physician  Insulin administration teaching for home therapy ordered    7    Hematology:    Post-operative blood count acceptable; Trend prn    8  Disposition:      Anticipate discharge to home when medically ready potentially tomorrow   Per PT- not ready for discharge, recommending post acute rehab services  VTE Pharmacologic Prophylaxis: Fondaparinux (Arixtra)  VTE Mechanical Prophylaxis: sequential compression device    Collaborative rounds completed with MARY Dunlap    Plan of care discussed with bedside nurse    SIGNATURE: Andrea Fountain PA-C  DATE: July 19, 2021  TIME: 6:57 AM

## 2021-07-19 NOTE — PHYSICAL THERAPY NOTE
PHYSICAL THERAPY NOTE          Patient Name: Ruma Caraballo  [id number] Date: 7/19/2021 07/19/21 0920   PT Last Visit   PT Visit Date 07/19/21   Note Type   Note Type Treatment   Pain Assessment   Pain Assessment Tool Pain Assessment not indicated - pt denies pain   Pain Score No Pain   Restrictions/Precautions   Other Precautions Cardiac/sternal;Telemetry   General   Chart Reviewed Yes   Additional Pertinent History cleared for Tx session (spoke to nsg)   Response to Previous Treatment Patient with no complaints from previous session  Cognition   Overall Cognitive Status WFL   Arousal/Participation Alert; Cooperative   Attention Within functional limits   Orientation Level Oriented to person;Oriented to place;Oriented to situation   Memory Decreased recall of precautions   Following Commands Follows one step commands without difficulty   Subjective   Subjective Alert; in the chair; agreeable to perform therex and mobilize; also wishes to go to the BR   Transfers   Sit to Stand 6  Modified independent  (after initial (S))   Stand to Sit 6  Modified independent  (after initial (S))   Toilet transfer 4  Minimal assistance  (stand to sit; mod (I) sit to stand)   Additional items Assist x 1;Commode   Ambulation/Elevation   Gait pattern Excessively slow; Short stride  (no overt LOB or knee buckling)   Gait Assistance 6  Modified independent  (during later amb trials; (S) initially)   Assistive Device Rolling walker  (will need one for home)   Distance 2 x 8 ft w/ seated period in between; after another seated rest period, additional 10 ft followed by up and down 1 step (reports one RESHMA) followed by additional 140 ft + 180 ft + 260 ft w/ seated rest periods in between;   Stair Management Assistance 5  Supervision   Additional items Assist x 1;Verbal cues   Stair Management Technique Step to pattern; Foreward; With walker  (reviewed sequencing and need for (A) w/ rw management)   Number of Stairs 1  (small curb step)   Balance   Static Sitting Good   Static Standing Fair +   Ambulatory Fair   Activity Tolerance   Activity Tolerance Patient tolerated treatment well   Nurse Made Aware spoke to Cathy Reyes [facility]   Leana   Knee AROM Long Arc Quad Sitting;15 reps;AROM; Bilateral   Ankle Pumps Sitting;15 reps;AROM; Bilateral   Equipment Use   Comments Pt was reclined in the chair w/ LE elevated at the end of session; call bell w/in reach   Assessment   Prognosis Good   Problem List Decreased strength;Decreased endurance; Impaired balance;Decreased mobility   Assessment Pt was able to progress w/ all aspects of performed mobility achieving mod (I) level w/ transfers and amb w/ rw and (S) on the steps; no overt uncorrected LOB, gross knee buckling, or swaying observed  At this time, anticipate pt will return home w/ available support upon D/C when medically cleared; home PT follow up is recommended; will follow to max endurance and to progress to less restrictive assistive device  Goals   Patient Goals to go home   STG Expiration Date 07/31/21   PT Treatment Day 2   Plan   Treatment/Interventions LE strengthening/ROM; Elevations; Therapeutic exercise; Endurance training;Bed mobility;Gait training;Spoke to nursing   Progress Improving as expected   PT Frequency Other (Comment)  (4-6x/wk)   Recommendation   PT Discharge Recommendation Home with home health rehabilitation  (home PT (change from prior recommendations))   Equipment Recommended Walker; Other (Comment)  ([facility] [place])   Walker Package Recommended Wheeled walker   AM-PAC Basic Mobility Inpatient   Turning in Bed Without Bedrails 4   Lying on Back to Sitting on Edge of Flat Bed 4   Moving Bed to Chair 4   Standing Up From Chair 4   Walk in Room 4   Climb 3-5 Stairs 3   Basic Mobility Inpatient Raw Score 23   Basic Mobility Standardized Score 50 88       Edwige Iyer, PT

## 2021-07-19 NOTE — CASE MANAGEMENT
Pt remains recommended to have in-home [facility] services for his aftercare plan  Previously made referrals to both [facility] and [facility] were declined due to both agencies being out of network w/ pt's [employer]  CM spoke to pt about this  CM provided pt w/ additional freedom of choice for [facility] agencies  Pt requested additional referral to [facility]  CM made additional Ecin referral to [facility]  CM to follow

## 2021-07-19 NOTE — PROGRESS NOTES
Progress Note - Elisa Guaman 61 y o  male MRN: [id number]    Unit/Bed#: [facility] 409-01 Encounter: [id number]      CC: diabetes f/u    Subjective:   Elisa Guaman is a 61y o  year old male with type 2 diabetes and CAD who underwent CABG  Feels well  No complaints  No hypoglycemia  Objective:     Vitals: Blood pressure 107/76, pulse 88, temperature 97 8 °F (36 6 °C), temperature source Oral, resp  rate 17, height 5' 10" (1 778 m), weight 112 kg (247 lb 5 7 oz), SpO2 95 %  ,Body mass index is 35 49 kg/m²  Intake/Output Summary (Last 24 hours) at 7/19/2021 1023  Last data filed at 7/19/2021 0600  Gross per 24 hour   Intake 433 4 ml   Output 1975 ml   Net -1541 6 ml       Physical Exam:  General Appearance: awake, appears stated age and cooperative  Head: Normocephalic, without obvious abnormality, atraumatic  Extremities: moves all extremities  Skin: Skin color and temperature normal    Pulm: no labored breathing    Lab, Imaging and other studies: I have personally reviewed pertinent reports  POC Glucose (mg/dl)   Date Value   07/19/2021 196 (H)   07/18/2021 129   07/18/2021 174 (H)   07/18/2021 210 (H)   07/18/2021 102   07/18/2021 109   07/18/2021 143 (H)   07/18/2021 171 (H)   07/18/2021 154 (H)   07/18/2021 127       Assessment:    Type 2 diabetes with hyperglycemia  CAD status post CABG  Hypertension  Hyperlipidemia    Plan:    Uncontrolled type 2 diabetes with last A1c 9%  Home regimen consists of Jardiance and metformin  Patient has been on IV insulin infusion requiring 2-4 units/hour initially  Plan was to switch to basal bolus insulin with Lantus 40 units q h s  And lispro 10 units t i d  A c  However patient did not receive Lantus last night  Will give Lantus 15 units now, as he is requirements have decreased in IV infusion  Will switch to Lantus 15 units q a m  Depending on his blood glucose he might need more basal insulin tomorrow  Continue lispro 10 units t i d  A c   For now    Patient will likely require insulin on discharge  He will need insulin teaching prior to discharge  For discharge, if Lantus solostar is not the preferred basal insulin for the patient's insurance company, please substitute with [place] flexpen, Basaglar Kwikpen , Levemir flexpen, Toujeo solostar pen  or equivalent insulin vials at the same dose instead  For discharge, if Humalog  Kwik pen is not the preferred mealtime insulin for the patient's insurance, please substitute with NovoLog flexpen, Fiasp  flextouch, Admelog solostar  or Apidra solostar pen or equivalent insulin vials at the same dose instead  If basal/bolus therapy is not covered or affordable, please substitute with Novolin 70/30 Flexpen OR Novolin 70/30 vial at equivalent dose     Please prescribe insulin pen needles, glucometer, test strips, lancets and insulin syringes (only when using insulin vials) on discharge  Portions of the record may have been created with voice recognition software

## 2021-07-19 NOTE — PLAN OF CARE
Problem: PHYSICAL THERAPY ADULT  Goal: Performs mobility at highest level of function for planned discharge setting  See evaluation for individualized goals  Description: Treatment/Interventions: Functional transfer training, LE strengthening/ROM, Therapeutic exercise, Endurance training, Patient/family training, Gait training, Bed mobility, Equipment eval/education  Equipment Recommended: Jesi Haywood       See flowsheet documentation for full assessment, interventions and recommendations  Outcome: Progressing  Note: Prognosis: Good  Problem List: Decreased strength, Decreased endurance, Impaired balance, Decreased mobility  Assessment: Pt was able to progress w/ all aspects of performed mobility achieving mod (I) level w/ transfers and amb w/ rw and (S) on the steps; no overt uncorrected LOB, gross knee buckling, or swaying observed  At this time, anticipate pt will return home w/ available support upon D/C when medically cleared; home PT follow up is recommended; will follow to max endurance and to progress to less restrictive assistive device  PT Discharge Recommendation: (S) Home with home health rehabilitation (home PT (change from prior recommendations))     PT - OK to Discharge: No    See flowsheet documentation for full assessment

## 2021-07-19 NOTE — PROGRESS NOTES
Progress Note - Cardiology   Versa Ou 61 y o  male MRN: [id number]  Unit/Bed#: [facility] 409-01 Encounter: [id number]        Principal Problem:    CAD (coronary artery disease)  Active Problems:    Hyperlipidemia    Type 2 diabetes mellitus ([address] )    Hypertension    Muscle spasm    Tobacco use    HTN (hypertension)    History of COVID-19    S/P CABG (coronary artery bypass graft)      Assessment/Plan    1  Multivessel CAD-status post CABG x3  POD #3  Amiodarone 200 mg every 8 hours  AP-aspirin 325 mg daily, statin-atorvastatin 80mg, BB-Lopressor 25 b i d  Diuretic-Lasix 40 mg IV b i d  net -1500 last 24  LVEF 50%  Telemetry- NSR short bursts PAF today with RVR  Continue to monitor   BP low normal   Will not increase BB at this time  If recurrent will consider amiodarone gtt  2  Preop non-STEMI type 1  Peak 0 26  AP, statin, BB  LVEF 50%    3  HLD-high-intensity statin  Previously on pravastatin 20 mg  Cholesterol 210/triglyceride 215/HDL 44/LDL 23    4  HTN- controlled  Continue current medical managment    5  Type 2 diabetes-hemoglobin A1c 9%  Per endo /primary team    6  Visual disturbance - c/o "floaters" right eye, transient, reportedly since surgery  Will inform CT surgery  Subjective/Objective   Chief Complaint/Subjective  Patient without chest pain or shortness of breath  Finally moved  his bowels  Abdomen feels much comfortable  Appetite improving  Transient floaters in the right eye since surgery per patient           Vitals: /76   Pulse 88   Temp 97 8 °F (36 6 °C) (Oral)   Resp 17   Ht 5' 10" (1 778 m)   Wt 112 kg (247 lb 5 7 oz)   SpO2 95%   BMI 35 49 kg/m²     Vitals:    07/18/21 0600 07/19/21 0537   Weight: 114 kg (251 lb 1 7 oz) 112 kg (247 lb 5 7 oz)     Orthostatic Blood Pressures      Most Recent Value   Blood Pressure  107/76 filed at 07/19/2021 1007   Patient Position - Orthostatic VS  Sitting filed at 07/19/2021 0743            Intake/Output Summary (Last 24 hours) at 7/19/2021 1030  Last data filed at 7/19/2021 0600  Gross per 24 hour   Intake 433 4 ml   Output 1975 ml   Net -1541 6 ml       Invasive Devices     Central Venous Catheter Line            CVC Central Lines 07/16/21 Triple 3 days          Peripheral Intravenous Line            Peripheral IV 07/16/21 Right Antecubital 3 days          Drain            Chest Tube 1 Left Pleural 32 Fr  3 days    Chest Tube 2 Posterior Mediastinal 32 Fr  3 days    Chest Tube 3 Anterior Mediastinal 32 Fr  3 days                Current Facility-Administered Medications   Medication Dose Route Frequency    acetaminophen (TYLENOL) tablet 975 mg  975 mg Oral Q8H    amiodarone tablet 200 mg  200 mg Oral Q8H [address]    aspirin tablet 325 mg  325 mg Oral Daily    atorvastatin (LIPITOR) tablet 80 mg  80 mg Oral Daily With Dinner    bisacodyl (DULCOLAX) rectal suppository 10 mg  10 mg Rectal Daily PRN    fondaparinux (ARIXTRA) subcutaneous injection 2 5 mg  2 5 mg Subcutaneous Q24H    furosemide (LASIX) injection 40 mg  40 mg Intravenous BID (diuretic)    insulin glargine (LANTUS) subcutaneous injection 40 Units 0 4 mL  40 Units Subcutaneous HS    insulin lispro (HumaLOG) 100 units/mL subcutaneous injection 1-5 Units  1-5 Units Subcutaneous HS    insulin lispro (HumaLOG) 100 units/mL subcutaneous injection 1-6 Units  1-6 Units Subcutaneous TID AC    insulin lispro (HumaLOG) 100 units/mL subcutaneous injection 10 Units  10 Units Subcutaneous TID With Meals    lidocaine (LIDODERM) 5 % patch 1 patch  1 patch Topical Daily    magnesium hydroxide (MILK OF MAGNESIA) oral suspension 30 mL  30 mL Oral Daily PRN    methocarbamol (ROBAXIN) tablet 500 mg  500 mg Oral Q6H [address]    metoprolol tartrate (LOPRESSOR) partial tablet 12 5 mg  12 5 mg Oral Once    metoprolol tartrate (LOPRESSOR) tablet 25 mg  25 mg Oral Q12H [address]    mupirocin (BACTROBAN) 2 % nasal ointment 1 application  1 application Nasal J49O [address]    ondansetron (ZOFRAN) injection 4 mg  4 mg Intravenous Q6H PRN    oxyCODONE (ROXICODONE) immediate release tablet 10 mg  10 mg Oral Q4H PRN    oxyCODONE (ROXICODONE) IR tablet 5 mg  5 mg Oral Q4H PRN    pantoprazole (PROTONIX) EC tablet 40 mg  40 mg Oral Early Morning    polyethylene glycol (MIRALAX) packet 17 g  17 g Oral Daily    potassium chloride (K-DUR,KLOR-CON) CR tablet 20 mEq  20 mEq Oral BID    senna-docusate sodium (SENOKOT S) 8 6-50 mg per tablet 1 tablet  1 tablet Oral BID    sodium chloride infusion 0 45 %  20 mL/hr Intravenous Continuous    temazepam (RESTORIL) capsule 15 mg  15 mg Oral HS PRN         Physical Exam: /76   Pulse 88   Temp 97 8 °F (36 6 °C) (Oral)   Resp 17   Ht 5' 10" (1 778 m)   Wt 112 kg (247 lb 5 7 oz)   SpO2 95%   BMI 35 49 kg/m²     General Appearance:    Alert, cooperative, no distress, appears stated age   Head:    Normocephalic, no scleral icterus   Eyes:    PERRL   Nose:   Nares normal, septum midline, no drainage    Throat:   Lips, mucosa, and tongue normal   Neck:   Supple, symmetrical, trachea midline,             Lungs:     Clear to auscultation bilaterally, respirations unlabored        Heart:    Regular rate and rhythm, S1 and S2 normal, no murmur, rub   or gallop       Extremities:   Extremities normal, atraumatic, no cyanosis , mild/moderate edema       Skin:   Skin warm    Neurologic:   Alert and oriented to person place and time, no focal deficits                 Lab Results:   Recent Results (from the past 72 hour(s))   POCT activated clotting time    Collection Time: 07/16/21 11:02 AM   Result Value Ref Range    Activated Clotting Time, i-STAT 121 89 - 137 sec    Specimen Type ARTERIAL    POCT Blood Gas (CG8+)    Collection Time: 07/16/21 11:03 AM   Result Value Ref Range    pH, Art i-STAT 7 356 7 350 - 7 450    pCO2, Art i-STAT 44 4 (H) 36 0 - 44 0 mm HG    pO2, ART i-STAT 206 0 (H) 75 0 - 129 0 mm HG    BE, i-STAT -1 -2 - 3 mmol/L    HCO3, Art i-STAT 24 8 22 0 - 28 0 mmol/L    CO2, i-STAT 26 21 - 32 mmol/L    O2 Sat, i-STAT 100 (H) 60 - 85 %    SODIUM, I-STAT 138 136 - 145 mmol/l    Potassium, i-STAT 5 0 3 5 - 5 3 mmol/L    Calcium, Ionized i-STAT 1 22 1 12 - 1 32 mmol/L    Hct, i-STAT 34 (L) 36 5 - 49 3 %    Hgb, i-STAT 11 6 (L) 12 0 - 17 0 g/dl    Glucose, i-STAT 258 (H) 65 - 140 mg/dl    Specimen Type ARTERIAL    ECG 12 lead    Collection Time: 07/16/21 11:43 AM   Result Value Ref Range    Ventricular Rate 77 BPM    Atrial Rate 77 BPM    AR Interval 175 ms    QRSD Interval 96 ms    QT Interval 396 ms    QTC Interval 449 ms    P [place] 32 degrees    QRS Axis -73 degrees    T Wave Axis 1 degrees   Basic metabolic panel    Collection Time: 07/16/21 11:44 AM   Result Value Ref Range    Sodium 142 136 - 145 mmol/L    Potassium 3 9 3 5 - 5 3 mmol/L    Chloride 114 (H) 100 - 108 mmol/L    CO2 24 21 - 32 mmol/L    ANION GAP 4 4 - 13 mmol/L    BUN 13 5 - 25 mg/dL    Creatinine 0 65 0 60 - 1 30 mg/dL    Glucose 221 (H) 65 - 140 mg/dL    Calcium 7 4 (L) 8 3 - 10 1 mg/dL    eGFR 103 ml/min/1 73sq m   Hemoglobin and hematocrit, blood    Collection Time: 07/16/21 11:44 AM   Result Value Ref Range    Hemoglobin 13 7 12 0 - 17 0 g/dL    Hematocrit 40 8 36 5 - 49 3 %   Platelet count    Collection Time: 07/16/21 11:44 AM   Result Value Ref Range    Platelets 446 883 - 726 Thousands/uL    MPV 10 4 8 9 - 12 7 fL   aPTT - If Chest Tube Losses > 200 mL/hr in First Hour    Collection Time: 07/16/21 11:44 AM   Result Value Ref Range    PTT 79 (H) 23 - 37 seconds   Protime - INR - If Chest Tube Losses > 200 mL/hr in First Hour    Collection Time: 07/16/21 11:44 AM   Result Value Ref Range    Protime 14 3 11 6 - 14 5 seconds    INR 1 11 0 84 - 1 19   Fibrinogen  - If Chest Tube Losses > 200 mL/hr in First Hour Postop    Collection Time: 07/16/21 11:44 AM   Result Value Ref Range    Fibrinogen 254 227 - 495 mg/dL   Fingerstick Glucose (POCT)    Collection Time: 07/16/21 11:44 AM   Result Value Ref Range    POC Glucose 235 (H) 65 - 140 mg/dl   POCT Blood Gas (CG8+)    Collection Time: 07/16/21 11:50 AM   Result Value Ref Range    pH, Art i-STAT 7 350 7 350 - 7 450    pCO2, Art i-STAT 46 8 (H) 36 0 - 44 0 mm HG    pO2, ART i-STAT 107 0 75 0 - 129 0 mm HG    BE, i-STAT 0 -2 - 3 mmol/L    HCO3, Art i-STAT 25 8 22 0 - 28 0 mmol/L    CO2, i-STAT 27 21 - 32 mmol/L    O2 Sat, i-STAT 98 (H) 60 - 85 %    SODIUM, I-STAT 139 136 - 145 mmol/l    Potassium, i-STAT 4 2 3 5 - 5 3 mmol/L    Hct, i-STAT 37 36 5 - 49 3 %    Hgb, i-STAT 12 6 12 0 - 17 0 g/dl    Glucose, i-STAT 231 (H) 65 - 140 mg/dl    Specimen Type ARTERIAL    Fingerstick Glucose (POCT)    Collection Time: 07/16/21 12:09 PM   Result Value Ref Range    POC Glucose 235 (H) 65 - 140 mg/dl   Fingerstick Glucose (POCT)    Collection Time: 07/16/21  1:58 PM   Result Value Ref Range    POC Glucose 196 (H) 65 - 140 mg/dl   Potassium    Collection Time: 07/16/21  3:59 PM   Result Value Ref Range    Potassium 3 3 (L) 3 5 - 5 3 mmol/L   Fingerstick Glucose (POCT)    Collection Time: 07/16/21  3:59 PM   Result Value Ref Range    POC Glucose 124 65 - 140 mg/dl   Fingerstick Glucose (POCT)    Collection Time: 07/16/21  6:11 PM   Result Value Ref Range    POC Glucose 133 65 - 140 mg/dl   Potassium    Collection Time: 07/16/21  6:57 PM   Result Value Ref Range    Potassium 3 1 (L) 3 5 - 5 3 mmol/L   Fingerstick Glucose (POCT)    Collection Time: 07/16/21  8:24 PM   Result Value Ref Range    POC Glucose 125 65 - 140 mg/dl   Platelet count    Collection Time: 07/16/21  8:33 PM   Result Value Ref Range    Platelets 641 812 - 258 Thousands/uL    MPV 9 8 8 9 - 12 7 fL   Hemoglobin and hematocrit, blood    Collection Time: 07/16/21  8:33 PM   Result Value Ref Range    Hemoglobin 14 6 12 0 - 17 0 g/dL    Hematocrit 43 2 36 5 - 49 3 %   Fingerstick Glucose (POCT)    Collection Time: 07/16/21 10:37 PM   Result Value Ref Range    POC Glucose 136 65 - 140 mg/dl   Fingerstick Glucose (POCT)    Collection Time: 07/17/21 12:34 AM   Result Value Ref Range    POC Glucose 128 65 - 140 mg/dl   Basic metabolic panel    Collection Time: 07/17/21 12:41 AM   Result Value Ref Range    Sodium 139 136 - 145 mmol/L    Potassium 5 3 3 5 - 5 3 mmol/L    Chloride 110 (H) 100 - 108 mmol/L    CO2 25 21 - 32 mmol/L    ANION GAP 4 4 - 13 mmol/L    BUN 12 5 - 25 mg/dL    Creatinine 0 75 0 60 - 1 30 mg/dL    Glucose 143 (H) 65 - 140 mg/dL    Calcium 8 2 (L) 8 3 - 10 1 mg/dL    eGFR 97 ml/min/1 73sq m   Fingerstick Glucose (POCT)    Collection Time: 07/17/21  2:30 AM   Result Value Ref Range    POC Glucose 141 (H) 65 - 140 mg/dl   Basic Metabolic Panel -AM POD #1    Collection Time: 07/17/21  4:18 AM   Result Value Ref Range    Sodium 138 136 - 145 mmol/L    Potassium 4 3 3 5 - 5 3 mmol/L    Chloride 108 100 - 108 mmol/L    CO2 26 21 - 32 mmol/L    ANION GAP 4 4 - 13 mmol/L    BUN 12 5 - 25 mg/dL    Creatinine 0 77 0 60 - 1 30 mg/dL    Glucose 113 65 - 140 mg/dL    Calcium 8 1 (L) 8 3 - 10 1 mg/dL    eGFR 96 ml/min/1 73sq m   Magnesium -AM POD #1    Collection Time: 07/17/21  4:18 AM   Result Value Ref Range    Magnesium 2 3 1 6 - 2 6 mg/dL   CBC-AM POD #1    Collection Time: 07/17/21  4:18 AM   Result Value Ref Range    WBC 12 97 (H) 4 31 - 10 16 Thousand/uL    RBC 4 11 3 88 - 5 62 Million/uL    Hemoglobin 13 8 12 0 - 17 0 g/dL    Hematocrit 41 1 36 5 - 49 3 %     (H) 82 - 98 fL    MCH 33 6 26 8 - 34 3 pg    MCHC 33 6 31 4 - 37 4 g/dL    RDW 13 1 11 6 - 15 1 %    Platelets 913 097 - 401 Thousands/uL    MPV 10 0 8 9 - 12 7 fL   Fingerstick Glucose (POCT)    Collection Time: 07/17/21  4:25 AM   Result Value Ref Range    POC Glucose 111 65 - 140 mg/dl   ECG 12 lead    Collection Time: 07/17/21  4:34 AM   Result Value Ref Range    Ventricular Rate 88 BPM    Atrial Rate 88 BPM    [place] Interval 150 ms    QRSD Interval 88 ms    QT Interval 342 ms    QTC Interval 414 ms    P Axis 45 degrees    QRS Axis -32 degrees    T Wave Axis 9 degrees   Fingerstick Glucose (POCT)    Collection Time: 07/17/21  6:26 AM   Result Value Ref Range    POC Glucose 121 65 - 140 mg/dl   Prepare Leukoreduced RBC: 4 Units, Leukoreduced    Collection Time: 07/17/21  7:00 AM   Result Value Ref Range    Unit Product Code D7743S49     Unit Number [id number]     Unit ABO B     Unit RH POS     Crossmatch Compatible     Unit Dispense Status Return to [facility]     Unit Product Code T5519A28     Unit Number [id number]     Unit ABO B     Unit RH POS     Crossmatch Compatible     Unit Dispense Status Return to [facility]     Unit Product Code I3381T05     Unit Number [id number]     Unit ABO B     Unit RH POS     Crossmatch Compatible     Unit Dispense Status Return to [facility]     Unit Product Code G8831B86     Unit Number [id number]     Unit ABO B     Unit RH POS     Crossmatch Compatible     Unit Dispense Status Return to [facility]    Fingerstick Glucose (POCT)    Collection Time: 07/17/21  9:07 AM   Result Value Ref Range    POC Glucose 124 65 - 140 mg/dl   Fingerstick Glucose (POCT)    Collection Time: 07/17/21 10:09 AM   Result Value Ref Range    POC Glucose 149 (H) 65 - 140 mg/dl   Fingerstick Glucose (POCT)    Collection Time: 07/17/21 12:14 PM   Result Value Ref Range    POC Glucose 124 65 - 140 mg/dl   Fingerstick Glucose (POCT)    Collection Time: 07/17/21  2:45 PM   Result Value Ref Range    POC Glucose 112 65 - 140 mg/dl   Fingerstick Glucose (POCT)    Collection Time: 07/17/21  6:07 PM   Result Value Ref Range    POC Glucose 115 65 - 140 mg/dl   Fingerstick Glucose (POCT)    Collection Time: 07/17/21  7:52 PM   Result Value Ref Range    POC Glucose 135 65 - 140 mg/dl   Fingerstick Glucose (POCT)    Collection Time: 07/17/21 10:00 PM   Result Value Ref Range    POC Glucose 138 65 - 140 mg/dl   Fingerstick Glucose (POCT)    Collection Time: 07/18/21 12:03 AM   Result Value Ref Range    POC Glucose 116 65 - 140 mg/dl   Fingerstick Glucose (POCT)    Collection Time: 07/18/21  2:25 AM   Result Value Ref Range    POC Glucose 131 65 - 140 mg/dl   Fingerstick Glucose (POCT)    Collection Time: 07/18/21  3:56 AM   Result Value Ref Range    POC Glucose 137 65 - 140 mg/dl   Basic metabolic panel    Collection Time: 07/18/21  4:36 AM   Result Value Ref Range    Sodium 132 (L) 136 - 145 mmol/L    Potassium 4 4 3 5 - 5 3 mmol/L    Chloride 102 100 - 108 mmol/L    CO2 25 21 - 32 mmol/L    ANION GAP 5 4 - 13 mmol/L    BUN 18 5 - 25 mg/dL    Creatinine 0 92 0 60 - 1 30 mg/dL    Glucose 117 65 - 140 mg/dL    Calcium 8 5 8 3 - 10 1 mg/dL    eGFR 88 ml/min/1 73sq m   Magnesium    Collection Time: 07/18/21  4:36 AM   Result Value Ref Range    Magnesium 2 1 1 6 - 2 6 mg/dL   CBC (With Platelets)    Collection Time: 07/18/21  4:36 AM   Result Value Ref Range    WBC 13 09 (H) 4 31 - 10 16 Thousand/uL    RBC 3 86 (L) 3 88 - 5 62 Million/uL    Hemoglobin 12 9 12 0 - 17 0 g/dL    Hematocrit 39 6 36 5 - 49 3 %     (H) 82 - 98 fL    MCH 33 4 26 8 - 34 3 pg    MCHC 32 6 31 4 - 37 4 g/dL    RDW 13 2 11 6 - 15 1 %    Platelets 183 064 - 850 Thousands/uL    MPV 10 5 8 9 - 12 7 fL   Fingerstick Glucose (POCT)    Collection Time: 07/18/21  5:45 AM   Result Value Ref Range    POC Glucose 127 65 - 140 mg/dl   Fingerstick Glucose (POCT)    Collection Time: 07/18/21  8:48 AM   Result Value Ref Range    POC Glucose 154 (H) 65 - 140 mg/dl   Fingerstick Glucose (POCT)    Collection Time: 07/18/21 10:01 AM   Result Value Ref Range    POC Glucose 171 (H) 65 - 140 mg/dl   Fingerstick Glucose (POCT)    Collection Time: 07/18/21 12:05 PM   Result Value Ref Range    POC Glucose 143 (H) 65 - 140 mg/dl   Fingerstick Glucose (POCT)    Collection Time: 07/18/21  2:13 PM   Result Value Ref Range    POC Glucose 109 65 - 140 mg/dl   Fingerstick Glucose (POCT)    Collection Time: 07/18/21  4:21 PM   Result Value Ref Range    POC Glucose 102 65 - 140 mg/dl   Fingerstick Glucose (POCT)    Collection Time: 07/18/21 6:17 PM   Result Value Ref Range    POC Glucose 210 (H) 65 - 140 mg/dl   Fingerstick Glucose (POCT)    Collection Time: 07/18/21  8:02 PM   Result Value Ref Range    POC Glucose 174 (H) 65 - 140 mg/dl   Fingerstick Glucose (POCT)    Collection Time: 07/18/21 10:46 PM   Result Value Ref Range    POC Glucose 129 65 - 140 mg/dl   Basic metabolic panel    Collection Time: 07/19/21  4:35 AM   Result Value Ref Range    Sodium 134 (L) 136 - 145 mmol/L    Potassium 4 3 3 5 - 5 3 mmol/L    Chloride 100 100 - 108 mmol/L    CO2 26 21 - 32 mmol/L    ANION GAP 8 4 - 13 mmol/L    BUN 22 5 - 25 mg/dL    Creatinine 0 79 0 60 - 1 30 mg/dL    Glucose 151 (H) 65 - 140 mg/dL    Calcium 8 4 8 3 - 10 1 mg/dL    eGFR 95 ml/min/1 73sq m   Fingerstick Glucose (POCT)    Collection Time: 07/19/21  6:12 AM   Result Value Ref Range    POC Glucose 196 (H) 65 - 140 mg/dl   [employer]    Collection Time: 07/19/21  9:14 AM   Result Value Ref Range    Supplier Name [address]     Supplier Phone Number [phone]     Order Status Supplier Submitted     Delivery Note      Delivery Request Date 07/19/2021     Item Description [employer], Adult    [employer]    Collection Time: 07/19/21  9:21 AM   Result Value Ref Range    Supplier Name [address]     Supplier Phone Number [phone]     Order Status Supplier Submitted     Delivery Note      Delivery Request Date 07/19/2021     Item Description 3 in 1 [employer]      Imaging: I have personally reviewed pertinent reports  Tele- nsr paf    Counseling / Coordination of Care  Total time spent today 30 minutes  Greater than 50% of total time was spent with the patient and / or family counseling and / or coordination of care

## 2021-07-19 NOTE — RESTORATIVE TECHNICIAN NOTE
Restorative Technician Note      [facility]: 07[facility]/21  Note Type: Mobility  Patient Position Upon Consult: Bedside chair  Activity Performed: Ambulated  Assistive Device: Roller walker  Patient Position at End of Consult: All needs within reach; Bedside chair

## 2021-07-19 NOTE — CASE MANAGEMENT
Pt  Denied by [facility]  Referral to [address] [place] [address]  nsg and PT, ordered RW and BSC, anticipate home tomorrow with family transport

## 2021-07-20 VITALS
OXYGEN SATURATION: 98 % | HEART RATE: 85 BPM | BODY MASS INDEX: 34.91 KG/M2 | HEIGHT: 70 IN | DIASTOLIC BLOOD PRESSURE: 71 MMHG | SYSTOLIC BLOOD PRESSURE: 119 MMHG | WEIGHT: 243.83 LBS | TEMPERATURE: 97.5 F | RESPIRATION RATE: 18 BRPM

## 2021-07-20 LAB
DME PARACHUTE DELIVERY DATE ACTUAL: NORMAL
DME PARACHUTE DELIVERY DATE REQUESTED: NORMAL
DME PARACHUTE ITEM DESCRIPTION: NORMAL
DME PARACHUTE ORDER STATUS: NORMAL
DME PARACHUTE SUPPLIER NAME: NORMAL
DME PARACHUTE SUPPLIER PHONE: NORMAL
GLUCOSE SERPL-MCNC: 127 MG/DL (ref 65–140)
GLUCOSE SERPL-MCNC: 144 MG/DL (ref 65–140)
GLUCOSE SERPL-MCNC: 209 MG/DL (ref 65–140)

## 2021-07-20 PROCEDURE — 97530 THERAPEUTIC ACTIVITIES: CPT

## 2021-07-20 PROCEDURE — 99024 POSTOP FOLLOW-UP VISIT: CPT | Performed by: PHYSICIAN ASSISTANT

## 2021-07-20 PROCEDURE — 99232 SBSQ HOSP IP/OBS MODERATE 35: CPT | Performed by: INTERNAL MEDICINE

## 2021-07-20 PROCEDURE — 82948 REAGENT STRIP/BLOOD GLUCOSE: CPT

## 2021-07-20 RX ORDER — PANTOPRAZOLE SODIUM 40 MG/1
40 TABLET, DELAYED RELEASE ORAL
Qty: 30 TABLET | Refills: 0 | Status: SHIPPED | OUTPATIENT
Start: 2021-07-21 | End: 2021-10-14

## 2021-07-20 RX ORDER — AMIODARONE HYDROCHLORIDE 200 MG/1
200 TABLET ORAL DAILY
Qty: 30 TABLET | Refills: 0 | Status: SHIPPED | OUTPATIENT
Start: 2021-07-20 | End: 2021-09-21 | Stop reason: SDUPTHER

## 2021-07-20 RX ORDER — AMIODARONE HYDROCHLORIDE 200 MG/1
200 TABLET ORAL DAILY
Qty: 30 TABLET | Refills: 0 | Status: SHIPPED | OUTPATIENT
Start: 2021-07-20 | End: 2021-07-20

## 2021-07-20 RX ORDER — DOCUSATE SODIUM 100 MG/1
100 CAPSULE, LIQUID FILLED ORAL 2 TIMES DAILY
Qty: 60 CAPSULE | Refills: 0 | Status: SHIPPED | OUTPATIENT
Start: 2021-07-20 | End: 2021-10-14

## 2021-07-20 RX ORDER — OXYCODONE HYDROCHLORIDE 5 MG/1
5 TABLET ORAL EVERY 4 HOURS PRN
Qty: 30 TABLET | Refills: 0 | Status: SHIPPED | OUTPATIENT
Start: 2021-07-20 | End: 2021-07-30

## 2021-07-20 RX ORDER — INSULIN GLARGINE 100 [IU]/ML
15 INJECTION, SOLUTION SUBCUTANEOUS DAILY
Qty: 4.5 ML | Refills: 0 | Status: SHIPPED | OUTPATIENT
Start: 2021-07-20 | End: 2022-04-07

## 2021-07-20 RX ORDER — ATORVASTATIN CALCIUM 80 MG/1
80 TABLET, FILM COATED ORAL
Qty: 30 TABLET | Refills: 0 | Status: SHIPPED | OUTPATIENT
Start: 2021-07-20 | End: 2021-08-16 | Stop reason: SDUPTHER

## 2021-07-20 RX ORDER — TORSEMIDE 20 MG/1
20 TABLET ORAL DAILY
Qty: 7 TABLET | Refills: 0 | Status: SHIPPED | OUTPATIENT
Start: 2021-07-20 | End: 2021-07-20 | Stop reason: SDUPTHER

## 2021-07-20 RX ORDER — OXYCODONE HYDROCHLORIDE 5 MG/1
5 TABLET ORAL EVERY 4 HOURS PRN
Qty: 30 TABLET | Refills: 0 | Status: SHIPPED | OUTPATIENT
Start: 2021-07-20 | End: 2021-07-20

## 2021-07-20 RX ORDER — ASPIRIN 325 MG
325 TABLET ORAL DAILY
Qty: 30 TABLET | Refills: 0 | Status: SHIPPED | OUTPATIENT
Start: 2021-07-21 | End: 2021-07-20

## 2021-07-20 RX ORDER — TORSEMIDE 20 MG/1
20 TABLET ORAL DAILY
Qty: 7 TABLET | Refills: 0 | Status: SHIPPED | OUTPATIENT
Start: 2021-07-20 | End: 2021-08-16 | Stop reason: ALTCHOICE

## 2021-07-20 RX ORDER — DOCUSATE SODIUM 100 MG/1
100 CAPSULE, LIQUID FILLED ORAL 2 TIMES DAILY
Qty: 60 CAPSULE | Refills: 0 | Status: SHIPPED | OUTPATIENT
Start: 2021-07-20 | End: 2021-07-20 | Stop reason: SDUPTHER

## 2021-07-20 RX ORDER — POTASSIUM CHLORIDE 20 MEQ/1
20 TABLET, EXTENDED RELEASE ORAL DAILY
Qty: 7 TABLET | Refills: 0 | Status: SHIPPED | OUTPATIENT
Start: 2021-07-20 | End: 2021-08-16 | Stop reason: ALTCHOICE

## 2021-07-20 RX ORDER — INSULIN LISPRO 100 [IU]/ML
10 INJECTION, SOLUTION INTRAVENOUS; SUBCUTANEOUS
Qty: 15 ML | Refills: 0 | Status: SHIPPED | OUTPATIENT
Start: 2021-07-20 | End: 2021-07-20 | Stop reason: SDUPTHER

## 2021-07-20 RX ORDER — ASPIRIN 325 MG
325 TABLET ORAL DAILY
Qty: 30 TABLET | Refills: 0 | Status: SHIPPED | OUTPATIENT
Start: 2021-07-21 | End: 2021-10-14

## 2021-07-20 RX ORDER — INSULIN LISPRO 100 [IU]/ML
6 INJECTION, SOLUTION INTRAVENOUS; SUBCUTANEOUS
Qty: 15 ML | Refills: 0 | Status: SHIPPED | OUTPATIENT
Start: 2021-07-20 | End: 2021-07-20 | Stop reason: SDUPTHER

## 2021-07-20 RX ORDER — ATORVASTATIN CALCIUM 80 MG/1
80 TABLET, FILM COATED ORAL
Qty: 30 TABLET | Refills: 0 | Status: SHIPPED | OUTPATIENT
Start: 2021-07-20 | End: 2021-07-20

## 2021-07-20 RX ORDER — PANTOPRAZOLE SODIUM 40 MG/1
40 TABLET, DELAYED RELEASE ORAL
Qty: 30 TABLET | Refills: 0 | Status: SHIPPED | OUTPATIENT
Start: 2021-07-21 | End: 2021-07-20

## 2021-07-20 RX ORDER — POTASSIUM CHLORIDE 20 MEQ/1
20 TABLET, EXTENDED RELEASE ORAL DAILY
Qty: 7 TABLET | Refills: 0 | Status: SHIPPED | OUTPATIENT
Start: 2021-07-20 | End: 2021-07-20 | Stop reason: SDUPTHER

## 2021-07-20 RX ORDER — INSULIN GLARGINE 100 [IU]/ML
15 INJECTION, SOLUTION SUBCUTANEOUS DAILY
Qty: 4.5 ML | Refills: 0 | Status: SHIPPED | OUTPATIENT
Start: 2021-07-20 | End: 2021-07-20 | Stop reason: SDUPTHER

## 2021-07-20 RX ORDER — INSULIN LISPRO 100 [IU]/ML
6 INJECTION, SOLUTION INTRAVENOUS; SUBCUTANEOUS
Qty: 15 ML | Refills: 0 | Status: SHIPPED | OUTPATIENT
Start: 2021-07-20

## 2021-07-20 RX ADMIN — ACETAMINOPHEN 975 MG: 325 TABLET, FILM COATED ORAL at 16:56

## 2021-07-20 RX ADMIN — PANTOPRAZOLE SODIUM 40 MG: 40 TABLET, DELAYED RELEASE ORAL at 05:42

## 2021-07-20 RX ADMIN — INSULIN LISPRO 2 UNITS: 100 INJECTION, SOLUTION INTRAVENOUS; SUBCUTANEOUS at 11:41

## 2021-07-20 RX ADMIN — INSULIN LISPRO 10 UNITS: 100 INJECTION, SOLUTION INTRAVENOUS; SUBCUTANEOUS at 11:41

## 2021-07-20 RX ADMIN — INSULIN GLARGINE 15 UNITS: 100 INJECTION, SOLUTION SUBCUTANEOUS at 08:19

## 2021-07-20 RX ADMIN — ATORVASTATIN CALCIUM 80 MG: 80 TABLET, FILM COATED ORAL at 16:56

## 2021-07-20 RX ADMIN — FUROSEMIDE 40 MG: 10 INJECTION, SOLUTION INTRAMUSCULAR; INTRAVENOUS at 08:19

## 2021-07-20 RX ADMIN — METOPROLOL TARTRATE 25 MG: 25 TABLET, FILM COATED ORAL at 08:18

## 2021-07-20 RX ADMIN — METHOCARBAMOL 500 MG: 500 TABLET ORAL at 11:41

## 2021-07-20 RX ADMIN — INSULIN LISPRO 10 UNITS: 100 INJECTION, SOLUTION INTRAVENOUS; SUBCUTANEOUS at 08:20

## 2021-07-20 RX ADMIN — AMIODARONE HYDROCHLORIDE 200 MG: 200 TABLET ORAL at 16:56

## 2021-07-20 RX ADMIN — METHOCARBAMOL 500 MG: 500 TABLET ORAL at 05:42

## 2021-07-20 RX ADMIN — AMIODARONE HYDROCHLORIDE 200 MG: 200 TABLET ORAL at 05:42

## 2021-07-20 RX ADMIN — MUPIROCIN 1 APPLICATION: 20 OINTMENT TOPICAL at 08:19

## 2021-07-20 RX ADMIN — LIDOCAINE 1 PATCH: 50 PATCH TOPICAL at 08:18

## 2021-07-20 RX ADMIN — OXYCODONE HYDROCHLORIDE 5 MG: 5 TABLET ORAL at 03:02

## 2021-07-20 RX ADMIN — POTASSIUM CHLORIDE 20 MEQ: 1500 TABLET, EXTENDED RELEASE ORAL at 08:18

## 2021-07-20 RX ADMIN — ASPIRIN 325 MG ORAL TABLET 325 MG: 325 PILL ORAL at 08:18

## 2021-07-20 RX ADMIN — ACETAMINOPHEN 975 MG: 325 TABLET, FILM COATED ORAL at 05:42

## 2021-07-20 NOTE — PROGRESS NOTES
Progress Note - Cardiology   Marita Kay 61 y o  male MRN: [id number]  Unit/Bed#: [facility] 409-01 Encounter: [id number]        Principal Problem:    CAD (coronary artery disease)  Active Problems:    Hyperlipidemia    Type 2 diabetes mellitus ([address] )    Hypertension    Muscle spasm    Tobacco use    HTN (hypertension)    History of COVID-19    S/P CABG (coronary artery bypass graft)        Assessment/Plan     1  Multivessel CAD-status post CABG x3  POD #4  Amiodarone 200 mg every 8 hours  Given 150 mg bolus yesterday for transient afib  AP-aspirin 325 mg daily, statin-atorvastatin 80mg, BB-Lopressor 25 b i d  Diuretic-Lasix 40 mg IV b i d  I and O appears an accurate  Weight down 4 lb  LVEF 50%  Telemetry- NSR short bursts PAF today with RVR early am yesterday  Given 150mg IV amiodarone  Today about 0600 several mins afib RVR  ( lopressor 25mg dose held last PM d/t BP 92/63) continue Lopressor 25 b i d , hold SBP 90     2  Preop non-STEMI type 1  Peak 0 26  AP, statin, BB  LVEF 50%     3  HLD-high-intensity statin  Previously on pravastatin 20 mg  Cholesterol 210/triglyceride 215/HDL 44/LDL 23     4  HTN- controlled  Continue current medical managment     5  Type 2 diabetes-hemoglobin A1c 9%  Per endo /primary team     6  Visual disturbance - c/o "floaters" right eye yesterday,  transient, reportedly since surgery  Informed CT surgery yesterday  Subjective/Objective   Chief Complaint/Subjecrtive  Patient without complaints of chest pain or shortness of breath  No palpitations          Vitals: /69 (BP Location: Left arm)   Pulse 88   Temp (!) 97 3 °F (36 3 °C) (Axillary)   Resp 16   Ht 5' 10" (1 778 m)   Wt 111 kg (243 lb 13 3 oz)   SpO2 95%   BMI 34 99 kg/m²     Vitals:    07/19/21 0537 07/20/21 0600   Weight: 112 kg (247 lb 5 7 oz) 111 kg (243 lb 13 3 oz)     Orthostatic Blood Pressures      Most Recent Value   Blood Pressure  119/69 filed at 07/20/2021 0720   Patient Position - Orthostatic VS Sitting filed at 07/20/2021 0720            Intake/Output Summary (Last 24 hours) at 7/20/2021 0932  Last data filed at 7/20/2021 0720  Gross per 24 hour   Intake 810 ml   Output 700 ml   Net 110 ml       Invasive Devices     Central Venous Catheter Line            CVC Central Lines 07/16/21 Triple 4 days                Current Facility-Administered Medications   Medication Dose Route Frequency    acetaminophen (TYLENOL) tablet 975 mg  975 mg Oral Q8H    amiodarone tablet 200 mg  200 mg Oral Q8H [address]    aspirin tablet 325 mg  325 mg Oral Daily    atorvastatin (LIPITOR) tablet 80 mg  80 mg Oral Daily With Dinner    bisacodyl (DULCOLAX) rectal suppository 10 mg  10 mg Rectal Daily PRN    fondaparinux (ARIXTRA) subcutaneous injection 2 5 mg  2 5 mg Subcutaneous Q24H    furosemide (LASIX) injection 40 mg  40 mg Intravenous BID (diuretic)    insulin glargine (LANTUS) subcutaneous injection 15 Units 0 15 mL  15 Units Subcutaneous QAM    insulin lispro (HumaLOG) 100 units/mL subcutaneous injection 1-5 Units  1-5 Units Subcutaneous HS    insulin lispro (HumaLOG) 100 units/mL subcutaneous injection 1-6 Units  1-6 Units Subcutaneous TID AC    insulin lispro (HumaLOG) 100 units/mL subcutaneous injection 10 Units  10 Units Subcutaneous TID With Meals    lidocaine (LIDODERM) 5 % patch 1 patch  1 patch Topical Daily    magnesium hydroxide (MILK OF MAGNESIA) oral suspension 30 mL  30 mL Oral Daily PRN    methocarbamol (ROBAXIN) tablet 500 mg  500 mg Oral Q6H [address]    metoprolol tartrate (LOPRESSOR) partial tablet 12 5 mg  12 5 mg Oral Once    metoprolol tartrate (LOPRESSOR) tablet 25 mg  25 mg Oral Q12H JAIMEE    mupirocin (BACTROBAN) 2 % nasal ointment 1 application  1 application Nasal W12T [address]    ondansetron (ZOFRAN) injection 4 mg  4 mg Intravenous Q6H PRN    oxyCODONE (ROXICODONE) immediate release tablet 10 mg  10 mg Oral Q4H PRN    oxyCODONE (ROXICODONE) IR tablet 5 mg  5 mg Oral Q4H PRN    pantoprazole (PROTONIX) EC tablet 40 mg  40 mg Oral Early Morning    polyethylene glycol (MIRALAX) packet 17 g  17 g Oral Daily    potassium chloride (K-DUR,KLOR-CON) CR tablet 20 mEq  20 mEq Oral BID    senna-docusate sodium (SENOKOT S) 8 6-50 mg per tablet 1 tablet  1 tablet Oral BID    sodium chloride infusion 0 45 %  20 mL/hr Intravenous Continuous    temazepam (RESTORIL) capsule 15 mg  15 mg Oral HS PRN         Physical Exam: /69 (BP Location: Left arm)   Pulse 88   Temp (!) 97 3 °F (36 3 °C) (Axillary)   Resp 16   Ht 5' 10" (1 778 m)   Wt 111 kg (243 lb 13 3 oz)   SpO2 95%   BMI 34 99 kg/m²     General Appearance:    Alert, cooperative, no distress, appears stated age   Head:    Normocephalic, no scleral icterus   Eyes:    PERRL   Nose:   Nares normal, septum midline, no drainage    Throat:   Lips, mucosa, and tongue normal   Neck:   Supple, symmetrical, trachea midline,             Lungs:     Clear to auscultation bilaterally, respirations unlabored   Chest Wall:    Open to air    Heart:    Regular rate and rhythm, S1 and S2 normal, no murmur, rub   or gallop   Abdomen:     Soft, non-tender, bowel sounds active all four quadrants,     no masses, no organomegaly   Extremities:   Extremities normal, atraumatic, no cyanosis  2+edema       Skin:   Skin warm   Neurologic:   Alert and oriented to person place and time, no focal deficits                 Lab Results:   Recent Results (from the past 72 hour(s))   Fingerstick Glucose (POCT)    Collection Time: 07/17/21 10:09 AM   Result Value Ref Range    POC Glucose 149 (H) 65 - 140 mg/dl   Fingerstick Glucose (POCT)    Collection Time: 07/17/21 12:14 PM   Result Value Ref Range    POC Glucose 124 65 - 140 mg/dl   Fingerstick Glucose (POCT)    Collection Time: 07/17/21  2:45 PM   Result Value Ref Range    POC Glucose 112 65 - 140 mg/dl   Fingerstick Glucose (POCT)    Collection Time: 07/17/21  6:07 PM   Result Value Ref Range    POC Glucose 115 65 - 140 mg/dl Fingerstick Glucose (POCT)    Collection Time: 07/17/21  7:52 PM   Result Value Ref Range    POC Glucose 135 65 - 140 mg/dl   Fingerstick Glucose (POCT)    Collection Time: 07/17/21 10:00 PM   Result Value Ref Range    POC Glucose 138 65 - 140 mg/dl   Fingerstick Glucose (POCT)    Collection Time: 07/18/21 12:03 AM   Result Value Ref Range    POC Glucose 116 65 - 140 mg/dl   Fingerstick Glucose (POCT)    Collection Time: 07/18/21  2:25 AM   Result Value Ref Range    POC Glucose 131 65 - 140 mg/dl   Fingerstick Glucose (POCT)    Collection Time: 07/18/21  3:56 AM   Result Value Ref Range    POC Glucose 137 65 - 140 mg/dl   Basic metabolic panel    Collection Time: 07/18/21  4:36 AM   Result Value Ref Range    Sodium 132 (L) 136 - 145 mmol/L    Potassium 4 4 3 5 - 5 3 mmol/L    Chloride 102 100 - 108 mmol/L    CO2 25 21 - 32 mmol/L    ANION GAP 5 4 - 13 mmol/L    BUN 18 5 - 25 mg/dL    Creatinine 0 92 0 60 - 1 30 mg/dL    Glucose 117 65 - 140 mg/dL    Calcium 8 5 8 3 - 10 1 mg/dL    eGFR 88 ml/min/1 73sq m   Magnesium    Collection Time: 07/18/21  4:36 AM   Result Value Ref Range    Magnesium 2 1 1 6 - 2 6 mg/dL   CBC (With Platelets)    Collection Time: 07/18/21  4:36 AM   Result Value Ref Range    WBC 13 09 (H) 4 31 - 10 16 Thousand/uL    RBC 3 86 (L) 3 88 - 5 62 Million/uL    Hemoglobin 12 9 12 0 - 17 0 g/dL    Hematocrit 39 6 36 5 - 49 3 %     (H) 82 - 98 fL    MCH 33 4 26 8 - 34 3 pg    MCHC 32 6 31 4 - 37 4 g/dL    RDW 13 2 11 6 - 15 1 %    Platelets 629 509 - 284 Thousands/uL    MPV 10 5 8 9 - 12 7 fL   Fingerstick Glucose (POCT)    Collection Time: 07/18/21  5:45 AM   Result Value Ref Range    POC Glucose 127 65 - 140 mg/dl   Fingerstick Glucose (POCT)    Collection Time: 07/18/21  8:48 AM   Result Value Ref Range    POC Glucose 154 (H) 65 - 140 mg/dl   Fingerstick Glucose (POCT)    Collection Time: 07/18/21 10:01 AM   Result Value Ref Range    POC Glucose 171 (H) 65 - 140 mg/dl   Fingerstick Glucose (POCT)    Collection Time: 07/18/21 12:05 PM   Result Value Ref Range    POC Glucose 143 (H) 65 - 140 mg/dl   Fingerstick Glucose (POCT)    Collection Time: 07/18/21  2:13 PM   Result Value Ref Range    POC Glucose 109 65 - 140 mg/dl   Fingerstick Glucose (POCT)    Collection Time: 07/18/21  4:21 PM   Result Value Ref Range    POC Glucose 102 65 - 140 mg/dl   Fingerstick Glucose (POCT)    Collection Time: 07/18/21  6:17 PM   Result Value Ref Range    POC Glucose 210 (H) 65 - 140 mg/dl   Fingerstick Glucose (POCT)    Collection Time: 07/18/21  8:02 PM   Result Value Ref Range    POC Glucose 174 (H) 65 - 140 mg/dl   Fingerstick Glucose (POCT)    Collection Time: 07/18/21 10:46 PM   Result Value Ref Range    POC Glucose 129 65 - 140 mg/dl   Basic metabolic panel    Collection Time: 07/19/21  4:35 AM   Result Value Ref Range    Sodium 134 (L) 136 - 145 mmol/L    Potassium 4 3 3 5 - 5 3 mmol/L    Chloride 100 100 - 108 mmol/L    CO2 26 21 - 32 mmol/L    ANION GAP 8 4 - 13 mmol/L    BUN 22 5 - 25 mg/dL    Creatinine 0 79 0 60 - 1 30 mg/dL    Glucose 151 (H) 65 - 140 mg/dL    Calcium 8 4 8 3 - 10 1 mg/dL    eGFR 95 ml/min/1 73sq m   Fingerstick Glucose (POCT)    Collection Time: 07/19/21  6:12 AM   Result Value Ref Range    POC Glucose 196 (H) 65 - 140 mg/dl   Marquis Magan    Collection Time: 07/19/21  9:14 AM   Result Value Ref Range    Supplier Name [address]     Supplier Phone Number [phone]     Order Status Pending Delivery Ticket     Delivery Note      Delivery Request Date 07/19/2021     Item Description Marquis Magan, Adult    [employer]    Collection Time: 07/19/21  9:21 AM   Result Value Ref Range    Supplier Name [address]     Supplier Phone Number [phone]     Order Status Pending Delivery Ticket     Delivery Note      Delivery Request Date 07/19/2021     Item Description 3 in 1 [employer]    Fingerstick Glucose (POCT)    Collection Time: 07/19/21 11:10 AM Result Value Ref Range    POC Glucose 222 (H) 65 - 140 mg/dl   Fingerstick Glucose (POCT)    Collection Time: 07/19/21  3:29 PM   Result Value Ref Range    POC Glucose 166 (H) 65 - 140 mg/dl   Fingerstick Glucose (POCT)    Collection Time: 07/19/21  8:45 PM   Result Value Ref Range    POC Glucose 149 (H) 65 - 140 mg/dl   Fingerstick Glucose (POCT)    Collection Time: 07/20/21  5:39 AM   Result Value Ref Range    POC Glucose 144 (H) 65 - 140 mg/dl     Imaging: I have personally reviewed pertinent reports  Tele- nsr burst afib    Counseling / Coordination of Care  Total time spent today 30 minutes  Greater than 50% of total time was spent with the patient and / or family counseling and / or coordination of care

## 2021-07-20 NOTE — OCCUPATIONAL THERAPY NOTE
OccupationalTherapy Progress Note     Patient Name: Randolph Martinez  IJXZT'O Date: 7/20/2021  Problem List  Principal Problem:    CAD (coronary artery disease)  Active Problems:    Hyperlipidemia    Type 2 diabetes mellitus (HCC)    Hypertension    Muscle spasm    Tobacco use    HTN (hypertension)    History of COVID-19    S/P CABG (coronary artery bypass graft)          07/20/21 1049   OT Last Visit   OT Visit Date 07/20/21   Restrictions/Precautions   Weight Bearing Precautions Per Order No   Other Precautions Cardiac/sternal;Telemetry   Pain Assessment   Pain Assessment Tool 0-10   Pain Score No Pain   Lifestyle   Autonomy Pt reports being I with ADLS, IADLS and mobility without device PTA  (+)    Reciprocal Relationships Pt lives alone but reports his significant other is able to assist as needed upon d/c    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   Bed Mobility   Additional Comments Pt was seated OOB upon entry  At end of treatment session pt was seated in chair with call bell placed in reach and all reported needs met   Transfers   Sit to Stand 6  Modified independent   Additional items Increased time required;Armrests   Stand to Sit 6  Modified independent   Additional items Increased time required;Armrests   Functional Mobility   Functional Mobility 4  Minimal assistance  (CGA as no AE/DME was used)   Additional Comments Pt demonstrated long household functional mobility without any reports of pain, dizziness, or SOB   Cognition   Overall Cognitive Status [facility]   Arousal/Participation Alert; Cooperative   Attention Within functional limits   Orientation Level Oriented X4   Memory Decreased recall of precautions   Following Commands Follows all commands and directions without difficulty   Comments Pt was alert, cooperative, and very pleasant for therapy   Assessment   Assessment Pt participated in a skilled OT session on 7/20/21 to address interventions consisting of ADL retraining with proper body mechanics, activity tolerance, and functional endurance  Upon entry pt was seated OOB  Pt completed LB dressing I while seated in chair  Pt completed sit <--> stand transfers with Mod I  Pt completed functional mobility with CGA and no AE/DME  Pt did not have any loss(es) of balance or any reports of pain, dizziness, or shortness of breath  Pt showed improvements in functional mobility and LB dressing as compared to initial evaluation  Pt reported that he did not have any concerns in regards to successfully completing his self-care needs upon d/c home  Acute OT needs are met; pt to be d/c'd from OT services at this time  Pt to continue to address functional mobility and ADLs with restorative team and hospital staff as available  From an OT standpoint, anticipate d/c home with increased social support  Plan   Treatment Interventions ADL retraining;Functional transfer training; Endurance training;Patient/family training;Equipment evaluation/education; Compensatory technique education;Continued evaluation; Energy conservation; Activityengagement   Goal Expiration Date 08/01/21   OT Treatment Day 1   OT Frequency 3-5x/wk   Recommendation   OT Discharge Recommendation No rehabilitation needs  (home with increased family support )   OT - OK to Discharge Yes  (when medically appropriate)   AM-PAC Daily Activity Inpatient   Lower Body Dressing 4   Bathing 4   Toileting 4   Upper Body Dressing 4   Grooming 4   Eating 4   Daily Activity Raw Score 24   Daily Activity Standardized Score (Calc for Raw Score >=11) 57 54   AM-PAC Applied Cognition Inpatient   Following a Speech/Presentation 4   Understanding Ordinary Conversation 4   Taking Medications 4   Remembering Where Things Are Placed or Put Away 4   Remembering List of 4-5 Errands 4   Taking Care of Complicated Tasks 4   Applied Cognition Raw Score 24   Applied Cognition Standardized Score 62 21   Modified Wm Scale   Modified [place] Scale 4     Denisa Drummond, OTS

## 2021-07-20 NOTE — UTILIZATION REVIEW
Continued Stay Review    Date: 07/19/2021                       Current Patient Class: Inpatient  Current Level of Care: Med/Surg    HPI:66 y o  male initially admitted on 07/09/2021  Coronary artery disease  S/P coronary artery bypass grafting; POD # 3  Assessment/Plan: Maintain triple CVC line  Continue with IV diuresis  Fld restriction  PT/OT    Vital Signs: BP 98/59 (BP Location: Left arm)   Pulse 83   Temp 98 2 °F (36 8 °C) (Oral)   Resp 16   Ht 5' 10" (1 778 m)   Wt 112 kg (247 lb 5 7 oz)   SpO2 94%   BMI 35 49 kg/m²     Pertinent Labs/Diagnostic Results:     Results from last 7 days   Lab Units 07/18/21  0436 07/17/21  0418 07/16/21  2033 07/16/21  1150 07/16/21  1144 07/16/21  0802 07/16/21  0516 07/14/21  0828 07/14/21  0828 07/13/21  0545   WBC Thousand/uL 13 09* 12 97*  --   --   --   --  6 50  --  7 35 6 50   HEMOGLOBIN g/dL 12 9 13 8 14 6  --  13 7  --  14 7  --  16 1 15 6   I STAT HEMOGLOBIN g/dl  --   --   --  12 6  --   --   --    < >  --   --    HEMATOCRIT % 39 6 41 1 43 2  --  40 8  --  43 9  --  48 0 46 9   HEMATOCRIT, ISTAT %  --   --   --  37  --   --   --    < >  --   --    PLATELETS Thousands/uL 168 160 159  --  178   < > 187  --  232 194   NEUTROS ABS Thousands/µL  --   --   --   --   --   --  3 85  --  4 31 3 49    < > = values in this interval not displayed       Results from last 7 days   Lab Units 07/19/21  0435 07/18/21  0436 07/17/21  0418 07/17/21  0041 07/16/21  1857 07/16/21  1150 07/16/21  1144 07/16/21  1103 07/16/21  1103 07/16/21  1021 07/16/21  1011 07/16/21  0950 07/16/21  0918 07/16/21  0516   SODIUM mmol/L 134* 132* 138 139  --   --  142  --   --   --   --   --   --   --    POTASSIUM mmol/L 4 3 4 4 4 3 5 3 3 1*  --  3 9   < >  --   --   --   --   --   --    CHLORIDE mmol/L 100 102 108 110*  --   --  114*  --   --   --   --   --   --   --    CO2 mmol/L 26 25 26 25  --   --  24  --   --   --   --   --   --   --    CO2, I-STAT mmol/L  --   --   --   --   --  27  --   -- 26 31  --  31 29   < >   ANION GAP mmol/L 8 5 4 4  --   --  4  --   --   --   --   --   --   --    BUN mg/dL 22 18 12 12  --   --  13  --   --   --   --   --   --   --    CREATININE mg/dL 0 79 0 92 0 77 0 75  --   --  0 65  --   --   --   --   --   --   --    EGFR ml/min/1 73sq m 95 88 96 97  --   --  103  --   --   --   --   --   --   --    CALCIUM mg/dL 8 4 8 5 8 1* 8 2*  --   --  7 4*  --   --   --   --   --   --   --    CALCIUM, IONIZED mmol/L  --   --   --   --   --   --   --   --   --   --  1 00*  --   --   --    CALCIUM, IONIZED, ISTAT mmol/L  --   --   --   --   --   --   --   --  1 22 1 08*  --  1 08* 1 23  --    MAGNESIUM mg/dL  --  2 1 2 3  --   --   --   --   --   --   --   --   --   --   --     < > = values in this interval not displayed       Results from last 7 days   Lab Units 07/19/21  1529 07/19/21  1110 07/19/21  0612 07/18/21  2246 07/18/21 2002 07/18/21  1817 07/18/21  1621 07/18/21  1413 07/18/21  1205 07/18/21  1001 07/18/21  0848 07/18/21  0545   POC GLUCOSE mg/dl 166* 222* 196* 129 174* 210* 102 109 143* 171* 154* 127     Results from last 7 days   Lab Units 07/19/21  0435 07/18/21  0436 07/17/21  0418 07/17/21  0041 07/16/21  1144 07/16/21  0516 07/14/21  0828 07/13/21  0545   GLUCOSE RANDOM mg/dL 151* 117 113 143* 221* 182* 183* 160*     Results from last 7 days   Lab Units 07/16/21  1150 07/16/21  1103 07/16/21  1021 07/16/21  0950 07/16/21  0802   PH, DERIC I-STAT   --   --   --  7 310 7 385   PCO2, DERIC ISTAT mm HG  --   --   --  57 6* 46 4   PO2, DERIC ISTAT mm HG  --   --   --  53 0* 38 0   HCO3, DERIC ISTAT mmol/L  --   --   --  29 0 27 7   I STAT BASE EXC mmol/L 0 -1 4* 2 2   I STAT O2 SAT % 98* 100*  --  83 71   ISTAT PH ART  7 350 7 356 7 379  --   --    I STAT ART PCO2 mm HG 46 8* 44 4* 50 5*  --   --    I STAT ART PO2 mm  0 206 0* >400 0*  --   --    I STAT ART HCO3 mmol/L 25 8 24 8 29 8*  --   --      Results from last 7 days   Lab Units 07/16/21  1144 07/16/21  0516 07/15/21  0453   PROTIME seconds 14 3  --   --    INR  1 11  --   --    PTT seconds 79* 44* 70*     Medications:   Scheduled Medications:  acetaminophen, 975 mg, Oral, Q8H  amiodarone, 200 mg, Oral, Q8H JAIMEE  aspirin, 325 mg, Oral, Daily  atorvastatin, 80 mg, Oral, Daily With Dinner  fondaparinux, 2 5 mg, Subcutaneous, Q24H  furosemide, 40 mg, Intravenous, BID (diuretic)  insulin glargine, 15 Units, Subcutaneous, QAM  insulin lispro, 1-5 Units, Subcutaneous, HS  insulin lispro, 1-6 Units, Subcutaneous, TID AC  insulin lispro, 10 Units, Subcutaneous, TID With Meals  lidocaine, 1 patch, Topical, Daily  methocarbamol, 500 mg, Oral, Q6H JAIMEE  metoprolol tartrate, 12 5 mg, Oral, Once  metoprolol tartrate, 25 mg, Oral, Q12H JAIMEE  mupirocin, 1 application, Nasal, A67R JAIMEE  pantoprazole, 40 mg, Oral, Early Morning  polyethylene glycol, 17 g, Oral, Daily  potassium chloride, 20 mEq, Oral, BID  senna-docusate sodium, 1 tablet, Oral, BID      Continuous IV Infusions:  sodium chloride, 20 mL/hr, Intravenous, Continuous      PRN Meds:  bisacodyl, 10 mg, Rectal, Daily PRN  magnesium hydroxide, 30 mL, Oral, Daily PRN  ondansetron, 4 mg, Intravenous, Q6H PRN  oxyCODONE, 10 mg, Oral, Q4H PRN  oxyCODONE, 5 mg, Oral, Q4H PRN  temazepam, 15 mg, Oral, HS PRN        Discharge Plan: [facility]    Network Utilization Review Department  ATTENTION: Please call with any questions or concerns to [phone] and carefully listen to the prompts so that you are directed to the right person  All voicemails are confidential   Fernando Sloop all requests for admission clinical reviews, approved or denied determinations and any other requests to dedicated fax number below belonging to the campus where the patient is receiving treatment   List of dedicated fax numbers for the Facilities:  [address] DENIALS (Administrative/Medical Necessity) [phone]   [address] (Maternity/NICU/Pediatrics) [phone] 5000 [facility] Serafin Sol [phone]   [address] [address] [place] [address] Melissa Schmidt [address] [phone] 4601 [facility] [phone]

## 2021-07-20 NOTE — PROGRESS NOTES
Progress Note - Cardiothoracic Surgery   Yarely Wright 61 y o  male MRN: [id number]  Unit/Bed#: [facility] 409-01 Encounter: [id number]    Coronary artery disease  S/P coronary artery bypass grafting; POD # 4    24 Hour Events: Brief PAF yesterday; Loaded with IV amioarone and converted back into NSR  Nausea improved with BM       Medications:   Scheduled Meds:  Current Facility-Administered Medications   Medication Dose Route Frequency Provider Last Rate    acetaminophen  975 mg Oral Q8H Bhavna Zapata PA-C      amiodarone  200 mg Oral [address] Beronica RashBESSIE      aspirin  325 mg Oral Daily Beronica BESSIE Guajardo      atorvastatin  80 mg Oral Daily With [employer] InternationalBESSIE      bisacodyl  10 mg Rectal Daily PRN Kym Boyd PA-C      fondaparinux  2 5 mg Subcutaneous Q24H Bhavna Zapata PA-C      furosemide  40 mg Intravenous BID (diuretic) Myrna Dubon PA-C      insulin glargine  15 Units Subcutaneous QAM Maryam Monaco MD      insulin lispro  1-5 Units Subcutaneous HS Arianna Kuhn MD      insulin lispro  1-6 Units Subcutaneous TID AC Arianna Kuhn MD      insulin lispro  10 Units Subcutaneous TID With Meals Arianna Kuhn MD      lidocaine  1 patch Topical Daily Miracle Josee Zapata PA-C      magnesium hydroxide  30 mL Oral Daily PRN Manjit Stein PA-C      methocarbamol  500 mg Oral [facility] [place], [place]      metoprolol tartrate  12 5 mg Oral Once Coty FST21 [facility] woo      metoprolol tartrate  25 mg Oral Q12H [address] [employer]y[address] [place], [place]      mupirocin  1 application Nasal Q63S [address] Beronica RashBESSIE      ondansetron  4 mg Intravenous Q6H PRN Beronica BESSIE Guajardo      oxyCODONE  10 mg Oral Q4H PRN Coty Mccabe PA-C      oxyCODONE  5 mg Oral Q4H PRN Coty FST21 NitinBESSIE      pantoprazole  40 mg Oral Early Morning Beronica RashBESSIE      polyethylene glycol  17 g Oral Daily Beronica BESSIE Guajardo      potassium chloride  20 mEq Oral BID [place], [place]      senna-docusate sodium  1 tablet Oral BID [place], [place]      sodium chloride  20 mL/hr Intravenous Continuous Bartolo Galvin PA-C Stopped (07/19/21 1312)    temazepam  15 mg Oral HS PRN Bartolo Galvin PA-C       Continuous Infusions:sodium chloride, 20 mL/hr, Last Rate: Stopped (07/19/21 1312)      PRN Meds: bisacodyl    magnesium hydroxide    ondansetron    oxyCODONE    oxyCODONE    temazepam    Vitals:   Vitals:    07/19/21 2238 07/20/21 0250 07/20/21 0600 07/20/21 0720   BP: 92/63 121/76  119/69   BP Location: Left arm Left arm  Left arm   Pulse: 83 84  88   Resp: 16 16  16   Temp: 99 °F (37 2 °C) (!) 97 4 °F (36 3 °C)  (!) 97 3 °F (36 3 °C)   TempSrc: Oral Oral  Axillary   SpO2: 97% 96%  95%   Weight:   111 kg (243 lb 13 3 oz)    Height:         Telemetry: NSR; Heart Rate: 86    Respiratory:   SpO2: SpO2: 95 %; Room Air    Intake/Output:   I/O       07/16 0701 - 07/17 0700 07/17 0701 - 07/18 0700 07/18 0701 - 07/19 0700    P  O  1360      I V  (mL/kg) 662 (6 1) 312 4 (2 7)     IV Piggyback 572 5 50     Cell Saver 750      Total Intake(mL/kg) 3344 5 (30 7) 362 4 (3 2)     Urine (mL/kg/hr) 1935 (0 7) 825 (0 3)     Chest Tube 590 200     Total Output 2525 1025     Net +819 5 -662 6            Unmeasured Urine Occurrence  1 x         I/O: even in 24 hours, with unmeasured occurrences  eights:   Weight (last 2 days)     Date/Time   Weight    07/20/21 0600   111 (243 83)    07/19/21 0537   112 (247 36)    07/18/21 0600   114 (251 1)          Up 2 kg from admission    Results:   Results from last 7 days   Lab Units 07/18/21  0436 07/17/21  0418 07/16/21  2033 07/16/21  0802 07/16/21  0516   WBC Thousand/uL 13 09* 12 97*  --   --  6 50   HEMOGLOBIN g/dL 12 9 13 8 14 6   < > 14 7   HEMATOCRIT % 39 6 41 1 43 2   < > 43 9   PLATELETS Thousands/uL 168 160 159   < > 187    < > = values in this interval not displayed  Results from last 7 days   Lab Units 07/19/21  0435 07/18/21  0436 07/17/21  0418 07/16/21  1559 07/16/21  1150   SODIUM mmol/L 134* 132* 138   < >  --    POTASSIUM mmol/L 4 3 4 4 4 3  --   --    CHLORIDE mmol/L 100 102 108   < >  --    CO2 mmol/L 26 25 26   < >  --    CO2, I-STAT mmol/L  --   --   --   --  27   BUN mg/dL 22 18 12   < >  --    CREATININE mg/dL 0 79 0 92 0 77   < >  --    GLUCOSE, ISTAT mg/dl  --   --   --   --  231*   CALCIUM mg/dL 8 4 8 5 8 1*   < >  --     < > = values in this interval not displayed  Results from last 7 days   Lab Units 07/16/21  1144 07/16/21  0516 07/15/21  0453   INR  1 11  --   --    PTT seconds 79* 44* 70*     Point of care glucose: 144-149    Invasive Lines/Tubes:  Invasive Devices     Central Venous Catheter Line            CVC Central Lines 07/16/21 Triple 4 days              Physical Exam:    HEENT/NECK:  Normocephalic  Atraumatic  No jugular venous distention  Cardiac: Regular rate and rhythm  Pulmonary:  Breath sounds clear bilaterally  Abdomen:  Non-tender, Non-distended and Normal bowel sounds  Incisions: Sternum is stable  Incision dressed with Acticoat  No erythema or drainage  Extremities: Extremities warm/dry  Neuro: Alert and oriented X 3  Skin: Warm/Dry, without rashes or lesions  Assessment:  Principal Problem:    CAD (coronary artery disease)  Active Problems:    Hyperlipidemia    Type 2 diabetes mellitus (HCC)    Hypertension    Muscle spasm    Tobacco use    HTN (hypertension)    History of COVID-19    S/P CABG (coronary artery bypass graft)       Coronary artery disease  S/P coronary artery bypass grafting; POD # 4    Plan:    1   Cardiac:   S/P brief PAF yesterday   Converted to NSR with IV Bolus (D/C Now)   No need for A/C without recurrence  NSR; HR/BP well controlled  Lopressor, 25mg PO BID  Continue ASA and Statin therapy  Epicardial pacing wires out  Maintain central IV access today for today  Continue DVT prophylaxis    2  Pulmonary:   Good Room air oxygen saturation; Continue incentive spirometry/Coughing/Deep breathing exercises  Chest tubes have been discontinued    3  Renal:   Intake/Output net: even balance for 24 hours  Continue diuresis   Lasix 40 mg IV BID  Potassium Chloride 20 mEq PO BID  Post op Creatinine stable; Follow up labs prn    4  Neuro:  Neurologically intact; No active issues  Incisional pain well-controlled  Continue Tylenol, 975 mg PO q 8, standing dose  Continue Oxycodone, 2 5 to 5 mg PO q 4 hours prn pain    5  GI:  Tolerating TLC 2 3 gm sodium diet/diebetic  + BM; Nausea improved  Maintain 1800 mL daily fluid restriction   Continue stool softeners and prn suppository, add MOM  Continue GI prophylaxis  Add ensure    6  Endo:   History of diabetes; Continue SQ insulin therapy as directed by endocrinology physician  Insulin administration teaching for home therapy ordered  Will need insulin at discharge, per endocrine; Teaching ordered  7    Hematology:    Post-operative blood count acceptable; Trend prn    8  Disposition:      Anticipate discharge to home when medically ready potentially tomorrow   D/W PT D/C plan    VTE Pharmacologic Prophylaxis: Fondaparinux (Arixtra)  VTE Mechanical Prophylaxis: sequential compression device    Collaborative rounds completed with MARY Krishnan    Plan of care discussed with bedside nurse    SIGNATURE: Meghna Mancera PA-C  DATE: July 20, 2021  TIME: 8:22 AM

## 2021-07-20 NOTE — QUICK NOTE
Patient is a 81-year-old male with type 2 diabetes and CAD who underwent CABG  Diabetes has been uncontrolled with A1c 9%  Home regimen consists of Jardiance and metformin  He is currently on Lantus 15 units q a m  And lispro 10 units t i d  A c     We recommend discharging on basal bolus insulin  Patient will require insulin teaching prior to discharge  Patient can go on Lantus 15 units q a m  And lispro 6 units t i d  A c  and resume his Jardiance and metformin    For discharge, if Lantus solostar is not the preferred basal insulin for the patient's insurance company, please substitute with Gianni Meres flexpen, Basaglar Kwikpen , Levemir flexpen, Toujeo solostar pen  or equivalent insulin vials at the same dose instead  For discharge, if Humalog  Kwik pen is not the preferred mealtime insulin for the patient's insurance, please substitute with NovoLog flexpen, Fiasp  flextouch, Admelog solostar  or Apidra solostar pen or equivalent insulin vials at the same dose instead  If basal/bolus therapy is not covered or affordable, please substitute with Novolin 70/30 Flexpen OR Novolin 70/30 vial at equivalent  dose     Please prescribe insulin pen needles, glucometer, test strips, lancets and insulin syringes (only when using insulin vials) on discharge

## 2021-07-20 NOTE — CASE MANAGEMENT
Pt is cleared for d/c by Cardiothoracic Surgery BESSIE Baron Socrates  NATHALIE Woodard was notified of pt's d/c order  Pt is accepted for services by [facility] for his aftercare plan  Pt also received a Rolling Walker and Bedside Commode [facility] prior to [facility]  The pt and his family were informed of d/c  Family will transport pt home later this day, pickup time TBD  No IMM required  No chart copy required  CM to follow

## 2021-07-20 NOTE — NURSING NOTE
Patient discharged via wheelchair with PCA  Patient demonstrated understanding of insulin teaching with insulin pens prior to discharge  Instructed to  prescriptions at [facility]  All patient questions answered

## 2021-07-20 NOTE — CONSULTS
Insulin Education after Cardiothoracic Surgery   Pharmacy Consultation      Keily Melendez is a 61 y o  male who is POD # 4 s/p CABG  The patient has received insulin education from the Pharmacy Consult service  The following person/people were educated: patient    The following information was covered   What is insulin? Who needs to take insulin? What are some complications associated hyperglycemia? Why should I always take my insulin? What are the different types of insulin that I may be prescribed? What are some complications or side effects to remember? What do I do if I am having side effects, issues, or questions about my insulin? Readiness for insulin therapy: eager   The method(s) of education included: explanation and teach back  Response to education: verbalizes understanding     Thank you for allowing us to take part in this patient's care  Pharmacy will sign-off at this point; please call if there are any questions        Shadia Montero, PharmD, [facility] 6 Pharmacist   [phone]

## 2021-07-20 NOTE — DISCHARGE SUMMARY
Discharge Summary - Cardiothoracic Surgery   Fernie Hill 61 y o  male MRN: [id number]  Unit/Bed#: [facility] 409-01 Encounter: [id number]    Admission Date: 7/9/2021     Discharge Date: 07/20/21    Admitting Diagnosis: Triple vessel disease of the heart [I25 10]    Primary Discharge Diagnosis:   Coronary artery disease  S/P coronary artery bypass grafting;    Secondary Discharge Diagnosis:   HTN, HLD, NIDDM2, H/O COVID-19 infection 11/20    Attending: MARY Colin  Consulting Physician(s):   Cardiology  Medical/Critical Care  Endocrinology  Pharmacy    Procedures Performed:   Procedure(s):  CORONARY ARTERY BYPASS GRAFT (CABG) 3 VESSELS, LIMA -LAD,EVH/GSV- PDA  AND OM  HARVEST VEIN ENDOSCOPIC (EVH)/GSV  TRANSESOPHAGEAL ECHOCARDIOGRAM (GENNA)     Hospital Course:   7/10: presented to [address] [place] [address] from his PCP's office for the evaluation of exertional chest pain and shortness of breath  The patient reports SOB over the past several months  He thought this was related to his history of COVID infection in November 2020 or due to deconditioning  On Monday, he developed burning chest pain and BOLAND while mowing the grass  He was evaluated by his PCP, who ordered a troponin, which was elevated, and sent to the ER for evaluation  He underwent cardiac catheterization, which revealed MV CAD  We have been consulted for surgical revascularization  Preop orders placed     7/11: No events  Vascular studies completed, report pending  7/12: No issues overnight  Denies cardiac complaints  Questions answered and consent obtained  7/13: No events  7/14: Mild chest pressure with walking briskly around unit 4 times this morning  Instructed pt to scale back walking and pace and notify staff if recurrence or worsening of symptoms  Remains on Heparin gtt  7/15: No events  Preop orders done  Patient informed to shave his beard  7/16: CABGx3  Transferred to ICU hemodynamically stable on no vasoactive gtts   AV paced at 80 w/ SB underneath  Not bleeding  Wean to extubate  7/17: Delined  In NSR on 4LNC  CI 2 6  Labs stable  Start lopressor 12 5 mg bid  Lasix 40 mg daily  D/C fine  Endocinrology consult  Transfer to telemetry  7/18: Complaints of pain in the chest - Robaxin around the clock, oxycodone 5 or 10 mg and d/c IV dilaudid  Lasix increased to BID  D/C EPW and CT  Increase metoprolol to 25 mg bid  Labs stable  Insulin gtt remains per endocrinology  7/19: Incisional pain improving  PO intake remains poor (240), no appetite, slightly nauseous, +flatus, +distended  no BM, add ensure and MOM  Ambulating  Endocrine transitioned to SSI yesterday  Labs stable  Around 1000 had transient afib, gave amio bolus  7/20:  Brief PAF yesterday; Loaded with IV amioarone and converted back into NSR  No need for A/C without recurrence      Nausea improved with BM  Will need insulin at discharge, per endocrine; Teaching ordered  Condition at Discharge:   good     Discharge Physical Exam:    Please see the documented physical exam from this morning's progress note for details  Discharge Data:  Results from last 7 days   Lab Units 07/18/21  0436 07/17/21  0418 07/16/21  2033 07/16/21  0802 07/16/21  0516   WBC Thousand/uL 13 09* 12 97*  --   --  6 50   HEMOGLOBIN g/dL 12 9 13 8 14 6   < > 14 7   HEMATOCRIT % 39 6 41 1 43 2   < > 43 9   PLATELETS Thousands/uL 168 160 159   < > 187    < > = values in this interval not displayed       Results from last 7 days   Lab Units 07/19/21  0435 07/18/21  0436 07/17/21  0418 07/16/21  1559 07/16/21  1150   POTASSIUM mmol/L 4 3 4 4 4 3  --   --    CHLORIDE mmol/L 100 102 108   < >  --    CO2 mmol/L 26 25 26   < >  --    CO2, I-STAT mmol/L  --   --   --   --  27   BUN mg/dL 22 18 12   < >  --    CREATININE mg/dL 0 79 0 92 0 77   < >  --    GLUCOSE, ISTAT mg/dl  --   --   --   --  231*   CALCIUM mg/dL 8 4 8 5 8 1*   < >  --     < > = values in this interval not displayed  Results from last 7 days   Lab Units 07/16/21  1144 07/16/21  0516 07/15/21  0453   INR  1 11  --   --    PTT seconds 79* 44* 70*       Discharge instructions/Information to patient and family:   See after visit summary for information provided to patient and family  Lavaughn Rubinstein was educated on restrictions regarding driving and lifting, and techniques of proper incisional care  They were specifically counselled on signs and symptoms of an incisional infection, and advised to contact our service immediately should they develop fevers, sweats, chill, redness or drainage at the site of any incisions  Provisions for Follow-Up Care:  See after visit summary for information related to follow-up care and any pertinent home health orders  Disposition:  See After Visit Summary for discharge disposition information  Planned Readmission:   No    Discharge Medications:  See after visit summary for reconciled discharge medications provided to patient and family  Lavaughn Rubinstein was provided contact information and scheduled a follow up appointment with MARY Garcia  Additionally, follow up appointments have been scheduled for their primary care physician and primary cardiologist   Contact information was provided  Upon admission, troponins were Positive for NSTEMI, with level > 0 02  Lavaughn Rubinstein was counseled on the importance of avoiding tobacco products  As with all patients whom have undergone open heart surgery, tobacco cessation medication was contraindicated at the time of discharge  ACE/ARB was Contraindicated secondary to hypotension    Beta Blocker was Prescribed at discharge      The patient was discharged on ongoing diuretic therapy with Torsemide 20 mg, PO QD and Potassium Chloride 20 mEq, PO QD  They were advised to continue these medications for 7 days, unless otherwise directed  Narcotic pain medication was prescribed in the form of oxycodone  Prior to prescribing, their prescription profile was reviewed on the [place] department of health prescription drug monitoring program     The patient was informed that following their postoperative surgical evaluation, they will be referred to outpatient cardiac rehabilitation  They were counseled that this program is run by specialists who will help them safely strengthen their heart and prevent more heart disease  Cardiac rehabilitation will include exercise, relaxation, stress management, and heart-healthy nutrition  Caregivers will also check to make sure their medication regimen is working  During this admission, the patient was questioned on their use of tobacco, alcohol, and illicit/non-prescription drug use in the  previous 24 months  During this time frame they admit to using tobacco  As such they have been counseled on the importance of cessation and abstinence  I spent 20 minutes discharging the patient  This time was spent on the day of discharge  I had direct contact with the patient on the day of discharge  Additional documentation is required if more than 30 minutes were spent on discharge       SIGNATURE: Lorene Henry PA-C  DATE: July 20, 2021  TIME: 2:41 PM

## 2021-07-20 NOTE — PLAN OF CARE
Problem: OCCUPATIONAL THERAPY ADULT  Goal: Performs self-care activities at highest level of function for planned discharge setting  See evaluation for individualized goals  Description: Treatment Interventions: ADL retraining, Functional transfer training, Endurance training, Patient/family training, Equipment evaluation/education, Compensatory technique education, Continued evaluation, Cardiac education, Energy conservation, Activityengagement          See flowsheet documentation for full assessment, interventions and recommendations  7/20/2021 1546 by Areli Mccollum  Outcome: Adequate for Discharge  Note: Limitation: Decreased ADL status, Decreased endurance, Decreased self-care trans, Decreased high-level ADLs  Prognosis: Good  Assessment: Pt participated in a skilled OT session on 7/20/21 to address interventions consisting of ADL retraining with proper body mechanics, activity tolerance, and functional endurance  Upon entry pt was seated OOB  Pt completed LB dressing I while seated in chair  Pt completed sit <--> stand transfers with Mod I  Pt completed functional mobility with CGA and no AE/DME  Pt did not have any loss(es) of balance or any reports of pain, dizziness, or shortness of breath  Pt showed improvements in functional mobility and LB dressing as compared to initial evaluation  Pt reported that he did not have any concerns in regards to successfully completing his self-care needs upon d/c home  Acute OT needs are met; pt to be d/c'd from OT services at this time  Pt to continue to address functional mobility and ADLs with restorative team and hospital staff as available  From an OT standpoint, anticipate d/c home with increased social support        OT Discharge Recommendation: No rehabilitation needs (home with increased family support )  OT - OK to Discharge: Yes (when medically appropriate)    7/20/2021 1545 by Areli Mccollum  Outcome: Adequate for Discharge  Note: Limitation: Decreased ADL status, Decreased endurance, Decreased self-care trans, Decreased high-level ADLs  Prognosis: Good  Assessment: Pt participated in a skilled OT session on 7/20/21 to address interventions consisting of ADL retraining with proper body mechanics, activity tolerance, and functional endurance  Upon entry pt was seated OOB  Pt completed LB dressing I while seated in chair  Pt completed sit <--> stand transfers with Mod I  Pt completed functional mobility with CGA and no AE/DME  Pt did not have any loss(es) of balance or any reports of pain, dizziness, or shortness of breath  Pt showed improvements in functional mobility and LB dressing as compared to initial evaluation  Pt reported that he did not have any concerns in regards to successfully completing his self-care needs upon d/c home  Acute OT needs are met; pt to be d/c'd from OT services at this time  Pt to continue to address functional mobility and ADLs with restorative team and hospital staff as available  From an OT standpoint, anticipate d/c home with increased social support        OT Discharge Recommendation: No rehabilitation needs (home with increased family support )  OT - OK to Discharge: Yes (when medically appropriate)

## 2021-07-21 ENCOUNTER — TELEPHONE (OUTPATIENT)
Dept: SURGERY | Facility: CLINIC | Age: 66
End: 2021-07-21

## 2021-07-21 NOTE — TELEPHONE ENCOUNTER
Michael Chavez from [facility] called in regards to prescriptions that were put in by Eston Rinne  They were requesting her state license  I did let them know that she was not at this office but I would send her a msg to contact them  Msg sent

## 2021-07-22 NOTE — UTILIZATION REVIEW
Notification of Discharge   This is a Notification of Discharge from our facility [address]  Please be advised that this patient has been discharge from our facility  Below you will find the admission and discharge date and time including the patients disposition  UTILIZATION REVIEW CONTACT:  Leny Higgins  Utilization   Network Utilization Review Department  Phone: [phone] x carefully listen to the prompts  All voicemails are confidential   Email: [email or web address]     PHYSICIAN ADVISORY SERVICES:  FOR [id number] REVIEW - MEDICAL NECESSITY DENIAL  Phone: [phone]  Fax: [phone]  Email: [email or web address]     PRESENTATION DATE: 7/9/2021 10:55 PM  OBERVATION ADMISSION DATE:   INPATIENT ADMISSION DATE: 7/9/21 10:55 PM   DISCHARGE DATE: 7/20/2021  6:38 PM  DISPOSITION: Home with [place] with [address] INFORMATION:  Send all requests for admission clinical reviews, approved or denied determinations and any other requests to dedicated fax number below belonging to the campus where the patient is receiving treatment   List of dedicated fax numbers:  [address] [place] [address] DENIALS (Administrative/Medical Necessity) [phone]   [address]  (Maternity/NICU/Pediatrics) [phone]   Kavitha Brock [phone]   [facility] [phone]   Natalie Webb [phone]   [facility] [address] [facility] [phone]   [facility]  [phone]   [address] [facility] [address]  And [facility] [address] [facility] [phone]

## 2021-07-28 ENCOUNTER — TELEPHONE (OUTPATIENT)
Dept: CARDIAC SURGERY | Facility: CLINIC | Age: 66
End: 2021-07-28

## 2021-07-28 NOTE — TELEPHONE ENCOUNTER
Routine Surgical Follow Up Phone Call  Operation: CABG x 3 on 7/16/2021  Discharge Date: 7/20 discharge to home    Spoke with Mr Madi Reardon  He is doing well  He has no complaints of chest pain, SOB, BOLAND, nausea, lightheadedness or dizziness  His appetite is good, moving his bowels and passing urine without issues  He has a rolling walker if needed but not using as much as he thought he would  Weight stable  No noted swelling  No questions about medications  Appointments reviewed  He does have difficulty sleeping at night - I recommended no daytime naps, can add melatonin 3 mg or 6 mg at bedtime  He has not taken any oxycodone  Recommended Tylenol at bedtime may help as well  He has no questions and referred to call the office if any questions arise

## 2021-08-15 NOTE — PROGRESS NOTES
Cardiology  Hospital Follow Up   Office Visit Note  Dori Alba   61 y o    male   MRN: [id number]  [address]  [address]  [address] [place] [address] Melissa Michele [address]  [phone]    PCP: Vijaya Mccrary MD  Cardiologist: Will  Be Dr Bharat Elise            Summary of recommendations  -Heart healthy diet  Educational information provided  We discussed importance of a low-salt diet to his heart health  -reports he does not smoke cigarettes  In the past he smokes cigars 1-2 a year  Encouraged smoking cessation  -Follow-up CT surgery 8/18  -Cardiac rehab has been prescribed/scheduled September 13th  -he will complete the prescription of amiodarone, that he had for 30 days  Today, will apply a 2 week ZIO patch to assess for recurrent AFib  -continue aggressive risk factor modification  - Follow up will be scheduled with Dr Bharat Elise 6-8 weeks        Assessment/plan  3VCAD, status post CABG x3 LIMA -LAD,VG- PDA/OM  ADM 7/9-7/20/21  · Preop non-STEMI type 1, trop to 0 26  · On aspirin 325 mg daily, high-intensity statin, beta-blocker, amiodarone 200 mg daily   · EKG today:  NSR 77 beats per minute  Nonspecific ST T-wave changes  Will order a 2 week ZIO patch for recurrence  PAF, brief  On amiodarone  Given no recurrence, currently not anticoagulated  Hypertension, essential   /62  metoprolol tartrate 25 mg Q 12  Hyperlipidemia, on atorvastatin 80 mg daily   non ;   Prior to admission was on pravastatin 20  Heart healthy diet; reassess lipids 8 weeks; goal LDL less than 70, non HDL less than 100  Type 2 diabetes mellitus  Hemoglobin A1c 9 0  , on Jardiance, insulin, metformin, per his PCP  History COVID-19 infection November 2020  Obesity, BMI 35  Tobacco abuse  He denies current tobacco use  Cardiac testing   TTE 7/8/21(prior to revascularization) EF 50%  There was mild hypokinesis of the basal-mid inferior and inferolateral walls  Grade 1 DD    RV normal size and function  Mild left atrial enlargement  Trace to mild MR  · Cardiac catheterization 7/8/21  Left main: Normal   LAD: The vessel was normal sized  There was a 70% stenosis in the proximal vessel  Circumflex: The vessel was normal sized and gave rise to one major OM branch  There was a 95% proximal stenosis of the large OM branch  RCA: The vessel was normal sized and dominant, giving rise to the PDA and several posterolateral branches  There was a complex eccentric lesion in mid vessel with thrombus  There is MEDINA 3 flow into the distal vessel, but MEDINA 1 flow into  the PDA  There was collateral flow to the PDA from the left system  HPI  Marita Kay is a 33-year-old man with history of diabetes, hypertension, hyperlipidemia, tobacco abuse  Recently, [facility] to [facility] with exertional chest pain and shortness of breath for several months  He suspected this may be related to his prior COVID infection November 2020 or due to deconditioning  He sought  the attention of his  PCP who ordered a troponin  This was elevated prompting an admission  He underwent cardiac catheterization which showed multivessel CAD  He was referred to CT surgery for evaluation of bypass surgery  When testing was complete he underwent an uneventful CABG x3 July 16th  Per CT surgery note, a postprocedure intraoperative GENNA demonstrated normal biventricular function  Postprocedure he developed chest discomfort and brief paroxysmal atrial fibrillation  He was loaded with IV amiodarone converting to normal sinus rhythm  He was discharged on amiodarone 200 mg daily  Given there was no recurrence, anticoagulation was not felt to be indicated  The patient was discharged on insulin which was new  He was provided insulin teaching prior to discharge      8/16/21  Hospital follow-up after non-STEMI and CABG  Postop GENNA: Preserved LV function    He had brief postop AFib, now on amiodarone 200 mg daily times 30 days  Symptoms:  He feels well  Minimal incisional discomfort  No shortness of breath  In the past few weeks, he had a few small pieces of him  He noted he swelled up pretty quickly  He finished up his diuretic and potassium  We discussed the impact about heart healthy diet/low-sodium  He denies smoking  In the past he smokes cigars 1-2 year  He complained of intermittent wavy lines in his right peripheral vision  It has lessened since he has gotten out of the hospital  EKG today:  Normal sinus rhythm  /62  Wt Readings from Last 3 Encounters:   08/16/21 111 kg (244 lb)   07/20/21 111 kg (243 lb 13 3 oz)   07/07/21 112 kg (246 lb 0 5 oz)   Weight is same as his discharge weight  He will finish the amiodarone  Will order a 2 week ZIO patch  Will have to [facility] his insurance for a prior auth and it will be mailed to him  We discussed importance of aggressive risk factor modification  He has appointment scheduled for cardiac rehab  He has follow-up with his PCP tomorrow, for his DM  He is now on Insulin  All questions were answered      I have spent 25 minutes with Patient  today in which greater than 50% of this time was spent in counseling/coordination of care regarding Intructions for management, Patient and family education, Importance of tx compliance and Risk factor reductions  Assessment  Diagnoses and all orders for this visit:    Hospital discharge follow-up    Coronary artery disease, unspecified vessel or lesion type, unspecified whether angina present, unspecified whether native or transplanted heart    NSTEMI    S/P CABG (coronary artery bypass graft)    Tobacco use    Mixed hyperlipidemia    Essential hypertension          Past Medical History:   Diagnosis Date    Diabetes mellitus ([facility] [address] )     History of COVID-19 11/2020    HLD (hyperlipidemia)     HTN (hypertension)        Review of Systems   Constitutional: Negative for chills     Cardiovascular: Negative for chest pain, claudication, cyanosis, dyspnea on exertion, irregular heartbeat, leg swelling, near-syncope, orthopnea, palpitations, paroxysmal nocturnal dyspnea and syncope  Respiratory: Negative for cough and shortness of breath  Gastrointestinal: Negative for heartburn and nausea  Neurological: Negative for dizziness, focal weakness, headaches, light-headedness and weakness  All other systems reviewed and are negative  No Known Allergies        Current Outpatient Medications:     amiodarone 200 mg tablet, Take 1 tablet (200 mg total) by mouth daily, Disp: 30 tablet, Rfl: 0    aspirin 325 mg tablet, Take 1 tablet (325 mg total) by mouth daily, Disp: 30 tablet, Rfl: 0    atorvastatin (LIPITOR) 80 mg tablet, Take 1 tablet (80 mg total) by mouth daily with dinner, Disp: 30 tablet, Rfl: 0    docusate sodium (COLACE) 100 mg capsule, Take 1 capsule (100 mg total) by mouth 2 (two) times a day, Disp: 60 capsule, Rfl: 0    Empagliflozin (Jardiance) 10 MG TABS, Take 10 mg by mouth daily , Disp: , Rfl:     insulin glargine (Lantus SoloStar) 100 units/mL injection pen, Inject 15 Units under the skin daily if Lantus solostar is not the preferred basal insulin for the patient's insurance company, please substitute with [place] flexpen, Basaglar Kwikpen , Levemir flexpen, [employer]  or equivalent insulin vials at the same dose instead , Disp: 4 5 mL, Rfl: 0    insulin lispro (HumaLOG KwikPen) 100 units/mL injection pen, Inject 6 Units under the skin 3 (three) times a day with meals if Humalog  Kwik pen is not the preferred mealtime insulin for the patient's insurance, please substitute with NovoLog flexpen, Fiasp  flextouch, Admelog solostar  or Apidra solostar pen or equivalent insulin vials at the same dose instead , Disp: 15 mL, Rfl: 0    metFORMIN (GLUCOPHAGE) 1000 MG tablet, Take 500 mg by mouth 2 (two) times a day with meals , Disp: , Rfl:     metoprolol tartrate (LOPRESSOR) 25 mg tablet, Take 1 tablet (25 mg total) by mouth every 12 (twelve) hours, Disp: 60 tablet, Rfl: 0    pantoprazole (PROTONIX) 40 mg tablet, Take 1 tablet (40 mg total) by mouth daily in the early morning, Disp: 30 tablet, Rfl: 0    potassium chloride (K-DUR,KLOR-CON) 20 mEq tablet, Take 1 tablet (20 mEq total) by mouth daily for 7 days, Disp: 7 tablet, Rfl: 0    torsemide (DEMADEX) 20 mg tablet, Take 1 tablet (20 mg total) by mouth daily for 7 days, Disp: 7 tablet, Rfl: 0        Social History     Socioeconomic History    Marital status:      Spouse name: Not on file    Number of children: Not on file    Years of education: Not on file    Highest education level: Not on file   Occupational History    Not on file   Tobacco Use    Smoking status: Current Some Day Smoker     Types: Cigars    Smokeless tobacco: Never Used   Vaping Use    Vaping Use: Never used   Substance and Sexual Activity    Alcohol use: Yes     Alcohol/week: 4 0 - 6 0 standard drinks     Types: 4 - 6 Cans of beer per week     Comment: 4-6 drinks on weekend    Drug use: Never    Sexual activity: Not on file   Other Topics Concern    Not on file   Social History Narrative    Not on file     Social Determinants of Health     Financial Resource Strain:     Difficulty of Paying Living Expenses:    Food Insecurity:     Worried About Running Out of Food in the Last Year:     [address] in the Last Year:    Transportation Needs:     Lack of Transportation (Medical):      Lack of Transportation (Non-Medical):    Physical Activity:     Days of Exercise per Week:     Minutes of Exercise per Session:    Stress:     Feeling of Stress :    Social Connections:     Frequency of Communication with Friends and Family:     Frequency of Social Gatherings with Friends and Family:     Attends Denominational Services:     Active Member of Clubs or Organizations:     Attends Club or Organization Meetings:     Marital Status:    Intimate Partner Violence:  Fear of Current or Ex-Partner:     Emotionally Abused:     Physically Abused:     Sexually Abused:        Family History   Problem Relation Age of Onset    Leukemia Mother        Physical Exam  Vitals and nursing note reviewed  Constitutional:       General: He is not in acute distress  HENT:      Head: Normocephalic and atraumatic  Eyes:      Conjunctiva/sclera: Conjunctivae normal    Cardiovascular:      Rate and Rhythm: Normal rate and regular rhythm  Pulses: Intact distal pulses  Heart sounds: Normal heart sounds  Pulmonary:      Effort: Pulmonary effort is normal       Breath sounds: Normal breath sounds  Abdominal:      General: Bowel sounds are normal       Palpations: Abdomen is soft  Musculoskeletal:         General: Normal range of motion  Cervical back: Normal range of motion and neck supple  Skin:     General: Skin is warm and dry  Comments: Skin incisions Incisions well approximated  No erythema or drainage   Neurological:      Mental Status: He is alert and oriented to person, place, and time  Vitals: There were no vitals taken for this visit     Wt Readings from Last 3 Encounters:   07/20/21 111 kg (243 lb 13 3 oz)   07/07/21 112 kg (246 lb 0 5 oz)   01/31/21 113 kg (249 lb 9 oz)         Labs & Results:  Lab Results   Component Value Date    WBC 13 09 (H) 07/18/2021    HGB 12 9 07/18/2021    HCT 39 6 07/18/2021     (H) 07/18/2021     07/18/2021     BNP   Date Value Ref Range Status   07/07/2021 31 8 1 - 100 pg/mL Final     No components found for: CHEM  Troponin I   Date Value Ref Range Status   07/08/2021 0 23 (H) <=0 04 ng/mL Final     Comment:     Siemens Chemistry analyzer 99% cutoff is > 0 04 ng/mL in network labs     o cTnI 99% cutoff is useful only when applied to patients in the clinical setting of myocardial ischemia   o cTnI 99% cutoff should be interpreted in the context of clinical history, ECG findings and possibly cardiac imaging to establish correct diagnosis  o cTnI 99% cutoff may be suggestive but clearly not indicative of a coronary event without the clinical setting of myocardial ischemia  Results indicate test should be repeated on new specimen collected within 4-6 hours of the original   2021 0 26 (H) <=0 04 ng/mL Final     Comment:     Siemens Chemistry analyzer 99% cutoff is > 0 04 ng/mL in network labs     o cTnI 99% cutoff is useful only when applied to patients in the clinical setting of myocardial ischemia   o cTnI 99% cutoff should be interpreted in the context of clinical history, ECG findings and possibly cardiac imaging to establish correct diagnosis  o cTnI 99% cutoff may be suggestive but clearly not indicative of a coronary event without the clinical setting of myocardial ischemia  Results indicate test should be repeated on new specimen collected within 4-6 hours of the original   2021 0 18 (H) <=0 04 ng/mL Final     Comment:     Siemens Chemistry analyzer 99% cutoff is > 0 04 ng/mL in network labs     o cTnI 99% cutoff is useful only when applied to patients in the clinical setting of myocardial ischemia   o cTnI 99% cutoff should be interpreted in the context of clinical history, ECG findings and possibly cardiac imaging to establish correct diagnosis  o cTnI 99% cutoff may be suggestive but clearly not indicative of a coronary event without the clinical setting of myocardial ischemia      Results indicate test should be repeated on new specimen collected within 4-6 hours of the original     Results for orders placed during the hospital encounter of 21    Echo complete with contrast if indicated    Narrative  [facility], [address] [facility]  [phone]    Transthoracic Echocardiogram  2D, M-mode, Doppler, and Color Doppler    Study date:  2021    Patient: Nicholas Wetzel  MR number: [id number]  Account number: [de-identified]  : [id number]  Age: 61 years  Gender: Male  Status: Outpatient  Location: Bedside  Height: 70 in  Weight: 246 lb  BP: 110/ 66 mmHg    Indications: NSTEMI    Diagnoses: I21 4 - Non-ST elevation (NSTEMI) myocardial infarction    Sonographer:  VLADISLAV Nathan  Primary Physician:  Dora Solis MD  Referring Physician:  Xuan Dove PA-C  Group:  [facility]  Interpreting Physician:  Navin Vilchis MD    SUMMARY    LEFT VENTRICLE:  Size was normal   Systolic function was at the lower limits of normal  Ejection fraction was estimated to be 50 %  There was mild hypokinesis of the basal-mid inferior and inferolateral walls  Doppler parameters were consistent with abnormal left ventricular relaxation (grade 1 diastolic dysfunction)  RIGHT VENTRICLE:  The size was normal   Systolic function was normal     LEFT ATRIUM:  The atrium was mildly dilated  MITRAL VALVE:  There was trace to mild regurgitation  TRICUSPID VALVE:  There was trace regurgitation  HISTORY: PRIOR HISTORY: HTN, HLD, DM2    PROCEDURE: The procedure was performed at the bedside  This was a routine study  The transthoracic approach was used  The study included complete 2D imaging, M-mode, complete spectral Doppler, and color Doppler  The heart rate was 70 bpm,  at the start of the study  Images were obtained from the parasternal, apical, subcostal, and suprasternal notch acoustic windows  Echocardiographic views were limited due to lung interference  This was a technically difficult study  LEFT VENTRICLE: Size was normal  Systolic function was at the lower limits of normal  Ejection fraction was estimated to be 50 %  There was mild hypokinesis of the basal-mid inferior and inferolateral walls  Wall thickness was normal   DOPPLER: Doppler parameters were consistent with abnormal left ventricular relaxation (grade 1 diastolic dysfunction)      RIGHT VENTRICLE: The size was normal  Systolic function was normal  Wall thickness was normal     LEFT ATRIUM: The atrium was mildly dilated  RIGHT ATRIUM: Size was normal     MITRAL VALVE: Valve structure was normal  There was normal leaflet separation  DOPPLER: The transmitral velocity was within the normal range  There was no evidence for stenosis  There was trace to mild regurgitation  AORTIC VALVE: The valve was trileaflet  Leaflets exhibited mildly increased thickness, mild calcification, normal cuspal separation, and sclerosis  DOPPLER: Transaortic velocity was within the normal range  There was no evidence for  stenosis  There was no regurgitation  TRICUSPID VALVE: The valve structure was normal  There was normal leaflet separation  DOPPLER: The transtricuspid velocity was within the normal range  There was no evidence for stenosis  There was trace regurgitation  The tricuspid jet  envelope definition was inadequate for estimation of RV systolic pressure  There are no indirect findings suggestive of moderate or severe pulmonary hypertension  PULMONIC VALVE: Leaflets exhibited normal thickness, no calcification, and normal cuspal separation  DOPPLER: The transpulmonic velocity was within the normal range  There was no regurgitation  PERICARDIUM: There was no pericardial effusion  The pericardium was normal in appearance  AORTA: The root exhibited normal size  SYSTEMIC VEINS: IVC: The inferior vena cava was normal in size and course   Respirophasic changes were normal     SYSTEM MEASUREMENT TABLES    2D  %FS: 24 71 %  Ao Diam: 3 41 cm  EDV(Teich): 173 64 ml  EF(Teich): 48 23 %  ESV(Teich): 89 89 ml  HR_4Ch_Q: 66 42 BPM  IVSd: 1 14 cm  LA Area: 23 21 cm2  LA Diam: 3 7 cm  LVCO_4Ch_Q: 2 84 L/min  LVEF_4Ch_Q: 41 39 %  LVIDd: 5 91 cm  LVIDs: 4 45 cm  LVLd_4Ch_Q: 8 6 cm  LVLs_4Ch_Q: 7 4 cm  LVPWd: 0 97 cm  LVSV_4Ch_Q: 42 77 ml  LVVED_4Ch_Q: 103 33 ml  LVVES_4Ch_Q: 60 56 ml  RA Area: 18 36 cm2  RVIDd: 3 33 cm  SV(Teich): 83 75 ml    MM  TAPSE: 2 59 cm    PW  E' Sept: 0 08 m/s  E/E' Sept: 9 37  MV A Abiel: 0 89 m/s  MV Dec [place]: 3 68 m/s2  MV DecT: 197 59 ms  MV E Abiel: 0 73 m/s  MV E/A Ratio: 0 82    [employer] Accredited Echocardiography Laboratory    Prepared and electronically signed by    Esau Blair MD  Signed 08-Jul-2021 14:41:28    No results found for this or any previous visit  This note was completed in part utilizing m-modal fluency direct voice recognition software  Grammatical errors, random word insertion, spelling mistakes, and incomplete sentences may be an occasional consequence of the system secondary to software limitations, ambient noise and hardware issues  At the time of dictation, efforts were made to edit, clarify and /or correct errors  Please read the chart carefully and recognize, using context, where substitutions have occurred    If you have any questions or concerns about the context, text or information contained within the body of this dictation, please contact myself, the provider, for further clarification

## 2021-08-16 ENCOUNTER — OFFICE VISIT (OUTPATIENT)
Dept: CARDIOLOGY CLINIC | Facility: CLINIC | Age: 66
End: 2021-08-16
Payer: COMMERCIAL

## 2021-08-16 VITALS
DIASTOLIC BLOOD PRESSURE: 62 MMHG | HEIGHT: 70 IN | OXYGEN SATURATION: 97 % | WEIGHT: 244 LBS | HEART RATE: 79 BPM | BODY MASS INDEX: 34.93 KG/M2 | SYSTOLIC BLOOD PRESSURE: 110 MMHG

## 2021-08-16 DIAGNOSIS — Z09 HOSPITAL DISCHARGE FOLLOW-UP: Primary | ICD-10-CM

## 2021-08-16 DIAGNOSIS — Z72.0 TOBACCO USE: ICD-10-CM

## 2021-08-16 DIAGNOSIS — Z95.1 S/P CABG (CORONARY ARTERY BYPASS GRAFT): ICD-10-CM

## 2021-08-16 DIAGNOSIS — I48.0 PAF (PAROXYSMAL ATRIAL FIBRILLATION) (HCC): ICD-10-CM

## 2021-08-16 DIAGNOSIS — R77.8 ELEVATED TROPONIN: ICD-10-CM

## 2021-08-16 DIAGNOSIS — I10 ESSENTIAL HYPERTENSION: ICD-10-CM

## 2021-08-16 DIAGNOSIS — I25.10 CORONARY ARTERY DISEASE, UNSPECIFIED VESSEL OR LESION TYPE, UNSPECIFIED WHETHER ANGINA PRESENT, UNSPECIFIED WHETHER NATIVE OR TRANSPLANTED HEART: ICD-10-CM

## 2021-08-16 DIAGNOSIS — E78.2 MIXED HYPERLIPIDEMIA: ICD-10-CM

## 2021-08-16 DIAGNOSIS — I25.10 CAD (CORONARY ARTERY DISEASE): ICD-10-CM

## 2021-08-16 PROCEDURE — 99215 OFFICE O/P EST HI 40 MIN: CPT | Performed by: NURSE PRACTITIONER

## 2021-08-16 PROCEDURE — 93000 ELECTROCARDIOGRAM COMPLETE: CPT | Performed by: NURSE PRACTITIONER

## 2021-08-16 RX ORDER — ATORVASTATIN CALCIUM 80 MG/1
80 TABLET, FILM COATED ORAL
Qty: 30 TABLET | Refills: 5 | Status: SHIPPED | OUTPATIENT
Start: 2021-08-16 | End: 2022-04-07

## 2021-08-16 NOTE — LETTER
August 16, 2021     Aquilino Castellanos MD  [address] [place] [address]    Patient: Quita Rizvi   YOB: 1955   Date of Visit: 8/16/2021       Dear Dr Kaylee Colon: Thank you for referring Chepe Leigh to me for evaluation  Below are my notes for this consultation  If you have questions, please do not hesitate to call me  I look forward to following your patient along with you  Sincerely,        SANDRA Wright        CC: MD Gelacio Vanessa CRNP  8/16/2021  1:28 PM  Sign when [facility] Visit  Cardiology  [facility] Follow Up   Office Visit Note  Quita Rizvi   61 y o    male   MRN: [id number]  [address]  [address] [place] [address] [phone]    PCP: Aquilino Castellanos MD  Cardiologist: Will  Be Dr Ramila Bansal            Summary of recommendations  -Heart healthy diet  Educational information provided  We discussed importance of a low-salt diet to his heart health  -reports he does not smoke cigarettes  In the past he smokes cigars 1-2 a year  Encouraged smoking cessation  -Follow-up CT surgery 8/18  -Cardiac rehab has been prescribed/scheduled September 13th  -he will complete the prescription of amiodarone, that he had for 30 days  Today, will apply a 2 week ZIO patch to assess for recurrent AFib  -continue aggressive risk factor modification  - Follow up will be scheduled with Dr Ramila Bansal 6-8 weeks        Assessment/plan  3VCAD, status post CABG x3 LIMA -LAD,VG- PDA/OM  ADM 7/9-7/20/21  · Preop non-STEMI type 1, trop to 0 26  · On aspirin 325 mg daily, high-intensity statin, beta-blocker, amiodarone 200 mg daily   · EKG today:  NSR 77 beats per minute  Nonspecific ST T-wave changes  Will order a 2 week ZIO patch for recurrence  PAF, brief  On amiodarone  Given no recurrence, currently not anticoagulated  Hypertension, essential   /62   metoprolol tartrate 25 mg Q 12  Hyperlipidemia, on atorvastatin 80 mg daily   non ;   Prior to admission was on pravastatin 20  Heart healthy diet; reassess lipids 8 weeks; goal LDL less than 70, non HDL less than 100  Type 2 diabetes mellitus  Hemoglobin A1c 9 0  , on Jardiance, insulin, metformin, per his PCP  History COVID-19 infection November 2020  Obesity, BMI 35  Tobacco abuse  He denies current tobacco use  Cardiac testing   TTE 7/8/21(prior to revascularization) EF 50%  There was mild hypokinesis of the basal-mid inferior and inferolateral walls  Grade 1 DD  RV normal size and function  Mild left atrial enlargement  Trace to mild MR  · Cardiac catheterization 7/8/21  Left main: Normal   LAD: The vessel was normal sized  There was a 70% stenosis in the proximal vessel  Circumflex: The vessel was normal sized and gave rise to one major OM branch  There was a 95% proximal stenosis of the large OM branch  RCA: The vessel was normal sized and dominant, giving rise to the PDA and several posterolateral branches  There was a complex eccentric lesion in mid vessel with thrombus  There is MEDINA 3 flow into the distal vessel, but MEDINA 1 flow into  the PDA  There was collateral flow to the PDA from the left system  HPI  Tushar Vázquez is a 59-year-old man with history of diabetes, hypertension, hyperlipidemia, tobacco abuse  Recently, presented to [facility] with exertional chest pain and shortness of breath for several months  He suspected this may be related to his prior COVID infection November 2020 or due to deconditioning  He sought  the attention of his  PCP who ordered a troponin  This was elevated prompting an admission  He underwent cardiac catheterization which showed multivessel CAD  He was referred to CT surgery for evaluation of bypass surgery  When testing was complete he underwent an uneventful CABG x3 July 16th    Per CT surgery note, a postprocedure intraoperative GENNA demonstrated normal biventricular function  Postprocedure he developed chest discomfort and brief paroxysmal atrial fibrillation  He was loaded with IV amiodarone converting to normal sinus rhythm  He was discharged on amiodarone 200 mg daily  Given there was no recurrence, anticoagulation was not felt to be indicated  The patient was discharged on insulin which was new  He was provided insulin teaching prior to discharge      8/16/21  Hospital follow-up after non-STEMI and CABG  Postop GENNA: Preserved LV function  He had brief postop AFib, now on amiodarone 200 mg daily times 30 days  Symptoms:  He feels well  Minimal incisional discomfort  No shortness of breath  In the past few weeks, he had a few small pieces of him  He noted he swelled up pretty quickly  He finished up his diuretic and potassium  We discussed the impact about heart healthy diet/low-sodium  He denies smoking  In the past he smokes cigars 1-2 year  He complained of intermittent wavy lines in his right peripheral vision  It has lessened since he has gotten out of the hospital  EKG today:  Normal sinus rhythm  /62  Wt Readings from Last 3 Encounters:   08/16/21 111 kg (244 lb)   07/20/21 111 kg (243 lb 13 3 oz)   07/07/21 112 kg (246 lb 0 5 oz)   Weight is same as his discharge weight  He will finish the amiodarone  Will order a 2 week ZIO patch  Will have to [facility] his insurance for a prior auth and it will be mailed to him  We discussed importance of aggressive risk factor modification  He has appointment scheduled for cardiac rehab  He has follow-up with his PCP tomorrow, for his DM  He is now on Insulin  All questions were answered      I have spent 25 minutes with Patient  today in which greater than 50% of this time was spent in counseling/coordination of care regarding Intructions for management, Patient and family education, Importance of tx compliance and Risk factor reductions    Assessment  Diagnoses and all orders for this visit:    ALEXIA ROSADO discharge follow-up    Coronary artery disease, unspecified vessel or lesion type, unspecified whether angina present, unspecified whether native or transplanted heart    NSTEMI    S/P CABG (coronary artery bypass graft)    Tobacco use    Mixed hyperlipidemia    Essential hypertension          Past Medical History:   Diagnosis Date    Diabetes mellitus ([facility] [address] )     History of COVID-19 11/2020    HLD (hyperlipidemia)     HTN (hypertension)        Review of Systems   Constitutional: Negative for chills  Cardiovascular: Negative for chest pain, claudication, cyanosis, dyspnea on exertion, irregular heartbeat, leg swelling, near-syncope, orthopnea, palpitations, paroxysmal nocturnal dyspnea and syncope  Respiratory: Negative for cough and shortness of breath  Gastrointestinal: Negative for heartburn and nausea  Neurological: Negative for dizziness, focal weakness, headaches, light-headedness and weakness  All other systems reviewed and are negative  No Known Allergies        Current Outpatient Medications:     amiodarone 200 mg tablet, Take 1 tablet (200 mg total) by mouth daily, Disp: 30 tablet, Rfl: 0    aspirin 325 mg tablet, Take 1 tablet (325 mg total) by mouth daily, Disp: 30 tablet, Rfl: 0    atorvastatin (LIPITOR) 80 mg tablet, Take 1 tablet (80 mg total) by mouth daily with dinner, Disp: 30 tablet, Rfl: 0    docusate sodium (COLACE) 100 mg capsule, Take 1 capsule (100 mg total) by mouth 2 (two) times a day, Disp: 60 capsule, Rfl: 0    Empagliflozin (Jardiance) 10 MG TABS, Take 10 mg by mouth daily , Disp: , Rfl:     insulin glargine (Lantus SoloStar) 100 units/mL injection pen, Inject 15 Units under the skin daily if Lantus solostar is not the preferred basal insulin for the patient's insurance company, please substitute with Arlyn Cousin flexpen, Basaglar Kwikpen , Levemir flexpen, [employer]  or equivalent insulin vials at the same dose instead , Disp: 4 5 mL, Rfl: 0    insulin lispro (HumaLOG KwikPen) 100 units/mL injection pen, Inject 6 Units under the skin 3 (three) times a day with meals if Humalog  Kwik pen is not the preferred mealtime insulin for the patient's insurance, please substitute with NovoLog flexpen, Fiasp  flextouch, Admelog solostar  or Apidra solostar pen or equivalent insulin vials at the same dose instead , Disp: 15 mL, Rfl: 0    metFORMIN (GLUCOPHAGE) 1000 MG tablet, Take 500 mg by mouth 2 (two) times a day with meals , Disp: , Rfl:     metoprolol tartrate (LOPRESSOR) 25 mg tablet, Take 1 tablet (25 mg total) by mouth every 12 (twelve) hours, Disp: 60 tablet, Rfl: 0    pantoprazole (PROTONIX) 40 mg tablet, Take 1 tablet (40 mg total) by mouth daily in the early morning, Disp: 30 tablet, Rfl: 0    potassium chloride (K-DUR,KLOR-CON) 20 mEq tablet, Take 1 tablet (20 mEq total) by mouth daily for 7 days, Disp: 7 tablet, Rfl: 0    torsemide (DEMADEX) 20 mg tablet, Take 1 tablet (20 mg total) by mouth daily for 7 days, Disp: 7 tablet, Rfl: 0        Social History     Socioeconomic History    Marital status:      Spouse name: Not on file    Number of children: Not on file    Years of education: Not on file    Highest education level: Not on file   Occupational History    Not on file   Tobacco Use    Smoking status: Current Some Day Smoker     Types: Cigars    Smokeless tobacco: Never Used   Vaping Use    Vaping Use: Never used   Substance and Sexual Activity    Alcohol use:  Yes     Alcohol/week: 4 0 - 6 0 standard drinks     Types: 4 - 6 Cans of beer per week     Comment: 4-6 drinks on weekend    Drug use: Never    Sexual activity: Not on file   Other Topics Concern    Not on file   Social History Narrative    Not on file     Social Determinants of Health     Financial Resource Strain:     Difficulty of Paying Living Expenses:    Food Insecurity:     Worried About Running Out of Food in the Last Year:     [address] in the Last Year: Transportation Needs:     Lack of Transportation (Medical):  Lack of Transportation (Non-Medical):    Physical Activity:     Days of Exercise per Week:     Minutes of Exercise per Session:    Stress:     Feeling of Stress :    Social Connections:     Frequency of Communication with Friends and Family:     Frequency of Social Gatherings with Friends and Family:     Attends [employer] Services:     Active Member of Clubs or Organizations:     Attends Club or Organization Meetings:     Marital Status:    Intimate Partner Violence:     Fear of Current or Ex-Partner:     Emotionally Abused:     Physically Abused:     Sexually Abused:        Family History   Problem Relation Age of Onset    Leukemia Mother        Physical Exam  Vitals and nursing note reviewed  Constitutional:       General: He is not in acute distress  HENT:      Head: Normocephalic and atraumatic  Eyes:      Conjunctiva/sclera: Conjunctivae normal    Cardiovascular:      Rate and Rhythm: Normal rate and regular rhythm  Pulses: Intact distal pulses  Heart sounds: Normal heart sounds  Pulmonary:      Effort: Pulmonary effort is normal       Breath sounds: Normal breath sounds  Abdominal:      General: Bowel sounds are normal       Palpations: Abdomen is soft  Musculoskeletal:         General: Normal range of motion  Cervical back: Normal range of motion and neck supple  Skin:     General: Skin is warm and dry  Comments: Skin incisions Incisions well approximated  No erythema or drainage   Neurological:      Mental Status: He is alert and oriented to person, place, and time  Vitals: There were no vitals taken for this visit     Wt Readings from Last 3 Encounters:   07/20/21 111 kg (243 lb 13 3 oz)   07/07/21 112 kg (246 lb 0 5 oz)   01/31/21 113 kg (249 lb 9 oz)         Labs & Results:  Lab Results   Component Value Date    WBC 13 09 (H) 07/18/2021    HGB 12 9 07/18/2021    HCT 39 6 07/18/2021  (H) 07/18/2021     07/18/2021     BNP   Date Value Ref Range Status   07/07/2021 31 8 1 - 100 pg/mL Final     No components found for: CHEM  Troponin I   Date Value Ref Range Status   07/08/2021 0 23 (H) <=0 04 ng/mL Final     Comment:     Siemens Chemistry analyzer 99% cutoff is > 0 04 ng/mL in network labs     o cTnI 99% cutoff is useful only when applied to patients in the clinical setting of myocardial ischemia   o cTnI 99% cutoff should be interpreted in the context of clinical history, ECG findings and possibly cardiac imaging to establish correct diagnosis  o cTnI 99% cutoff may be suggestive but clearly not indicative of a coronary event without the clinical setting of myocardial ischemia  Results indicate test should be repeated on new specimen collected within 4-6 hours of the original   07/08/2021 0 26 (H) <=0 04 ng/mL Final     Comment:     Siemens Chemistry analyzer 99% cutoff is > 0 04 ng/mL in network labs     o cTnI 99% cutoff is useful only when applied to patients in the clinical setting of myocardial ischemia   o cTnI 99% cutoff should be interpreted in the context of clinical history, ECG findings and possibly cardiac imaging to establish correct diagnosis  o cTnI 99% cutoff may be suggestive but clearly not indicative of a coronary event without the clinical setting of myocardial ischemia  Results indicate test should be repeated on new specimen collected within 4-6 hours of the original   07/08/2021 0 18 (H) <=0 04 ng/mL Final     Comment:     Siemens Chemistry analyzer 99% cutoff is > 0 04 ng/mL in network labs     o cTnI 99% cutoff is useful only when applied to patients in the clinical setting of myocardial ischemia   o cTnI 99% cutoff should be interpreted in the context of clinical history, ECG findings and possibly cardiac imaging to establish correct diagnosis     o cTnI 99% cutoff may be suggestive but clearly not indicative of a coronary event without the clinical setting of myocardial ischemia  Results indicate test should be repeated on new specimen collected within 4-6 hours of the original     Results for orders placed during the hospital encounter of 21    Echo complete with contrast if indicated    Narrative  [place] [address], [address] [facility]  [phone]    Transthoracic Echocardiogram  2D, M-mode, Doppler, and Color Doppler    Study date:  2021    Patient: Rita Altamirano  MR number: [id number]  Account number: [de-identified]  : 1955  Age: 61 years  Gender: Male  Status: Outpatient  Location: Bedside  Height: 70 in  Weight: 246 lb  BP: 110/ 66 mmHg    Indications: NSTEMI    Diagnoses: I21 4 - Non-ST elevation (NSTEMI) myocardial infarction    Sonographer:  VLADISLAV Casey  Primary Physician:  Ryann Angel MD  Referring Physician:  Jamal Reid PA-C  Group:  [facility]  Interpreting Physician:  Arik Bailey MD    SUMMARY    LEFT VENTRICLE:  Size was normal   Systolic function was at the lower limits of normal  Ejection fraction was estimated to be 50 %  There was mild hypokinesis of the basal-mid inferior and inferolateral walls  Doppler parameters were consistent with abnormal left ventricular relaxation (grade 1 diastolic dysfunction)  RIGHT VENTRICLE:  The size was normal   Systolic function was normal     LEFT ATRIUM:  The atrium was mildly dilated  MITRAL VALVE:  There was trace to mild regurgitation  TRICUSPID VALVE:  There was trace regurgitation  HISTORY: PRIOR HISTORY: HTN, HLD, DM2    PROCEDURE: The procedure was performed at the bedside  This was a routine study  The transthoracic approach was used  The study included complete 2D imaging, M-mode, complete spectral Doppler, and color Doppler  The heart rate was 70 bpm,  at the start of the study  Images were obtained from the parasternal, apical, subcostal, and suprasternal notch acoustic windows  Echocardiographic views were limited due to lung interference  This was a technically difficult study  LEFT VENTRICLE: Size was normal  Systolic function was at the lower limits of normal  Ejection fraction was estimated to be 50 %  There was mild hypokinesis of the basal-mid inferior and inferolateral walls  Wall thickness was normal   DOPPLER: Doppler parameters were consistent with abnormal left ventricular relaxation (grade 1 diastolic dysfunction)  RIGHT VENTRICLE: The size was normal  Systolic function was normal  Wall thickness was normal     LEFT ATRIUM: The atrium was mildly dilated  RIGHT ATRIUM: Size was normal     MITRAL VALVE: Valve structure was normal  There was normal leaflet separation  DOPPLER: The transmitral velocity was within the normal range  There was no evidence for stenosis  There was trace to mild regurgitation  AORTIC VALVE: The valve was trileaflet  Leaflets exhibited mildly increased thickness, mild calcification, normal cuspal separation, and sclerosis  DOPPLER: Transaortic velocity was within the normal range  There was no evidence for  stenosis  There was no regurgitation  TRICUSPID VALVE: The valve structure was normal  There was normal leaflet separation  DOPPLER: The transtricuspid velocity was within the normal range  There was no evidence for stenosis  There was trace regurgitation  The tricuspid jet  envelope definition was inadequate for estimation of RV systolic pressure  There are no indirect findings suggestive of moderate or severe pulmonary hypertension  PULMONIC VALVE: Leaflets exhibited normal thickness, no calcification, and normal cuspal separation  DOPPLER: The transpulmonic velocity was within the normal range  There was no regurgitation  PERICARDIUM: There was no pericardial effusion  The pericardium was normal in appearance  AORTA: The root exhibited normal size  SYSTEMIC VEINS: IVC: The inferior vena cava was normal in size and course  Respirophasic changes were normal     SYSTEM MEASUREMENT TABLES    2D  %FS: 24 71 %  Ao Diam: 3 41 cm  EDV(Teich): 173 64 ml  EF(Teich): 48 23 %  ESV(Teich): 89 89 ml  HR_4Ch_Q: 66 42 BPM  IVSd: 1 14 cm  LA Area: 23 21 cm2  LA Diam: 3 7 cm  LVCO_4Ch_Q: 2 84 L/min  LVEF_4Ch_Q: 41 39 %  LVIDd: 5 91 cm  LVIDs: 4 45 cm  LVLd_4Ch_Q: 8 6 cm  LVLs_4Ch_Q: 7 4 cm  LVPWd: 0 97 cm  LVSV_4Ch_Q: 42 77 ml  LVVED_4Ch_Q: 103 33 ml  LVVES_4Ch_Q: 60 56 ml  RA Area: 18 36 cm2  RVIDd: 3 33 cm  SV(Teich): 83 75 ml    MM  TAPSE: 2 59 cm    PW  E' Sept: 0 08 m/s  E/E' Sept: 9 37  MV A Abiel: 0 89 m/s  MV Dec [place]: 3 68 m/s2  MV DecT: 197 59 ms  MV E Abiel: 0 73 m/s  MV E/A Ratio: 0 82    [facility]    Prepared and electronically signed by    Charlie Moncada MD  Signed 08-Jul-2021 14:41:28    No results found for this or any previous visit  This note was completed in part utilizing m-modal fluency direct voice recognition software  Grammatical errors, random word insertion, spelling mistakes, and incomplete sentences may be an occasional consequence of the system secondary to software limitations, ambient noise and hardware issues  At the time of dictation, efforts were made to edit, clarify and /or correct errors  Please read the chart carefully and recognize, using context, where substitutions have occurred    If you have any questions or concerns about the context, text or information contained within the body of this dictation, please contact myself, the provider, for further clarification

## 2021-08-16 NOTE — PATIENT INSTRUCTIONS
Mediterranean Diet   AMBULATORY CARE:   A Mediterranean diet  is a meal plan that includes foods that are commonly eaten in countries that border the [place]  This meal plan may provide several health benefits  These include losing or maintaining weight, and decreasing blood pressure, blood sugar, and cholesterol levels  It may also help protect against certain health conditions such as heart disease, cancer, type 2 diabetes, and Alzheimer disease  Work with a dietitian to develop a meal plan that is right for you  Foods to include in the [address] [facility] diet:   · Include fruits and vegetables in each meal   Eat a variety of fresh fruits and vegetables  · Choose whole grains every day  These foods include whole-grain breads, pastas, and cereals  It also includes brown rice, quinoa, and millet  · Use unsaturated fats instead of saturated fats  Cook with olive or canola oil  Limit saturated fats, such as butter, margarine, and shortening  Saturated fat is an unhealthy fat that can increase your cholesterol levels  · Choose plant foods, poultry, and fish as your main sources of protein  ? Eat plant-based foods that provide protein,  such as lentils, beans, chickpeas, nuts, and seeds  Choose mostly plant-based foods in place of meat on most days of the week  ? Eat protein foods high in omega-3 fats  Fish high in omega-3 fats include salmon, trout, and tuna  Include these types of fish 1 or 2 times each week  Limit fish high in mercury, such as shark, swordfish, tilefish, and huan mackerel  Omega-3 fats are also found in walnuts and flaxseed  ? Choose poultry (chicken or turkey)  without skin instead of red meat  Red meat is high in saturated fat  Limit eggs and high-fat meats, such as pagan, sausage, and hot dogs  · Choose low-fat dairy foods  such as nonfat or 1% milk, or low-fat almond, cashew, or soy milk   Other examples include low-fat cheese, yogurt, and cottage cheese  · Limit sweets  Limit your intake of high-sugar foods, such as soda, desserts, and candy  · Talk to your healthcare provider about alcohol  Studies have shown that moderate intake of wine may reduce the risk of heart disease  A moderate amount of wine is 1 serving for women and men 65 years and older each day  Two servings is recommended for men 24to 59years of age each day  A serving of wine is 5 ounces  Other things you need to know if you follow the Mediterranean diet:   · Include foods high in iron and vitamin C   Plant-based foods that are high in iron include spinach, beans, tofu, and artichoke  Eat a serving of vitamin C with any iron-rich food to help your body absorb more iron  Examples include oranges, strawberries, cantaloupe, broccoli, and yellow peppers  · Get regular physical activity  The Mediterranean diet will have the most benefit if you get regular physical activity  Get 30 minutes of physical activity at least 5 days a week  Choose physical activities that increase your heart rate  Examples include walking, hiking, swimming, and riding a bike  Ask your healthcare provider about the best exercise plan for you  © Copyright [employer] 2021 Information is for End User's use only and may not be sold, redistributed or otherwise used for commercial purposes  All illustrations and images included in CareNotes® are the copyrighted property of [employer]  or [place] [facility]   The above information is an  only  It is not intended as medical advice for individual conditions or treatments  Talk to your doctor, nurse or pharmacist before following any medical regimen to see if it is safe and effective for you  Cigarette Smoking and Your Health   AMBULATORY CARE:   Risks to your health if you smoke:  Nicotine and other chemicals found in tobacco and e-cigarettes can damage every cell in your body   Even if you are a light smoker, you have an increased risk for cancer, heart disease, and lung disease  If you are pregnant or have diabetes, smoking increases your risk for complications  Nicotine can affect an adolescent's developing brain  This can lead to trouble thinking, learning, or paying attention  Benefits to your health if you stop smoking:   · You decrease respiratory symptoms such as coughing, wheezing, and shortness of breath  · You reduce your risk for cancers of the lung, mouth, throat, kidney, bladder, pancreas, stomach, and cervix  If you already have cancer, you increase the benefits of chemotherapy  You also reduce your risk for cancer returning or a second cancer from developing  · You reduce your risk for heart disease, blood clots, heart attack, and stroke  · You reduce your risk for lung infections, and diseases such as pneumonia, asthma, chronic bronchitis, and emphysema  · Your circulation improves  More oxygen can be delivered to your body  If you have diabetes, you lower your risk for complications, such as kidney, artery, and eye diseases  You also lower your risk for nerve damage  Nerve damage can lead to amputations, poor vision, and blindness  · You improve your body's ability to heal and to fight infections  · An adolescent can help his or her brain and body develop in a healthy way  Talk to your adolescent about all the health risks of nicotine  If you can, start talking about nicotine when your child is younger than 12 years  This may make it easier for him or her not to start using nicotine as a teenager or adult  Explain to him or her that it is best never to start  It can be hard to try to quit later  Benefits to the health of others if you stop smoking:  Tobacco is harmful to nonsmokers who breathe in your secondhand smoke  The following are ways the health of others around you may improve when you stop smoking:  · You lower the risks for lung cancer and heart disease in nonsmoking adults      · If you are pregnant, you lower the risk for miscarriage, early delivery, low birth weight, and stillbirth  You also lower your baby's risk for SIDS, obesity, developmental delay, and neurobehavioral problems, such as ADHD  · If you have children, you lower their risk for ear infections, colds, pneumonia, bronchitis, and asthma  Follow up with your doctor as directed:  Write down your questions so you remember to ask them during your visits  For support and more information:   · American Lung Association  [facility],[address]  [address]  Phone: [facility] [address]  Phone: 8- 214 - 696-7591  Web Address: [employer]    · Smokefree  gov  Phone: 9- 216 - 883-8724  Web Address: www smokefree  [address] 2021 Information is for End User's use only and may not be sold, redistributed or otherwise used for commercial purposes  All illustrations and images included in CareNotes® are the copyrighted property of [employer]  or [address] [place] [address]  The above information is an  only  It is not intended as medical advice for individual conditions or treatments  Talk to your doctor, nurse or pharmacist before following any medical regimen to see if it is safe and effective for you

## 2021-08-18 ENCOUNTER — OFFICE VISIT (OUTPATIENT)
Dept: CARDIAC SURGERY | Facility: CLINIC | Age: 66
End: 2021-08-18

## 2021-08-18 VITALS
TEMPERATURE: 98.1 F | OXYGEN SATURATION: 98 % | SYSTOLIC BLOOD PRESSURE: 96 MMHG | HEIGHT: 70 IN | HEART RATE: 64 BPM | WEIGHT: 245.6 LBS | DIASTOLIC BLOOD PRESSURE: 70 MMHG | BODY MASS INDEX: 35.16 KG/M2 | RESPIRATION RATE: 18 BRPM

## 2021-08-18 DIAGNOSIS — I25.10 CORONARY ARTERY DISEASE, UNSPECIFIED VESSEL OR LESION TYPE, UNSPECIFIED WHETHER ANGINA PRESENT, UNSPECIFIED WHETHER NATIVE OR TRANSPLANTED HEART: ICD-10-CM

## 2021-08-18 DIAGNOSIS — Z48.89 ENCOUNTER FOR POSTOPERATIVE CARE: ICD-10-CM

## 2021-08-18 DIAGNOSIS — Z95.1 S/P CABG (CORONARY ARTERY BYPASS GRAFT): Primary | ICD-10-CM

## 2021-08-18 PROCEDURE — 99024 POSTOP FOLLOW-UP VISIT: CPT | Performed by: NURSE PRACTITIONER

## 2021-08-18 NOTE — LETTER
August 18, 2021     Mari Pearson MD  [address] [place] [address]    Patient: Frederic Mendoza   YOB: 1955   Date of Visit: 8/18/2021       Dear Dr Nay Coppola: Thank you for referring Estephania Wilson to me for evaluation  Below are my notes for this consultation  If you have questions, please do not hesitate to call me  I look forward to following your patient along with you  Sincerely,        Kandace Prader, DO        CC: No Recipients  SANDRA Johnson  8/18/2021 10:15 AM  Attested   POST OP FOLLOW UP VISIT    Procedure: S/P coronary artery bypass grafting x 3 (LIMA to LAD, SVG to PDA, SVG to OM), performed on 7/16/21  History: Frederic Mendoza is a 61y o  year old male who presents to our office today for routine follow up care from coronary artery bypass grafting  Patient tolerated procedure well  Post op course was remarkable for brief PAF treated with Beta Blocker and Amiodarone  Discharge home on POD # 4 in NSR and new to insulin for DM2  Patient has had out patient follow up with PCP and Cardiology  Amiodarone is being weaned off  He is being set up for [address] ambulatory monitoring  Today patient reports he is doing well  He is tolerating activity and denies SOB, lightheadedness, angina, weight gain, edema or fever  Incision have healed well  He has mild chest wall soreness  He is taking his medications as prescribed  Vital Signs:   Vitals:    08/18/21 0937 08/18/21 0940   BP: 102/70 96/70   BP Location: Left arm Right arm   Patient Position: Sitting    Cuff Size: Standard    Pulse: 64    Resp: 18    Temp: 98 1 °F (36 7 °C)    TempSrc: Tympanic    SpO2: 98%    Weight: 111 kg (245 lb 9 6 oz)    Height: 5' 10" (1 778 m)        Home Medications:   Prior to Admission medications    Medication Sig Start Date End Date Taking?  Authorizing Provider   amiodarone 200 mg tablet Take 1 tablet (200 mg total) by mouth daily 7/20/21 8/19/21 Yes Giuliano Fu PA-C   aspirin 325 mg tablet Take 1 tablet (325 mg total) by mouth daily 7/21/21 8/20/21 Yes Marivel Soto PA-C   atorvastatin (LIPITOR) 80 mg tablet Take 1 tablet (80 mg total) by mouth daily with dinner 8/16/21 2/12/22 Yes SANDRA Silva   docusate sodium (COLACE) 100 mg capsule Take 1 capsule (100 mg total) by mouth 2 (two) times a day 7/20/21 8/19/21 Yes Marivel Soto PA-C   Empagliflozin (Jardiance) 10 MG TABS Take 10 mg by mouth daily    Yes Historical Provider, MD   insulin glargine (Lantus SoloStar) 100 units/mL injection pen Inject 15 Units under the skin daily if Lantus solostar is not the preferred basal insulin for the patient's insurance company, please substitute with Mary Seble flexpen, Basaglar Kwikpen , Levemir flexpen, Toujeo solostar pen  or equivalent insulin vials at the same dose instead  7/20/21 8/19/21 Yes Yashira Patel PA-C   insulin lispro (HumaLOG KwikPen) 100 units/mL injection pen Inject 6 Units under the skin 3 (three) times a day with meals if Humalog  Kwik pen is not the preferred mealtime insulin for the patient's insurance, please substitute with NovoLog flexpen, Fiasp  flextouch, Admelog solostar  or Apidra solostar pen or equivalent insulin vials at the same dose instead  7/20/21  Yes Marivel Soto PA-C   metFORMIN (GLUCOPHAGE) 1000 MG tablet Take 500 mg by mouth 2 (two) times a day with meals    Yes Historical Provider, MD   metoprolol tartrate (LOPRESSOR) 25 mg tablet Take 1 tablet (25 mg total) by mouth every 12 (twelve) hours 8/16/21 2/12/22 Yes SANDRA Silva   pantoprazole (PROTONIX) 40 mg tablet Take 1 tablet (40 mg total) by mouth daily in the early morning 7/21/21 8/20/21 Yes Marivel Soto PA-C       Physical Exam:  General: Alert, oriented, well developed, no acute distress  HEENT/NECK:  PERRLA  No jugular venous distention  Cardiac:Regular rate and rhythm, No murmurs rubs or gallops  Pulmonary:Breath sounds clear bilaterally  Abdomen:  Non-tender, Non-distended    Positive bowel sounds  Upper extremities: 2+ radial pulses; brisk capillary refill  Lower extremities: Extremities warm/dry  PT/DP pules 2+ bilaterally  Trace edema B/L  Incisions: Sternum is stable  Incision is clean, dry, and intact  Left EVH Saphenectomy incision is clean, dry, and intact  Musculoskeletal: MAEE, no deficits, stable gaitr  Neuro: Alert and oriented X 3  Sensation is grossly intact  No focal deficits  Skin: Warm/Dry, without rashes or lesions  Lab Results:     Lab Results   Component Value Date    HGBA1C 9 0 (H) 07/07/2021     Lab Results   Component Value Date    TROPONINI 0 23 (H) 07/08/2021       Assessment:   Coronary artery disease  S/P coronary artery bypass grafting    Lavaughn Rubinstein is 4 weeks post-op, making good progress in their recovery  Incisions are well-healed and the sternum is stable  Weight and VS are stable  Plan:   Medications reviewed with patient, associated questions answered and no changes made  Benefits of participating in cardiac rehab have been discussed and they are cleared to begin the outpatient  program    They may resume driving  They have a lifting restriction of no more than 25 lbs until 10/8/21 which is 12 weeks from their surgical date  No further follow up in our office is needed; call with questions or concerns  They should maintain routine follow-up with Cardiology and Primary Care for ongoing medical care  Patient verbalizes understanding of recommendations and all questions were answered to their satisfaction  SANDRA Aguilar  8/18/21  9:30AM  Attestation signed by Jacqui Farah DO at 8/18/2021 10:40 AM:  The patient was seen and examined, and I agree with the midlevel's history, physical exam, assessment and plan with the following additions:    Mr Bishnu Duong was seen   For postop visit after his coronary bypass grafting x3  He is recovering well  He is ready to enter into outpatient cardiac rehab

## 2021-08-18 NOTE — PROGRESS NOTES
POST OP FOLLOW UP VISIT    Procedure: S/P coronary artery bypass grafting x 3 (LIMA to LAD, SVG to PDA, SVG to OM), performed on 7/16/21  History: Randolph Martinez is a 61y o  year old male who presents to our office today for routine follow up care from coronary artery bypass grafting  Patient tolerated procedure well  Post op course was remarkable for brief PAF treated with Beta Blocker and Amiodarone  Discharge home on POD # 4 in NSR and new to insulin for DM2  Patient has had out patient follow up with PCP and Cardiology  Amiodarone is being weaned off  He is being set up for [address] ambulatory monitoring  Today patient reports he is doing well  He is tolerating activity and denies SOB, lightheadedness, angina, weight gain, edema or fever  Incision have healed well  He has mild chest wall soreness  He is taking his medications as prescribed  Vital Signs:   Vitals:    08/18/21 0937 08/18/21 0940   BP: 102/70 96/70   BP Location: Left arm Right arm   Patient Position: Sitting    Cuff Size: Standard    Pulse: 64    Resp: 18    Temp: 98 1 °F (36 7 °C)    TempSrc: Tympanic    SpO2: 98%    Weight: 111 kg (245 lb 9 6 oz)    Height: 5' 10" (1 778 m)        Home Medications:   Prior to Admission medications    Medication Sig Start Date End Date Taking?  Authorizing Provider   amiodarone 200 mg tablet Take 1 tablet (200 mg total) by mouth daily 7/20/21 8/19/21 Yes Lorene Henry PA-C   aspirin 325 mg tablet Take 1 tablet (325 mg total) by mouth daily 7/21/21 8/20/21 Yes Lorene Henry PA-C   atorvastatin (LIPITOR) 80 mg tablet Take 1 tablet (80 mg total) by mouth daily with dinner 8/16/21 2/12/22 Yes SANDRA Soliman   docusate sodium (COLACE) 100 mg capsule Take 1 capsule (100 mg total) by mouth 2 (two) times a day 7/20/21 8/19/21 Yes Lorene Henry PA-C   Empagliflozin (Jardiance) 10 MG TABS Take 10 mg by mouth daily    Yes Historical Provider, MD   insulin glargine (Lantus SoloStar) 100 units/mL injection pen Inject 15 Units under the skin daily if Lantus solostar is not the preferred basal insulin for the patient's insurance company, please substitute with Binh Bowl flexpen, Basaglar Kwikpen , Levemir flexpen, Toujeo solostar pen  or equivalent insulin vials at the same dose instead  7/20/21 8/19/21 Yes Yashira Patel PA-C   insulin lispro (HumaLOG KwikPen) 100 units/mL injection pen Inject 6 Units under the skin 3 (three) times a day with meals if Humalog  Kwik pen is not the preferred mealtime insulin for the patient's insurance, please substitute with NovoLog flexpen, Fiasp  flextouch, Admelog solostar  or Apidra solostar pen or equivalent insulin vials at the same dose instead  7/20/21  Yes Valentin Castaneda PA-C   metFORMIN (GLUCOPHAGE) 1000 MG tablet Take 500 mg by mouth 2 (two) times a day with meals    Yes Historical Provider, MD   metoprolol tartrate (LOPRESSOR) 25 mg tablet Take 1 tablet (25 mg total) by mouth every 12 (twelve) hours 8/16/21 2/12/22 Yes SANDRA Newton   pantoprazole (PROTONIX) 40 mg tablet Take 1 tablet (40 mg total) by mouth daily in the early morning 7/21/21 8/20/21 Yes Valentin Castaneda PA-C       Physical Exam:  General: Alert, oriented, well developed, no acute distress  HEENT/NECK:  PERRLA  No jugular venous distention  Cardiac:Regular rate and rhythm, No murmurs rubs or gallops  Pulmonary:Breath sounds clear bilaterally  Abdomen:  Non-tender, Non-distended  Positive bowel sounds  Upper extremities: 2+ radial pulses; brisk capillary refill  Lower extremities: Extremities warm/dry  PT/DP pules 2+ bilaterally  Trace edema B/L  Incisions: Sternum is stable  Incision is clean, dry, and intact  Left EVH Saphenectomy incision is clean, dry, and intact  Musculoskeletal: MAEE, no deficits, stable gaitr  Neuro: Alert and oriented X 3  Sensation is grossly intact  No focal deficits  Skin: Warm/Dry, without rashes or lesions      Lab Results:     Lab Results   Component Value Date HGBA1C 9 0 (H) 07/07/2021     Lab Results   Component Value Date    TROPONINI 0 23 (H) 07/08/2021       Assessment:   Coronary artery disease  S/P coronary artery bypass grafting    Cheryl Shepherd is 4 weeks post-op, making good progress in their recovery  Incisions are well-healed and the sternum is stable  Weight and VS are stable  Plan:   Medications reviewed with patient, associated questions answered and no changes made  Benefits of participating in cardiac rehab have been discussed and they are cleared to begin the outpatient  program    They may resume driving  They have a lifting restriction of no more than 25 lbs until 10/8/21 which is 12 weeks from their surgical date  No further follow up in our office is needed; call with questions or concerns  They should maintain routine follow-up with Cardiology and Primary Care for ongoing medical care  Patient verbalizes understanding of recommendations and all questions were answered to their satisfaction        SANDRA Gupta  8/18/21  9:30AM

## 2021-09-08 ENCOUNTER — HOSPITAL ENCOUNTER (OUTPATIENT)
Dept: VASCULAR ULTRASOUND | Facility: HOSPITAL | Age: 66
Discharge: HOME | End: 2021-09-08
Attending: INTERNAL MEDICINE
Payer: COMMERCIAL

## 2021-09-08 DIAGNOSIS — R22.41 LOCALIZED SWELLING, MASS AND LUMP, RIGHT LOWER LIMB: ICD-10-CM

## 2021-09-08 PROCEDURE — 93971 EXTREMITY STUDY: CPT

## 2021-09-08 PROCEDURE — 93971 EXTREMITY STUDY: CPT | Performed by: SURGERY

## 2021-09-10 ENCOUNTER — TELEPHONE (OUTPATIENT)
Dept: CARDIAC REHAB | Facility: CLINIC | Age: 66
End: 2021-09-10

## 2021-09-13 ENCOUNTER — CLINICAL SUPPORT (OUTPATIENT)
Dept: CARDIAC REHAB | Facility: CLINIC | Age: 66
End: 2021-09-13
Payer: COMMERCIAL

## 2021-09-13 DIAGNOSIS — I25.10 CAD (CORONARY ARTERY DISEASE): ICD-10-CM

## 2021-09-13 DIAGNOSIS — Z95.1 S/P CABG (CORONARY ARTERY BYPASS GRAFT): ICD-10-CM

## 2021-09-13 PROCEDURE — 93797 PHYS/QHP OP CAR RHAB WO ECG: CPT

## 2021-09-13 NOTE — PROGRESS NOTES
CARDIAC REHAB ASSESSMENT    Today's date: 2021  Patient name: Lavaughn Rubinstein     : 1955       MRN: [id number]  PCP: Joyce Jewell MD  Referring Physician: Harshil Duarte DO  Cardiologist: Trish Garcia MD  Surgeon: Harshil Duarte DO  Dx:   Encounter Diagnoses   Name Primary?  CAD (coronary artery disease)     S/P CABG (coronary artery bypass graft)        Date of onset: 2021  Cultural needs: None     Height:    Wt Readings from Last 1 Encounters:   21 111 kg (245 lb 9 6 oz)      Weight:   Ht Readings from Last 1 Encounters:   21 5' 10" (1 778 m)     Medical History:   Past Medical History:   Diagnosis Date    [facility]     History of COVID-19 2020    HLD (hyperlipidemia)     HTN (hypertension)          Physical Limitations: current problems with R ankle, pain with walking     Fall Risk: Low   Comments: Ambulates with a steady gait with no assist device     Anginal Equivalent: None/denies angina   NTG use: No prescription    Risk Factors   Cholesterol: Yes  Smoking: Former user  HTN: Yes  DM: Type 2   average -170  insulin  Obesity: Yes   Inactivity: Yes    Stress:  perceived  stress: 3/10   Stressors: wants to go back to work   Goals for Stress Management:Practice Relaxation Techniques, Read and Exercise    Family History:  Family History   Problem Relation Age of Onset    Leukemia Mother        Allergies: Patient has no known allergies    ETOH:   Social History     Substance and Sexual Activity   Alcohol Use Not Currently    Comment: has not been drinking since heart  p-op         Current Medications:   Current Outpatient Medications   Medication Sig Dispense Refill    amiodarone 200 mg tablet Take 1 tablet (200 mg total) by mouth daily 30 tablet 0    aspirin 325 mg tablet Take 1 tablet (325 mg total) by mouth daily 30 tablet 0    atorvastatin (LIPITOR) 80 mg tablet Take 1 tablet (80 mg total) by mouth daily with dinner 30 tablet 5  docusate sodium (COLACE) 100 mg capsule Take 1 capsule (100 mg total) by mouth 2 (two) times a day 60 capsule 0    Empagliflozin (Jardiance) 10 MG TABS Take 10 mg by mouth daily       insulin glargine (Lantus SoloStar) 100 units/mL injection pen Inject 15 Units under the skin daily if Lantus solostar is not the preferred basal insulin for the patient's insurance company, please substitute with Sharene Baize flexpen, Basaglar Kwikpen , Levemir flexpen, Toujeo solostar pen  or equivalent insulin vials at the same dose instead  4 5 mL 0    insulin lispro (HumaLOG KwikPen) 100 units/mL injection pen Inject 6 Units under the skin 3 (three) times a day with meals if Humalog  Kwik pen is not the preferred mealtime insulin for the patient's insurance, please substitute with NovoLog flexpen, Fiasp  flextouch, Admelog solostar  or Apidra solostar pen or equivalent insulin vials at the same dose instead  15 mL 0    metFORMIN (GLUCOPHAGE) 1000 MG tablet Take 500 mg by mouth 2 (two) times a day with meals       metoprolol tartrate (LOPRESSOR) 25 mg tablet Take 1 tablet (25 mg total) by mouth every 12 (twelve) hours 60 tablet 5    pantoprazole (PROTONIX) 40 mg tablet Take 1 tablet (40 mg total) by mouth daily in the early morning 30 tablet 0     No current facility-administered medications for this visit           Functional Status Prior to Diagnosis for Treatment   Occupation: full time job counselor  Recreation: outdoors   ADLs: Capable of performing light to moderate ADLs limited by deconditioning of COVID-19, SOB  [place]: able to perform self-care resumed driving  Exercise: walking/jogging  Other: none     Current Functional Status  Occupation: full time job counselor  Recreation: none   ADLs:Capable of performing light ADLs only limited by Weakness  Orthopedic limitations   [place]: Capable of performing light ADLs only limited by Dyspnea  Orthopedic limitations   Exercise: walking/jogging  Other: none Patient Specific Goals:  Get back to work, live a healthier lifestyle     Short Term Program Goals: dietary modifications increased strength improved energy/stamina with ADLs exercise 120-150 mins/wk improved BG control wt loss 1-2 ppw    Long Term Goals: increased maximal walking duration  increased intial training workload  Improved [facility] Activity Status score  Improved functional capacity  Improved Quality of Life - [facility] score reduced  Improved lipid profile  Reduced waist circumference  Abstain from smoking  Improved A1c  Improved fasting glucose  improved Rate Your Plate Score    Ability to reach goals/rehabilitation potential:  Very Good     Projected return to function: 12 weeks  Objective tests: 6 MWT      Nutritional   Reviewed details of Rate your Plate  Discussed key elements of heart healthy eating  Reviewed patient goals for dietary modifications and their clinical implications  Reviewed most recent lipid profile  Goals for dietary modification: choose lean cuts of meat  poultry without the skin  low fat ground meat and poultry  eliminate processed meats  reduce portions of meat to 3 oz  increase fish intake  more meatless meals  low fat dairy   reduced fat cheese  increase whole grains  increase fruits and vegetables  eliminate butter  low sodium  improved snack choices  more nuts/seeds  reduce sweets/frozen desserts  heathier choices while dining out      Emotional/Social  Patient reports feelings of depression   Patient reports feelings of anxiety  Reports sufficient emotional support    Marital status:     Domestic Violence Screening: No    Comments: CABG x3 7/16/2021 - still feeling slight muscular discomfort in chest; NSTEMI on 7/7/21 after CP and SOB for several months post COVID-19 infection  Hx of PAF, HDL, HTN, T2D with insulin, COVID-18 11/2020   Recently went into vascular for blockage testing in R ankle, Patient feels pain in food and ankle region when load baring, will follow up with doc

## 2021-09-13 NOTE — PROGRESS NOTES
Cardiac Rehabilitation Plan of Care   Initial Care Plan          Today's date: 2021   # of Exercise Sessions Completed: 1  Patient name: Haris Oconnell      : 1955  Age: 61 y o  MRN: [id number]  Referring Physician: Ta Morales DO  Cardiologist: Niki Avery MD  Provider: [facility]  Clinician: Jeniffer Ellis MS, CEP    Dx:   Encounter Diagnoses   Name Primary?  CAD (coronary artery disease)     S/P CABG (coronary artery bypass graft)      Date of onset: 2021      SUMMARY OF PROGRESS:  Today is Blanka Snyder initial evaluation to begin Cardiac Rehab post CABG x3 on 2021  The patient does not currently follow a formal exercise program at home  He has not resumed allADLs reporting dyspnea with activity and reporting weakness and fatigue  Depression screening using the PHQ-9 interprets the patient's score 5-9 = Mild Depression  SUZANNE-7 screening tool for anxiety suggests 0-4  = Not anxious  When addressed, the patient denies having depression/anxiety  Patient reports excellent social/emotional support  Information to begin using [employer] was provided as well as contact information for counseling through [employer]  PHQ-9 score will be reassessed in 30 days  The patient is a former smoker  He has abstained since quitting  Patient admits to 100% medication compliance  Patient reports the following physical limitations: R ankle pain 2/10 with ambulation at which weight baring exercise will be limited  The patient completed an initial 6MWT  The patient walked  900 feet/2 3 METs  Resting  /70 with appropriate hemodynamic response to exercise reaching 128/76  Patient denied symptoms during exercise  Telemetry revealed NSR with occ PACs  The patient has a history of PAF post-op and recently had a Zio patch for 2 weeks and is awaiting results at this time    Patient was counseled on exercise guidelines to achieve a minimum of 150 mins/wk of moderate intensity (RPE 4-6) exercise and encouraged to add 1-2 days of exercise on opposite days of cardiac rehab as tolerated  We discussed current dietary habits and goals of heart healthy eating for lipid management, diabetes management and weight loss  The patient has T2D, Patient monitors BS 1 times/day, Patient reported fasting -170, on Insulin   Patient's goals include: increased ADLs, return to work, live a healthier lifestyle  The patient's CAD risk factors include:  inactivity, stress, obesity/overweight, hypertension, hyperlipidemia and diabetes  His education will focus on lifestyle modification/education specific to His needs  Patient will attend group education classes on heart healthy eating, reading food labels, stress management, risk factor reduction, understanding heart disease and common heart medications  Patient will attend 35 monitored exercise sessions, 3x/wk for 12-18 weeks beginning 2021         Medication compliance: Yes   Comments: Pt reports to be compliant with medications  Fall Risk: Low   Comments: Ambulates with a steady gait with no assist device    EKG Interpretation: Hx of PAF and incomplete RBBB      EXERCISE ASSESSMENT and PLAN    Current Exercise Program in Rehab:       Frequency: 3 days/week Supplement with home exercise 2+ days/wk as tolerated       Minutes: 30-35         METS: 2-3            HR:    RPE: 4-5         Modalities: UBE, Lifecycle, NuStep and Recumbent bike      Exercise Progression 30 Day Goals :    Frequency: 3 days/week of cardiac rehab     Supplement with home exercise 2+ days/wk as tolerated    Minutes: 35-40                            >150 mins/wk of moderate intensity exercise   METS: 3-4   HR:     RPE: 4-6   Modalities: Airdyne bike, UBE, Lifecycle, NuStep and Recumbent bike    Strength trainin-3 days / week  12-15 repetitions  1-2 sets per modality   Will be added following at least 8 weeks post surgery and 8-10 monitored sessions   Modalities: Leg Press, Chest Press, Pull Downs, Arm Curl and Seated Row    Home Exercise: none    Goals: 10% improvement in functional capacity - based on max METs achieved in fitness assessment, Reduced dyspnea with physical activity  0-1/10, improved DASI score by 10%, Increase in exercise capacity by 40% - based on peak METs tolerated in cardiac rehab exercise session, Exercise 5 days/wk, >150mins/wk of moderate intensity exercise, Improved 6MWT distance by 10%, Resume ADLs with increased strength, Attend Rehab regularly, Decrease sitting time and Start a walking program    Progression Toward Goals:  Reviewed Pt goals and determined plan of care, Will continue to educate and progress as tolerated      Education: benefit of exercise for CAD risk factors, home exercise guidelines, AHA guidelines to achieve >150 mins/wk of moderate exercise and RPE scale   Plan:home exercise 30+ mins 2 days opposite CR and Education class: Risk Factors for Heart Disease  Readiness to change: Preparation:  (Getting ready to change)       NUTRITION ASSESSMENT AND PLAN    Weight control:    Starting weight: 246 4 lb   Current weight:     Waist circumference:    Startin in   Current:      Diabetes: T2D  A1c: 9 0    last measured: 2021    Lipid management: Discussed diet and lipid management and Last lipid profile 2021  Chol 210    HDL 44      Goals:reduced BMI to < 25, LDL <100, HDL >40, TRG <150, CHOL <200, decreased body fat% <25%   (M), reduced waist circumference <40 inches (M), fasting BG , improved A1c  < 7 0%, Improved Rate Your Plate score  >06, 7 2-3%  wt loss, increase intake of meatless meals, use low fat dairy, reduce cheese intake or use reduced-fat, eat 3 or more servings of whole grains a day, Eat 4-5 cups of fruits and vegetables daily, Increase intake of nuts and seeds, seldom eat or choose low fat ice-cream, fruit juice bars or frozen yogurt , eliminate or choose low-fat sweets and daily saturated fat intake <7%/13g    Progression Toward Goals: Reviewed Pt goals and determined plan of care, Will continue to educate and progress as tolerated  Education: heart healthy eating  nutrition for  lipid management  nutrition for Improved BG control  target goal for A1c <7 0  exercise and diabetes management   wt  loss   Plan: Education class: Reading Food Labels, Education Class: Heart Healthy Eating, refer to diabetes educator, refer to medical nutrition therapy, switch to low fat cheeses, replace butter with soft spreads made with olive oil, canola or yogurt, replace refined grain bread with whole grain bread, replace unhealthy snacks with fruits & vegs, reduce portion sizes, reduce red meat 1x/wk, switch to skim or 1% milk, eat fewer desserts and sweets, avoid processed foods, remove salt shaker from table, use salt substitute like Mrs   Dash, increase utilization of fresh or dried herbs, eat more home cooked meals and eat out less often, will try new grains like brown rice, quinoa, farro, will replace sugar sweetened cereals with whole grain or oatmeal, monitor home blood glucose, reduce alcohol intake, drink more water, learn how to read food labels, replace sugar with stevia or truvia, keep added daily sugar <25g/day, slow down eating time, decrease carbonated beverages and decrease gas producing foods  Readiness to change: Preparation:  (Getting ready to change)       PSYCHOSOCIAL ASSESSMENT AND PLAN    Emotional:  Depression assessment:  PHQ-9 = 5-9 = Mild Depression            Anxiety measure:  SUZANNE-7 = 0-4  = Not anxious  Self-reported stress level:  3  Social support: Excellent    Goals:  Reduce perceived stress to 1-3/10, PHQ-9 - reduced severity by one level, Physical Fitness in [facility] Score < 3, Daily Activity in [facility] Score < 3, Pain in [facility] Score < 3, Overall Health in [facility] Score < 3, Quality of Life in [facility] Score < 3 , improved positive thoughts of well being and increased energy    Progression Toward Goals: Reviewed Pt goals and determined plan of care, Will continue to educate and progress as tolerated  Education: signs/sxs of depression, benefits of a positive support system, stress management techniques, depression and CAD and benefits of mental health counseling  Plan: Class: Stress and Your Health, Class: Relaxation, Practice relaxation techniques, Exercise and Enjoy a hobby  Readiness to change: Preparation:  (Getting ready to change)       OTHER CORE COMPONENTS     Tobacco:   Social History     Tobacco Use   Smoking Status Former Smoker    Types: Cigars   Smokeless Tobacco Never Used       Tobacco Use Intervention:   N/A: Pt has a remote history of smoking    Anginal Symptoms:  None   NTG use: No prescription    Blood pressure:    Restin/70   Exercise: 128/76    Goals: consistent BP < 130/80, reduced dietary sodium <2300mg, moderate intensity exercise >150 mins/wk and medication compliance    Progression Toward Goals: Reviewed Pt goals and determined plan of care, Will continue to educate and progress as tolerated      Education:  understanding high blood pressure and it's relationship to CAD and low sodium diet and HTN  Plan: Class: Understanding Heart Disease, Class: Common Heart Medications, use salt substitutes, check labels for sodium content and monitor home BP  Readiness to change: Preparation:  (Getting ready to change)

## 2021-09-16 ENCOUNTER — CLINICAL SUPPORT (OUTPATIENT)
Dept: CARDIAC REHAB | Facility: CLINIC | Age: 66
End: 2021-09-16
Payer: COMMERCIAL

## 2021-09-16 DIAGNOSIS — Z95.1 S/P CABG (CORONARY ARTERY BYPASS GRAFT): ICD-10-CM

## 2021-09-16 PROCEDURE — 93798 PHYS/QHP OP CAR RHAB W/ECG: CPT

## 2021-09-17 ENCOUNTER — CLINICAL SUPPORT (OUTPATIENT)
Dept: CARDIAC REHAB | Facility: CLINIC | Age: 66
End: 2021-09-17
Payer: COMMERCIAL

## 2021-09-17 DIAGNOSIS — Z95.1 S/P CABG (CORONARY ARTERY BYPASS GRAFT): ICD-10-CM

## 2021-09-17 PROCEDURE — 93798 PHYS/QHP OP CAR RHAB W/ECG: CPT

## 2021-09-21 ENCOUNTER — CLINICAL SUPPORT (OUTPATIENT)
Dept: CARDIOLOGY CLINIC | Facility: CLINIC | Age: 66
End: 2021-09-21
Payer: COMMERCIAL

## 2021-09-21 ENCOUNTER — CLINICAL SUPPORT (OUTPATIENT)
Dept: CARDIAC REHAB | Facility: CLINIC | Age: 66
End: 2021-09-21
Payer: COMMERCIAL

## 2021-09-21 DIAGNOSIS — Z95.1 S/P CABG (CORONARY ARTERY BYPASS GRAFT): ICD-10-CM

## 2021-09-21 DIAGNOSIS — I48.0 PAF (PAROXYSMAL ATRIAL FIBRILLATION) (HCC): ICD-10-CM

## 2021-09-21 PROCEDURE — 93798 PHYS/QHP OP CAR RHAB W/ECG: CPT

## 2021-09-21 PROCEDURE — 93248 EXT ECG>7D<15D REV&INTERPJ: CPT | Performed by: INTERNAL MEDICINE

## 2021-09-23 ENCOUNTER — CLINICAL SUPPORT (OUTPATIENT)
Dept: CARDIAC REHAB | Facility: CLINIC | Age: 66
End: 2021-09-23
Payer: COMMERCIAL

## 2021-09-23 DIAGNOSIS — Z95.1 S/P CABG (CORONARY ARTERY BYPASS GRAFT): ICD-10-CM

## 2021-09-23 PROCEDURE — 93798 PHYS/QHP OP CAR RHAB W/ECG: CPT

## 2021-09-23 NOTE — RESULT ENCOUNTER NOTE
Spoke with patient, patient verbalized understanding   Lab orders have been mailed to him to obtain through [facility]

## 2021-09-24 ENCOUNTER — CLINICAL SUPPORT (OUTPATIENT)
Dept: CARDIAC REHAB | Facility: CLINIC | Age: 66
End: 2021-09-24
Payer: COMMERCIAL

## 2021-09-24 DIAGNOSIS — Z95.1 S/P CABG (CORONARY ARTERY BYPASS GRAFT): ICD-10-CM

## 2021-09-24 PROCEDURE — 93798 PHYS/QHP OP CAR RHAB W/ECG: CPT

## 2021-09-28 ENCOUNTER — CLINICAL SUPPORT (OUTPATIENT)
Dept: CARDIAC REHAB | Facility: CLINIC | Age: 66
End: 2021-09-28
Payer: COMMERCIAL

## 2021-09-28 DIAGNOSIS — Z95.1 S/P CABG (CORONARY ARTERY BYPASS GRAFT): ICD-10-CM

## 2021-09-28 PROCEDURE — 93798 PHYS/QHP OP CAR RHAB W/ECG: CPT

## 2021-09-30 ENCOUNTER — CLINICAL SUPPORT (OUTPATIENT)
Dept: CARDIAC REHAB | Facility: CLINIC | Age: 66
End: 2021-09-30
Payer: COMMERCIAL

## 2021-09-30 DIAGNOSIS — Z95.1 S/P CABG (CORONARY ARTERY BYPASS GRAFT): ICD-10-CM

## 2021-09-30 PROCEDURE — 93798 PHYS/QHP OP CAR RHAB W/ECG: CPT

## 2021-10-01 ENCOUNTER — CLINICAL SUPPORT (OUTPATIENT)
Dept: CARDIAC REHAB | Facility: CLINIC | Age: 66
End: 2021-10-01
Payer: COMMERCIAL

## 2021-10-01 DIAGNOSIS — Z95.1 S/P CABG (CORONARY ARTERY BYPASS GRAFT): ICD-10-CM

## 2021-10-01 PROCEDURE — 93798 PHYS/QHP OP CAR RHAB W/ECG: CPT

## 2021-10-05 ENCOUNTER — CLINICAL SUPPORT (OUTPATIENT)
Dept: CARDIAC REHAB | Facility: CLINIC | Age: 66
End: 2021-10-05
Payer: COMMERCIAL

## 2021-10-05 DIAGNOSIS — Z95.1 S/P CABG (CORONARY ARTERY BYPASS GRAFT): ICD-10-CM

## 2021-10-05 PROCEDURE — 93798 PHYS/QHP OP CAR RHAB W/ECG: CPT

## 2021-10-07 ENCOUNTER — CLINICAL SUPPORT (OUTPATIENT)
Dept: CARDIAC REHAB | Facility: CLINIC | Age: 66
End: 2021-10-07
Payer: COMMERCIAL

## 2021-10-07 DIAGNOSIS — Z95.1 S/P CABG (CORONARY ARTERY BYPASS GRAFT): ICD-10-CM

## 2021-10-07 PROCEDURE — 93798 PHYS/QHP OP CAR RHAB W/ECG: CPT

## 2021-10-08 ENCOUNTER — CLINICAL SUPPORT (OUTPATIENT)
Dept: CARDIAC REHAB | Facility: CLINIC | Age: 66
End: 2021-10-08
Payer: COMMERCIAL

## 2021-10-08 ENCOUNTER — EVALUATION (OUTPATIENT)
Dept: PHYSICAL THERAPY | Facility: CLINIC | Age: 66
End: 2021-10-08
Payer: COMMERCIAL

## 2021-10-08 DIAGNOSIS — Z95.1 S/P CABG (CORONARY ARTERY BYPASS GRAFT): ICD-10-CM

## 2021-10-08 DIAGNOSIS — M76.61 ACHILLES TENDINITIS OF RIGHT LOWER EXTREMITY: Primary | ICD-10-CM

## 2021-10-08 PROCEDURE — 97110 THERAPEUTIC EXERCISES: CPT | Performed by: PHYSICAL THERAPIST

## 2021-10-08 PROCEDURE — 93798 PHYS/QHP OP CAR RHAB W/ECG: CPT

## 2021-10-08 PROCEDURE — 97112 NEUROMUSCULAR REEDUCATION: CPT | Performed by: PHYSICAL THERAPIST

## 2021-10-08 PROCEDURE — 97161 PT EVAL LOW COMPLEX 20 MIN: CPT | Performed by: PHYSICAL THERAPIST

## 2021-10-12 ENCOUNTER — CLINICAL SUPPORT (OUTPATIENT)
Dept: CARDIAC REHAB | Facility: CLINIC | Age: 66
End: 2021-10-12
Payer: COMMERCIAL

## 2021-10-12 DIAGNOSIS — Z95.1 S/P CABG (CORONARY ARTERY BYPASS GRAFT): ICD-10-CM

## 2021-10-12 PROCEDURE — 93798 PHYS/QHP OP CAR RHAB W/ECG: CPT

## 2021-10-14 ENCOUNTER — CLINICAL SUPPORT (OUTPATIENT)
Dept: CARDIAC REHAB | Facility: CLINIC | Age: 66
End: 2021-10-14
Payer: COMMERCIAL

## 2021-10-14 ENCOUNTER — OFFICE VISIT (OUTPATIENT)
Dept: CARDIOLOGY CLINIC | Facility: CLINIC | Age: 66
End: 2021-10-14
Payer: COMMERCIAL

## 2021-10-14 ENCOUNTER — OFFICE VISIT (OUTPATIENT)
Dept: PHYSICAL THERAPY | Facility: CLINIC | Age: 66
End: 2021-10-14
Payer: COMMERCIAL

## 2021-10-14 VITALS
DIASTOLIC BLOOD PRESSURE: 78 MMHG | HEART RATE: 64 BPM | OXYGEN SATURATION: 98 % | WEIGHT: 248 LBS | HEIGHT: 70 IN | BODY MASS INDEX: 35.5 KG/M2 | SYSTOLIC BLOOD PRESSURE: 108 MMHG

## 2021-10-14 DIAGNOSIS — M76.61 ACHILLES TENDINITIS OF RIGHT LOWER EXTREMITY: Primary | ICD-10-CM

## 2021-10-14 DIAGNOSIS — E78.2 MIXED HYPERLIPIDEMIA: Primary | ICD-10-CM

## 2021-10-14 DIAGNOSIS — Z95.1 S/P CABG (CORONARY ARTERY BYPASS GRAFT): ICD-10-CM

## 2021-10-14 DIAGNOSIS — I25.10 CORONARY ARTERY DISEASE, UNSPECIFIED VESSEL OR LESION TYPE, UNSPECIFIED WHETHER ANGINA PRESENT, UNSPECIFIED WHETHER NATIVE OR TRANSPLANTED HEART: ICD-10-CM

## 2021-10-14 DIAGNOSIS — I10 PRIMARY HYPERTENSION: ICD-10-CM

## 2021-10-14 PROCEDURE — 93798 PHYS/QHP OP CAR RHAB W/ECG: CPT

## 2021-10-14 PROCEDURE — 97140 MANUAL THERAPY 1/> REGIONS: CPT | Performed by: PHYSICAL THERAPIST

## 2021-10-14 PROCEDURE — 99214 OFFICE O/P EST MOD 30 MIN: CPT | Performed by: INTERNAL MEDICINE

## 2021-10-14 PROCEDURE — 97110 THERAPEUTIC EXERCISES: CPT | Performed by: PHYSICAL THERAPIST

## 2021-10-14 PROCEDURE — 97112 NEUROMUSCULAR REEDUCATION: CPT | Performed by: PHYSICAL THERAPIST

## 2021-10-14 PROCEDURE — 93000 ELECTROCARDIOGRAM COMPLETE: CPT | Performed by: INTERNAL MEDICINE

## 2021-10-15 ENCOUNTER — OFFICE VISIT (OUTPATIENT)
Dept: PHYSICAL THERAPY | Facility: CLINIC | Age: 66
End: 2021-10-15
Payer: COMMERCIAL

## 2021-10-15 ENCOUNTER — CLINICAL SUPPORT (OUTPATIENT)
Dept: CARDIAC REHAB | Facility: CLINIC | Age: 66
End: 2021-10-15
Payer: COMMERCIAL

## 2021-10-15 DIAGNOSIS — M76.61 ACHILLES TENDINITIS OF RIGHT LOWER EXTREMITY: Primary | ICD-10-CM

## 2021-10-15 DIAGNOSIS — Z95.1 S/P CABG (CORONARY ARTERY BYPASS GRAFT): ICD-10-CM

## 2021-10-15 PROCEDURE — 93798 PHYS/QHP OP CAR RHAB W/ECG: CPT

## 2021-10-15 PROCEDURE — 97112 NEUROMUSCULAR REEDUCATION: CPT

## 2021-10-15 PROCEDURE — 97110 THERAPEUTIC EXERCISES: CPT

## 2021-10-19 ENCOUNTER — APPOINTMENT (OUTPATIENT)
Dept: PHYSICAL THERAPY | Facility: CLINIC | Age: 66
End: 2021-10-19
Payer: COMMERCIAL

## 2021-10-19 ENCOUNTER — OFFICE VISIT (OUTPATIENT)
Dept: PHYSICAL THERAPY | Facility: CLINIC | Age: 66
End: 2021-10-19
Payer: COMMERCIAL

## 2021-10-19 ENCOUNTER — CLINICAL SUPPORT (OUTPATIENT)
Dept: CARDIAC REHAB | Facility: CLINIC | Age: 66
End: 2021-10-19
Payer: COMMERCIAL

## 2021-10-19 DIAGNOSIS — M76.61 ACHILLES TENDINITIS OF RIGHT LOWER EXTREMITY: Primary | ICD-10-CM

## 2021-10-19 DIAGNOSIS — Z95.1 S/P CABG (CORONARY ARTERY BYPASS GRAFT): ICD-10-CM

## 2021-10-19 PROCEDURE — 97110 THERAPEUTIC EXERCISES: CPT

## 2021-10-19 PROCEDURE — 97140 MANUAL THERAPY 1/> REGIONS: CPT

## 2021-10-19 PROCEDURE — 93798 PHYS/QHP OP CAR RHAB W/ECG: CPT

## 2021-10-21 ENCOUNTER — CLINICAL SUPPORT (OUTPATIENT)
Dept: CARDIAC REHAB | Facility: CLINIC | Age: 66
End: 2021-10-21
Payer: COMMERCIAL

## 2021-10-21 DIAGNOSIS — Z95.1 S/P CABG (CORONARY ARTERY BYPASS GRAFT): ICD-10-CM

## 2021-10-21 PROCEDURE — 93798 PHYS/QHP OP CAR RHAB W/ECG: CPT

## 2021-10-22 ENCOUNTER — OFFICE VISIT (OUTPATIENT)
Dept: PHYSICAL THERAPY | Facility: CLINIC | Age: 66
End: 2021-10-22
Payer: COMMERCIAL

## 2021-10-22 ENCOUNTER — CLINICAL SUPPORT (OUTPATIENT)
Dept: CARDIAC REHAB | Facility: CLINIC | Age: 66
End: 2021-10-22
Payer: COMMERCIAL

## 2021-10-22 DIAGNOSIS — M76.61 ACHILLES TENDINITIS OF RIGHT LOWER EXTREMITY: Primary | ICD-10-CM

## 2021-10-22 DIAGNOSIS — Z95.1 S/P CABG (CORONARY ARTERY BYPASS GRAFT): ICD-10-CM

## 2021-10-22 PROCEDURE — 97112 NEUROMUSCULAR REEDUCATION: CPT

## 2021-10-22 PROCEDURE — 93798 PHYS/QHP OP CAR RHAB W/ECG: CPT

## 2021-10-22 PROCEDURE — 97110 THERAPEUTIC EXERCISES: CPT

## 2021-10-22 PROCEDURE — 97140 MANUAL THERAPY 1/> REGIONS: CPT

## 2021-10-26 ENCOUNTER — CLINICAL SUPPORT (OUTPATIENT)
Dept: CARDIAC REHAB | Facility: CLINIC | Age: 66
End: 2021-10-26
Payer: COMMERCIAL

## 2021-10-26 ENCOUNTER — OFFICE VISIT (OUTPATIENT)
Dept: PHYSICAL THERAPY | Facility: CLINIC | Age: 66
End: 2021-10-26
Payer: COMMERCIAL

## 2021-10-26 DIAGNOSIS — M76.61 ACHILLES TENDINITIS OF RIGHT LOWER EXTREMITY: Primary | ICD-10-CM

## 2021-10-26 DIAGNOSIS — Z95.1 S/P CABG (CORONARY ARTERY BYPASS GRAFT): ICD-10-CM

## 2021-10-26 PROCEDURE — 97112 NEUROMUSCULAR REEDUCATION: CPT

## 2021-10-26 PROCEDURE — 93798 PHYS/QHP OP CAR RHAB W/ECG: CPT

## 2021-10-26 PROCEDURE — 97110 THERAPEUTIC EXERCISES: CPT

## 2021-10-26 PROCEDURE — 97140 MANUAL THERAPY 1/> REGIONS: CPT

## 2021-10-28 ENCOUNTER — CLINICAL SUPPORT (OUTPATIENT)
Dept: CARDIAC REHAB | Facility: CLINIC | Age: 66
End: 2021-10-28
Payer: COMMERCIAL

## 2021-10-28 DIAGNOSIS — Z95.1 S/P CABG (CORONARY ARTERY BYPASS GRAFT): ICD-10-CM

## 2021-10-28 PROCEDURE — 93798 PHYS/QHP OP CAR RHAB W/ECG: CPT

## 2021-10-29 ENCOUNTER — CLINICAL SUPPORT (OUTPATIENT)
Dept: CARDIAC REHAB | Facility: CLINIC | Age: 66
End: 2021-10-29
Payer: COMMERCIAL

## 2021-10-29 ENCOUNTER — OFFICE VISIT (OUTPATIENT)
Dept: PHYSICAL THERAPY | Facility: CLINIC | Age: 66
End: 2021-10-29
Payer: COMMERCIAL

## 2021-10-29 DIAGNOSIS — Z95.1 S/P CABG (CORONARY ARTERY BYPASS GRAFT): ICD-10-CM

## 2021-10-29 DIAGNOSIS — M76.61 ACHILLES TENDINITIS OF RIGHT LOWER EXTREMITY: Primary | ICD-10-CM

## 2021-10-29 PROCEDURE — 93798 PHYS/QHP OP CAR RHAB W/ECG: CPT

## 2021-10-29 PROCEDURE — 97110 THERAPEUTIC EXERCISES: CPT

## 2021-10-29 PROCEDURE — 97112 NEUROMUSCULAR REEDUCATION: CPT

## 2021-10-29 PROCEDURE — 97140 MANUAL THERAPY 1/> REGIONS: CPT

## 2021-11-02 ENCOUNTER — OFFICE VISIT (OUTPATIENT)
Dept: PHYSICAL THERAPY | Facility: CLINIC | Age: 66
End: 2021-11-02
Payer: COMMERCIAL

## 2021-11-02 ENCOUNTER — CLINICAL SUPPORT (OUTPATIENT)
Dept: CARDIAC REHAB | Facility: CLINIC | Age: 66
End: 2021-11-02
Payer: COMMERCIAL

## 2021-11-02 DIAGNOSIS — Z95.1 S/P CABG (CORONARY ARTERY BYPASS GRAFT): ICD-10-CM

## 2021-11-02 DIAGNOSIS — M76.61 ACHILLES TENDINITIS OF RIGHT LOWER EXTREMITY: Primary | ICD-10-CM

## 2021-11-02 PROCEDURE — 97110 THERAPEUTIC EXERCISES: CPT

## 2021-11-02 PROCEDURE — 93798 PHYS/QHP OP CAR RHAB W/ECG: CPT

## 2021-11-02 PROCEDURE — 97140 MANUAL THERAPY 1/> REGIONS: CPT

## 2021-11-02 PROCEDURE — 97112 NEUROMUSCULAR REEDUCATION: CPT

## 2021-11-04 ENCOUNTER — CLINICAL SUPPORT (OUTPATIENT)
Dept: CARDIAC REHAB | Facility: CLINIC | Age: 66
End: 2021-11-04
Payer: COMMERCIAL

## 2021-11-04 DIAGNOSIS — Z95.1 S/P CABG (CORONARY ARTERY BYPASS GRAFT): ICD-10-CM

## 2021-11-04 PROCEDURE — 93798 PHYS/QHP OP CAR RHAB W/ECG: CPT

## 2021-11-05 ENCOUNTER — CLINICAL SUPPORT (OUTPATIENT)
Dept: CARDIAC REHAB | Facility: CLINIC | Age: 66
End: 2021-11-05
Payer: COMMERCIAL

## 2021-11-05 ENCOUNTER — EVALUATION (OUTPATIENT)
Dept: PHYSICAL THERAPY | Facility: CLINIC | Age: 66
End: 2021-11-05
Payer: COMMERCIAL

## 2021-11-05 DIAGNOSIS — M76.61 ACHILLES TENDINITIS OF RIGHT LOWER EXTREMITY: Primary | ICD-10-CM

## 2021-11-05 DIAGNOSIS — Z95.1 S/P CABG (CORONARY ARTERY BYPASS GRAFT): ICD-10-CM

## 2021-11-05 PROCEDURE — 93798 PHYS/QHP OP CAR RHAB W/ECG: CPT

## 2021-11-05 PROCEDURE — 97112 NEUROMUSCULAR REEDUCATION: CPT | Performed by: PHYSICAL THERAPIST

## 2021-11-05 PROCEDURE — 97140 MANUAL THERAPY 1/> REGIONS: CPT | Performed by: PHYSICAL THERAPIST

## 2021-11-05 PROCEDURE — 97110 THERAPEUTIC EXERCISES: CPT | Performed by: PHYSICAL THERAPIST

## 2021-11-09 ENCOUNTER — OFFICE VISIT (OUTPATIENT)
Dept: PHYSICAL THERAPY | Facility: CLINIC | Age: 66
End: 2021-11-09
Payer: COMMERCIAL

## 2021-11-09 ENCOUNTER — CLINICAL SUPPORT (OUTPATIENT)
Dept: CARDIAC REHAB | Facility: CLINIC | Age: 66
End: 2021-11-09
Payer: COMMERCIAL

## 2021-11-09 DIAGNOSIS — M76.61 ACHILLES TENDINITIS OF RIGHT LOWER EXTREMITY: Primary | ICD-10-CM

## 2021-11-09 DIAGNOSIS — Z95.1 S/P CABG (CORONARY ARTERY BYPASS GRAFT): ICD-10-CM

## 2021-11-09 PROCEDURE — 97110 THERAPEUTIC EXERCISES: CPT

## 2021-11-09 PROCEDURE — 97140 MANUAL THERAPY 1/> REGIONS: CPT

## 2021-11-09 PROCEDURE — 93798 PHYS/QHP OP CAR RHAB W/ECG: CPT

## 2021-11-09 PROCEDURE — 97112 NEUROMUSCULAR REEDUCATION: CPT

## 2021-11-10 ENCOUNTER — TELEHEALTH PROVIDED OTHER THAN IN PATIENT'S HOME (OUTPATIENT)
Dept: URBAN - METROPOLITAN AREA TELEHEALTH 3 | Facility: TELEHEALTH | Age: 66
End: 2021-11-10

## 2021-11-10 VITALS — WEIGHT: 185 LBS | HEIGHT: 71 IN

## 2021-11-10 DIAGNOSIS — R12 HEARTBURN: ICD-10-CM

## 2021-11-10 DIAGNOSIS — Z86.010 PERSONAL HISTORY OF COLONIC POLYPS: ICD-10-CM

## 2021-11-10 DIAGNOSIS — F10.120 ALCOHOL ABUSE WITH INTOXICATION, UNCOMPLICATED: ICD-10-CM

## 2021-11-10 DIAGNOSIS — K20.90 ESOPHAGITIS, UNSPECIFIED WITHOUT BLEEDING: ICD-10-CM

## 2021-11-10 DIAGNOSIS — F17.200 NICOTINE DEPENDENCE, UNSPECIFIED, UNCOMPLICATED: ICD-10-CM

## 2021-11-10 DIAGNOSIS — Z95.0 PRESENCE OF CARDIAC PACEMAKER: ICD-10-CM

## 2021-11-10 PROCEDURE — 99204 OFFICE O/P NEW MOD 45 MIN: CPT | Mod: 95 | Performed by: PHYSICIAN ASSISTANT

## 2021-11-10 NOTE — SERVICEHPINOTES
PATIENT VERIFIED BY DATE OF BIRTH AND NAME. Patient has been consented for this telecommunication visit.   EGD/Colonoscopy 11/2017 showed severe esophagitis upon EGD recommended to repeat EGD in 2 months time bx showed ulcer tissue, possible Tierney's esophagus, colonoscopy revealed TA polyp tissue in the stalk with clear margins, it was recommended to have a f/u flex sig in six months to look at this site, and if ok, repeat colonoscopy in 3 yrs--pt did not return for either f/u test until now.  He denies any issues with his BMs easy to pass and regular no blood in the stool or per rectum. He has heartburn pretty regularly  3 x per week. He has just been taking Tums. He eats well w/o dysphagia, early satiety, weight loss, stomach pain. Stools are brown, no black stool. No regular NSAID use. He smokes 1 ppd drinks about 6 beers per day. He has sinus node dysfunction follows with Dr. Weiner. No chest pain or SOB. No other GI related complaints today.

## 2021-11-11 ENCOUNTER — CLINICAL SUPPORT (OUTPATIENT)
Dept: CARDIAC REHAB | Facility: CLINIC | Age: 66
End: 2021-11-11
Payer: COMMERCIAL

## 2021-11-11 DIAGNOSIS — Z95.1 S/P CABG (CORONARY ARTERY BYPASS GRAFT): ICD-10-CM

## 2021-11-11 PROCEDURE — 93798 PHYS/QHP OP CAR RHAB W/ECG: CPT

## 2021-11-12 ENCOUNTER — OFFICE VISIT (OUTPATIENT)
Dept: PHYSICAL THERAPY | Facility: CLINIC | Age: 66
End: 2021-11-12
Payer: COMMERCIAL

## 2021-11-12 ENCOUNTER — CLINICAL SUPPORT (OUTPATIENT)
Dept: CARDIAC REHAB | Facility: CLINIC | Age: 66
End: 2021-11-12
Payer: COMMERCIAL

## 2021-11-12 DIAGNOSIS — Z95.1 S/P CABG (CORONARY ARTERY BYPASS GRAFT): ICD-10-CM

## 2021-11-12 DIAGNOSIS — M76.61 ACHILLES TENDINITIS OF RIGHT LOWER EXTREMITY: Primary | ICD-10-CM

## 2021-11-12 PROCEDURE — 97110 THERAPEUTIC EXERCISES: CPT | Performed by: PHYSICAL THERAPIST

## 2021-11-12 PROCEDURE — 97112 NEUROMUSCULAR REEDUCATION: CPT | Performed by: PHYSICAL THERAPIST

## 2021-11-12 PROCEDURE — 93798 PHYS/QHP OP CAR RHAB W/ECG: CPT

## 2021-11-12 PROCEDURE — 97140 MANUAL THERAPY 1/> REGIONS: CPT | Performed by: PHYSICAL THERAPIST

## 2021-11-16 ENCOUNTER — OFFICE VISIT (OUTPATIENT)
Dept: PHYSICAL THERAPY | Facility: CLINIC | Age: 66
End: 2021-11-16
Payer: COMMERCIAL

## 2021-11-16 ENCOUNTER — CLINICAL SUPPORT (OUTPATIENT)
Dept: CARDIAC REHAB | Facility: CLINIC | Age: 66
End: 2021-11-16
Payer: COMMERCIAL

## 2021-11-16 DIAGNOSIS — M76.61 ACHILLES TENDINITIS OF RIGHT LOWER EXTREMITY: Primary | ICD-10-CM

## 2021-11-16 DIAGNOSIS — Z95.1 S/P CABG (CORONARY ARTERY BYPASS GRAFT): ICD-10-CM

## 2021-11-16 PROCEDURE — 97140 MANUAL THERAPY 1/> REGIONS: CPT

## 2021-11-16 PROCEDURE — 97112 NEUROMUSCULAR REEDUCATION: CPT

## 2021-11-16 PROCEDURE — 93798 PHYS/QHP OP CAR RHAB W/ECG: CPT

## 2021-11-16 PROCEDURE — 97110 THERAPEUTIC EXERCISES: CPT

## 2021-11-18 ENCOUNTER — CLINICAL SUPPORT (OUTPATIENT)
Dept: CARDIAC REHAB | Facility: CLINIC | Age: 66
End: 2021-11-18
Payer: COMMERCIAL

## 2021-11-18 ENCOUNTER — EVALUATION (OUTPATIENT)
Dept: PHYSICAL THERAPY | Facility: CLINIC | Age: 66
End: 2021-11-18
Payer: COMMERCIAL

## 2021-11-18 DIAGNOSIS — M76.61 ACHILLES TENDINITIS OF RIGHT LOWER EXTREMITY: Primary | ICD-10-CM

## 2021-11-18 DIAGNOSIS — Z95.1 S/P CABG (CORONARY ARTERY BYPASS GRAFT): ICD-10-CM

## 2021-11-18 PROCEDURE — 93798 PHYS/QHP OP CAR RHAB W/ECG: CPT

## 2021-11-18 PROCEDURE — 97112 NEUROMUSCULAR REEDUCATION: CPT | Performed by: PHYSICAL THERAPIST

## 2021-11-18 PROCEDURE — 97140 MANUAL THERAPY 1/> REGIONS: CPT | Performed by: PHYSICAL THERAPIST

## 2021-11-18 PROCEDURE — 97110 THERAPEUTIC EXERCISES: CPT | Performed by: PHYSICAL THERAPIST

## 2021-11-19 ENCOUNTER — APPOINTMENT (OUTPATIENT)
Dept: PHYSICAL THERAPY | Facility: CLINIC | Age: 66
End: 2021-11-19
Payer: COMMERCIAL

## 2021-11-19 ENCOUNTER — CLINICAL SUPPORT (OUTPATIENT)
Dept: CARDIAC REHAB | Facility: CLINIC | Age: 66
End: 2021-11-19
Payer: COMMERCIAL

## 2021-11-19 DIAGNOSIS — Z95.1 S/P CABG (CORONARY ARTERY BYPASS GRAFT): ICD-10-CM

## 2021-11-19 PROCEDURE — 93798 PHYS/QHP OP CAR RHAB W/ECG: CPT

## 2021-11-23 ENCOUNTER — CLINICAL SUPPORT (OUTPATIENT)
Dept: CARDIAC REHAB | Facility: CLINIC | Age: 66
End: 2021-11-23
Payer: COMMERCIAL

## 2021-11-23 ENCOUNTER — OFFICE VISIT (OUTPATIENT)
Dept: PHYSICAL THERAPY | Facility: CLINIC | Age: 66
End: 2021-11-23
Payer: COMMERCIAL

## 2021-11-23 DIAGNOSIS — M76.61 ACHILLES TENDINITIS OF RIGHT LOWER EXTREMITY: Primary | ICD-10-CM

## 2021-11-23 DIAGNOSIS — Z95.1 S/P CABG (CORONARY ARTERY BYPASS GRAFT): ICD-10-CM

## 2021-11-23 PROCEDURE — 97110 THERAPEUTIC EXERCISES: CPT

## 2021-11-23 PROCEDURE — 93798 PHYS/QHP OP CAR RHAB W/ECG: CPT

## 2021-11-23 PROCEDURE — 97140 MANUAL THERAPY 1/> REGIONS: CPT

## 2021-11-23 PROCEDURE — 97112 NEUROMUSCULAR REEDUCATION: CPT

## 2021-11-25 ENCOUNTER — APPOINTMENT (OUTPATIENT)
Dept: CARDIAC REHAB | Facility: CLINIC | Age: 66
End: 2021-11-25
Payer: COMMERCIAL

## 2021-11-26 ENCOUNTER — APPOINTMENT (OUTPATIENT)
Dept: CARDIAC REHAB | Facility: CLINIC | Age: 66
End: 2021-11-26
Payer: COMMERCIAL

## 2021-11-26 ENCOUNTER — APPOINTMENT (OUTPATIENT)
Dept: PHYSICAL THERAPY | Facility: CLINIC | Age: 66
End: 2021-11-26
Payer: COMMERCIAL

## 2021-11-30 ENCOUNTER — APPOINTMENT (OUTPATIENT)
Dept: PHYSICAL THERAPY | Facility: CLINIC | Age: 66
End: 2021-11-30
Payer: COMMERCIAL

## 2021-11-30 ENCOUNTER — CLINICAL SUPPORT (OUTPATIENT)
Dept: CARDIAC REHAB | Facility: CLINIC | Age: 66
End: 2021-11-30
Payer: COMMERCIAL

## 2021-11-30 DIAGNOSIS — Z95.1 S/P CABG (CORONARY ARTERY BYPASS GRAFT): ICD-10-CM

## 2021-11-30 PROCEDURE — 93798 PHYS/QHP OP CAR RHAB W/ECG: CPT

## 2021-12-02 ENCOUNTER — CLINICAL SUPPORT (OUTPATIENT)
Dept: CARDIAC REHAB | Facility: CLINIC | Age: 66
End: 2021-12-02
Payer: COMMERCIAL

## 2021-12-02 DIAGNOSIS — Z95.1 S/P CABG (CORONARY ARTERY BYPASS GRAFT): ICD-10-CM

## 2021-12-02 PROCEDURE — 93798 PHYS/QHP OP CAR RHAB W/ECG: CPT

## 2021-12-03 ENCOUNTER — CLINICAL SUPPORT (OUTPATIENT)
Dept: CARDIAC REHAB | Facility: CLINIC | Age: 66
End: 2021-12-03
Payer: COMMERCIAL

## 2021-12-03 DIAGNOSIS — Z95.1 S/P CABG (CORONARY ARTERY BYPASS GRAFT): ICD-10-CM

## 2021-12-03 PROCEDURE — 93798 PHYS/QHP OP CAR RHAB W/ECG: CPT

## 2021-12-07 ENCOUNTER — CLINICAL SUPPORT (OUTPATIENT)
Dept: CARDIAC REHAB | Facility: CLINIC | Age: 66
End: 2021-12-07
Payer: COMMERCIAL

## 2021-12-07 ENCOUNTER — OFFICE (OUTPATIENT)
Dept: URBAN - METROPOLITAN AREA CLINIC 102 | Facility: CLINIC | Age: 66
End: 2021-12-07

## 2021-12-07 DIAGNOSIS — Z95.1 S/P CABG (CORONARY ARTERY BYPASS GRAFT): Primary | ICD-10-CM

## 2021-12-07 PROCEDURE — 93798 PHYS/QHP OP CAR RHAB W/ECG: CPT

## 2021-12-07 PROCEDURE — 00038: CPT | Performed by: INTERNAL MEDICINE

## 2021-12-09 ENCOUNTER — CLINICAL SUPPORT (OUTPATIENT)
Dept: CARDIAC REHAB | Facility: CLINIC | Age: 66
End: 2021-12-09
Payer: COMMERCIAL

## 2021-12-09 DIAGNOSIS — Z95.1 S/P CABG (CORONARY ARTERY BYPASS GRAFT): Primary | ICD-10-CM

## 2021-12-09 PROCEDURE — 93798 PHYS/QHP OP CAR RHAB W/ECG: CPT

## 2022-01-31 ENCOUNTER — ON CAMPUS - OUTPATIENT (OUTPATIENT)
Dept: URBAN - METROPOLITAN AREA HOSPITAL 16 | Facility: HOSPITAL | Age: 67
End: 2022-01-31
Payer: COMMERCIAL

## 2022-01-31 DIAGNOSIS — R12 HEARTBURN: ICD-10-CM

## 2022-01-31 DIAGNOSIS — K20.80 OTHER ESOPHAGITIS WITHOUT BLEEDING: ICD-10-CM

## 2022-01-31 DIAGNOSIS — K63.5 POLYP OF COLON: ICD-10-CM

## 2022-01-31 DIAGNOSIS — Z86.010 PERSONAL HISTORY OF COLONIC POLYPS: ICD-10-CM

## 2022-01-31 DIAGNOSIS — D12.2 BENIGN NEOPLASM OF ASCENDING COLON: ICD-10-CM

## 2022-01-31 PROCEDURE — 45380 COLONOSCOPY AND BIOPSY: CPT | Performed by: INTERNAL MEDICINE

## 2022-01-31 PROCEDURE — 43235 EGD DIAGNOSTIC BRUSH WASH: CPT | Performed by: INTERNAL MEDICINE

## 2022-02-10 ENCOUNTER — TELEPHONE (OUTPATIENT)
Dept: CARDIOLOGY CLINIC | Facility: CLINIC | Age: 67
End: 2022-02-10

## 2022-04-07 ENCOUNTER — OFFICE VISIT (OUTPATIENT)
Dept: CARDIOLOGY CLINIC | Facility: CLINIC | Age: 67
End: 2022-04-07
Payer: COMMERCIAL

## 2022-04-07 VITALS
OXYGEN SATURATION: 98 % | DIASTOLIC BLOOD PRESSURE: 74 MMHG | SYSTOLIC BLOOD PRESSURE: 122 MMHG | BODY MASS INDEX: 36.97 KG/M2 | HEIGHT: 70 IN | WEIGHT: 258.2 LBS | HEART RATE: 83 BPM

## 2022-04-07 DIAGNOSIS — I10 PRIMARY HYPERTENSION: ICD-10-CM

## 2022-04-07 DIAGNOSIS — I25.10 CORONARY ARTERY DISEASE, UNSPECIFIED VESSEL OR LESION TYPE, UNSPECIFIED WHETHER ANGINA PRESENT, UNSPECIFIED WHETHER NATIVE OR TRANSPLANTED HEART: Primary | ICD-10-CM

## 2022-04-07 DIAGNOSIS — Z95.1 S/P CABG (CORONARY ARTERY BYPASS GRAFT): ICD-10-CM

## 2022-04-07 DIAGNOSIS — E78.2 MIXED HYPERLIPIDEMIA: ICD-10-CM

## 2022-04-07 PROCEDURE — 99214 OFFICE O/P EST MOD 30 MIN: CPT | Performed by: INTERNAL MEDICINE

## 2022-04-07 RX ORDER — MELOXICAM 15 MG/1
TABLET ORAL
COMMUNITY

## 2022-04-07 RX ORDER — AMIODARONE HYDROCHLORIDE 200 MG/1
200 TABLET ORAL DAILY
COMMUNITY
Start: 2022-02-07

## 2022-04-07 NOTE — PROGRESS NOTES
Cardiology   Liudmila Hudson 61 y o  male MRN: [id number]        Impression:  1  CAD s/p CABG in setting of MI 7/21 - feels well  2  Hypertension - controlled  3  Dyslipidemia - on statin  4  Post operative atrial fibrillation - no further episodes  Off amiodarone      Recommendations:  1  Continue current medications  2  Check fasting lipid panel and complete metabolic profile  3  Follow up in 6 months  HPI: Liudmila Hudson is a 61y o  year old male with CAD s/p CABG x 3 7/21 in setting of MI, post-op PAF, hypertension, dyslipidemia, echo 7/21 EF 50% with trace to mild MRI, who presents for follow up  Had an exercise stress test 2/22 which demonstrated no ischemia  Review of Systems      Past Medical History:   Diagnosis Date    Diabetes mellitus ([address] )     History of COVID-19 11/2020    HLD (hyperlipidemia)     HTN (hypertension)      Past Surgical History:   Procedure Laterality Date    WI CABG, ARTERY-VEIN, THREE N/A 7/16/2021    Procedure: CORONARY ARTERY BYPASS GRAFT (CABG) 3 VESSELS, LIMA -LAD,EVH/GSV- PDA  AND OM;  Surgeon: Scar Valentino DO;  [facility];  Service: Cardiac Surgery    WI ECHO TRANSESOPHAG R-T 2D W/PRB IMG ACQUISJ I&R N/A 7/16/2021    Procedure: TRANSESOPHAGEAL ECHOCARDIOGRAM (GENNA);   Surgeon: Scar Valentino DO;  [facility];  Service: Cardiac Surgery    WI ENDOSCOPY W/VIDEO-ASST VEIN HARVEST,CABG N/A 7/16/2021    Procedure: HARVEST VEIN ENDOSCOPIC (EVH)/GSV;  Surgeon: Scar Valentino DO;  [facility];  Service: Cardiac Surgery     Social History     Substance and Sexual Activity   Alcohol Use Not Currently    Comment: has not been drinking since heart  p-op     Social History     Substance and Sexual Activity   Drug Use Never     Social History     Tobacco Use   Smoking Status Former Smoker    Types: Cigars   Smokeless Tobacco Never Used     Family History   Problem Relation Age of Onset    Leukemia Mother        Allergies:  No Known Allergies    Medications:     Current Outpatient Medications:     atorvastatin (LIPITOR) 80 mg tablet, Take 1 tablet (80 mg total) by mouth daily with dinner, Disp: 30 tablet, Rfl: 5    Empagliflozin (Jardiance) 10 MG TABS, Take 25 mg by mouth daily  , Disp: , Rfl:     insulin glargine (Lantus SoloStar) 100 units/mL injection pen, Inject 15 Units under the skin daily if Lantus solostar is not the preferred basal insulin for the patient's insurance company, please substitute with Genevive Reus flexpen, Basaglar Kwikpen , Levemir flexpen, [employer]  or equivalent insulin vials at the same dose instead , Disp: 4 5 mL, Rfl: 0    insulin lispro (HumaLOG KwikPen) 100 units/mL injection pen, Inject 6 Units under the skin 3 (three) times a day with meals if Humalog  Kwik pen is not the preferred mealtime insulin for the patient's insurance, please substitute with NovoLog flexpen, Fiasp  flextouch, Admelog solostar  or Apidra solostar pen or equivalent insulin vials at the same dose instead , Disp: 15 mL, Rfl: 0    metFORMIN (GLUCOPHAGE) 1000 MG tablet, Take 1,000 mg by mouth 2 (two) times a day with meals , Disp: , Rfl:     metoprolol tartrate (LOPRESSOR) 25 mg tablet, Take 1 tablet (25 mg total) by mouth every 12 (twelve) hours, Disp: 60 tablet, Rfl: 5    amiodarone 200 mg tablet, Take 200 mg by mouth daily (Patient not taking: Reported on 4/7/2022 ), Disp: , Rfl:     aspirin 325 mg tablet, Take 1 tablet (325 mg total) by mouth daily (Patient not taking: Reported on 4/7/2022 ), Disp: 30 tablet, Rfl: 0    docusate sodium (COLACE) 100 mg capsule, Take 1 capsule (100 mg total) by mouth 2 (two) times a day (Patient not taking: Reported on 4/7/2022 ), Disp: 60 capsule, Rfl: 0    meloxicam (MOBIC) 15 mg tablet, meloxicam 15 mg tablet  Take 1 tablet every day by oral route as directed for 30 days   (Patient not taking: Reported on 4/7/2022), Disp: , Rfl:     pantoprazole (PROTONIX) 40 mg tablet, Take 1 tablet (40 mg total) by mouth daily in the early morning (Patient not taking: Reported on 2022 ), Disp: 30 tablet, Rfl: 0      Wt Readings from Last 3 Encounters:   22 117 kg (258 lb 3 2 oz)   10/14/21 112 kg (248 lb)   21 111 kg (245 lb 9 6 oz)     Temp Readings from Last 3 Encounters:   21 98 1 °F (36 7 °C) (Tympanic)   21 97 5 °F (36 4 °C) (Oral)   21 98 3 °F (36 8 °C) (Oral)     BP Readings from Last 3 Encounters:   22 122/74   10/14/21 108/78   21 96/70     Pulse Readings from Last 3 Encounters:   22 83   10/14/21 64   21 64         Physical Exam      Laboratory Studies:  CMP:  Lab Results   Component Value Date    K 4 3 2021     2021    CO2 26 2021    BUN 22 2021    CREATININE 0 79 2021    GLUCOSE 231 (H) 2021    AST 14 (L) 2021    ALT 19 2021    EGFR 95 2021       Lipid Profile:   No results found for: CHOL  Lab Results   Component Value Date    HDL 44 2021     Lab Results   Component Value Date    LDLCALC 123 (H) 2021     Lab Results   Component Value Date    TRIG 215 8 (H) 2021       Cardiac testing:   EKG reviewed personally:   Results for orders placed during the hospital encounter of 21    Echo complete with contrast if indicated    Narrative  [facility] [address] [facility]  [phone]    Transthoracic Echocardiogram  2D, M-mode, Doppler, and Color Doppler    Study date:  2021    Patient: Bridget Mckee  MR number: [id number]  Account number: [de-identified]  : 1955  Age: 61 years  Gender: Male  Status: Outpatient  Location: Bedside  Height: 70 in  Weight: 246 lb  BP: 110/ 66 mmHg    Indications: NSTEMI    Diagnoses: I21 4 - Non-ST elevation (NSTEMI) myocardial infarction    Sonographer:  VLADISLAV Aguirre  Primary Physician:  Sherice Baker MD  Referring Physician:  Caitlyn Og PA-C  Group:  [facility]  Interpreting Physician:  Noy Felder MD    SUMMARY    LEFT VENTRICLE:  Size was normal   Systolic function was at the lower limits of normal  Ejection fraction was estimated to be 50 %  There was mild hypokinesis of the basal-mid inferior and inferolateral walls  Doppler parameters were consistent with abnormal left ventricular relaxation (grade 1 diastolic dysfunction)  RIGHT VENTRICLE:  The size was normal   Systolic function was normal     LEFT ATRIUM:  The atrium was mildly dilated  MITRAL VALVE:  There was trace to mild regurgitation  TRICUSPID VALVE:  There was trace regurgitation  HISTORY: PRIOR HISTORY: HTN, HLD, DM2    PROCEDURE: The procedure was performed at the bedside  This was a routine study  The transthoracic approach was used  The study included complete 2D imaging, M-mode, complete spectral Doppler, and color Doppler  The heart rate was 70 bpm,  at the start of the study  Images were obtained from the parasternal, apical, subcostal, and suprasternal notch acoustic windows  Echocardiographic views were limited due to lung interference  This was a technically difficult study  LEFT VENTRICLE: Size was normal  Systolic function was at the lower limits of normal  Ejection fraction was estimated to be 50 %  There was mild hypokinesis of the basal-mid inferior and inferolateral walls  Wall thickness was normal   DOPPLER: Doppler parameters were consistent with abnormal left ventricular relaxation (grade 1 diastolic dysfunction)  RIGHT VENTRICLE: The size was normal  Systolic function was normal  Wall thickness was normal     LEFT ATRIUM: The atrium was mildly dilated  RIGHT ATRIUM: Size was normal     MITRAL VALVE: Valve structure was normal  There was normal leaflet separation  DOPPLER: The transmitral velocity was within the normal range  There was no evidence for stenosis  There was trace to mild regurgitation  AORTIC VALVE: The valve was trileaflet   Leaflets exhibited mildly increased thickness, mild calcification, normal cuspal separation, and sclerosis  DOPPLER: Transaortic velocity was within the normal range  There was no evidence for  stenosis  There was no regurgitation  TRICUSPID VALVE: The valve structure was normal  There was normal leaflet separation  DOPPLER: The transtricuspid velocity was within the normal range  There was no evidence for stenosis  There was trace regurgitation  The tricuspid jet  envelope definition was inadequate for estimation of RV systolic pressure  There are no indirect findings suggestive of moderate or severe pulmonary hypertension  PULMONIC VALVE: Leaflets exhibited normal thickness, no calcification, and normal cuspal separation  DOPPLER: The transpulmonic velocity was within the normal range  There was no regurgitation  PERICARDIUM: There was no pericardial effusion  The pericardium was normal in appearance  AORTA: The root exhibited normal size  SYSTEMIC VEINS: IVC: The inferior vena cava was normal in size and course  Respirophasic changes were normal     SYSTEM MEASUREMENT TABLES    2D  %FS: 24 71 %  Ao Diam: 3 41 cm  EDV(Teich): 173 64 ml  EF(Teich): 48 23 %  ESV(Teich): 89 89 ml  HR_4Ch_Q: 66 42 BPM  IVSd: 1 14 cm  LA Area: 23 21 cm2  LA Diam: 3 7 cm  LVCO_4Ch_Q: 2 84 L/min  LVEF_4Ch_Q: 41 39 %  LVIDd: 5 91 cm  LVIDs: 4 45 cm  LVLd_4Ch_Q: 8 6 cm  LVLs_4Ch_Q: 7 4 cm  LVPWd: 0 97 cm  LVSV_4Ch_Q: 42 77 ml  LVVED_4Ch_Q: 103 33 ml  LVVES_4Ch_Q: 60 56 ml  RA Area: 18 36 cm2  RVIDd: 3 33 cm  SV(Teich): 83 75 ml    MM  TAPSE: 2 59 cm    PW  E' Sept: 0 08 m/s  E/E' Sept: 9 37  MV A Abiel: 0 89 m/s  MV Dec Piatt: 3 68 m/s2  MV DecT: 197 59 ms  MV E Abiel: 0 73 m/s  MV E/A Ratio: 0 82    [employer] Accredited Echocardiography Laboratory    Prepared and electronically signed by    Gustavo Alexander MD  Signed 08-Jul-2021 14:41:28    No results found for this or any previous visit      Results for orders placed during the hospital encounter of 21    Cardiac catheterization    Narrative  [address] [facility]  [phone]    [facility]    Invasive Cardiovascular Lab Complete Report    Patient: Lashell Madden  MR number: [id number]  Account number: [de-identified]  Study date: 2021  Gender: Male  : 1955  Height: 70 1 in  Weight: 246 4 lb  BSA: 2 28 mï¾²    Allergies: NO KNOWN ALLERGIES    Diagnostic Cardiologist:  Ruddy Cerrato MD  Primary Physician:  Luis Felipe Henderson MD    SUMMARY    CORONARY CIRCULATION:  Left main: Normal   LAD: The vessel was normal sized  There was a 70% stenosis in the proximal vessel  Circumflex: The vessel was normal sized and gave rise to one major OM branch  There was a 95% proximal stenosis of the large OM branch  RCA: The vessel was normal sized and dominant, giving rise to the PDA and several posterolateral branches  There was a complex eccentric lesion in mid vessel with thrombus  There is MEDINA 3 flow into the distal vessel, but MEDINA 1 flow into  the PDA  There was collateral flow to the PDA from the left system  REPORT ELEMENT SELECTION:  Right radial access  There were no complications  Summary:  Severe 3 vessel CAD  Plan: transfer to [facility] for surgical consultation  INDICATIONS:  --  Possible CAD: myocardial infarction without ST elevation (NSTEMI)  PROCEDURES PERFORMED    --  Left coronary angiography  --  Right coronary angiography  --  Inpatient  --  Mod Sedation Same Physician Initial 15min  --  Mod Sedation Same Physician Add 15min  --  Coronary Catheterization (w/o LHC)  PROCEDURE: The risks and alternatives of the procedures and conscious sedation were explained to the patient and informed consent was obtained  The patient was brought to the cath lab and placed on the table  The planned puncture sites  were prepped and draped in the usual sterile fashion  --  Right radial artery access   After performing an Jarret's test to verify adequate ulnar artery supply to the hand, the radial site was prepped  The puncture site was infiltrated with local anesthetic  The vessel was accessed using the  modified Seldinger technique, a wire was advanced into the vessel, and a sheath was advanced over the wire into the vessel  --  Left coronary artery angiography  A catheter was advanced over a guidewire into the aorta and positioned in the left coronary artery ostium under fluoroscopic guidance  Angiography was performed  --  Right coronary artery angiography  A catheter was advanced over a guidewire into the aorta and positioned in the right coronary artery ostium under fluoroscopic guidance  Angiography was performed  --  Inpatient  --  Mod Sedation Same Physician Initial 15min  --  Mod Sedation Same Physician Add 15min  --  Coronary Catheterization (w/o [facility])  PROCEDURE COMPLETION: The patient tolerated the procedure well and was discharged from the cath lab  TIMING: Test started at 15:17  Test concluded at 15:36  HEMOSTASIS: The sheath was removed  The site was compressed with a Hemoband  device  Hemostasis was obtained  MEDICATIONS GIVEN: Verapamil (Isoptin, Calan, Covera), 1 25 mg, IA, at 15:22  Fentanyl (1OOmcg/2 ml), 50 mcg, IV, at 15:05  Versed (2mg/2ml), 2 mg, IV, at 15:06  Fentanyl (1OOmcg/2 ml), 25 mcg, IV, at  15:17  Versed (2mg/2ml), 1 mg, IV, at 15:17  1% Lidocaine, 0 1 ml, subcutaneously, at 15:21  Nitroglycerin (200mcg/ml), 200 mcg, at 15:21  Heparin 1000 units/ml, 4,000 units, IV, at 15:22  CONTRAST GIVEN: 75 ml Omnipaque (350mg I /ml)  RADIATION EXPOSURE: Fluoroscopy time: 2 48 min  CORONARY VESSELS:   --  Left main: Normal   --  LAD: The vessel was normal sized  There was a 70% stenosis in the proximal vessel  --  Circumflex: The vessel was normal sized and gave rise to one major OM branch  There was a 95% proximal stenosis of the large OM branch    --  RCA: The vessel was normal sized and dominant, giving rise to the PDA and several posterolateral branches  There was a complex eccentric lesion in mid vessel with thrombus  There is MEDINA 3 flow into the distal vessel, but MEDINA 1 flow  into the PDA  There was collateral flow to the PDA from the left system  NOTE: Right radial access  There were no complications  Prepared and signed by    Rebekah Patel MD  Signed 2021 15:53:35    CC: Dr Melissa Huang    Study diagram    Hemodynamic tables    Pressures:  Baseline  Pressures:  - HR: 76  Pressures:  - Rhythm:  Pressures:  -- Aortic Pressure (S/D/M): 92/59/70    Outputs:  Baseline  Outputs:  -- CALCULATIONS[age]  Outputs:  -- CALCULATIONS: Body Surface Area: 2 28  Outputs:  -- CALCULATIONS: Height in cm: 178 00  Outputs:  -- CALCULATIONS: Sex: Male  Outputs:  -- CALCULATIONS: Weight in k 00  Outputs:  -- OUTPUTS: O2 consumption: 285 59  Outputs:  -- OUTPUTS: Vo2 Indexed: 125 00    No results found for this or any previous visit

## 2022-04-25 ENCOUNTER — HOSPITAL ENCOUNTER (OUTPATIENT)
Dept: PULMONOLOGY | Facility: HOSPITAL | Age: 67
Discharge: HOME | End: 2022-04-25
Attending: INTERNAL MEDICINE
Payer: COMMERCIAL

## 2022-04-25 DIAGNOSIS — R06.00 DYSPNEA, UNSPECIFIED TYPE: ICD-10-CM

## 2022-04-25 PROCEDURE — 94726 PLETHYSMOGRAPHY LUNG VOLUMES: CPT | Performed by: INTERNAL MEDICINE

## 2022-04-25 PROCEDURE — 94760 N-INVAS EAR/PLS OXIMETRY 1: CPT

## 2022-04-25 PROCEDURE — 94060 EVALUATION OF WHEEZING: CPT | Performed by: INTERNAL MEDICINE

## 2022-04-25 PROCEDURE — 94726 PLETHYSMOGRAPHY LUNG VOLUMES: CPT

## 2022-04-25 PROCEDURE — 94060 EVALUATION OF WHEEZING: CPT

## 2022-04-25 PROCEDURE — 94729 DIFFUSING CAPACITY: CPT

## 2022-04-25 PROCEDURE — 94729 DIFFUSING CAPACITY: CPT | Performed by: INTERNAL MEDICINE

## 2022-04-25 RX ORDER — ALBUTEROL SULFATE 2.5 MG/3ML
2.5 SOLUTION RESPIRATORY (INHALATION) ONCE
Status: COMPLETED | OUTPATIENT
Start: 2022-04-25 | End: 2022-04-25

## 2022-04-25 RX ADMIN — ALBUTEROL SULFATE 2.5 MG: 2.5 SOLUTION RESPIRATORY (INHALATION) at 09:00

## 2022-10-26 ENCOUNTER — HOSPITAL ENCOUNTER (EMERGENCY)
Facility: HOSPITAL | Age: 67
Discharge: HOME | End: 2022-10-27
Attending: EMERGENCY MEDICINE
Payer: COMMERCIAL

## 2022-10-26 ENCOUNTER — APPOINTMENT (EMERGENCY)
Dept: RADIOLOGY | Facility: HOSPITAL | Age: 67
End: 2022-10-26
Payer: COMMERCIAL

## 2022-10-26 DIAGNOSIS — R07.9 CHEST PAIN, UNSPECIFIED TYPE: Primary | ICD-10-CM

## 2022-10-26 DIAGNOSIS — R06.02 SHORTNESS OF BREATH: ICD-10-CM

## 2022-10-26 LAB
ALBUMIN SERPL BCP-MCNC: 4.2 G/DL (ref 3.5–5)
ALP SERPL-CCNC: 71 U/L (ref 34–104)
ALT SERPL W P-5'-P-CCNC: 24 U/L (ref 7–52)
ANION GAP SERPL CALCULATED.3IONS-SCNC: 7 MMOL/L (ref 4–13)
AST SERPL W P-5'-P-CCNC: 16 U/L (ref 13–39)
BASOPHILS # BLD AUTO: 0.04 THOUSANDS/ÂΜL (ref 0–0.1)
BASOPHILS NFR BLD AUTO: 1 % (ref 0–1)
BILIRUB SERPL-MCNC: 0.58 MG/DL (ref 0.2–1)
BUN SERPL-MCNC: 19 MG/DL (ref 5–25)
CALCIUM SERPL-MCNC: 9.2 MG/DL (ref 8.4–10.2)
CHLORIDE SERPL-SCNC: 104 MMOL/L (ref 96–108)
CO2 SERPL-SCNC: 26 MMOL/L (ref 21–32)
CREAT SERPL-MCNC: 0.79 MG/DL (ref 0.6–1.3)
EOSINOPHIL # BLD AUTO: 0.21 THOUSAND/ÂΜL (ref 0–0.61)
EOSINOPHIL NFR BLD AUTO: 3 % (ref 0–6)
ERYTHROCYTE [DISTWIDTH] IN BLOOD BY AUTOMATED COUNT: 12.4 % (ref 11.6–15.1)
GFR SERPL CREATININE-BSD FRML MDRD: 94 ML/MIN/1.73SQ M
GLUCOSE SERPL-MCNC: 165 MG/DL (ref 65–140)
HCT VFR BLD AUTO: 48.3 % (ref 36.5–49.3)
HGB BLD-MCNC: 16.1 G/DL (ref 12–17)
IMM GRANULOCYTES # BLD AUTO: 0.03 THOUSAND/UL (ref 0–0.2)
IMM GRANULOCYTES NFR BLD AUTO: 0 % (ref 0–2)
LYMPHOCYTES # BLD AUTO: 2.15 THOUSANDS/ÂΜL (ref 0.6–4.47)
LYMPHOCYTES NFR BLD AUTO: 26 % (ref 14–44)
MCH RBC QN AUTO: 32.6 PG (ref 26.8–34.3)
MCHC RBC AUTO-ENTMCNC: 33.3 G/DL (ref 31.4–37.4)
MCV RBC AUTO: 98 FL (ref 82–98)
MONOCYTES # BLD AUTO: 0.9 THOUSAND/ÂΜL (ref 0.17–1.22)
MONOCYTES NFR BLD AUTO: 11 % (ref 4–12)
NEUTROPHILS # BLD AUTO: 5.11 THOUSANDS/ÂΜL (ref 1.85–7.62)
NEUTS SEG NFR BLD AUTO: 59 % (ref 43–75)
NRBC BLD AUTO-RTO: 0 /100 WBCS
PLATELET # BLD AUTO: 228 THOUSANDS/UL (ref 149–390)
PMV BLD AUTO: 9.6 FL (ref 8.9–12.7)
POTASSIUM SERPL-SCNC: 3.8 MMOL/L (ref 3.5–5.3)
PROT SERPL-MCNC: 7.1 G/DL (ref 6.4–8.4)
RBC # BLD AUTO: 4.94 MILLION/UL (ref 3.88–5.62)
SODIUM SERPL-SCNC: 137 MMOL/L (ref 135–147)
WBC # BLD AUTO: 8.44 THOUSAND/UL (ref 4.31–10.16)

## 2022-10-26 PROCEDURE — 36415 COLL VENOUS BLD VENIPUNCTURE: CPT

## 2022-10-26 PROCEDURE — 84484 ASSAY OF TROPONIN QUANT: CPT | Performed by: EMERGENCY MEDICINE

## 2022-10-26 PROCEDURE — 85025 COMPLETE CBC W/AUTO DIFF WBC: CPT | Performed by: EMERGENCY MEDICINE

## 2022-10-26 PROCEDURE — 80053 COMPREHEN METABOLIC PANEL: CPT | Performed by: EMERGENCY MEDICINE

## 2022-10-26 PROCEDURE — 71045 X-RAY EXAM CHEST 1 VIEW: CPT

## 2022-10-26 PROCEDURE — 93005 ELECTROCARDIOGRAM TRACING: CPT

## 2022-10-27 ENCOUNTER — APPOINTMENT (EMERGENCY)
Dept: CT IMAGING | Facility: HOSPITAL | Age: 67
End: 2022-10-27
Payer: COMMERCIAL

## 2022-10-27 VITALS
RESPIRATION RATE: 14 BRPM | SYSTOLIC BLOOD PRESSURE: 119 MMHG | OXYGEN SATURATION: 95 % | BODY MASS INDEX: 37.01 KG/M2 | DIASTOLIC BLOOD PRESSURE: 61 MMHG | WEIGHT: 257.94 LBS | TEMPERATURE: 98 F | HEART RATE: 68 BPM

## 2022-10-27 LAB
2HR DELTA HS TROPONIN: -1 NG/L
4HR DELTA HS TROPONIN: -1 NG/L
APTT PPP: 25 SECONDS (ref 23–37)
ATRIAL RATE: 65 BPM
ATRIAL RATE: 73 BPM
BNP SERPL-MCNC: 19 PG/ML (ref 0–100)
CARDIAC TROPONIN I PNL SERPL HS: 3 NG/L
CARDIAC TROPONIN I PNL SERPL HS: 3 NG/L
CARDIAC TROPONIN I PNL SERPL HS: 4 NG/L
D DIMER PPP FEU-MCNC: 0.31 UG/ML FEU
FLUAV RNA RESP QL NAA+PROBE: NEGATIVE
FLUBV RNA RESP QL NAA+PROBE: NEGATIVE
INR PPP: 0.96 (ref 0.84–1.19)
P AXIS: 40 DEGREES
P AXIS: 58 DEGREES
PR INTERVAL: 160 MS
PR INTERVAL: 170 MS
PROTHROMBIN TIME: 13 SECONDS (ref 11.6–14.5)
QRS AXIS: -59 DEGREES
QRS AXIS: -67 DEGREES
QRSD INTERVAL: 102 MS
QRSD INTERVAL: 94 MS
QT INTERVAL: 388 MS
QT INTERVAL: 440 MS
QTC INTERVAL: 427 MS
QTC INTERVAL: 457 MS
RSV RNA RESP QL NAA+PROBE: NEGATIVE
SARS-COV-2 RNA RESP QL NAA+PROBE: NEGATIVE
T WAVE AXIS: 32 DEGREES
T WAVE AXIS: 46 DEGREES
TSH SERPL DL<=0.05 MIU/L-ACNC: 4.12 UIU/ML (ref 0.45–4.5)
VENTRICULAR RATE: 65 BPM
VENTRICULAR RATE: 73 BPM

## 2022-10-27 PROCEDURE — 74174 CTA ABD&PLVS W/CONTRAST: CPT

## 2022-10-27 PROCEDURE — G1004 CDSM NDSC: HCPCS

## 2022-10-27 PROCEDURE — 85379 FIBRIN DEGRADATION QUANT: CPT | Performed by: PHYSICIAN ASSISTANT

## 2022-10-27 PROCEDURE — 93010 ELECTROCARDIOGRAM REPORT: CPT | Performed by: INTERNAL MEDICINE

## 2022-10-27 PROCEDURE — 85610 PROTHROMBIN TIME: CPT | Performed by: PHYSICIAN ASSISTANT

## 2022-10-27 PROCEDURE — 99285 EMERGENCY DEPT VISIT HI MDM: CPT | Performed by: PHYSICIAN ASSISTANT

## 2022-10-27 PROCEDURE — 84484 ASSAY OF TROPONIN QUANT: CPT | Performed by: EMERGENCY MEDICINE

## 2022-10-27 PROCEDURE — 71275 CT ANGIOGRAPHY CHEST: CPT

## 2022-10-27 PROCEDURE — 85730 THROMBOPLASTIN TIME PARTIAL: CPT | Performed by: PHYSICIAN ASSISTANT

## 2022-10-27 PROCEDURE — 36415 COLL VENOUS BLD VENIPUNCTURE: CPT | Performed by: PHYSICIAN ASSISTANT

## 2022-10-27 PROCEDURE — 83880 ASSAY OF NATRIURETIC PEPTIDE: CPT | Performed by: PHYSICIAN ASSISTANT

## 2022-10-27 PROCEDURE — 0241U HB NFCT DS VIR RESP RNA 4 TRGT: CPT | Performed by: PHYSICIAN ASSISTANT

## 2022-10-27 PROCEDURE — 84443 ASSAY THYROID STIM HORMONE: CPT | Performed by: PHYSICIAN ASSISTANT

## 2022-10-27 PROCEDURE — 93005 ELECTROCARDIOGRAM TRACING: CPT

## 2022-10-27 RX ADMIN — IOHEXOL 100 ML: 350 INJECTION, SOLUTION INTRAVENOUS at 03:05

## 2022-10-27 NOTE — ED PROVIDER NOTES
History  Chief Complaint   Patient presents with   • Chest Pain     Pt reports worsening SOB over the past week with new midsternum chest pain tonight   (-)N/V  Hx triple bypass     Patient is a 74 YOM presenting to the ER for evaluation  He states over the last month he has had intermittent shortness of breath  States it is worse when he is laying down  Patient states tonight he developed a mild substernal chest pressure that he described as a "soreness " States it is constant  It does not radiate  Nothing makes the pain better or worse  No associated symptoms  He denies fever, chills, abdominal pain, nausea, vomiting, sweats, or any other symptoms at this time  History provided by:  Patient   used: No        Prior to Admission Medications   Prescriptions Last Dose Informant Patient Reported? Taking? Empagliflozin (Jardiance) 10 MG TABS  Self Yes No   Sig: Take 25 mg by mouth daily     amiodarone 200 mg tablet  Self Yes No   Sig: Take 200 mg by mouth daily   Patient not taking: Reported on 4/7/2022    aspirin 325 mg tablet  Self No No   Sig: Take 1 tablet (325 mg total) by mouth daily   Patient not taking: Reported on 4/7/2022    atorvastatin (LIPITOR) 80 mg tablet  Self No No   Sig: Take 1 tablet (80 mg total) by mouth daily with dinner   docusate sodium (COLACE) 100 mg capsule  Self No No   Sig: Take 1 capsule (100 mg total) by mouth 2 (two) times a day   Patient not taking: Reported on 4/7/2022    insulin glargine (Lantus SoloStar) 100 units/mL injection pen  Self No No   Sig: Inject 15 Units under the skin daily if Lantus solostar is not the preferred basal insulin for the patient's insurance company, please substitute with Carol Odell flexpen, Basaglar Kwikpen , Levemir flexpen, Toujeo solostar pen  or equivalent insulin vials at the same dose instead     insulin lispro (HumaLOG KwikPen) 100 units/mL injection pen  Self No No   Sig: Inject 6 Units under the skin 3 (three) times a day with meals if Humalog  Kwik pen is not the preferred mealtime insulin for the patient's insurance, please substitute with NovoLog flexpen, Fiasp  flextouch, Admelog solostar  or Apidra solostar pen or equivalent insulin vials at the same dose instead  meloxicam (MOBIC) 15 mg tablet  Self Yes No   Sig: meloxicam 15 mg tablet   Take 1 tablet every day by oral route as directed for 30 days  Patient not taking: Reported on 4/7/2022   metFORMIN (GLUCOPHAGE) 1000 MG tablet  Self Yes No   Sig: Take 1,000 mg by mouth 2 (two) times a day with meals    metoprolol tartrate (LOPRESSOR) 25 mg tablet  Self No No   Sig: Take 1 tablet (25 mg total) by mouth every 12 (twelve) hours   pantoprazole (PROTONIX) 40 mg tablet  Self No No   Sig: Take 1 tablet (40 mg total) by mouth daily in the early morning   Patient not taking: Reported on 4/7/2022       Facility-Administered Medications: None       Past Medical History:   Diagnosis Date   • Diabetes mellitus ([facility] [address] )    • History of COVID-19 11/2020   • HLD (hyperlipidemia)    • HTN (hypertension)        Past Surgical History:   Procedure Laterality Date   • MD CABG, ARTERY-VEIN, THREE N/A 7/16/2021    Procedure: CORONARY ARTERY BYPASS GRAFT (CABG) 3 VESSELS, LIMA -LAD,EVH/GSV- PDA  AND OM;  Surgeon: Randolph Barber DO;  [facility]: [facility];  Service: Cardiac Surgery   • MD ECHO TRANSESOPHAG R-T 2D W/PRB IMG ACQUISJ I&R N/A 7/16/2021    Procedure: TRANSESOPHAGEAL ECHOCARDIOGRAM (GENNA); Surgeon: Randolph Barber DO;  Location: [facility];  Service: Cardiac Surgery   • MD ENDOSCOPY W/VIDEO-ASST VEIN HARVEST,CABG N/A 7/16/2021    Procedure: HARVEST VEIN ENDOSCOPIC (EVH)/GSV;  Surgeon: Randolph Barber DO;  Location[facility];  Service: Cardiac Surgery       Family History   Problem Relation Age of Onset   • Leukemia Mother      I have reviewed and agree with the history as documented      E-Cigarette/Vaping   • E-Cigarette Use Never User      E-Cigarette/Vaping Substances   • Nicotine No    • THC No    • CBD No    • Flavoring No    • Other No    • Unknown No      Social History     Tobacco Use   • Smoking status: Former Smoker     Types: Cigars   • Smokeless tobacco: Never Used   Vaping Use   • Vaping Use: Never used   Substance Use Topics   • Alcohol use: Not Currently     Comment: has not been drinking since heart  p-op   • Drug use: Never       Review of Systems   Constitutional: Negative for chills and fever  HENT: Negative for ear pain and sore throat  Eyes: Negative for pain and visual disturbance  Respiratory: Positive for shortness of breath  Negative for cough  Cardiovascular: Positive for chest pain  Negative for palpitations  Gastrointestinal: Negative for abdominal pain, diarrhea, nausea and vomiting  Genitourinary: Negative for dysuria and hematuria  Musculoskeletal: Negative for arthralgias and back pain  Skin: Negative for color change and rash  Neurological: Negative for seizures and syncope  All other systems reviewed and are negative  Physical Exam  Physical Exam  Vitals and nursing note reviewed  Constitutional:       General: He is not in acute distress  Appearance: He is well-developed  He is obese  He is not ill-appearing or diaphoretic  HENT:      Head: Normocephalic and atraumatic  Eyes:      Conjunctiva/sclera: Conjunctivae normal    Cardiovascular:      Rate and Rhythm: Normal rate and regular rhythm  Pulses: Normal pulses  Heart sounds: No murmur heard  Pulmonary:      Effort: Pulmonary effort is normal  No respiratory distress  Breath sounds: Normal breath sounds  Abdominal:      General: Abdomen is flat  There is no distension  Palpations: Abdomen is soft  Tenderness: There is no abdominal tenderness  There is no right CVA tenderness, left CVA tenderness or guarding  Musculoskeletal:      Cervical back: Neck supple  Right lower leg: Edema (trace) present        Left lower leg: Edema (trace) present  Skin:     General: Skin is warm and dry  Capillary Refill: Capillary refill takes less than 2 seconds  Neurological:      General: No focal deficit present  Mental Status: He is alert  Vital Signs  ED Triage Vitals   Temperature Pulse Respirations Blood Pressure SpO2   10/26/22 2230 10/26/22 2228 10/26/22 2228 10/26/22 2228 10/26/22 2228   98 1 °F (36 7 °C) 76 19 145/84 97 %      Temp Source Heart Rate Source Patient Position - Orthostatic VS BP Location FiO2 (%)   10/26/22 2230 10/26/22 2228 10/26/22 2231 10/26/22 2231 --   Oral Monitor Sitting Right arm       Pain Score       10/27/22 0057       1           Vitals:    10/26/22 2228 10/26/22 2231 10/27/22 0057 10/27/22 0322   BP: 145/84 145/84 121/73 119/61   Pulse: 76 74 65 68   Patient Position - Orthostatic VS:  Sitting Sitting          ED Medications  Medications   iohexol (OMNIPAQUE) 350 MG/ML injection (SINGLE-DOSE) 100 mL (100 mL Intravenous Given 10/27/22 0305)       Diagnostic Studies  Results Reviewed     Procedure Component Value Units Date/Time    HS Troponin I 4hr [[id number]]  (Normal) Collected: 10/27/22 0256    Lab Status: Final result Specimen: Blood from Arm, Right Updated: 10/27/22 0333     hs TnI 4hr 3 ng/L      Delta 4hr hsTnI -1 ng/L     TSH, 3rd generation with Free T4 reflex [[id number]]  (Normal) Collected: 10/27/22 0052    Lab Status: Final result Specimen: Blood from Arm, Right Updated: 10/27/22 0215     TSH 3RD GENERATON 4 125 uIU/mL     Narrative:      Patients undergoing fluorescein dye angiography may retain small amounts of fluorescein in the body for 48-72 hours post procedure  Samples containing fluorescein can produce falsely depressed TSH values  If the patient had this procedure,a specimen should be resubmitted post fluorescein clearance        HS Troponin I 2hr [[id number]]  (Normal) Collected: 10/27/22 0052    Lab Status: Final result Specimen: Blood from Arm, Right Updated: 10/27/22 0200 hs TnI 2hr 3 ng/L      Delta 2hr hsTnI -1 ng/L     B-Type Natriuretic Peptide(BNP), AN, CA, EA Campuses Only [[id number]]  (Normal) Collected: 10/27/22 0052    Lab Status: Final result Specimen: Blood from Arm, Right Updated: 10/27/22 0152     BNP 19 pg/mL     D-dimer, quantitative [[id number]]  (Normal) Collected: 10/27/22 0052    Lab Status: Final result Specimen: Blood from Arm, Right Updated: 10/27/22 0151     D-Dimer, Quant 0 31 ug/ml FEU     Narrative: In the evaluation for possible pulmonary embolism, in the appropriate (Well's Score of 4 or less) patient, the age adjusted d-dimer cutoff for this patient can be calculated as:    Age x 0 01 (in ug/mL) for Age-adjusted D-dimer exclusion threshold for a patient over 50 years  Protime-INR [[id number]]  (Normal) Collected: 10/27/22 0052    Lab Status: Final result Specimen: Blood from Arm, Right Updated: 10/27/22 0149     Protime 13 0 seconds      INR 0 96    APTT [[id number]]  (Normal) Collected: 10/27/22 0052    Lab Status: Final result Specimen: Blood from Arm, Right Updated: 10/27/22 0149     PTT 25 seconds     FLU/RSV/COVID - if FLU/RSV clinically relevant [[id number]]  (Normal) Collected: 10/27/22 0052    Lab Status: Final result Specimen: Nares from Nose Updated: 10/27/22 0139     SARS-CoV-2 Negative     INFLUENZA A PCR Negative     INFLUENZA B PCR Negative     RSV PCR Negative    Narrative:      FOR PEDIATRIC PATIENTS - copy/paste COVID Guidelines URL to browser: [email or web address]    SARS-CoV-2 assay is a Nucleic Acid Amplification assay intended for the  qualitative detection of nucleic acid from SARS-CoV-2 in nasopharyngeal  swabs  Results are for the presumptive identification of SARS-CoV-2 RNA  Positive results are indicative of infection with SARS-CoV-2, the virus  causing COVID-19, but do not rule out bacterial infection or co-infection  with other viruses   Laboratories within the [facility] and its  territories are required to report all positive results to the appropriate  public health authorities  Negative results do not preclude SARS-CoV-2  infection and should not be used as the sole basis for treatment or other  patient management decisions  Negative results must be combined with  clinical observations, patient history, and epidemiological information  This test has not been FDA cleared or approved  This test has been authorized by FDA under an Emergency Use Authorization  (EUA)  This test is only authorized for the duration of time the  declaration that circumstances exist justifying the authorization of the  emergency use of an in vitro diagnostic tests for detection of SARS-CoV-2  virus and/or diagnosis of COVID-19 infection under section 564(b)(1) of  the Act, 21 U  S C  403PLQ-1(K)(1), unless the authorization is terminated  or revoked sooner  The test has been validated but independent review by FDA  and CLIA is pending  Test performed using [employer] GeneXpert: This RT-PCR assay targets N2,  a region unique to SARS-CoV-2  A conserved region in the E-gene was chosen  for pan-Sarbecovirus detection which includes SARS-CoV-2  According to CMS-2020-01-R, this platform meets the definition of high-throughput technology      HS Troponin 0hr (reflex protocol) [[id number]]  (Normal) Collected: 10/26/22 2234    Lab Status: Final result Specimen: Blood from Arm, Right Updated: 10/27/22 0042     hs TnI 0hr 4 ng/L     Comprehensive metabolic panel [[id number]]  (Abnormal) Collected: 10/26/22 2234    Lab Status: Final result Specimen: Blood from Arm, Right Updated: 10/26/22 2306     Sodium 137 mmol/L      Potassium 3 8 mmol/L      Chloride 104 mmol/L      CO2 26 mmol/L      ANION GAP 7 mmol/L      BUN 19 mg/dL      Creatinine 0 79 mg/dL      Glucose 165 mg/dL      Calcium 9 2 mg/dL      AST 16 U/L      ALT 24 U/L      Alkaline Phosphatase 71 U/L      Total Protein 7 1 g/dL      Albumin 4 2 g/dL      Total Bilirubin 0 58 mg/dL      eGFR 94 ml/min/1 73sq m     Narrative:      National Kidney Disease Foundation guidelines for Chronic Kidney Disease (CKD):   •  Stage 1 with normal or high GFR (GFR > 90 mL/min/1 73 square meters)  •  Stage 2 Mild CKD (GFR = 60-89 mL/min/1 73 square meters)  •  Stage 3A Moderate CKD (GFR = 45-59 mL/min/1 73 square meters)  •  Stage 3B Moderate CKD (GFR = 30-44 mL/min/1 73 square meters)  •  Stage 4 Severe CKD (GFR = 15-29 mL/min/1 73 square meters)  •  Stage 5 End Stage CKD (GFR <15 mL/min/1 73 square meters)  Note: GFR calculation is accurate only with a steady state creatinine    CBC and differential [[id number]] Collected: 10/26/22 2234    Lab Status: Final result Specimen: Blood from Arm, Right Updated: 10/26/22 2248     WBC 8 44 Thousand/uL      RBC 4 94 Million/uL      Hemoglobin 16 1 g/dL      Hematocrit 48 3 %      MCV 98 fL      MCH 32 6 pg      MCHC 33 3 g/dL      RDW 12 4 %      MPV 9 6 fL      Platelets 084 Thousands/uL      nRBC 0 /100 WBCs      Neutrophils Relative 59 %      Immat GRANS % 0 %      Lymphocytes Relative 26 %      Monocytes Relative 11 %      Eosinophils Relative 3 %      Basophils Relative 1 %      Neutrophils Absolute 5 11 Thousands/µL      Immature Grans Absolute 0 03 Thousand/uL      Lymphocytes Absolute 2 15 Thousands/µL      Monocytes Absolute 0 90 Thousand/µL      Eosinophils Absolute 0 21 Thousand/µL      Basophils Absolute 0 04 Thousands/µL                  CTA dissection protocol chest/abdomen/pelvis   Final Result by Whitney Shen MD (10/27 9829)      1  No acute angiographic abnormality   2   2 mm nonobstructing left renal stone   3    Contents within the urinary bladder measured greater than simple fluid attenuation, possibly hemorrhage               Workstation performed: [id number]         XR chest 1 view portable   ED Interpretation by Spring Leonardo PA-C (10/27 7710)   No acute findings                  Procedures  ECG 12 Lead Documentation Only    Date/Time: 10/26/2022 10:30 PM  Performed by: Daniel Jnoes PA-C  Authorized by: Daniel Jones PA-C     ECG reviewed by me, the ED Provider: yes    Patient location:  ED  Rate:     ECG rate:  73    ECG rate assessment: normal    Rhythm:     Rhythm: sinus rhythm    Ectopy:     Ectopy: none    Conduction:     Conduction: abnormal      Abnormal conduction: incomplete RBBB    ST segments:     ST segments:  Normal  T waves:     T waves: normal    ECG 12 Lead Documentation Only    Date/Time: 10/27/2022 4:35 AM  Performed by: Daniel Jones PA-C  Authorized by: Daniel Jones PA-C     ECG reviewed by me, the ED Provider: yes    Patient location:  ED  Rate:     ECG rate:  65    ECG rate assessment: normal    Rhythm:     Rhythm: sinus rhythm    Ectopy:     Ectopy: none    Conduction:     Conduction: abnormal      Abnormal conduction: incomplete RBBB    ST segments:     ST segments:  Normal  T waves:     T waves: normal               ED Course  ED Course as of 10/27/22 0729   Thu Oct 27, 2022   3913 Re-evaluation at bedside, patient feels better, would like to go home  Updated patient on labs and imaging results  Counseling as below  The appropriate recommended follow up and warning signs for return to the nearest ED were explained  patient questions answered; patient is competent and reliable, verbalized understanding of all that was explained, and agrees with the disposition and further plan of treatment  patient was additionally provided with detailed follow up care instructions, phone numbers to call within our institution for other concerns or inquiries  Advised to follow up with PCP and cardiology  Pt is in no acute distress and is stable for discharge at this time  9471 Patient notified of CT finding regarding his urinary bladder at this time, CT concerning for a possible hemorrhage in the bladder    Patient does not take any blood thinners and does not complain of any dysuria, hematuria, or any urinary symptoms  Patient does have a urologist that he can follow up with as an outpatient  Patient notified that if he begins to have hematuria to return to the emergency room  Patient understands and is agreeable to the plan at this time               HEART Risk Score    Flowsheet Row Most Recent Value   Heart Score Risk Calculator    History 2 Filed at: 10/27/2022 0424   ECG 1 Filed at: 10/27/2022 0424   Age 2 Filed at: 10/27/2022 0424   Risk Factors 2 Filed at: 10/27/2022 0424   Troponin 0 Filed at: 10/27/2022 0424   HEART Score 7 Filed at: 10/27/2022 0424                PERC Rule for PE    Flowsheet Row Most Recent Value   PERC Rule for PE    Age >=50 1 Filed at: 10/27/2022 0559   HR >=100 0 Filed at: 10/27/2022 0559   O2 Sat on room air < 95% 0 Filed at: 10/27/2022 0559   History of PE or DVT 0 Filed at: 10/27/2022 0559   Recent trauma or surgery 0 Filed at: 10/27/2022 0559   Hemoptysis 0 Filed at: 10/27/2022 0559   Exogenous estrogen 0 Filed at: 10/27/2022 0559   Unilateral leg swelling 0 Filed at: 10/27/2022 0559   PERC Rule for PE Results 1 Filed at: 10/27/2022 3998          Wells' Criteria for PE    Flowsheet Row Most Recent Value   Wells' Criteria for PE    Clinical signs and symptoms of DVT 0 Filed at: 10/27/2022 8784   PE is primary diagnosis or equally likely 0 Filed at: 10/27/2022 0559   HR >100 0 Filed at: 10/27/2022 0559   Immobilization at least 3 days or Surgery in the previous 4 weeks 0 Filed at: 10/27/2022 0559   Previous, objectively diagnosed PE or DVT 0 Filed at: 10/27/2022 0559   Hemoptysis 0 Filed at: 10/27/2022 0559   Malignancy with treatment within 6 months or palliative 0 Filed at: 10/27/2022 0559   Wells' Criteria Total 0 Filed at: 10/27/2022 0559                MDM  Number of Diagnoses or Management Options  Chest pain, unspecified type: new and requires workup  Shortness of breath: new and requires workup  Diagnosis management comments: No radiation to the back  No tearing pain going to the back  Normal bilat UE pulses  No pleuritic pain  No history of PE  No new unilateral leg swelling  Non exertional  No sweats  No right sided pain  No vomiting  No fever or cough to suggest pneumonia  No  vomiting or subcue crepitus  Equal breath sounds  No skin rash  No syncope  No new murmur to suggest symptomatic valvular stenosis/ regurg or ruptured chordae  Vitals do not suggest tamponade  No palpable crepitus on exam    No recent viral syndromes to suggest Aliyah/Myocarditis  Amount and/or Complexity of Data Reviewed  Clinical lab tests: ordered and reviewed  Tests in the radiology section of CPT®: ordered and reviewed    Patient Progress  Patient progress: stable      Disposition  Final diagnoses:   Chest pain, unspecified type   Shortness of breath     Time reflects when diagnosis was documented in both MDM as applicable and the Disposition within this note     Time User Action Codes Description Comment    10/27/2022  4:34 AM Ishan Foster Add [R07 9] Chest pain, unspecified type     10/27/2022  4:34 AM Ishan Foster Add [R06 02] Shortness of breath       ED Disposition     ED Disposition   Discharge    Condition   Stable    Date/Time   u Oct 27, 2022  4:38 AM    Comment   Elizabeth French discharge to home/self care                 Follow-up Information     Follow up With Specialties Details Why Contact Info Additional Information    David Bhakta MD Internal Medicine Schedule an appointment as soon as possible for a visit   [address] [facility] [address] [place] [address] Emergency Department Emergency Medicine  If symptoms worsen [address] Emergency Department,  [address], [place], [address]          Discharge Medication List as of 10/27/2022  4:39 AM      CONTINUE these medications which have NOT CHANGED    Details   amiodarone 200 mg tablet Take 200 mg by mouth daily, Starting Mon 2/7/2022, Historical Med      aspirin 325 mg tablet Take 1 tablet (325 mg total) by mouth daily, Starting Wed 7/21/2021, Until Thu 10/14/2021, Normal      atorvastatin (LIPITOR) 80 mg tablet Take 1 tablet (80 mg total) by mouth daily with dinner, Starting Mon 8/16/2021, Until Thu 4/7/2022, Normal      docusate sodium (COLACE) 100 mg capsule Take 1 capsule (100 mg total) by mouth 2 (two) times a day, Starting Tue 7/20/2021, Until Thu 10/14/2021, Normal      Empagliflozin (Jardiance) 10 MG TABS Take 25 mg by mouth daily  , Historical Med      insulin glargine (Lantus SoloStar) 100 units/mL injection pen Inject 15 Units under the skin daily if Lantus solostar is not the preferred basal insulin for the patient's insurance company, please substitute with [place] flexpen, [employer] , Levemir flexpen, [employer]  or equivalent insulin vials a t the same dose instead , Starting Tue 7/20/2021, Until Thu 4/7/2022, Normal      insulin lispro (HumaLOG KwikPen) 100 units/mL injection pen Inject 6 Units under the skin 3 (three) times a day with meals if Humalog  Kwik pen is not the preferred mealtime insulin for the patient's insurance, please substitute with NovoLog flexpen, Fiasp  flextouch, Admelog solostar  or Apidra solostar pen or e quivalent insulin vials at the same dose instead , Starting Tue 7/20/2021, Normal      meloxicam (MOBIC) 15 mg tablet meloxicam 15 mg tablet   Take 1 tablet every day by oral route as directed for 30 days  , Historical Med      metFORMIN (GLUCOPHAGE) 1000 MG tablet Take 1,000 mg by mouth 2 (two) times a day with meals , Historical Med      metoprolol tartrate (LOPRESSOR) 25 mg tablet Take 1 tablet (25 mg total) by mouth every 12 (twelve) hours, Starting Mon 8/16/2021, Until Thu 4/7/2022, Normal      pantoprazole (PROTONIX) 40 mg tablet Take 1 tablet (40 mg total) by mouth daily in the early morning, Starting Wed 7/21/2021, Until Thu 10/14/2021, Normal                 PDMP Review       Value Time User    PDMP Reviewed  Yes 10/27/2022 12:24 AM Edmund Lei MD          ED Provider  Electronically Signed by         Cinthya Ricci PA-C  10/27/22 2429

## 2022-10-28 ENCOUNTER — OFFICE VISIT (OUTPATIENT)
Dept: CARDIOLOGY CLINIC | Facility: CLINIC | Age: 67
End: 2022-10-28
Payer: COMMERCIAL

## 2022-10-28 VITALS
OXYGEN SATURATION: 98 % | DIASTOLIC BLOOD PRESSURE: 60 MMHG | HEIGHT: 71 IN | SYSTOLIC BLOOD PRESSURE: 114 MMHG | WEIGHT: 255.6 LBS | HEART RATE: 80 BPM | BODY MASS INDEX: 35.78 KG/M2

## 2022-10-28 DIAGNOSIS — I10 PRIMARY HYPERTENSION: Primary | ICD-10-CM

## 2022-10-28 DIAGNOSIS — Z95.1 S/P CABG (CORONARY ARTERY BYPASS GRAFT): ICD-10-CM

## 2022-10-28 DIAGNOSIS — E78.2 MIXED HYPERLIPIDEMIA: ICD-10-CM

## 2022-10-28 DIAGNOSIS — I25.10 CORONARY ARTERY DISEASE, UNSPECIFIED VESSEL OR LESION TYPE, UNSPECIFIED WHETHER ANGINA PRESENT, UNSPECIFIED WHETHER NATIVE OR TRANSPLANTED HEART: ICD-10-CM

## 2022-10-28 DIAGNOSIS — I25.10 CORONARY ARTERY DISEASE INVOLVING NATIVE HEART WITHOUT ANGINA PECTORIS, UNSPECIFIED VESSEL OR LESION TYPE: ICD-10-CM

## 2022-10-28 DIAGNOSIS — K21.9 GASTROESOPHAGEAL REFLUX DISEASE, UNSPECIFIED WHETHER ESOPHAGITIS PRESENT: ICD-10-CM

## 2022-10-28 PROCEDURE — 99214 OFFICE O/P EST MOD 30 MIN: CPT | Performed by: INTERNAL MEDICINE

## 2022-10-28 RX ORDER — MELATONIN
1000 DAILY
COMMUNITY

## 2022-10-28 RX ORDER — EZETIMIBE 10 MG/1
10 TABLET ORAL DAILY
COMMUNITY
Start: 2022-09-17

## 2022-10-28 RX ORDER — PANTOPRAZOLE SODIUM 40 MG/1
40 TABLET, DELAYED RELEASE ORAL DAILY
Qty: 90 TABLET | Refills: 3 | Status: SHIPPED | OUTPATIENT
Start: 2022-10-28

## 2022-10-28 NOTE — PATIENT INSTRUCTIONS
Recommendations:  1  Restart Pantoprazole 40mg daily  2  Continue remainder of medications  3  Follow up in 3 months

## 2022-10-28 NOTE — PROGRESS NOTES
Cardiology   Jil Collins 74 y o  male MRN: [id number]          Impression:  1  CAD s/p CABG in setting of MI 7/21 - feels well  2  Hypertension - controlled  3  Dyslipidemia - on statin  4  Post operative atrial fibrillation - no further episodes     5  Dyspnea - unclear etiology  No exertional component  Possibly GI related or musculoskeletal   CTA chest normal       Recommendations:  1  Restart Pantoprazole 40mg daily  2  Continue remainder of medications  3  Follow up in 3 months         HPI: Jil Collins is a 74y o  year old male with CAD s/p CABG x 3 7/21 in setting of MI, post-op PAF, hypertension, dyslipidemia, echo 7/21 EF 50% with trace to mild MRI, who presents for follow up  Had an exercise stress test 2/22 which demonstrated no ischemia  Was in the ED 2 days ago with some difficulty  CTA chest demonstrated no PE or pulmonary issues, ECG, BNP, and cardiac enzymes were normal   Patient is having increased dyspneic on exertion, and occasionally when lying down  No exertional symptoms            Review of Systems   Constitutional: Negative  HENT: Negative  Eyes: Negative  Respiratory: Positive for shortness of breath  Negative for chest tightness  Cardiovascular: Negative for chest pain, palpitations and leg swelling  Gastrointestinal: Negative  Endocrine: Negative  Genitourinary: Negative  Musculoskeletal: Negative  Skin: Negative  Allergic/Immunologic: Negative  Neurological: Negative  Hematological: Negative  Psychiatric/Behavioral: Negative  All other systems reviewed and are negative          Past Medical History:   Diagnosis Date   • Diabetes mellitus ([address] )    • History of COVID-19 11/2020   • HLD (hyperlipidemia)    • HTN (hypertension)      Past Surgical History:   Procedure Laterality Date   • PA CABG, ARTERY-VEIN, THREE N/A 7/16/2021    Procedure: CORONARY ARTERY BYPASS GRAFT (CABG) 3 VESSELS, LIMA -LAD,EVH/GSV- PDA  AND OM;  Surgeon: Piedad Valencia DO Linnea;  Location: [facility];  Service: Cardiac Surgery   • [place] ECHO TRANSESOPHAG R-T 2D W/PRB IMG ACQUISJ I&R N/A 7/16/2021    Procedure: TRANSESOPHAGEAL ECHOCARDIOGRAM (GENNA);   Surgeon: Geraldine Campbell DO;  Location: [facility];  Service: Cardiac Surgery   • [place] ENDOSCOPY W/VIDEO-ASST VEIN HARVEST,CABG N/A 7/16/2021    Procedure: HARVEST VEIN ENDOSCOPIC (EVH)/GSV;  Surgeon: Geraldine Campbell DO;  Location: [facility];  Service: Cardiac Surgery     Social History     Substance and Sexual Activity   Alcohol Use Not Currently    Comment: has not been drinking since heart  p-op     Social History     Substance and Sexual Activity   Drug Use Never     Social History     Tobacco Use   Smoking Status Former Smoker   • Types: Cigars   Smokeless Tobacco Never Used     Family History   Problem Relation Age of Onset   • Leukemia Mother        Allergies:  No Known Allergies    Medications:     Current Outpatient Medications:   •  atorvastatin (LIPITOR) 80 mg tablet, Take 1 tablet (80 mg total) by mouth daily with dinner, Disp: 30 tablet, Rfl: 5  •  cholecalciferol (VITAMIN D3) 1,000 units tablet, Take 1,000 Units by mouth daily, Disp: , Rfl:   •  cyanocobalamin (VITAMIN B-12) 250 MCG tablet, Take 250 mcg by mouth daily, Disp: , Rfl:   •  Empagliflozin (Jardiance) 10 MG TABS, Take 10 mg by mouth daily, Disp: , Rfl:   •  ezetimibe (ZETIA) 10 mg tablet, Take 10 mg by mouth daily, Disp: , Rfl:   •  insulin glargine (Lantus SoloStar) 100 units/mL injection pen, Inject 15 Units under the skin daily if Lantus solostar is not the preferred basal insulin for the patient's insurance company, please substitute with Viraj Linh flexpen, Basaglar Kwikpen , Levemir flexpen, [employer]  or equivalent insulin vials at the same dose instead , Disp: 4 5 mL, Rfl: 0  •  insulin lispro (HumaLOG KwikPen) 100 units/mL injection pen, Inject 6 Units under the skin 3 (three) times a day with meals if Humalog  Kwik pen is not the preferred mealtime insulin for the patient's insurance, please substitute with NovoLog flexpen, Fiasp  flextouch, Admelog solostar  or Apidra solostar pen or equivalent insulin vials at the same dose instead , Disp: 15 mL, Rfl: 0  •  metFORMIN (GLUCOPHAGE) 1000 MG tablet, Take 1,000 mg by mouth 2 (two) times a day with meals , Disp: , Rfl:   •  metoprolol tartrate (LOPRESSOR) 25 mg tablet, Take 1 tablet (25 mg total) by mouth every 12 (twelve) hours, Disp: 60 tablet, Rfl: 5  •  amiodarone 200 mg tablet, Take 200 mg by mouth daily (Patient not taking: No sig reported), Disp: , Rfl:   •  aspirin 325 mg tablet, Take 1 tablet (325 mg total) by mouth daily (Patient not taking: Reported on 4/7/2022 ), Disp: 30 tablet, Rfl: 0  •  docusate sodium (COLACE) 100 mg capsule, Take 1 capsule (100 mg total) by mouth 2 (two) times a day (Patient not taking: Reported on 4/7/2022 ), Disp: 60 capsule, Rfl: 0  •  meloxicam (MOBIC) 15 mg tablet, meloxicam 15 mg tablet  Take 1 tablet every day by oral route as directed for 30 days  (Patient not taking: No sig reported), Disp: , Rfl:   •  pantoprazole (PROTONIX) 40 mg tablet, Take 1 tablet (40 mg total) by mouth daily in the early morning (Patient not taking: No sig reported), Disp: 30 tablet, Rfl: 0      Wt Readings from Last 3 Encounters:   10/28/22 116 kg (255 lb 9 6 oz)   10/27/22 117 kg (257 lb 15 oz)   04/07/22 117 kg (258 lb 3 2 oz)     Temp Readings from Last 3 Encounters:   10/27/22 98 °F (36 7 °C) (Oral)   08/18/21 98 1 °F (36 7 °C) (Tympanic)   07/20/21 97 5 °F (36 4 °C) (Oral)     BP Readings from Last 3 Encounters:   10/28/22 114/60   10/27/22 119/61   04/07/22 122/74     Pulse Readings from Last 3 Encounters:   10/28/22 80   10/27/22 68   04/07/22 83         Physical Exam  HENT:      Head: Atraumatic  Mouth/Throat:      Mouth: Mucous membranes are moist    Eyes:      Extraocular Movements: Extraocular movements intact     Cardiovascular:      Rate and Rhythm: Normal rate and regular rhythm  Heart sounds: Normal heart sounds  Pulmonary:      Effort: Pulmonary effort is normal       Breath sounds: Normal breath sounds  Abdominal:      General: Abdomen is flat  Musculoskeletal:         General: Normal range of motion  Cervical back: Normal range of motion  Skin:     General: Skin is warm  Neurological:      General: No focal deficit present  Mental Status: He is alert and oriented to person, place, and time     Psychiatric:         Mood and Affect: Mood normal          Behavior: Behavior normal            Laboratory Studies:  CMP:  Lab Results   Component Value Date    K 3 8 10/26/2022     10/26/2022    CO2 26 10/26/2022    BUN 19 10/26/2022    CREATININE 0 79 10/26/2022    GLUCOSE 231 (H) 2021    AST 16 10/26/2022    ALT 24 10/26/2022    EGFR 94 10/26/2022       Lipid Profile:   No results found for: CHOL  Lab Results   Component Value Date    HDL 44 2021     Lab Results   Component Value Date    LDLCALC 123 (H) 2021     Lab Results   Component Value Date    TRIG 215 8 (H) 2021       Cardiac testing:   EKG reviewed personally:   Results for orders placed during the hospital encounter of 21    Echo complete with contrast if indicated    Narrative  [facility] [address] [facility]  [phone]    Transthoracic Echocardiogram  2D, M-mode, Doppler, and Color Doppler    Study date:  2021    Patient: Enrrique Casper  MR number: [id number]  Account number: [de-identified]  : 1955  Age: 61 years  Gender: Male  Status: Outpatient  Location: Bedside  Height: 70 in  Weight: 246 lb  BP: 110/ 66 mmHg    Indications: NSTEMI    Diagnoses: I21 4 - Non-ST elevation (NSTEMI) myocardial infarction    Sonographer:  VLADISLAV Ayala  Primary Physician:  Kaley Oconnell MD  Referring Physician:  Alexandr Pal PA-C  Group:  [facility]  Interpreting Physician:  Kay Barnes MD    SUMMARY    LEFT VENTRICLE:  Size was normal   Systolic function was at the lower limits of normal  Ejection fraction was estimated to be 50 %  There was mild hypokinesis of the basal-mid inferior and inferolateral walls  Doppler parameters were consistent with abnormal left ventricular relaxation (grade 1 diastolic dysfunction)  RIGHT VENTRICLE:  The size was normal   Systolic function was normal     LEFT ATRIUM:  The atrium was mildly dilated  MITRAL VALVE:  There was trace to mild regurgitation  TRICUSPID VALVE:  There was trace regurgitation  HISTORY: PRIOR HISTORY: HTN, HLD, DM2    PROCEDURE: The procedure was performed at the bedside  This was a routine study  The transthoracic approach was used  The study included complete 2D imaging, M-mode, complete spectral Doppler, and color Doppler  The heart rate was 70 bpm,  at the start of the study  Images were obtained from the parasternal, apical, subcostal, and suprasternal notch acoustic windows  Echocardiographic views were limited due to lung interference  This was a technically difficult study  LEFT VENTRICLE: Size was normal  Systolic function was at the lower limits of normal  Ejection fraction was estimated to be 50 %  There was mild hypokinesis of the basal-mid inferior and inferolateral walls  Wall thickness was normal   DOPPLER: Doppler parameters were consistent with abnormal left ventricular relaxation (grade 1 diastolic dysfunction)  RIGHT VENTRICLE: The size was normal  Systolic function was normal  Wall thickness was normal     LEFT ATRIUM: The atrium was mildly dilated  RIGHT ATRIUM: Size was normal     MITRAL VALVE: Valve structure was normal  There was normal leaflet separation  DOPPLER: The transmitral velocity was within the normal range  There was no evidence for stenosis  There was trace to mild regurgitation  AORTIC VALVE: The valve was trileaflet   Leaflets exhibited mildly increased thickness, mild calcification, normal cuspal separation, and sclerosis  DOPPLER: Transaortic velocity was within the normal range  There was no evidence for  stenosis  There was no regurgitation  TRICUSPID VALVE: The valve structure was normal  There was normal leaflet separation  DOPPLER: The transtricuspid velocity was within the normal range  There was no evidence for stenosis  There was trace regurgitation  The tricuspid jet  envelope definition was inadequate for estimation of RV systolic pressure  There are no indirect findings suggestive of moderate or severe pulmonary hypertension  PULMONIC VALVE: Leaflets exhibited normal thickness, no calcification, and normal cuspal separation  DOPPLER: The transpulmonic velocity was within the normal range  There was no regurgitation  PERICARDIUM: There was no pericardial effusion  The pericardium was normal in appearance  AORTA: The root exhibited normal size  SYSTEMIC VEINS: IVC: The inferior vena cava was normal in size and course  Respirophasic changes were normal     SYSTEM MEASUREMENT TABLES    2D  %FS: 24 71 %  Ao Diam: 3 41 cm  EDV(Teich): 173 64 ml  EF(Teich): 48 23 %  ESV(Teich): 89 89 ml  HR_4Ch_Q: 66 42 BPM  IVSd: 1 14 cm  LA Area: 23 21 cm2  LA Diam: 3 7 cm  LVCO_4Ch_Q: 2 84 L/min  LVEF_4Ch_Q: 41 39 %  LVIDd: 5 91 cm  LVIDs: 4 45 cm  LVLd_4Ch_Q: 8 6 cm  LVLs_4Ch_Q: 7 4 cm  LVPWd: 0 97 cm  LVSV_4Ch_Q: 42 77 ml  LVVED_4Ch_Q: 103 33 ml  LVVES_4Ch_Q: 60 56 ml  RA Area: 18 36 cm2  RVIDd: 3 33 cm  SV(Teich): 83 75 ml    MM  TAPSE: 2 59 cm    PW  E' Sept: 0 08 m/s  E/E' Sept: 9 37  MV A Abiel: 0 89 m/s  MV Dec [place]: 3 68 m/s2  MV DecT: 197 59 ms  MV E Abiel: 0 73 m/s  MV E/A Ratio: 0 82    [employer] Accredited Echocardiography Laboratory    Prepared and electronically signed by    Meghann Snider MD  Signed 08-Jul-2021 14:41:28    No results found for this or any previous visit      Results for orders placed during the hospital encounter of 21    Cardiac catheterization    Narrative  [address] [facility]  [phone]    [facility]    Invasive Cardiovascular Lab Complete Report    Patient: Karuna An  MR number: [id number]  Account number: [de-identified]  Study date: 2021  Gender: Male  : 1955  Height: 70 1 in  Weight: 246 4 lb  BSA: 2 28 mï¾²    Allergies: NO KNOWN ALLERGIES    Diagnostic Cardiologist:  Neil Pan MD  Primary Physician:  Shyanne Aponte MD    SUMMARY    CORONARY CIRCULATION:  Left main: Normal   LAD: The vessel was normal sized  There was a 70% stenosis in the proximal vessel  Circumflex: The vessel was normal sized and gave rise to one major OM branch  There was a 95% proximal stenosis of the large OM branch  RCA: The vessel was normal sized and dominant, giving rise to the PDA and several posterolateral branches  There was a complex eccentric lesion in mid vessel with thrombus  There is MEDINA 3 flow into the distal vessel, but MEDINA 1 flow into  the PDA  There was collateral flow to the PDA from the left system  REPORT ELEMENT SELECTION:  Right radial access  There were no complications  Summary:  Severe 3 vessel CAD  Plan: transfer to [facility] for surgical consultation  INDICATIONS:  --  Possible CAD: myocardial infarction without ST elevation (NSTEMI)  PROCEDURES PERFORMED    --  Left coronary angiography  --  Right coronary angiography  --  Inpatient  --  Mod Sedation Same Physician Initial 15min  --  Mod Sedation Same Physician Add 15min  --  Coronary Catheterization (w/o LHC)  PROCEDURE: The risks and alternatives of the procedures and conscious sedation were explained to the patient and informed consent was obtained  The patient was brought to the cath lab and placed on the table  The planned puncture sites  were prepped and draped in the usual sterile fashion  --  Right radial artery access   After performing an Jarret's test to verify adequate ulnar artery supply to the hand, the radial site was prepped  The puncture site was infiltrated with local anesthetic  The vessel was accessed using the  modified Seldinger technique, a wire was advanced into the vessel, and a sheath was advanced over the wire into the vessel  --  Left coronary artery angiography  A catheter was advanced over a guidewire into the aorta and positioned in the left coronary artery ostium under fluoroscopic guidance  Angiography was performed  --  Right coronary artery angiography  A catheter was advanced over a guidewire into the aorta and positioned in the right coronary artery ostium under fluoroscopic guidance  Angiography was performed  --  Inpatient  --  Mod Sedation Same Physician Initial 15min  --  Mod Sedation Same Physician Add 15min  --  Coronary Catheterization (w/o [facility])  PROCEDURE COMPLETION: The patient tolerated the procedure well and was discharged from the cath lab  TIMING: Test started at 15:17  Test concluded at 15:36  HEMOSTASIS: The sheath was removed  The site was compressed with a Hemoband  device  Hemostasis was obtained  MEDICATIONS GIVEN: Verapamil (Isoptin, Calan, Covera), 1 25 mg, IA, at 15:22  Fentanyl (1OOmcg/2 ml), 50 mcg, IV, at 15:05  Versed (2mg/2ml), 2 mg, IV, at 15:06  Fentanyl (1OOmcg/2 ml), 25 mcg, IV, at  15:17  Versed (2mg/2ml), 1 mg, IV, at 15:17  1% Lidocaine, 0 1 ml, subcutaneously, at 15:21  Nitroglycerin (200mcg/ml), 200 mcg, at 15:21  Heparin 1000 units/ml, 4,000 units, IV, at 15:22  CONTRAST GIVEN: 75 ml Omnipaque (350mg I /ml)  RADIATION EXPOSURE: Fluoroscopy time: 2 48 min  CORONARY VESSELS:   --  Left main: Normal   --  LAD: The vessel was normal sized  There was a 70% stenosis in the proximal vessel  --  Circumflex: The vessel was normal sized and gave rise to one major OM branch  There was a 95% proximal stenosis of the large OM branch    --  RCA: The vessel was normal sized and dominant, giving rise to the PDA and several posterolateral branches  There was a complex eccentric lesion in mid vessel with thrombus  There is MEDINA 3 flow into the distal vessel, but MEDINA 1 flow  into the PDA  There was collateral flow to the PDA from the left system  NOTE: Right radial access  There were no complications  Prepared and signed by    Citlali Cavazos MD  Signed 2021 15:53:35    CC: Dr Catrina Huang    Study diagram    Hemodynamic tables    Pressures:  Baseline  Pressures:  - HR: 76  Pressures:  - Rhythm:  Pressures:  -- Aortic Pressure (S/D/M): 92/59/70    Outputs:  Baseline  Outputs:  -- CALCULATIONS[age]  Outputs:  -- CALCULATIONS: Body Surface Area: 2 28  Outputs:  -- CALCULATIONS: Height in cm: 178 00  Outputs:  -- CALCULATIONS: Sex: Male  Outputs:  -- CALCULATIONS: Weight in k 00  Outputs:  -- OUTPUTS: O2 consumption: 285 59  Outputs:  -- OUTPUTS: Vo2 Indexed: 125 00    No results found for this or any previous visit

## 2022-11-18 ENCOUNTER — HOSPITAL ENCOUNTER (OUTPATIENT)
Dept: NON INVASIVE DIAGNOSTICS | Facility: CLINIC | Age: 67
Discharge: HOME | End: 2022-11-18

## 2022-11-18 DIAGNOSIS — I25.10 ATHEROSCLEROSIS OF NATIVE CORONARY ARTERY OF NATIVE HEART WITHOUT ANGINA PECTORIS: ICD-10-CM

## 2023-01-12 ENCOUNTER — OFFICE VISIT (OUTPATIENT)
Dept: CARDIOLOGY CLINIC | Facility: CLINIC | Age: 68
End: 2023-01-12

## 2023-01-12 VITALS
DIASTOLIC BLOOD PRESSURE: 84 MMHG | WEIGHT: 253 LBS | OXYGEN SATURATION: 96 % | BODY MASS INDEX: 35.42 KG/M2 | HEIGHT: 71 IN | HEART RATE: 71 BPM | SYSTOLIC BLOOD PRESSURE: 126 MMHG

## 2023-01-12 DIAGNOSIS — E78.2 MIXED HYPERLIPIDEMIA: ICD-10-CM

## 2023-01-12 DIAGNOSIS — Z95.1 S/P CABG (CORONARY ARTERY BYPASS GRAFT): ICD-10-CM

## 2023-01-12 DIAGNOSIS — I10 PRIMARY HYPERTENSION: Primary | ICD-10-CM

## 2023-01-12 DIAGNOSIS — I25.10 CORONARY ARTERY DISEASE, UNSPECIFIED VESSEL OR LESION TYPE, UNSPECIFIED WHETHER ANGINA PRESENT, UNSPECIFIED WHETHER NATIVE OR TRANSPLANTED HEART: ICD-10-CM

## 2023-01-12 NOTE — PROGRESS NOTES
Cardiology   Lakhwinder Coronado 74 y o  male MRN: [id number]        Impression:  1  CAD s/p CABG in setting of MI 7/21   2  Hypertension - controlled  3  Dyslipidemia - on statin  4  Post operative atrial fibrillation - no further episodes     5  Dyspnea - unclear etiology  Will repeat a nuclear stress test as exercise stress test was unrevealing       Recommendations:  1  Restart aspirin 81mg daily  2  Continue remainder of medications  3  Check exercise nuclear stress test   4  Follow up in 6 months  HPI: Lakhwinder Coronado is a 74y o  year old male with CAD s/p CABG x 3 7/21 in setting of MI, post-op PAF, hypertension, dyslipidemia, echo 7/21 EF 50% with trace to mild MRI, who presents for follow up  Had an exercise stress test 2/22 which demonstrated no ischemia  Was in the ED 2 days ago with some difficulty  CTA chest demonstrated no PE or pulmonary issues, ECG, BNP, and cardiac enzymes were normal   Does feel dyspneic on exertion              Review of Systems   Constitutional: Negative  HENT: Negative  Eyes: Negative  Respiratory: Positive for shortness of breath  Negative for chest tightness  Cardiovascular: Negative for chest pain, palpitations and leg swelling  Gastrointestinal: Negative  Endocrine: Negative  Genitourinary: Negative  Musculoskeletal: Negative  Skin: Negative  Allergic/Immunologic: Negative  Neurological: Negative  Hematological: Negative  Psychiatric/Behavioral: Negative  All other systems reviewed and are negative          Past Medical History:   Diagnosis Date   • Diabetes mellitus ([address] )    • History of COVID-19 11/2020   • HLD (hyperlipidemia)    • HTN (hypertension)      Past Surgical History:   Procedure Laterality Date   • [place] CORONARY ARTERY BYP W/VEIN & ARTERY GRAFT 3 VEIN N/A 7/16/2021    Procedure: CORONARY ARTERY BYPASS GRAFT (CABG) 3 VESSELS, LIMA -LAD,EVH/GSV- PDA  AND OM;  Surgeon: Junito Yoo DO;  Location: [facility]; Service: Cardiac Surgery   • [place] ECHO TRANSESOPHAG R-T 2D W/PRB IMG ACQUISJ I&R N/A 7/16/2021    Procedure: TRANSESOPHAGEAL ECHOCARDIOGRAM (GENNA);   Surgeon: Junito Yoo DO;  Location: [facility];  Service: Cardiac Surgery   • [place] NDSC SURG W/VIDEO-ASSISTED HARVEST VEIN CABG N/A 7/16/2021    Procedure: HARVEST VEIN ENDOSCOPIC (EVH)/GSV;  Surgeon: Junito Yoo DO;  Location: [facility];  Service: Cardiac Surgery     Social History     Substance and Sexual Activity   Alcohol Use Yes    Comment: 2 beers a week     Social History     Substance and Sexual Activity   Drug Use Never     Social History     Tobacco Use   Smoking Status Former   • Types: Cigars   Smokeless Tobacco Never     Family History   Problem Relation Age of Onset   • Leukemia Mother        Allergies:  No Known Allergies    Medications:     Current Outpatient Medications:   •  atorvastatin (LIPITOR) 80 mg tablet, Take 1 tablet (80 mg total) by mouth daily with dinner, Disp: 30 tablet, Rfl: 5  •  cholecalciferol (VITAMIN D3) 1,000 units tablet, Take 1,000 Units by mouth daily, Disp: , Rfl:   •  cyanocobalamin (VITAMIN B-12) 250 MCG tablet, Take 250 mcg by mouth daily, Disp: , Rfl:   •  Empagliflozin (Jardiance) 10 MG TABS, Take 10 mg by mouth daily, Disp: , Rfl:   •  ezetimibe (ZETIA) 10 mg tablet, Take 10 mg by mouth daily, Disp: , Rfl:   •  metFORMIN (GLUCOPHAGE) 1000 MG tablet, Take 1,000 mg by mouth 2 (two) times a day with meals , Disp: , Rfl:   •  metoprolol tartrate (LOPRESSOR) 25 mg tablet, Take 1 tablet (25 mg total) by mouth every 12 (twelve) hours, Disp: 60 tablet, Rfl: 5  •  pantoprazole (PROTONIX) 40 mg tablet, Take 1 tablet (40 mg total) by mouth daily, Disp: 90 tablet, Rfl: 3  •  amiodarone 200 mg tablet, Take 200 mg by mouth daily (Patient not taking: Reported on 4/7/2022), Disp: , Rfl:   •  aspirin 325 mg tablet, Take 1 tablet (325 mg total) by mouth daily (Patient not taking: Reported on 4/7/2022 ), Disp: 30 tablet, Rfl: 0  • docusate sodium (COLACE) 100 mg capsule, Take 1 capsule (100 mg total) by mouth 2 (two) times a day (Patient not taking: Reported on 4/7/2022 ), Disp: 60 capsule, Rfl: 0  •  insulin glargine (Lantus SoloStar) 100 units/mL injection pen, Inject 15 Units under the skin daily if Lantus solostar is not the preferred basal insulin for the patient's insurance company, please substitute with Terral [place] flexpen, Basaglar Kwikpen , Levemir flexpen, [employer]  or equivalent insulin vials at the same dose instead , Disp: 4 5 mL, Rfl: 0  •  insulin lispro (HumaLOG KwikPen) 100 units/mL injection pen, Inject 6 Units under the skin 3 (three) times a day with meals if Humalog  Kwik pen is not the preferred mealtime insulin for the patient's insurance, please substitute with NovoLog flexpen, Fiasp  flextouch, Admelog solostar  or Apidra solostar pen or equivalent insulin vials at the same dose instead  (Patient not taking: Reported on 1/12/2023), Disp: 15 mL, Rfl: 0  •  meloxicam (MOBIC) 15 mg tablet, meloxicam 15 mg tablet  Take 1 tablet every day by oral route as directed for 30 days  (Patient not taking: No sig reported), Disp: , Rfl:       Wt Readings from Last 3 Encounters:   01/12/23 115 kg (253 lb)   10/28/22 116 kg (255 lb 9 6 oz)   10/27/22 117 kg (257 lb 15 oz)     Temp Readings from Last 3 Encounters:   10/27/22 98 °F (36 7 °C) (Oral)   08/18/21 98 1 °F (36 7 °C) (Tympanic)   07/20/21 97 5 °F (36 4 °C) (Oral)     BP Readings from Last 3 Encounters:   01/12/23 126/84   10/28/22 114/60   10/27/22 119/61     Pulse Readings from Last 3 Encounters:   01/12/23 71   10/28/22 80   10/27/22 68         Physical Exam  HENT:      Head: Atraumatic  Mouth/Throat:      Mouth: Mucous membranes are dry  Eyes:      Extraocular Movements: Extraocular movements intact  Cardiovascular:      Rate and Rhythm: Normal rate and regular rhythm  Pulses: Normal pulses  Heart sounds: Normal heart sounds     Pulmonary: Effort: Pulmonary effort is normal       Breath sounds: Normal breath sounds  Abdominal:      General: Abdomen is flat  Musculoskeletal:         General: Normal range of motion  Cervical back: Normal range of motion  Skin:     General: Skin is warm  Neurological:      General: No focal deficit present  Mental Status: He is alert and oriented to person, place, and time     Psychiatric:         Mood and Affect: Mood normal          Behavior: Behavior normal            Laboratory Studies:  CMP:  Lab Results   Component Value Date    K 3 8 10/26/2022     10/26/2022    CO2 26 10/26/2022    BUN 19 10/26/2022    CREATININE 0 79 10/26/2022    GLUCOSE 231 (H) 2021    AST 16 10/26/2022    ALT 24 10/26/2022    EGFR 94 10/26/2022       Lipid Profile:   No results found for: CHOL  Lab Results   Component Value Date    HDL 44 2021     Lab Results   Component Value Date    LDLCALC 123 (H) 2021     Lab Results   Component Value Date    TRIG 215 8 (H) 2021       Cardiac testing:   EKG reviewed personally:   Results for orders placed during the hospital encounter of 21    Echo complete with contrast if indicated    Narrative  [place] [address], [address] [facility]  [phone]    Transthoracic Echocardiogram  2D, M-mode, Doppler, and Color Doppler    Study date:  2021    Patient: Ana Ball  MR number: [id number]  Account number: [de-identified]  : 1955  Age: 61 years  Gender: Male  Status: Outpatient  Location: Bedside  Height: 70 in  Weight: 246 lb  BP: 110/ 66 mmHg    Indications: NSTEMI    Diagnoses: I21 4 - Non-ST elevation (NSTEMI) myocardial infarction    Sonographer:  VLADISLAV Villaseñor  Primary Physician:  Jaspal Gasca MD  Referring Physician:  Araceli Dior PA-C  Group:  [facility]  Interpreting Physician:  Rocio Duarte MD    SUMMARY    LEFT VENTRICLE:  Size was normal   Systolic function was at the lower limits of normal  Ejection fraction was estimated to be 50 %  There was mild hypokinesis of the basal-mid inferior and inferolateral walls  Doppler parameters were consistent with abnormal left ventricular relaxation (grade 1 diastolic dysfunction)  RIGHT VENTRICLE:  The size was normal   Systolic function was normal     LEFT ATRIUM:  The atrium was mildly dilated  MITRAL VALVE:  There was trace to mild regurgitation  TRICUSPID VALVE:  There was trace regurgitation  HISTORY: PRIOR HISTORY: HTN, HLD, DM2    PROCEDURE: The procedure was performed at the bedside  This was a routine study  The transthoracic approach was used  The study included complete 2D imaging, M-mode, complete spectral Doppler, and color Doppler  The heart rate was 70 bpm,  at the start of the study  Images were obtained from the parasternal, apical, subcostal, and suprasternal notch acoustic windows  Echocardiographic views were limited due to lung interference  This was a technically difficult study  LEFT VENTRICLE: Size was normal  Systolic function was at the lower limits of normal  Ejection fraction was estimated to be 50 %  There was mild hypokinesis of the basal-mid inferior and inferolateral walls  Wall thickness was normal   DOPPLER: Doppler parameters were consistent with abnormal left ventricular relaxation (grade 1 diastolic dysfunction)  RIGHT VENTRICLE: The size was normal  Systolic function was normal  Wall thickness was normal     LEFT ATRIUM: The atrium was mildly dilated  RIGHT ATRIUM: Size was normal     MITRAL VALVE: Valve structure was normal  There was normal leaflet separation  DOPPLER: The transmitral velocity was within the normal range  There was no evidence for stenosis  There was trace to mild regurgitation  AORTIC VALVE: The valve was trileaflet  Leaflets exhibited mildly increased thickness, mild calcification, normal cuspal separation, and sclerosis   DOPPLER: Transaortic velocity was within the normal range  There was no evidence for  stenosis  There was no regurgitation  TRICUSPID VALVE: The valve structure was normal  There was normal leaflet separation  DOPPLER: The transtricuspid velocity was within the normal range  There was no evidence for stenosis  There was trace regurgitation  The tricuspid jet  envelope definition was inadequate for estimation of RV systolic pressure  There are no indirect findings suggestive of moderate or severe pulmonary hypertension  PULMONIC VALVE: Leaflets exhibited normal thickness, no calcification, and normal cuspal separation  DOPPLER: The transpulmonic velocity was within the normal range  There was no regurgitation  PERICARDIUM: There was no pericardial effusion  The pericardium was normal in appearance  AORTA: The root exhibited normal size  SYSTEMIC VEINS: IVC: The inferior vena cava was normal in size and course  Respirophasic changes were normal     SYSTEM MEASUREMENT TABLES    2D  %FS: 24 71 %  Ao Diam: 3 41 cm  EDV(Teich): 173 64 ml  EF(Teich): 48 23 %  ESV(Teich): 89 89 ml  HR_4Ch_Q: 66 42 BPM  IVSd: 1 14 cm  LA Area: 23 21 cm2  LA Diam: 3 7 cm  LVCO_4Ch_Q: 2 84 L/min  LVEF_4Ch_Q: 41 39 %  LVIDd: 5 91 cm  LVIDs: 4 45 cm  LVLd_4Ch_Q: 8 6 cm  LVLs_4Ch_Q: 7 4 cm  LVPWd: 0 97 cm  LVSV_4Ch_Q: 42 77 ml  LVVED_4Ch_Q: 103 33 ml  LVVES_4Ch_Q: 60 56 ml  RA Area: 18 36 cm2  RVIDd: 3 33 cm  SV(Teich): 83 75 ml    MM  TAPSE: 2 59 cm    PW  E' Sept: 0 08 m/s  E/E' Sept: 9 37  MV A Abiel: 0 89 m/s  MV Dec Barton: 3 68 m/s2  MV DecT: 197 59 ms  MV E Abiel: 0 73 m/s  MV E/A Ratio: 0 82    [employer] Accredited Echocardiography Laboratory    Prepared and electronically signed by    Mery Ni MD  Signed 08-Jul-2021 14:41:28    No results found for this or any previous visit      Results for orders placed during the hospital encounter of 07/07/21    Cardiac catheterization    [place]  [address] [address]  [phone]    [facility]    Invasive Cardiovascular Lab Complete Report    Patient: Hawa Zimmer  MR number: [id number]  Account number: [de-identified]  Study date: 2021  Gender: Male  : 1955  Height: 70 1 in  Weight: 246 4 lb  BSA: 2 28 mï¾²    Allergies: NO KNOWN ALLERGIES    Diagnostic Cardiologist:  Nitza Combs MD  Primary Physician:  Ludivina Dean MD    SUMMARY    CORONARY CIRCULATION:  Left main: Normal   LAD: The vessel was normal sized  There was a 70% stenosis in the proximal vessel  Circumflex: The vessel was normal sized and gave rise to one major OM branch  There was a 95% proximal stenosis of the large OM branch  RCA: The vessel was normal sized and dominant, giving rise to the PDA and several posterolateral branches  There was a complex eccentric lesion in mid vessel with thrombus  There is MEDINA 3 flow into the distal vessel, but MEDINA 1 flow into  the PDA  There was collateral flow to the PDA from the left system  REPORT ELEMENT SELECTION:  Right radial access  There were no complications  Summary:  Severe 3 vessel CAD  Plan: [facility] for surgical consultation  INDICATIONS:  --  Possible CAD: myocardial infarction without ST elevation (NSTEMI)  PROCEDURES PERFORMED    --  Left coronary angiography  --  Right coronary angiography  --  Inpatient  --  Mod Sedation Same Physician Initial 15min  --  Mod Sedation Same Physician Add 15min  --  Coronary Catheterization (w/o LHC)  PROCEDURE: The risks and alternatives of the procedures and conscious sedation were explained to the patient and informed consent was obtained  The patient was brought to the cath lab and placed on the table  The planned puncture sites  were prepped and draped in the usual sterile fashion  --  Right radial artery access  After performing an Jarret's test to verify adequate ulnar artery supply to the hand, the radial site was prepped   The puncture site was infiltrated with local anesthetic  The vessel was accessed using the  modified Seldinger technique, a wire was advanced into the vessel, and a sheath was advanced over the wire into the vessel  --  Left coronary artery angiography  A catheter was advanced over a guidewire into the aorta and positioned in the left coronary artery ostium under fluoroscopic guidance  Angiography was performed  --  Right coronary artery angiography  A catheter was advanced over a guidewire into the aorta and positioned in the right coronary artery ostium under fluoroscopic guidance  Angiography was performed  --  Inpatient  --  Mod Sedation Same Physician Initial 15min  --  Mod Sedation Same Physician Add 15min  --  Coronary Catheterization (w/o [facility])  PROCEDURE COMPLETION: The patient tolerated the procedure well and was discharged from the cath lab  TIMING: Test started at 15:17  Test concluded at 15:36  HEMOSTASIS: The sheath was removed  The site was compressed with a Hemoband  device  Hemostasis was obtained  MEDICATIONS GIVEN: Verapamil (Isoptin, Calan, Covera), 1 25 mg, IA, at 15:22  Fentanyl (1OOmcg/2 ml), 50 mcg, IV, at 15:05  Versed (2mg/2ml), 2 mg, IV, at 15:06  Fentanyl (1OOmcg/2 ml), 25 mcg, IV, at  15:17  Versed (2mg/2ml), 1 mg, IV, at 15:17  1% Lidocaine, 0 1 ml, subcutaneously, at 15:21  Nitroglycerin (200mcg/ml), 200 mcg, at 15:21  Heparin 1000 units/ml, 4,000 units, IV, at 15:22  CONTRAST GIVEN: 75 ml Omnipaque (350mg I /ml)  RADIATION EXPOSURE: Fluoroscopy time: 2 48 min  CORONARY VESSELS:   --  Left main: Normal   --  LAD: The vessel was normal sized  There was a 70% stenosis in the proximal vessel  --  Circumflex: The vessel was normal sized and gave rise to one major OM branch  There was a 95% proximal stenosis of the large OM branch  --  RCA: The vessel was normal sized and dominant, giving rise to the PDA and several posterolateral branches   There was a complex eccentric lesion in mid vessel with thrombus  There is MEDINA 3 flow into the distal vessel, but MEDINA 1 flow  into the PDA  There was collateral flow to the PDA from the left system  NOTE: Right radial access  There were no complications  Prepared and signed by    Noni Murray MD  Signed 2021 15:53:35    CC: Dr Braxton Cazares  [facility]    Study diagram    Hemodynamic tables    Pressures:  Baseline  Pressures:  - HR: 76  Pressures:  - Rhythm:  Pressures:  -- Aortic Pressure (S/D/M): 92/59/70    Outputs:  Baseline  Outputs:  -- CALCULATIONS[age]  Outputs:  -- CALCULATIONS: Body Surface Area: 2 28  Outputs:  -- CALCULATIONS: Height in cm: 178 00  Outputs:  -- CALCULATIONS: Sex: Male  Outputs:  -- CALCULATIONS: Weight in k 00  Outputs:  -- OUTPUTS: O2 consumption: 285 59  Outputs:  -- OUTPUTS: Vo2 Indexed: 125 00    No results found for this or any previous visit

## 2023-01-12 NOTE — PATIENT INSTRUCTIONS
Recommendations:  1  Restart aspirin 81mg daily  2  Continue remainder of medications  3  Check exercise nuclear stress test   4  Follow up in 6 months

## 2023-01-25 ENCOUNTER — HOSPITAL ENCOUNTER (OUTPATIENT)
Dept: NON INVASIVE DIAGNOSTICS | Facility: CLINIC | Age: 68
Discharge: HOME | End: 2023-01-25

## 2023-01-25 DIAGNOSIS — I25.10 CORONARY ARTERY DISEASE, UNSPECIFIED VESSEL OR LESION TYPE, UNSPECIFIED WHETHER ANGINA PRESENT, UNSPECIFIED WHETHER NATIVE OR TRANSPLANTED HEART: ICD-10-CM

## 2023-01-25 DIAGNOSIS — Z95.1 S/P CABG (CORONARY ARTERY BYPASS GRAFT): ICD-10-CM

## 2023-01-25 LAB
CHEST PAIN STATEMENT: NORMAL
MAX DIASTOLIC BP: 86 MMHG
MAX HEART RATE: 125 BPM
MAX PREDICTED HEART RATE: 155 BPM
MAX. SYSTOLIC BP: 144 MMHG
NUC STRESS EJECTION FRACTION: 49 %
PROTOCOL NAME: NORMAL
RATE PRESSURE PRODUCT: NORMAL
REASON FOR TERMINATION: NORMAL
SL CV REST NUCLEAR ISOTOPE DOSE: 16.2 MCI
SL CV STRESS NUCLEAR ISOTOPE DOSE: 46.3 MCI
SL CV STRESS RECOVERY BP: NORMAL MMHG
SL CV STRESS RECOVERY HR: 96 BPM
SL CV STRESS RECOVERY O2 SAT: 97 %
STRESS ANGINA INDEX: 0
STRESS BASELINE BP: NORMAL MMHG
STRESS BASELINE HR: 71 BPM
STRESS O2 SAT REST: 95 %
STRESS PEAK HR: 125 BPM
STRESS POST O2 SAT PEAK: 98 %
STRESS POST PEAK BP: 144 MMHG
STRESS ST DEPRESSION: 0 MM
STRESS/REST PERFUSION RATIO: 1
TARGET HR FORMULA: NORMAL
TEST INDICATION: NORMAL
TIME IN EXERCISE PHASE: NORMAL

## 2023-01-25 RX ADMIN — REGADENOSON 0.4 MG: 0.08 INJECTION, SOLUTION INTRAVENOUS at 13:46

## 2023-02-07 ENCOUNTER — TELEPHONE (OUTPATIENT)
Dept: CARDIOLOGY CLINIC | Facility: CLINIC | Age: 68
End: 2023-02-07

## 2023-02-07 NOTE — TELEPHONE ENCOUNTER
Called patient and left message  stress test looked good  No evidence of myocardial ischemia         ----- Message from Keith James MD sent at 2/6/2023 10:45 AM EST -----  Please let patient know stress test looked good  No evidence of myocardial ischemia  Thanks

## 2023-03-24 ENCOUNTER — APPOINTMENT (OUTPATIENT)
Dept: LAB | Facility: HOSPITAL | Age: 68
End: 2023-03-24
Attending: INTERNAL MEDICINE

## 2023-03-24 DIAGNOSIS — E13.49 OTHER DIABETIC NEUROLOGICAL COMPLICATION ASSOCIATED WITH OTHER SPECIFIED DIABETES MELLITUS (HCC): ICD-10-CM

## 2023-03-24 DIAGNOSIS — R06.00 DYSPNEA, UNSPECIFIED TYPE: ICD-10-CM

## 2023-03-24 DIAGNOSIS — I25.119 ATHEROSCLEROSIS OF NATIVE CORONARY ARTERY WITH ANGINA PECTORIS, UNSPECIFIED WHETHER NATIVE OR TRANSPLANTED HEART (HCC): ICD-10-CM

## 2023-03-24 DIAGNOSIS — I10 ESSENTIAL HYPERTENSION, BENIGN: ICD-10-CM

## 2023-03-24 DIAGNOSIS — E78.5 HYPERLIPIDEMIA, UNSPECIFIED HYPERLIPIDEMIA TYPE: ICD-10-CM

## 2023-03-24 DIAGNOSIS — E11.65 TYPE II DIABETES MELLITUS WITH HYPEROSMOLARITY, UNCONTROLLED (HCC): ICD-10-CM

## 2023-03-24 DIAGNOSIS — E11.00 TYPE II DIABETES MELLITUS WITH HYPEROSMOLARITY, UNCONTROLLED (HCC): ICD-10-CM

## 2023-03-24 LAB
ALBUMIN SERPL BCP-MCNC: 3.8 G/DL (ref 3.5–5)
ALP SERPL-CCNC: 75 U/L (ref 34–104)
ALT SERPL W P-5'-P-CCNC: 21 U/L (ref 7–52)
ANION GAP SERPL CALCULATED.3IONS-SCNC: 7 MMOL/L (ref 4–13)
AST SERPL W P-5'-P-CCNC: 11 U/L (ref 13–39)
BASOPHILS # BLD AUTO: 0.04 THOUSANDS/ÂΜL (ref 0–0.1)
BASOPHILS NFR BLD AUTO: 1 % (ref 0–1)
BILIRUB SERPL-MCNC: 0.85 MG/DL (ref 0.2–1)
BNP SERPL-MCNC: 52 PG/ML (ref 0–100)
BUN SERPL-MCNC: 12 MG/DL (ref 5–25)
CALCIUM SERPL-MCNC: 8.9 MG/DL (ref 8.4–10.2)
CARDIAC TROPONIN I PNL SERPL HS: 4 NG/L (ref 8–18)
CHLORIDE SERPL-SCNC: 96 MMOL/L (ref 96–108)
CO2 SERPL-SCNC: 27 MMOL/L (ref 21–32)
CREAT SERPL-MCNC: 0.72 MG/DL (ref 0.6–1.3)
EOSINOPHIL # BLD AUTO: 0.11 THOUSAND/ÂΜL (ref 0–0.61)
EOSINOPHIL NFR BLD AUTO: 2 % (ref 0–6)
ERYTHROCYTE [DISTWIDTH] IN BLOOD BY AUTOMATED COUNT: 12.2 % (ref 11.6–15.1)
EST. AVERAGE GLUCOSE BLD GHB EST-MCNC: 263 MG/DL
GFR SERPL CREATININE-BSD FRML MDRD: 97 ML/MIN/1.73SQ M
GLUCOSE SERPL-MCNC: 411 MG/DL (ref 65–140)
HBA1C MFR BLD: 10.8 %
HCT VFR BLD AUTO: 44.1 % (ref 36.5–49.3)
HGB BLD-MCNC: 15.2 G/DL (ref 12–17)
IMM GRANULOCYTES # BLD AUTO: 0.03 THOUSAND/UL (ref 0–0.2)
IMM GRANULOCYTES NFR BLD AUTO: 0 % (ref 0–2)
LYMPHOCYTES # BLD AUTO: 1.36 THOUSANDS/ÂΜL (ref 0.6–4.47)
LYMPHOCYTES NFR BLD AUTO: 20 % (ref 14–44)
MCH RBC QN AUTO: 32.8 PG (ref 26.8–34.3)
MCHC RBC AUTO-ENTMCNC: 34.5 G/DL (ref 31.4–37.4)
MCV RBC AUTO: 95 FL (ref 82–98)
MONOCYTES # BLD AUTO: 0.73 THOUSAND/ÂΜL (ref 0.17–1.22)
MONOCYTES NFR BLD AUTO: 11 % (ref 4–12)
NEUTROPHILS # BLD AUTO: 4.44 THOUSANDS/ÂΜL (ref 1.85–7.62)
NEUTS SEG NFR BLD AUTO: 66 % (ref 43–75)
NRBC BLD AUTO-RTO: 0 /100 WBCS
PLATELET # BLD AUTO: 186 THOUSANDS/UL (ref 149–390)
PMV BLD AUTO: 9.3 FL (ref 8.9–12.7)
POTASSIUM SERPL-SCNC: 4.1 MMOL/L (ref 3.5–5.3)
PROT SERPL-MCNC: 6.6 G/DL (ref 6.4–8.4)
RBC # BLD AUTO: 4.64 MILLION/UL (ref 3.88–5.62)
SODIUM SERPL-SCNC: 130 MMOL/L (ref 135–147)
TSH SERPL DL<=0.05 MIU/L-ACNC: 2.57 UIU/ML (ref 0.45–4.5)
WBC # BLD AUTO: 6.71 THOUSAND/UL (ref 4.31–10.16)

## 2023-03-28 ENCOUNTER — APPOINTMENT (OUTPATIENT)
Dept: LAB | Facility: HOSPITAL | Age: 68
End: 2023-03-28
Attending: INTERNAL MEDICINE

## 2023-03-28 ENCOUNTER — HOSPITAL ENCOUNTER (OUTPATIENT)
Dept: RADIOLOGY | Facility: HOSPITAL | Age: 68
Discharge: HOME | End: 2023-03-28
Attending: INTERNAL MEDICINE

## 2023-03-28 DIAGNOSIS — R06.02 SHORTNESS OF BREATH: ICD-10-CM

## 2023-03-28 LAB
ALBUMIN SERPL BCP-MCNC: 4.2 G/DL (ref 3.5–5)
ALP SERPL-CCNC: 61 U/L (ref 34–104)
ALT SERPL W P-5'-P-CCNC: 24 U/L (ref 7–52)
ANION GAP SERPL CALCULATED.3IONS-SCNC: 8 MMOL/L (ref 4–13)
AST SERPL W P-5'-P-CCNC: 15 U/L (ref 13–39)
BILIRUB SERPL-MCNC: 0.84 MG/DL (ref 0.2–1)
BUN SERPL-MCNC: 11 MG/DL (ref 5–25)
CALCIUM SERPL-MCNC: 9.3 MG/DL (ref 8.4–10.2)
CHLORIDE SERPL-SCNC: 98 MMOL/L (ref 96–108)
CHOLEST SERPL-MCNC: 97 MG/DL
CO2 SERPL-SCNC: 29 MMOL/L (ref 21–32)
CREAT SERPL-MCNC: 0.7 MG/DL (ref 0.6–1.3)
GFR SERPL CREATININE-BSD FRML MDRD: 99 ML/MIN/1.73SQ M
GLUCOSE P FAST SERPL-MCNC: 228 MG/DL (ref 65–99)
HDLC SERPL-MCNC: 33 MG/DL
LDLC SERPL CALC-MCNC: 44 MG/DL (ref 0–100)
NONHDLC SERPL-MCNC: 64 MG/DL
POTASSIUM SERPL-SCNC: 3.8 MMOL/L (ref 3.5–5.3)
PROT SERPL-MCNC: 7 G/DL (ref 6.4–8.4)
SODIUM SERPL-SCNC: 135 MMOL/L (ref 135–147)
TRIGL SERPL-MCNC: 101 MG/DL

## 2023-05-11 ENCOUNTER — CONSULT (OUTPATIENT)
Dept: PULMONOLOGY | Facility: CLINIC | Age: 68
End: 2023-05-11

## 2023-05-11 ENCOUNTER — TELEPHONE (OUTPATIENT)
Dept: PULMONOLOGY | Facility: CLINIC | Age: 68
End: 2023-05-11

## 2023-05-11 VITALS
BODY MASS INDEX: 35.84 KG/M2 | RESPIRATION RATE: 16 BRPM | HEIGHT: 71 IN | SYSTOLIC BLOOD PRESSURE: 132 MMHG | WEIGHT: 256 LBS | OXYGEN SATURATION: 98 % | HEART RATE: 75 BPM | TEMPERATURE: 97.5 F | DIASTOLIC BLOOD PRESSURE: 80 MMHG

## 2023-05-11 DIAGNOSIS — Z86.16 HISTORY OF COVID-19: ICD-10-CM

## 2023-05-11 DIAGNOSIS — J45.40 MODERATE PERSISTENT ASTHMA WITHOUT COMPLICATION: Primary | ICD-10-CM

## 2023-05-11 DIAGNOSIS — E66.01 CLASS 2 SEVERE OBESITY DUE TO EXCESS CALORIES WITH SERIOUS COMORBIDITY AND BODY MASS INDEX (BMI) OF 36.0 TO 36.9 IN ADULT (HCC): ICD-10-CM

## 2023-05-11 DIAGNOSIS — G47.33 OSA (OBSTRUCTIVE SLEEP APNEA): ICD-10-CM

## 2023-05-11 DIAGNOSIS — R06.00 DYSPNEA, UNSPECIFIED: ICD-10-CM

## 2023-05-11 PROBLEM — Z72.0 TOBACCO USE: Status: RESOLVED | Noted: 2021-07-10 | Resolved: 2023-05-11

## 2023-05-11 PROBLEM — E66.812 CLASS 2 SEVERE OBESITY DUE TO EXCESS CALORIES WITH SERIOUS COMORBIDITY AND BODY MASS INDEX (BMI) OF 36.0 TO 36.9 IN ADULT (HCC): Status: ACTIVE | Noted: 2023-05-11

## 2023-05-11 RX ORDER — ALBUTEROL SULFATE 90 UG/1
2 AEROSOL, METERED RESPIRATORY (INHALATION) EVERY 6 HOURS PRN
Qty: 18 G | Refills: 1 | Status: SHIPPED | OUTPATIENT
Start: 2023-05-11

## 2023-05-11 NOTE — PROGRESS NOTES
Assessment/Plan: Moderate persistent asthma without complication  Mr Rosales Alonso has shortness of breath on exertion with history of cough and wheeze  This started after he had COVID infection in November 2021  Currently his exercise tolerance is more than 2 blocks  He has also history of allergies  On clinical examination his chest was clear to auscultation  He had a recent PFT which showed mild airflow obstruction with reversibility and normal diffusion capacity  He had evidence of air trapping  His CT scan was unremarkable except for sternotomy status  I have advised him to continue with Advair regularly and have added albuterol rescue inhaler  I have reminded him to rinse mouth and gargle throat after using steroid inhaler  I had a long discussion with him and have answered all his questions  REJI (obstructive sleep apnea)  He was told to have struct of sleep apnea more than 10 years back after his sleep study by Dr Tamela Cantu  He thought he could not use the CPAP and never took any treatment  He developed coronary artery disease subsequently and currently has diabetes and hypertension  He has history of snoring interrupted sleep and daytime sleepiness and tiredness  On clinical examination, he had oropharyngeal crowding  He likely has significant obstructive sleep apnea and could benefit from CPAP therapy  I showed him the various new interfaces which can be used in spite of beard  He is aware of the complications of untreated sleep apnea  I have ordered a repeat sleep study to reconfirm his sleep apnea  I had a long discussion with him and answered all his questions    Class 2 severe obesity due to excess calories with serious comorbidity and body mass index (BMI) of 36 0 to 36 9 in [facility])  He is moderately obese and I have highlighted to him the need for losing weight    He understands that weight reduction can significantly improve his sleep apnea and other symptoms    History of COVID-19  He had COVID in November 2021  He was found to have pneumonia on imaging at that time  He subsequently developed chest pain and found to have coronary artery disease requiring CABG  He states that his shortness of breath and wheezing started after the COVID  No previous history of asthma  Diagnoses and all orders for this visit:    Moderate persistent asthma without complication  -     albuterol (Ventolin HFA) 90 mcg/act inhaler; Inhale 2 puffs every 6 (six) hours as needed for wheezing or shortness of breath    REJI (obstructive sleep apnea)  -     Diagnostic Sleep Study; Future    Dyspnea, unspecified  -     Ambulatory Referral to Pulmonology    History of COVID-19    Class 2 severe obesity due to excess calories with serious comorbidity and body mass index (BMI) of 36 0 to 36 9 in [facility])          Subjective:      Patient ID: Kaylee Pope is a 74 y o  male  Mr Navjot Valenzuela has been referred for evaluation of his shortness of breath on exertion  He stated that he had COVID infection in number of 2021 and afterwards noting shortness of breath on exertion and occasional wheeze  He was subsequently found to have coronary artery disease and underwent CABG with vein grafting from left lower extremity  He reportedly had cardiac arrhythmias and was on amiodarone for a while  His symptoms got better and currently he again notes shortness of breath on exertion  His exercise tolerance is at least 2 blocks but she has occasional wheezing and nonproductive cough  He has also noted a runny nose and allergies  He has no previous history of significant smoking  He had a pulmonary function test which showed mild airflow obstruction with bronchodilator response, air trapping and normal diffusion capacity  He is found to have asthma and has been started on Advair by his primary care physician  He denies any chest pain or palpitations    He has some swelling of his left ankle usually towards the evening  He has no occupational exposure to chemicals or asbestos  He has history of diabetes and hypertension  He works as a special   He was diagnosed with obstructive sleep apnea more than 10 years back and his doctor Dr campbell advised him CPAP therapy  He thought he would not be able to tolerate the CPAP and did not take any treatment  Currently he has history of snoring and daytime sleepiness and tiredness  His sleep is interrupted and he occasionally wakes up not rested  No witnessed apneas  He has no problems driving  He is obese and understands the need for weight reduction  The following portions of the patient's history were reviewed and updated as appropriate: allergies, current medications, past family history, past medical history, past social history, past surgical history and problem list     Review of Systems   Constitutional: Negative for appetite change, chills, fatigue and fever  HENT: Positive for rhinorrhea  Negative for hearing loss, sneezing, sore throat, trouble swallowing and voice change  Eyes: Negative for visual disturbance  Respiratory: Positive for cough, shortness of breath and wheezing  Negative for chest tightness  Cardiovascular: Positive for leg swelling  Negative for chest pain and palpitations  Gastrointestinal: Positive for constipation  Negative for abdominal pain, diarrhea, nausea and vomiting  Genitourinary: Negative for dysuria, frequency and urgency  Musculoskeletal: Positive for arthralgias  Skin: Negative for rash  Allergic/Immunologic: Positive for environmental allergies  Neurological: Negative for dizziness, syncope, light-headedness and headaches  Psychiatric/Behavioral: Negative for agitation, confusion and sleep disturbance  The patient is not nervous/anxious            Objective:      /80 (BP Location: Left arm, Patient Position: Sitting, Cuff Size: Standard)   Pulse 75   Temp 97 5 °F (36 4 °C) "(Tympanic)   Resp 16   Ht 5' 10 5\" (1 791 m)   Wt 116 kg (256 lb)   SpO2 98%   BMI 36 21 kg/m²          Physical Exam  Vitals reviewed  Constitutional:       General: He is not in acute distress  Appearance: He is obese  He is not ill-appearing, toxic-appearing or diaphoretic  HENT:      Head: Normocephalic  Mouth/Throat:      Mouth: Mucous membranes are moist       Pharynx: Oropharynx is clear  Comments: Mallampatti class 3  Eyes:      General: No scleral icterus  Conjunctiva/sclera: Conjunctivae normal    Cardiovascular:      Rate and Rhythm: Normal rate and regular rhythm  Heart sounds: Normal heart sounds  No murmur heard  Pulmonary:      Effort: Pulmonary effort is normal  No respiratory distress  Breath sounds: Normal breath sounds  No stridor  No wheezing, rhonchi or rales  Chest:      Chest wall: No tenderness  Abdominal:      General: Bowel sounds are normal       Palpations: Abdomen is soft  Tenderness: There is no abdominal tenderness  There is no guarding  Musculoskeletal:      Cervical back: No rigidity  Right lower leg: No edema  Left lower leg: Edema present  Lymphadenopathy:      Cervical: No cervical adenopathy  Skin:     Coloration: Skin is not jaundiced or pale  Findings: No rash  Neurological:      Mental Status: He is alert and oriented to person, place, and time  Gait: Gait normal    Psychiatric:         Mood and Affect: Mood normal          Behavior: Behavior normal          Thought Content:  Thought content normal          Judgment: Judgment normal          "

## 2023-05-11 NOTE — TELEPHONE ENCOUNTER
Pt called back to let Dr Corine Bhakta know that the inhaler he got yesterday was the generic inhaler for Advair 250/50 mcg he has not used yet

## 2023-05-11 NOTE — ASSESSMENT & PLAN NOTE
He was told to have struct of sleep apnea more than 10 years back after his sleep study by Dr Breezy Dubose  He thought he could not use the CPAP and never took any treatment  He developed coronary artery disease subsequently and currently has diabetes and hypertension  He has history of snoring interrupted sleep and daytime sleepiness and tiredness  On clinical examination, he had oropharyngeal crowding  He likely has significant obstructive sleep apnea and could benefit from CPAP therapy  I showed him the various new interfaces which can be used in spite of beard  He is aware of the complications of untreated sleep apnea  I have ordered a repeat sleep study to reconfirm his sleep apnea    I had a long discussion with him and answered all his questions

## 2023-05-11 NOTE — ASSESSMENT & PLAN NOTE
He is moderately obese and I have highlighted to him the need for losing weight    He understands that weight reduction can significantly improve his sleep apnea and other symptoms

## 2023-05-11 NOTE — ASSESSMENT & PLAN NOTE
He had COVID in November 2021  He was found to have pneumonia on imaging at that time  He subsequently developed chest pain and found to have coronary artery disease requiring CABG  He states that his shortness of breath and wheezing started after the COVID  No previous history of asthma

## 2023-05-11 NOTE — ASSESSMENT & PLAN NOTE
Mr Karla Mao has shortness of breath on exertion with history of cough and wheeze  This started after he had COVID infection in November 2021  Currently his exercise tolerance is more than 2 blocks  He has also history of allergies  On clinical examination his chest was clear to auscultation  He had a recent PFT which showed mild airflow obstruction with reversibility and normal diffusion capacity  He had evidence of air trapping  His CT scan was unremarkable except for sternotomy status  I have advised him to continue with Advair regularly and have added albuterol rescue inhaler  I have reminded him to rinse mouth and gargle throat after using steroid inhaler  I had a long discussion with him and have answered all his questions

## 2023-05-25 ENCOUNTER — HOSPITAL ENCOUNTER (OUTPATIENT)
Dept: CT IMAGING | Facility: HOSPITAL | Age: 68
End: 2023-05-25
Attending: INTERNAL MEDICINE

## 2023-05-25 DIAGNOSIS — J84.10 PULMONARY FIBROSIS, UNSPECIFIED (HCC): ICD-10-CM

## 2023-07-24 ENCOUNTER — OFFICE VISIT (OUTPATIENT)
Dept: PULMONOLOGY | Facility: CLINIC | Age: 68
End: 2023-07-24
Payer: COMMERCIAL

## 2023-07-24 VITALS
OXYGEN SATURATION: 97 % | TEMPERATURE: 98.7 F | BODY MASS INDEX: 36.65 KG/M2 | HEIGHT: 70 IN | RESPIRATION RATE: 20 BRPM | DIASTOLIC BLOOD PRESSURE: 84 MMHG | SYSTOLIC BLOOD PRESSURE: 128 MMHG | HEART RATE: 98 BPM | WEIGHT: 256 LBS

## 2023-07-24 DIAGNOSIS — G47.33 OSA (OBSTRUCTIVE SLEEP APNEA): ICD-10-CM

## 2023-07-24 DIAGNOSIS — Z86.16 HISTORY OF COVID-19: ICD-10-CM

## 2023-07-24 DIAGNOSIS — E66.01 CLASS 2 SEVERE OBESITY DUE TO EXCESS CALORIES WITH SERIOUS COMORBIDITY AND BODY MASS INDEX (BMI) OF 36.0 TO 36.9 IN ADULT (HCC): ICD-10-CM

## 2023-07-24 DIAGNOSIS — J45.40 MODERATE PERSISTENT ASTHMA WITHOUT COMPLICATION: Primary | ICD-10-CM

## 2023-07-24 PROCEDURE — 99214 OFFICE O/P EST MOD 30 MIN: CPT | Performed by: INTERNAL MEDICINE

## 2023-07-24 RX ORDER — FLUTICASONE PROPIONATE AND SALMETEROL 250; 50 UG/1; UG/1
1 POWDER RESPIRATORY (INHALATION) 2 TIMES DAILY
Qty: 180 BLISTER | Refills: 0 | Status: SHIPPED | OUTPATIENT
Start: 2023-07-24 | End: 2023-10-22

## 2023-07-24 NOTE — ASSESSMENT & PLAN NOTE
Mr. Hermann Carpenter has had shortness of breath on exertion with history of cough and wheeze. This started after he had COVID infection in November 2021. Currently his exercise tolerance is less than a block. He has also history of allergies. On clinical examination his chest was clear to auscultation. His PFT showed mild airflow obstruction with reversibility and normal diffusion capacity. He had evidence of air trapping. His CT scan was unremarkable except for sternotomy status. I have advised him to continue with Advair regularly and albuterol regularly. I had a long discussion with him and have answered all his questions.

## 2023-07-24 NOTE — ASSESSMENT & PLAN NOTE
He was told to have struct of sleep apnea more than 10 years back after his sleep study by Dr. Re Gauthier. He thought he could not use the CPAP and never took any treatment. He developed coronary artery disease subsequently and currently has diabetes and hypertension. He has history of snoring interrupted sleep and daytime sleepiness and tiredness. On clinical examination, he had oropharyngeal crowding. He likely has significant obstructive sleep apnea and could benefit from CPAP therapy. I showed him the various new interfaces which can be used in spite of beard. He is aware of the complications of untreated sleep apnea. I have ordered a repeat sleep study to reconfirm his sleep apnea.   I had a long discussion with him and answered all his questions

## 2023-07-24 NOTE — PROGRESS NOTES
Assessment/Plan: Moderate persistent asthma without complication  Mr. Madie Chapin has had shortness of breath on exertion with history of cough and wheeze. This started after he had COVID infection in November 2021. Currently his exercise tolerance is less than a block. He has also history of allergies. On clinical examination his chest was clear to auscultation. His PFT showed mild airflow obstruction with reversibility and normal diffusion capacity. He had evidence of air trapping. His CT scan was unremarkable except for sternotomy status. I have advised him to continue with Advair regularly and albuterol regularly. I had a long discussion with him and have answered all his questions. REJI (obstructive sleep apnea)  He was told to have struct of sleep apnea more than 10 years back after his sleep study by Dr. Kevin Garcia. He thought he could not use the CPAP and never took any treatment. He developed coronary artery disease subsequently and currently has diabetes and hypertension. He has history of snoring interrupted sleep and daytime sleepiness and tiredness. On clinical examination, he had oropharyngeal crowding. He likely has significant obstructive sleep apnea and could benefit from CPAP therapy. I showed him the various new interfaces which can be used in spite of beard. He is aware of the complications of untreated sleep apnea. I have ordered a repeat sleep study to reconfirm his sleep apnea. I had a long discussion with him and answered all his questions       Class 2 severe obesity due to excess calories with serious comorbidity and body mass index (BMI) of 36.0 to 36.9 in adult [facility])  He is moderately obese and I have highlighted to him the need for losing weight. He understands that weight reduction can significantly improve his sleep apnea and other symptoms    History of COVID-19  He had COVID in November 2021. He was found to have pneumonia on imaging at that time.   He subsequently developed chest pain and found to have coronary artery disease requiring CABG. He states that his shortness of breath and wheezing started after the COVID. No previous history of asthma.          Diagnoses and all orders for this visit:    Moderate persistent asthma without complication  -     Fluticasone-Salmeterol (Advair Diskus) 250-50 mcg/dose inhaler; Inhale 1 puff 2 (two) times a day Rinse mouth after use. REJI (obstructive sleep apnea)    Class 2 severe obesity due to excess calories with serious comorbidity and body mass index (BMI) of 36.0 to 36.9 in adult [facility])    History of COVID-19          Subjective:      Patient ID: Heather Velazco is a 79 y.o. male. Mr. Cony Dipo came for follow-up for his asthma and obstructive sleep apnea. He has shortness of breath on exertion and his exercise tolerance is less than 1 block. He has cough but no significant phlegm. He has occasional wheezing. He has been on treatment with Advair 250 but he has not been using the discaler regularly. Uses the albuterol rescue inhaler occasionally. He has no hoarseness of voice or dysphagia. No fever or chills. He has history of allergies. He had lung function test which showed minimal airflow obstruction and evidence of bronchodilator response. His CT scan of the chest did not show any evidence of interstitial lung disease. He denied any heartburn. He denied any chest pain or palpitation. He has occasional swelling of left ankle but that leg is status post vein grafting for his CABG. He denied any fever or chills. His appetite has been good. The following portions of the patient's history were reviewed and updated as appropriate: allergies, current medications, past family history, past medical history, past social history, past surgical history and problem list.    Review of Systems   Constitutional: Positive for fatigue. Negative for appetite change, chills, fever and unexpected weight change.    HENT: Negative for hearing loss, rhinorrhea, sneezing, sore throat and trouble swallowing. Eyes: Negative for visual disturbance. Respiratory: Positive for cough, shortness of breath (Et less than one block) and wheezing. Negative for chest tightness. Cardiovascular: Positive for leg swelling. Negative for chest pain and palpitations. Gastrointestinal: Negative for abdominal pain, constipation, diarrhea, nausea and vomiting. Genitourinary: Negative for dysuria, frequency and urgency. Musculoskeletal: Positive for back pain. Negative for arthralgias. Skin: Negative for rash. Allergic/Immunologic: Positive for environmental allergies. Neurological: Negative for dizziness, syncope, light-headedness and headaches. Psychiatric/Behavioral: Positive for sleep disturbance. Negative for agitation and confusion. The patient is not nervous/anxious. Objective:      /84 (BP Location: Left arm, Patient Position: Sitting, Cuff Size: Standard)   Pulse 98   Temp 98.7 °F (37.1 °C) (Tympanic)   Resp 20   Ht 5' 10" (1.778 m)   Wt 116 kg (256 lb)   SpO2 97%   BMI 36.73 kg/m²          Physical Exam  Vitals reviewed. Constitutional:       General: He is not in acute distress. Appearance: He is not ill-appearing, toxic-appearing or diaphoretic. HENT:      Head: Normocephalic. Mouth/Throat:      Mouth: Mucous membranes are moist.   Eyes:      General: No scleral icterus. Conjunctiva/sclera: Conjunctivae normal.   Cardiovascular:      Rate and Rhythm: Normal rate and regular rhythm. Heart sounds: Normal heart sounds. No murmur heard. Pulmonary:      Effort: Pulmonary effort is normal. No respiratory distress. Breath sounds: Normal breath sounds. No stridor. No wheezing, rhonchi or rales. Abdominal:      General: Bowel sounds are normal.      Palpations: Abdomen is soft. Tenderness: There is no abdominal tenderness. There is no guarding.    Musculoskeletal:      Cervical back: No rigidity. Right lower leg: No edema. Left lower leg: No edema. Lymphadenopathy:      Cervical: No cervical adenopathy. Skin:     Coloration: Skin is not jaundiced or pale. Findings: No rash. Neurological:      Mental Status: He is alert and oriented to person, place, and time. Gait: Gait normal.   Psychiatric:         Mood and Affect: Mood normal.         Behavior: Behavior normal.         Thought Content:  Thought content normal.         Judgment: Judgment normal.

## 2023-07-25 NOTE — ASSESSMENT & PLAN NOTE
He had COVID in November 2021. He was found to have pneumonia on imaging at that time. He subsequently developed chest pain and found to have coronary artery disease requiring CABG. He states that his shortness of breath and wheezing started after the COVID.   No previous history of asthma.

## 2023-07-25 NOTE — ASSESSMENT & PLAN NOTE
He is moderately obese and I have highlighted to him the need for losing weight.   He understands that weight reduction can significantly improve his sleep apnea and other symptoms

## 2023-12-01 ENCOUNTER — OFFICE VISIT (OUTPATIENT)
Dept: CARDIOLOGY CLINIC | Facility: CLINIC | Age: 68
End: 2023-12-01
Payer: COMMERCIAL

## 2023-12-01 VITALS
DIASTOLIC BLOOD PRESSURE: 78 MMHG | HEART RATE: 90 BPM | OXYGEN SATURATION: 99 % | HEIGHT: 70 IN | BODY MASS INDEX: 36.65 KG/M2 | WEIGHT: 256 LBS | SYSTOLIC BLOOD PRESSURE: 126 MMHG

## 2023-12-01 DIAGNOSIS — Z95.1 S/P CABG (CORONARY ARTERY BYPASS GRAFT): ICD-10-CM

## 2023-12-01 DIAGNOSIS — I10 PRIMARY HYPERTENSION: ICD-10-CM

## 2023-12-01 DIAGNOSIS — I25.10 CORONARY ARTERY DISEASE WITHOUT ANGINA PECTORIS, UNSPECIFIED VESSEL OR LESION TYPE, UNSPECIFIED WHETHER NATIVE OR TRANSPLANTED HEART: Primary | ICD-10-CM

## 2023-12-01 DIAGNOSIS — E78.2 MIXED HYPERLIPIDEMIA: ICD-10-CM

## 2023-12-01 PROCEDURE — 99214 OFFICE O/P EST MOD 30 MIN: CPT | Performed by: INTERNAL MEDICINE

## 2023-12-01 NOTE — PATIENT INSTRUCTIONS
Recommendations:  1. Continue current medications. 2. Use rescue inhalers when exercising. 3. Follow up in 6 months.

## 2023-12-01 NOTE — PROGRESS NOTES
Cardiology   Rafal Hendricks 79 y.o. male MRN: [id number]        Impression:  1. CAD s/p CABG in setting of MI 7/21   2. Hypertension - controlled. 3. Dyslipidemia - on statin. 4. Post operative atrial fibrillation - no further episodes. 5. Shortness of breath - may be asthma. Recommendations:  1. Continue current medications. 2. Use rescue inhalers when exercising. 3. Follow up in 6 months. HPI: Rafal Hendricks is a 79y.o. year old male with CAD s/p CABG x 3 7/21 in setting of MI, post-op PAF, hypertension, dyslipidemia, echo 7/21 EF 50% with trace to mild MRI, who presents for follow up. Had an exercise stress test 2/22 which demonstrated no ischemia. Was in the ED 2 days ago with some difficulty. CTA chest demonstrated no PE or pulmonary issues, ECG, BNP, and cardiac enzymes were normal.  Does have dyspnea on exertion - has wheezing. Pharmacologic nuclear stress test 1/23 demonstrated no ischemia. Review of Systems   Constitutional: Negative. HENT: Negative. Eyes: Negative. Respiratory:  Positive for shortness of breath. Negative for chest tightness. Cardiovascular:  Negative for chest pain, palpitations and leg swelling. Gastrointestinal: Negative. Endocrine: Negative. Genitourinary: Negative. Musculoskeletal: Negative. Skin: Negative. Allergic/Immunologic: Negative. Neurological: Negative. Hematological: Negative. Psychiatric/Behavioral: Negative. All other systems reviewed and are negative.         Past Medical History:   Diagnosis Date    Diabetes mellitus ([address] )     History of COVID-19 11/2020    HLD (hyperlipidemia)     HTN (hypertension)      Past Surgical History:   Procedure Laterality Date    ID CORONARY ARTERY BYP W/VEIN & ARTERY GRAFT 3 VEIN N/A 7/16/2021    Procedure: CORONARY ARTERY BYPASS GRAFT (CABG) 3 VESSELS, LIMA -LAD,EVH/GSV- PDA  AND OM;  Surgeon: Aggie Brooks DO;  Location: [facility];  Service: Cardiac Surgery MD ECHO TRANSESOPHAG R-T 2D W/PRB IMG ACQUISJ I&R N/A 7/16/2021    Procedure: TRANSESOPHAGEAL ECHOCARDIOGRAM (GENNA);   Surgeon: Ijeoma Paniagua DO;  Location: [facility];  Service: Cardiac Surgery    MD NDSC SURG W/VIDEO-ASSISTED HARVEST VEIN CABG N/A 7/16/2021    Procedure: HARVEST VEIN ENDOSCOPIC (EVH)/GSV;  Surgeon: Ijeoma Paniagua DO;  Location: [facility];  Service: Cardiac Surgery     Social History     Substance and Sexual Activity   Alcohol Use Yes    Comment: 2 beers a week     Social History     Substance and Sexual Activity   Drug Use Never     Social History     Tobacco Use   Smoking Status Former    Types: Cigars   Smokeless Tobacco Never     Family History   Problem Relation Age of Onset    Leukemia Mother        Allergies:  No Known Allergies    Medications:     Current Outpatient Medications:     albuterol (Ventolin HFA) 90 mcg/act inhaler, Inhale 2 puffs every 6 (six) hours as needed for wheezing or shortness of breath, Disp: 18 g, Rfl: 1    aspirin 325 mg tablet, Take 1 tablet (325 mg total) by mouth daily, Disp: 30 tablet, Rfl: 0    cholecalciferol (VITAMIN D3) 1,000 units tablet, Take 1,000 Units by mouth daily, Disp: , Rfl:     cyanocobalamin (VITAMIN B-12) 250 MCG tablet, Take 250 mcg by mouth daily, Disp: , Rfl:     docusate sodium (COLACE) 100 mg capsule, Take 1 capsule (100 mg total) by mouth 2 (two) times a day, Disp: 60 capsule, Rfl: 0    Fluticasone-Salmeterol (Advair Diskus) 250-50 mcg/dose inhaler, Inhale 1 puff 2 (two) times a day Rinse mouth after use., Disp: 180 blister, Rfl: 0    insulin glargine (Lantus SoloStar) 100 units/mL injection pen, Inject 15 Units under the skin daily if Lantus solostar is not the preferred basal insulin for the patient's insurance company, please substitute with [employer] flexpen, Basaglar Kwikpen , Levemir flexpen, [employer]  or equivalent insulin vials at the same dose instead., Disp: 4.5 mL, Rfl: 0    insulin lispro (HumaLOG KwikPen) 100 units/mL injection pen, Inject 6 Units under the skin 3 (three) times a day with meals if Humalog  Kwik pen is not the preferred mealtime insulin for the patient's insurance, please substitute with NovoLog flexpen, Fiasp  flextouch, Admelog solostar  or Apidra solostar pen or equivalent insulin vials at the same dose instead., Disp: 15 mL, Rfl: 0    meloxicam (MOBIC) 15 mg tablet, meloxicam 15 mg tablet  Take 1 tablet every day by oral route as directed for 30 days. , Disp: , Rfl:     metFORMIN (GLUCOPHAGE) 1000 MG tablet, Take 1,000 mg by mouth 2 (two) times a day with meals , Disp: , Rfl:     metoprolol tartrate (LOPRESSOR) 25 mg tablet, Take 1 tablet (25 mg total) by mouth every 12 (twelve) hours, Disp: 60 tablet, Rfl: 5    amiodarone 200 mg tablet, Take 200 mg by mouth daily (Patient not taking: Reported on 4/7/2022), Disp: , Rfl:     atorvastatin (LIPITOR) 80 mg tablet, Take 1 tablet (80 mg total) by mouth daily with dinner, Disp: 30 tablet, Rfl: 5    Empagliflozin (Jardiance) 10 MG TABS, Take 10 mg by mouth daily (Patient not taking: Reported on 12/1/2023), Disp: , Rfl:     ezetimibe (ZETIA) 10 mg tablet, Take 10 mg by mouth daily (Patient not taking: Reported on 12/1/2023), Disp: , Rfl:     pantoprazole (PROTONIX) 40 mg tablet, Take 1 tablet (40 mg total) by mouth daily (Patient not taking: Reported on 12/1/2023), Disp: 90 tablet, Rfl: 3      Wt Readings from Last 3 Encounters:   12/01/23 116 kg (256 lb)   07/24/23 116 kg (256 lb)   05/11/23 116 kg (256 lb)     Temp Readings from Last 3 Encounters:   07/24/23 98.7 °F (37.1 °C) (Tympanic)   05/11/23 97.5 °F (36.4 °C) (Tympanic)   10/27/22 98 °F (36.7 °C) (Oral)     BP Readings from Last 3 Encounters:   12/01/23 126/78   07/24/23 128/84   05/11/23 132/80     Pulse Readings from Last 3 Encounters:   12/01/23 90   07/24/23 98   05/11/23 75         Physical Exam  HENT:      Head: Atraumatic.       Mouth/Throat:      Mouth: Mucous membranes are moist.   Eyes: Extraocular Movements: Extraocular movements intact. Cardiovascular:      Rate and Rhythm: Normal rate and regular rhythm. Heart sounds: Normal heart sounds. Pulmonary:      Effort: Pulmonary effort is normal.      Breath sounds: Normal breath sounds. Abdominal:      General: Abdomen is flat. Musculoskeletal:         General: Normal range of motion. Cervical back: Normal range of motion. Skin:     General: Skin is warm. Neurological:      General: No focal deficit present. Mental Status: He is alert and oriented to person, place, and time.    Psychiatric:         Mood and Affect: Mood normal.         Behavior: Behavior normal.           Laboratory Studies:  CMP:  Lab Results   Component Value Date    K 3.8 2023    CL 98 2023    CO2 29 2023    BUN 11 2023    CREATININE 0.70 2023    GLUCOSE 231 (H) 2021    AST 15 2023    ALT 24 2023    EGFR 99 2023       Lipid Profile:   No results found for: "CHOL"  Lab Results   Component Value Date    HDL 33 (L) 2023     Lab Results   Component Value Date    LDLCALC 44 2023     Lab Results   Component Value Date    TRIG 101 2023       Cardiac testing:   EKG reviewed personally:   Results for orders placed during the hospital encounter of 21    Echo complete with contrast if indicated    Narrative  [address] [place] [address]  [phone]    Transthoracic Echocardiogram  2D, M-mode, Doppler, and Color Doppler    Study date:  2021    Patient: Brandon Lau  MR number: [id number]  Account number: [de-identified]  : 1955  Age: 61 years  Gender: Male  Status: Outpatient  Location: Bedside  Height: 70 in  Weight: 246 lb  BP: 110/ 66 mmHg    Indications: NSTEMI    Diagnoses: I21.4 - Non-ST elevation (NSTEMI) myocardial infarction    Sonographer:  VLADISLAV Weston  Primary Physician:  Jory Turner MD  Referring Physician:  Fazal Santos BESSIE Patiño  Group:  [facility]  Interpreting Physician:  Vishal Handley MD    SUMMARY    LEFT VENTRICLE:  Size was normal.  Systolic function was at the lower limits of normal. Ejection fraction was estimated to be 50 %. There was mild hypokinesis of the basal-mid inferior and inferolateral walls. Doppler parameters were consistent with abnormal left ventricular relaxation (grade 1 diastolic dysfunction). RIGHT VENTRICLE:  The size was normal.  Systolic function was normal.    LEFT ATRIUM:  The atrium was mildly dilated. MITRAL VALVE:  There was trace to mild regurgitation. TRICUSPID VALVE:  There was trace regurgitation. HISTORY: PRIOR HISTORY: HTN, HLD, DM2    PROCEDURE: The procedure was performed at the bedside. This was a routine study. The transthoracic approach was used. The study included complete 2D imaging, M-mode, complete spectral Doppler, and color Doppler. The heart rate was 70 bpm,  at the start of the study. Images were obtained from the parasternal, apical, subcostal, and suprasternal notch acoustic windows. Echocardiographic views were limited due to lung interference. This was a technically difficult study. LEFT VENTRICLE: Size was normal. Systolic function was at the lower limits of normal. Ejection fraction was estimated to be 50 %. There was mild hypokinesis of the basal-mid inferior and inferolateral walls. Wall thickness was normal.  DOPPLER: Doppler parameters were consistent with abnormal left ventricular relaxation (grade 1 diastolic dysfunction). RIGHT VENTRICLE: The size was normal. Systolic function was normal. Wall thickness was normal.    LEFT ATRIUM: The atrium was mildly dilated. RIGHT ATRIUM: Size was normal.    MITRAL VALVE: Valve structure was normal. There was normal leaflet separation. DOPPLER: The transmitral velocity was within the normal range. There was no evidence for stenosis.  There was trace to mild regurgitation. AORTIC VALVE: The valve was trileaflet. Leaflets exhibited mildly increased thickness, mild calcification, normal cuspal separation, and sclerosis. DOPPLER: Transaortic velocity was within the normal range. There was no evidence for  stenosis. There was no regurgitation. TRICUSPID VALVE: The valve structure was normal. There was normal leaflet separation. DOPPLER: The transtricuspid velocity was within the normal range. There was no evidence for stenosis. There was trace regurgitation. The tricuspid jet  envelope definition was inadequate for estimation of RV systolic pressure. There are no indirect findings suggestive of moderate or severe pulmonary hypertension. PULMONIC VALVE: Leaflets exhibited normal thickness, no calcification, and normal cuspal separation. DOPPLER: The transpulmonic velocity was within the normal range. There was no regurgitation. PERICARDIUM: There was no pericardial effusion. The pericardium was normal in appearance. AORTA: The root exhibited normal size. SYSTEMIC VEINS: IVC: The inferior vena cava was normal in size and course.  Respirophasic changes were normal.    SYSTEM MEASUREMENT TABLES    2D  %FS: 24.71 %  Ao Diam: 3.41 cm  EDV(Teich): 173.64 ml  EF(Teich): 48.23 %  ESV(Teich): 89.89 ml  HR_4Ch_Q: 66.42 BPM  IVSd: 1.14 cm  LA Area: 23.21 cm2  LA Diam: 3.7 cm  LVCO_4Ch_Q: 2.84 L/min  LVEF_4Ch_Q: 41.39 %  LVIDd: 5.91 cm  LVIDs: 4.45 cm  LVLd_4Ch_Q: 8.6 cm  LVLs_4Ch_Q: 7.4 cm  LVPWd: 0.97 cm  LVSV_4Ch_Q: 42.77 ml  LVVED_4Ch_Q: 103.33 ml  LVVES_4Ch_Q: 60.56 ml  RA Area: 18.36 cm2  RVIDd: 3.33 cm  SV(Teich): 83.75 ml    MM  TAPSE: 2.59 cm    PW  E' Sept: 0.08 m/s  E/E' Sept: 9.37  MV A Abiel: 0.89 m/s  MV Dec Stokes: 3.68 m/s2  MV DecT: 197.59 ms  MV E Abiel: 0.73 m/s  MV E/A Ratio: 0.82    [employer] Commission Accredited Echocardiography Laboratory    Prepared and electronically signed by    Viola Vasques MD  Signed 08-Jul-2021 14:41:28    No results found for this or any previous visit. Results for orders placed during the hospital encounter of 21    Cardiac catheterization    Narrative  [address], [address] [facility]  [phone]    [facility]    Invasive Cardiovascular Lab Complete Report    Patient: Marty Sanchez  MR number: [id number]  Account number: [de-identified]  Study date: 2021  Gender: Male  : 1955  Height: 70.1 in  Weight: 246.4 lb  BSA: 2.28 mï¾²    Allergies: NO KNOWN ALLERGIES    Diagnostic Cardiologist:  Jessica Macario MD  Primary Physician:  Kevin Borja MD    SUMMARY    CORONARY CIRCULATION:  Left main: Normal.  LAD: The vessel was normal sized. There was a 70% stenosis in the proximal vessel. Circumflex: The vessel was normal sized and gave rise to one major OM branch. There was a 95% proximal stenosis of the large OM branch. RCA: The vessel was normal sized and dominant, giving rise to the PDA and several posterolateral branches. There was a complex eccentric lesion in mid vessel with thrombus. There is MEDINA 3 flow into the distal vessel, but MEDINA 1 flow into  the PDA. There was collateral flow to the PDA from the left system. REPORT ELEMENT SELECTION:  Right radial access. There were no complications. Summary:  Severe 3 vessel CAD. Plan: transfer to [facility] for surgical consultation. INDICATIONS:  --  Possible CAD: myocardial infarction without ST elevation (NSTEMI). PROCEDURES PERFORMED    --  Left coronary angiography. --  Right coronary angiography. --  Inpatient. --  Mod Sedation Same Physician Initial 15min. --  Mod Sedation Same Physician Add 15min. --  Coronary Catheterization (w/o [facility]). PROCEDURE: The risks and alternatives of the procedures and conscious sedation were explained to the patient and informed consent was obtained. The patient was brought to the cath lab and placed on the table.  The planned puncture sites  were prepped and draped in the usual sterile fashion. --  Right radial artery access. After performing an Jarret's test to verify adequate ulnar artery supply to the hand, the radial site was prepped. The puncture site was infiltrated with local anesthetic. The vessel was accessed using the  modified Seldinger technique, a wire was advanced into the vessel, and a sheath was advanced over the wire into the vessel. --  Left coronary artery angiography. A catheter was advanced over a guidewire into the aorta and positioned in the left coronary artery ostium under fluoroscopic guidance. Angiography was performed. --  Right coronary artery angiography. A catheter was advanced over a guidewire into the aorta and positioned in the right coronary artery ostium under fluoroscopic guidance. Angiography was performed. --  Inpatient. --  Mod Sedation Same Physician Initial 15min. --  Mod Sedation Same Physician Add 15min. --  Coronary Catheterization (w/o [facility]). PROCEDURE COMPLETION: The patient tolerated the procedure well and was discharged from the cath lab. TIMING: Test started at 15:17. Test concluded at 15:36. HEMOSTASIS: The sheath was removed. The site was compressed with a Hemoband  device. Hemostasis was obtained. MEDICATIONS GIVEN: Verapamil (Isoptin, Calan, Covera), 1.25 mg, IA, at 15:22. Fentanyl (1OOmcg/2 ml), 50 mcg, IV, at 15:05. Versed (2mg/2ml), 2 mg, IV, at 15:06. Fentanyl (1OOmcg/2 ml), 25 mcg, IV, at  15:17. Versed (2mg/2ml), 1 mg, IV, at 15:17. 1% Lidocaine, 0.1 ml, subcutaneously, at 15:21. Nitroglycerin (200mcg/ml), 200 mcg, at 15:21. Heparin 1000 units/ml, 4,000 units, IV, at 15:22. CONTRAST GIVEN: 75 ml Omnipaque (350mg I /ml). RADIATION EXPOSURE: Fluoroscopy time: 2.48 min. CORONARY VESSELS:   --  Left main: Normal.  --  LAD: The vessel was normal sized. There was a 70% stenosis in the proximal vessel. --  Circumflex: The vessel was normal sized and gave rise to one major OM branch.  There was a 95% proximal stenosis of the large OM branch. --  RCA: The vessel was normal sized and dominant, giving rise to the PDA and several posterolateral branches. There was a complex eccentric lesion in mid vessel with thrombus. There is MEDINA 3 flow into the distal vessel, but MEDINA 1 flow  into the PDA. There was collateral flow to the PDA from the left system. NOTE: Right radial access. There were no complications. Prepared and signed by    Candy Soto MD  Signed 2021 15:53:35    CC: Dr. Tucker De Santiago. [facility]    Study diagram    Hemodynamic tables    Pressures:  Baseline  Pressures:  - HR: 76  Pressures:  - Rhythm:  Pressures:  -- Aortic Pressure (S/D/M): 92/59/70    Outputs:  Baseline  Outputs:  -- CALCULATIONS[age] 64.11  Outputs:  -- CALCULATIONS: Body Surface Area: 2.28  Outputs:  -- CALCULATIONS: Height in cm: 178.00  Outputs:  -- CALCULATIONS: Sex: Male  Outputs:  -- CALCULATIONS: Weight in k.00  Outputs:  -- OUTPUTS: O2 consumption: 285.59  Outputs:  -- OUTPUTS: Vo2 Indexed: 125.00    Results for orders placed during the hospital encounter of 23    NM myocardial perfusion spect (stress and/or rest)    Interpretation Summary    Stress ECG: No ST deviation is noted. The stress ECG is negative for ischemia after pharmacologic vasodilation. Perfusion: There are no perfusion defects. Stress Function: Left ventricular function post-stress is normal. Post-stress ejection fraction is 49 %. Stress Combined Conclusion: The ECG and SPECT imaging portions of the stress study are concordant with no evidence of stress induced myocardial ischemia. never

## 2023-12-11 ENCOUNTER — OFFICE VISIT (OUTPATIENT)
Dept: PULMONOLOGY | Facility: CLINIC | Age: 68
End: 2023-12-11
Payer: COMMERCIAL

## 2023-12-11 VITALS
WEIGHT: 256.8 LBS | DIASTOLIC BLOOD PRESSURE: 70 MMHG | BODY MASS INDEX: 36.76 KG/M2 | OXYGEN SATURATION: 97 % | SYSTOLIC BLOOD PRESSURE: 110 MMHG | TEMPERATURE: 98.2 F | HEIGHT: 70 IN | HEART RATE: 92 BPM

## 2023-12-11 DIAGNOSIS — E66.01 CLASS 2 SEVERE OBESITY DUE TO EXCESS CALORIES WITH SERIOUS COMORBIDITY AND BODY MASS INDEX (BMI) OF 36.0 TO 36.9 IN ADULT: ICD-10-CM

## 2023-12-11 DIAGNOSIS — J45.40 MODERATE PERSISTENT ASTHMA WITHOUT COMPLICATION: ICD-10-CM

## 2023-12-11 DIAGNOSIS — G47.33 OSA (OBSTRUCTIVE SLEEP APNEA): Primary | ICD-10-CM

## 2023-12-11 PROCEDURE — 99214 OFFICE O/P EST MOD 30 MIN: CPT | Performed by: INTERNAL MEDICINE

## 2023-12-11 RX ORDER — FLUTICASONE PROPIONATE AND SALMETEROL 250; 50 UG/1; UG/1
1 POWDER RESPIRATORY (INHALATION) 2 TIMES DAILY
Qty: 180 BLISTER | Refills: 1 | Status: SHIPPED | OUTPATIENT
Start: 2023-12-11 | End: 2024-03-10

## 2023-12-11 NOTE — PROGRESS NOTES
Assessment/Plan:    REJI (obstructive sleep apnea)  He was told to have struct of sleep apnea more than 10 years back after his sleep study by Dr. Kit Thrasher. He thought he could not use the CPAP and never took any treatment. He developed coronary artery disease subsequently and currently has diabetes and hypertension. He has history of snoring interrupted sleep and daytime sleepiness and tiredness. On clinical examination, he had oropharyngeal crowding. He likely has significant obstructive sleep apnea and could benefit from CPAP therapy. I showed him the various new interfaces which can be used in spite of beard. He is aware of the complications of untreated sleep apnea. I have ordered a repeat sleep study to reconfirm his sleep apnea this is waiting to be done. I had a long discussion with him and answered all his questions     Moderate persistent asthma without complication  Mr. Mathew Montiel has had shortness of breath on exertion with history of cough and wheeze. This started after he had COVID infection in November 2021. Currently his exercise tolerance is less than a block. He has also history of allergies. On clinical examination his chest was clear to auscultation. His PFT showed mild airflow obstruction with reversibility and normal diffusion capacity. He had evidence of air trapping. His CT scan was unremarkable except for sternotomy status. I have advised him to continue with Advair regularly and albuterol regularly. I had a long discussion with him and have answered all his questions. Class 2 severe obesity due to excess calories with serious comorbidity and body mass index (BMI) of 36.0 to 36.9 in [facility])  He is obese and understands need for weight reduction. He understands that weight reduction can significantly improve sleep apnea and other symptoms.        Diagnoses and all orders for this visit:    REJI (obstructive sleep apnea)    Moderate persistent asthma without complication  -     Fluticasone-Salmeterol (Advair Diskus) 250-50 mcg/dose inhaler; Inhale 1 puff 2 (two) times a day Rinse mouth after use. Class 2 severe obesity due to excess calories with serious comorbidity and body mass index (BMI) of 36.0 to 36.9 in adult           Subjective:      Patient ID: Fred De La Cruz is a 79 y.o. male. Mr. Jayne Rubi came for follow-up for his asthma and sleep apnea. Currently is on treatment with Advair 250/50 inhalation twice a day and albuterol as needed. He has some shortness of breath on exertion but his exercise tolerance is at least 2 blocks. He does not have any significant cough or phlegm. He has occasional wheezing. No chest pain or palpitations. He has chronic bilateral leg edema. He has no hoarseness of voice or dysphagia. He gargles throat after using Advair. He was previously on amiodarone which he does not take anymore. He has history suggestive of sleep apnea and a sleep study has been ordered and is waiting to be done. He is also diabetic and hypertensive. He is obese and understands the need for weight reduction. The following portions of the patient's history were reviewed and updated as appropriate: allergies, current medications, past family history, past medical history, past social history, past surgical history, and problem list.    Review of Systems   Constitutional:  Negative for appetite change, chills, fatigue and fever. HENT:  Negative for hearing loss, rhinorrhea, sneezing, sore throat, trouble swallowing and voice change. Eyes:  Negative for visual disturbance. Respiratory:  Positive for shortness of breath and wheezing. Negative for cough. Cardiovascular:  Negative for chest pain, palpitations and leg swelling. Gastrointestinal:  Negative for abdominal pain, constipation, diarrhea, nausea and vomiting. Genitourinary:  Negative for dysuria, frequency and urgency.    Musculoskeletal:  Negative for arthralgias, back pain and gait problem. Skin:  Negative for rash. Allergic/Immunologic: Positive for environmental allergies. Negative for food allergies. Neurological:  Negative for dizziness, syncope, light-headedness and headaches. Psychiatric/Behavioral:  Negative for agitation and confusion. The patient is not nervous/anxious. Objective:      /70 (BP Location: Left arm, Patient Position: Sitting, Cuff Size: Standard)   Pulse 92   Temp 98.2 °F (36.8 °C) (Tympanic)   Ht 5' 10" (1.778 m)   Wt 116 kg (256 lb 12.8 oz)   SpO2 97%   BMI 36.85 kg/m²          Physical Exam  Vitals reviewed. Constitutional:       General: He is not in acute distress. Appearance: He is obese. He is not ill-appearing, toxic-appearing or diaphoretic. HENT:      Head: Normocephalic. Mouth/Throat:      Mouth: Mucous membranes are moist.   Eyes:      General: No scleral icterus. Conjunctiva/sclera: Conjunctivae normal.   Cardiovascular:      Rate and Rhythm: Normal rate and regular rhythm. Heart sounds: Normal heart sounds. No murmur heard. Pulmonary:      Effort: Pulmonary effort is normal. No respiratory distress. Breath sounds: Normal breath sounds. No stridor. No wheezing, rhonchi or rales. Chest:      Chest wall: No tenderness. Abdominal:      General: Bowel sounds are normal.      Palpations: Abdomen is soft. Tenderness: There is no abdominal tenderness. There is no guarding. Musculoskeletal:      Cervical back: Neck supple. No rigidity. Right lower leg: No edema. Left lower leg: No edema. Lymphadenopathy:      Cervical: No cervical adenopathy. Skin:     Coloration: Skin is not jaundiced or pale. Findings: No rash. Neurological:      Mental Status: He is alert and oriented to person, place, and time. Gait: Gait normal.   Psychiatric:         Mood and Affect: Mood normal.         Behavior: Behavior normal.         Thought Content:  Thought content normal. Judgment: Judgment normal.

## 2023-12-12 NOTE — ASSESSMENT & PLAN NOTE
He was told to have struct of sleep apnea more than 10 years back after his sleep study by Dr. Kit Thrasher. He thought he could not use the CPAP and never took any treatment. He developed coronary artery disease subsequently and currently has diabetes and hypertension. He has history of snoring interrupted sleep and daytime sleepiness and tiredness. On clinical examination, he had oropharyngeal crowding. He likely has significant obstructive sleep apnea and could benefit from CPAP therapy. I showed him the various new interfaces which can be used in spite of beard. He is aware of the complications of untreated sleep apnea. I have ordered a repeat sleep study to reconfirm his sleep apnea this is waiting to be done.   I had a long discussion with him and answered all his questions

## 2023-12-12 NOTE — ASSESSMENT & PLAN NOTE
Mr. Bertha Ha has had shortness of breath on exertion with history of cough and wheeze. This started after he had COVID infection in November 2021. Currently his exercise tolerance is less than a block. He has also history of allergies. On clinical examination his chest was clear to auscultation. His PFT showed mild airflow obstruction with reversibility and normal diffusion capacity. He had evidence of air trapping. His CT scan was unremarkable except for sternotomy status. I have advised him to continue with Advair regularly and albuterol regularly. I had a long discussion with him and have answered all his questions.

## 2023-12-12 NOTE — ASSESSMENT & PLAN NOTE
He is obese and understands need for weight reduction. He understands that weight reduction can significantly improve sleep apnea and other symptoms.

## 2024-01-19 DIAGNOSIS — G47.33 OSA (OBSTRUCTIVE SLEEP APNEA): Primary | ICD-10-CM

## 2024-05-06 ENCOUNTER — OFFICE VISIT (OUTPATIENT)
Dept: PULMONOLOGY | Facility: CLINIC | Age: 69
End: 2024-05-06
Payer: COMMERCIAL

## 2024-05-06 VITALS
OXYGEN SATURATION: 94 % | DIASTOLIC BLOOD PRESSURE: 80 MMHG | SYSTOLIC BLOOD PRESSURE: 112 MMHG | BODY MASS INDEX: 36.79 KG/M2 | HEART RATE: 74 BPM | HEIGHT: 70 IN | WEIGHT: 257 LBS | TEMPERATURE: 99.1 F

## 2024-05-06 DIAGNOSIS — E66.01 CLASS 2 SEVERE OBESITY DUE TO EXCESS CALORIES WITH SERIOUS COMORBIDITY AND BODY MASS INDEX (BMI) OF 36.0 TO 36.9 IN ADULT (HCC): ICD-10-CM

## 2024-05-06 DIAGNOSIS — J45.40 MODERATE PERSISTENT ASTHMA WITHOUT COMPLICATION: ICD-10-CM

## 2024-05-06 DIAGNOSIS — G47.33 OSA (OBSTRUCTIVE SLEEP APNEA): Primary | ICD-10-CM

## 2024-05-06 PROCEDURE — 99214 OFFICE O/P EST MOD 30 MIN: CPT | Performed by: INTERNAL MEDICINE

## 2024-05-06 RX ORDER — OMEGA-3-ACID ETHYL ESTERS 1 G/1
2 CAPSULE, LIQUID FILLED ORAL 2 TIMES DAILY
COMMUNITY

## 2024-05-06 NOTE — PROGRESS NOTES
Assessment/Plan:    Moderate persistent asthma without complication  Mr. Venkata Jones has had shortness of breath on exertion with history of cough and wheeze.  This started after he had COVID infection in November 2021.  Currently his exercise tolerance is less than a block.  He has also history of allergies.  On clinical examination his chest was clear to auscultation.  His PFT showed mild airflow obstruction with reversibility and normal diffusion capacity.  He had evidence of air trapping.  His CT scan was unremarkable except for sternotomy status.  I have advised him to continue with Advair regularly and albuterol regularly. I had a long discussion with him and have answered all his questions.      REJI (obstructive sleep apnea)  He was told to have struct of sleep apnea more than 10 years back after his sleep study by Dr. Daniel.  He thought he could not use the CPAP and never took any treatment.  He developed coronary artery disease subsequently and currently has diabetes and hypertension.  He has history of snoring interrupted sleep and daytime sleepiness and tiredness.  On clinical examination, he had oropharyngeal crowding.  He likely has significant obstructive sleep apnea and could benefit from CPAP therapy.  I showed him the various new interfaces which can be used in spite of beard.  He is aware of the complications of untreated sleep apnea.  I have ordered a repeat sleep study to reconfirm his sleep apnea this is still waiting to be done.  I had a long discussion with him and answered all his questions      Class 2 severe obesity due to excess calories with serious comorbidity and body mass index (BMI) of 36.0 to 36.9 in adult (HCC)  He is obese and understands need for weight reduction.  He understands that weight reduction can significantly improve his sleep apnea and other symptoms.       Diagnoses and all orders for this visit:    REJI (obstructive sleep apnea)  -     Home Study; Future    Moderate  persistent asthma without complication    Class 2 severe obesity due to excess calories with serious comorbidity and body mass index (BMI) of 36.0 to 36.9 in adult ([facility])    Other orders  -     omega-3-acid ethyl esters (LOVAZA) 1 g capsule; Take 2 g by mouth 2 (two) times a day          Subjective:      Patient ID: Venkata Jones is a 68 y.o. male.    Venkata Jones came for follow-up for his asthma and obstructive sleep apnea.  I had ordered a sleep study and this is waiting to be done.  He has history of daytime sleepiness and tiredness and interrupted sleep and snoring.  He has asthma currently his excess tolerance at least 2 blocks.  He has been on treatment with Advair regularly and albuterol as needed.  His previous PFT showed minimal airflow obstruction with normal diffusion defect.  He has no hoarseness of voice or dysphagia.  He rarely uses albuterol rescue inhaler.  He denied any chest pain or palpitation.  He had swelling of ankles.  He has history of allergies.  He also has history of hypertension.        The following portions of the patient's history were reviewed and updated as appropriate: allergies, current medications, past family history, past medical history, past social history, past surgical history, and problem list.    Review of Systems   Constitutional:  Positive for fatigue. Negative for appetite change, chills, fever and unexpected weight change.   HENT:  Positive for rhinorrhea. Negative for hearing loss, sneezing, sore throat and trouble swallowing.    Eyes:  Negative for visual disturbance.   Respiratory:  Positive for cough, shortness of breath and wheezing. Negative for chest tightness.    Cardiovascular:  Positive for leg swelling. Negative for chest pain and palpitations.   Gastrointestinal:  Negative for abdominal pain, constipation, diarrhea, nausea and vomiting.   Genitourinary:  Negative for dysuria, frequency and urgency.   Musculoskeletal:  Negative for arthralgias, back pain  "and gait problem.   Skin:  Negative for rash.   Allergic/Immunologic: Negative for environmental allergies.   Neurological:  Negative for dizziness, syncope, light-headedness and headaches.   Psychiatric/Behavioral:  Positive for sleep disturbance. Negative for agitation and confusion.          Objective:      /80 (BP Location: Left arm, Patient Position: Sitting, Cuff Size: Standard)   Pulse 74   Temp 99.1 °F (37.3 °C) (Tympanic)   Ht 5' 10\" (1.778 m)   Wt 117 kg (257 lb)   SpO2 94%   BMI 36.88 kg/m²          Physical Exam  Constitutional:       General: He is not in acute distress.     Appearance: He is obese. He is not ill-appearing, toxic-appearing or diaphoretic.   HENT:      Head: Normocephalic.      Mouth/Throat:      Mouth: Mucous membranes are moist.   Eyes:      General: No scleral icterus.     Conjunctiva/sclera: Conjunctivae normal.   Cardiovascular:      Rate and Rhythm: Normal rate and regular rhythm.      Heart sounds: Normal heart sounds. No murmur heard.  Pulmonary:      Effort: Pulmonary effort is normal. No respiratory distress.      Breath sounds: Normal breath sounds. No stridor. No wheezing, rhonchi or rales.   Abdominal:      General: Bowel sounds are normal.      Tenderness: There is no abdominal tenderness. There is no guarding.   Musculoskeletal:      Cervical back: Neck supple. No rigidity.      Right lower leg: Edema present.      Left lower leg: Edema present.   Lymphadenopathy:      Cervical: No cervical adenopathy.   Skin:     Coloration: Skin is not jaundiced or pale.      Findings: No rash.   Neurological:      Mental Status: He is alert and oriented to person, place, and time.      Gait: Gait normal.   Psychiatric:         Mood and Affect: Mood normal.         Behavior: Behavior normal.         Thought Content: Thought content normal.         Judgment: Judgment normal.      I spent 30 minutes of time during care of this patient with complex medical issues.  The majority " of this time was spent directly with the patient counseling as well as coordinating care.

## 2024-05-07 NOTE — ASSESSMENT & PLAN NOTE
He was told to have struct of sleep apnea more than 10 years back after his sleep study by Dr. Daniel.  He thought he could not use the CPAP and never took any treatment.  He developed coronary artery disease subsequently and currently has diabetes and hypertension.  He has history of snoring interrupted sleep and daytime sleepiness and tiredness.  On clinical examination, he had oropharyngeal crowding.  He likely has significant obstructive sleep apnea and could benefit from CPAP therapy.  I showed him the various new interfaces which can be used in spite of beard.  He is aware of the complications of untreated sleep apnea.  I have ordered a repeat sleep study to reconfirm his sleep apnea this is still waiting to be done.  I had a long discussion with him and answered all his questions

## 2024-05-07 NOTE — ASSESSMENT & PLAN NOTE
Mr. Venkata Jones has had shortness of breath on exertion with history of cough and wheeze.  This started after he had COVID infection in November 2021.  Currently his exercise tolerance is less than a block.  He has also history of allergies.  On clinical examination his chest was clear to auscultation.  His PFT showed mild airflow obstruction with reversibility and normal diffusion capacity.  He had evidence of air trapping.  His CT scan was unremarkable except for sternotomy status.  I have advised him to continue with Advair regularly and albuterol regularly. I had a long discussion with him and have answered all his questions.

## 2024-05-07 NOTE — ASSESSMENT & PLAN NOTE
He is obese and understands need for weight reduction.  He understands that weight reduction can significantly improve his sleep apnea and other symptoms.

## 2024-06-09 ENCOUNTER — HOSPITAL ENCOUNTER (EMERGENCY)
Facility: HOSPITAL | Age: 69
Discharge: HOME | End: 2024-06-09
Attending: EMERGENCY MEDICINE | Admitting: EMERGENCY MEDICINE
Payer: COMMERCIAL

## 2024-06-09 ENCOUNTER — APPOINTMENT (EMERGENCY)
Dept: RADIOLOGY | Facility: HOSPITAL | Age: 69
End: 2024-06-09
Payer: COMMERCIAL

## 2024-06-09 VITALS
TEMPERATURE: 98.1 F | RESPIRATION RATE: 16 BRPM | HEIGHT: 70 IN | OXYGEN SATURATION: 98 % | HEART RATE: 95 BPM | WEIGHT: 260 LBS | DIASTOLIC BLOOD PRESSURE: 63 MMHG | SYSTOLIC BLOOD PRESSURE: 138 MMHG | BODY MASS INDEX: 37.22 KG/M2

## 2024-06-09 DIAGNOSIS — J06.9 VIRAL URI WITH COUGH: Primary | ICD-10-CM

## 2024-06-09 LAB
ANION GAP SERPL CALCULATED.3IONS-SCNC: 9 MMOL/L (ref 4–13)
ATRIAL RATE: 94 BPM
BASOPHILS # BLD AUTO: 0.01 THOUSANDS/ÂΜL (ref 0–0.1)
BASOPHILS NFR BLD AUTO: 0 % (ref 0–1)
BUN SERPL-MCNC: 13 MG/DL (ref 5–25)
CALCIUM SERPL-MCNC: 8.9 MG/DL (ref 8.4–10.2)
CARDIAC TROPONIN I PNL SERPL HS: 6 NG/L
CHLORIDE SERPL-SCNC: 98 MMOL/L (ref 96–108)
CO2 SERPL-SCNC: 26 MMOL/L (ref 21–32)
CREAT SERPL-MCNC: 0.9 MG/DL (ref 0.6–1.3)
EOSINOPHIL # BLD AUTO: 0.02 THOUSAND/ÂΜL (ref 0–0.61)
EOSINOPHIL NFR BLD AUTO: 0 % (ref 0–6)
ERYTHROCYTE [DISTWIDTH] IN BLOOD BY AUTOMATED COUNT: 12.7 % (ref 11.6–15.1)
GFR SERPL CREATININE-BSD FRML MDRD: 88 ML/MIN/1.73SQ M
GLUCOSE SERPL-MCNC: 366 MG/DL (ref 65–140)
HCT VFR BLD AUTO: 48.6 % (ref 36.5–49.3)
HGB BLD-MCNC: 16.1 G/DL (ref 12–17)
IMM GRANULOCYTES # BLD AUTO: 0.04 THOUSAND/UL (ref 0–0.2)
IMM GRANULOCYTES NFR BLD AUTO: 1 % (ref 0–2)
LYMPHOCYTES # BLD AUTO: 1.04 THOUSANDS/ÂΜL (ref 0.6–4.47)
LYMPHOCYTES NFR BLD AUTO: 15 % (ref 14–44)
MCH RBC QN AUTO: 32.4 PG (ref 26.8–34.3)
MCHC RBC AUTO-ENTMCNC: 33.1 G/DL (ref 31.4–37.4)
MCV RBC AUTO: 98 FL (ref 82–98)
MONOCYTES # BLD AUTO: 0.94 THOUSAND/ÂΜL (ref 0.17–1.22)
MONOCYTES NFR BLD AUTO: 14 % (ref 4–12)
NEUTROPHILS # BLD AUTO: 4.87 THOUSANDS/ÂΜL (ref 1.85–7.62)
NEUTS SEG NFR BLD AUTO: 70 % (ref 43–75)
NRBC BLD AUTO-RTO: 0 /100 WBCS
P AXIS: 38 DEGREES
PLATELET # BLD AUTO: 165 THOUSANDS/UL (ref 149–390)
PMV BLD AUTO: 9.3 FL (ref 8.9–12.7)
POTASSIUM SERPL-SCNC: 4 MMOL/L (ref 3.5–5.3)
PR INTERVAL: 154 MS
QRS AXIS: -65 DEGREES
QRSD INTERVAL: 98 MS
QT INTERVAL: 360 MS
QTC INTERVAL: 450 MS
RBC # BLD AUTO: 4.97 MILLION/UL (ref 3.88–5.62)
SODIUM SERPL-SCNC: 133 MMOL/L (ref 135–147)
T WAVE AXIS: 45 DEGREES
VENTRICULAR RATE: 94 BPM
WBC # BLD AUTO: 6.92 THOUSAND/UL (ref 4.31–10.16)

## 2024-06-09 PROCEDURE — 84484 ASSAY OF TROPONIN QUANT: CPT | Performed by: EMERGENCY MEDICINE

## 2024-06-09 PROCEDURE — 93010 ELECTROCARDIOGRAM REPORT: CPT | Performed by: INTERNAL MEDICINE

## 2024-06-09 PROCEDURE — 85025 COMPLETE CBC W/AUTO DIFF WBC: CPT | Performed by: EMERGENCY MEDICINE

## 2024-06-09 PROCEDURE — 93005 ELECTROCARDIOGRAM TRACING: CPT

## 2024-06-09 PROCEDURE — 99285 EMERGENCY DEPT VISIT HI MDM: CPT

## 2024-06-09 PROCEDURE — 80048 BASIC METABOLIC PNL TOTAL CA: CPT | Performed by: EMERGENCY MEDICINE

## 2024-06-09 PROCEDURE — 71046 X-RAY EXAM CHEST 2 VIEWS: CPT

## 2024-06-09 PROCEDURE — 99285 EMERGENCY DEPT VISIT HI MDM: CPT | Performed by: EMERGENCY MEDICINE

## 2024-06-09 PROCEDURE — 36415 COLL VENOUS BLD VENIPUNCTURE: CPT | Performed by: EMERGENCY MEDICINE

## 2024-06-09 NOTE — ED PROVIDER NOTES
History  Chief Complaint   Patient presents with    Shortness of Breath     Cough and sob x2 days, states very congested. States also lightheaded when walking (but has also been going  on since fist time having covid)      This is a 69 y.o. old male who presents to the ED for evaluation of cough. Since Thursday, starting with cough and dyspnea. Cough is nonproductive, Chest hurts from coughing so much. Has congestion and rhinorrhea. Chest is sore from coughing, but no exertional dyspnea or symptoms similar to his ACS. 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 Mcrae. No sick contacts. No nausea, vomiting, or diarrhea. No hx cigarette smoking, intermittent, low volume cigar smoking. Has some mild LLE swelling, at baseline that's occurred due to vein harvesting for CABG. No recently long travels, no hx DVT.       Prior to Admission Medications   Prescriptions Last Dose Informant Patient Reported? Taking?   Empagliflozin (Jardiance) 10 MG TABS  Self Yes No   Sig: Take 10 mg by mouth daily   Patient not taking: Reported on 12/1/2023   Fluticasone-Salmeterol (Advair Diskus) 250-50 mcg/dose inhaler   No No   Sig: Inhale 1 puff 2 (two) times a day Rinse mouth after use.   albuterol (Ventolin HFA) 90 mcg/act inhaler  Self No No   Sig: Inhale 2 puffs every 6 (six) hours as needed for wheezing or shortness of breath   amiodarone 200 mg tablet  Self Yes No   Sig: Take 200 mg by mouth daily   Patient not taking: Reported on 4/7/2022   aspirin 325 mg tablet  Self No No   Sig: Take 1 tablet (325 mg total) by mouth daily   atorvastatin (LIPITOR) 80 mg tablet  Self No No   Sig: Take 1 tablet (80 mg total) by mouth daily with dinner   cholecalciferol (VITAMIN D3) 1,000 units tablet  Self Yes No   Sig: Take 1,000 Units by mouth daily   cyanocobalamin (VITAMIN B-12) 250 MCG tablet  Self Yes No   Sig: Take 250 mcg by mouth daily   docusate sodium (COLACE) 100 mg capsule  Self No No   Sig:  Take 1 capsule (100 mg total) by mouth 2 (two) times a day   ezetimibe (ZETIA) 10 mg tablet  Self Yes No   Sig: Take 10 mg by mouth daily   Patient not taking: Reported on 12/1/2023   insulin glargine (Lantus SoloStar) 100 units/mL injection pen  Self No No   Sig: Inject 15 Units under the skin daily if Lantus solostar is not the preferred basal insulin for the patient's insurance company, please substitute with Tresiba flexpen, Basaglar Kwikpen , Levemir flexpen, Toujeo solostar pen  or equivalent insulin vials at the same dose instead.   insulin lispro (HumaLOG KwikPen) 100 units/mL injection pen  Self No No   Sig: Inject 6 Units under the skin 3 (three) times a day with meals if Humalog  Kwik pen is not the preferred mealtime insulin for the patient's insurance, please substitute with NovoLog flexpen, Fiasp  flextouch, Admelog solostar  or Apidra solostar pen or equivalent insulin vials at the same dose instead.   meloxicam (MOBIC) 15 mg tablet  Self Yes No   Sig: meloxicam 15 mg tablet   Take 1 tablet every day by oral route as directed for 30 days.   Patient not taking: Reported on 12/11/2023   metFORMIN (GLUCOPHAGE) 1000 MG tablet  Self Yes No   Sig: Take 1,000 mg by mouth 2 (two) times a day with meals    metoprolol tartrate (LOPRESSOR) 25 mg tablet  Self No No   Sig: Take 1 tablet (25 mg total) by mouth every 12 (twelve) hours   omega-3-acid ethyl esters (LOVAZA) 1 g capsule   Yes No   Sig: Take 2 g by mouth 2 (two) times a day   pantoprazole (PROTONIX) 40 mg tablet  Self No No   Sig: Take 1 tablet (40 mg total) by mouth daily   Patient not taking: Reported on 12/1/2023      Facility-Administered Medications: None     Past Medical History:   Diagnosis Date    Diabetes mellitus (HCC)     History of COVID-19 11/2020    HLD (hyperlipidemia)     HTN (hypertension)      Past Surgical History:   Procedure Laterality Date    IA CORONARY ARTERY BYP W/VEIN & ARTERY GRAFT 3 VEIN N/A 7/16/2021    Procedure: CORONARY  ARTERY BYPASS GRAFT (CABG) 3 VESSELS, LIMA -LAD,EVH/GSV- PDA  AND OM;  Surgeon: Eliazar Yarbrough DO;  Location: [facility];  Service: Cardiac Surgery    SD ECHO TRANSESOPHAG R-T 2D W/PRB IMG ACQUISJ I&R N/A 7/16/2021    Procedure: TRANSESOPHAGEAL ECHOCARDIOGRAM (GENNA);  Surgeon: Eliazar Yarbrough DO;  Location: [facility];  Service: Cardiac Surgery    SD NDSC SURG W/VIDEO-ASSISTED HARVEST VEIN CABG N/A 7/16/2021    Procedure: HARVEST VEIN ENDOSCOPIC (EVH)/GSV;  Surgeon: Eliazar Yarbrough DO;  Location: [facility];  Service: Cardiac Surgery     Family History   Problem Relation Age of Onset    Leukemia Mother      I have reviewed and agree with the history as documented.    E-Cigarette/Vaping    E-Cigarette Use Never User      E-Cigarette/Vaping Substances    Nicotine No     THC No     CBD No     Flavoring No     Other No     Unknown No      Social History     Tobacco Use    Smoking status: Former     Types: Cigars    Smokeless tobacco: Never   Vaping Use    Vaping status: Never Used   Substance Use Topics    Alcohol use: Yes     Comment: 2 beers a week    Drug use: Never     Review of Systems   Constitutional:  Negative for chills, fatigue, fever and unexpected weight change.   HENT:  Negative for congestion, rhinorrhea and sore throat.    Eyes:  Negative for redness and visual disturbance.   Respiratory:  Negative for cough and shortness of breath.    Cardiovascular:  Negative for chest pain and leg swelling.   Gastrointestinal:  Negative for abdominal pain, constipation, diarrhea, nausea and vomiting.   Endocrine: Negative for cold intolerance and heat intolerance.   Genitourinary:  Negative for dysuria, frequency and urgency.   Musculoskeletal:  Negative for back pain.   Skin:  Negative for rash.   Neurological:  Negative for dizziness, syncope and numbness.   All other systems reviewed and are negative.    Physical Exam  Physical Exam  Vitals and nursing note reviewed.   Constitutional:       General: He is  not in acute distress.     Appearance: He is well-developed. He is not diaphoretic.   HENT:      Head: Normocephalic and atraumatic.      Right Ear: External ear normal.      Left Ear: External ear normal.      Nose: Nose normal.      Mouth/Throat:      Mouth: Mucous membranes are moist.   Eyes:      Conjunctiva/sclera: Conjunctivae normal.      Pupils: Pupils are equal, round, and reactive to light.   Cardiovascular:      Rate and Rhythm: Normal rate and regular rhythm.      Heart sounds: Normal heart sounds. No murmur heard.     No gallop.   Pulmonary:      Effort: Pulmonary effort is normal. No respiratory distress.      Breath sounds: Normal breath sounds. No wheezing or rales.   Abdominal:      General: Bowel sounds are normal. There is no distension.      Palpations: Abdomen is soft. There is no mass.      Tenderness: There is no abdominal tenderness. There is no guarding or rebound.   Musculoskeletal:         General: No deformity. Normal range of motion.      Cervical back: Normal range of motion and neck supple.   Lymphadenopathy:      Cervical: No cervical adenopathy.   Skin:     General: Skin is warm and dry.      Findings: No erythema or rash.   Neurological:      Mental Status: He is alert and oriented to person, place, and time.      Cranial Nerves: No cranial nerve deficit.       Vital Signs  ED Triage Vitals   Temperature Pulse Respirations Blood Pressure SpO2   06/09/24 1038 06/09/24 1038 06/09/24 1038 06/09/24 1038 06/09/24 1038   98.1 °F (36.7 °C) 104 18 164/78 98 %      Temp src Heart Rate Source Patient Position - Orthostatic VS BP Location FiO2 (%)   -- 06/09/24 1200 06/09/24 1200 06/09/24 1200 --    Monitor Lying Right arm       Pain Score       06/09/24 1126       No Pain         ED Medications  Medications - No data to display    Diagnostic Studies  Results Reviewed       Procedure Component Value Units Date/Time    HS Troponin 0hr (reflex protocol) [[id number]]  (Normal) Collected: 06/09/24  1105    Lab Status: Final result Specimen: Blood from Arm, Left Updated: 06/09/24 1133     hs TnI 0hr 6 ng/L     Basic metabolic panel [[id number]]  (Abnormal) Collected: 06/09/24 1105    Lab Status: Final result Specimen: Blood from Arm, Left Updated: 06/09/24 1126     Sodium 133 mmol/L      Potassium 4.0 mmol/L      Chloride 98 mmol/L      CO2 26 mmol/L      ANION GAP 9 mmol/L      BUN 13 mg/dL      Creatinine 0.90 mg/dL      Glucose 366 mg/dL      Calcium 8.9 mg/dL      eGFR 88 ml/min/1.73sq m     Narrative:      National Kid[employer] Disease Foundation guidelines for Chronic Kidney Disease (CKD):     Stage 1 with normal or high GFR (GFR > 90 mL/min/1.73 square meters)    Stage 2 Mild CKD (GFR = 60-89 mL/min/1.73 square meters)    Stage 3A Moderate CKD (GFR = 45-59 mL/min/1.73 square meters)    Stage 3B Moderate CKD (GFR = 30-44 mL/min/1.73 square meters)    Stage 4 Severe CKD (GFR = 15-29 mL/min/1.73 square meters)    Stage 5 End Stage CKD (GFR <15 mL/min/1.73 square meters)  Note: GFR calculation is accurate only with a steady state creatinine    CBC and differential [[id number]]  (Abnormal) Collected: 06/09/24 1105    Lab Status: Final result Specimen: Blood from Arm, Left Updated: 06/09/24 1111     WBC 6.92 Thousand/uL      RBC 4.97 Million/uL      Hemoglobin 16.1 g/dL      Hematocrit 48.6 %      MCV 98 fL      MCH 32.4 pg      MCHC 33.1 g/dL      RDW 12.7 %      MPV 9.3 fL      Platelets 165 Thousands/uL      nRBC 0 /100 WBCs      Segmented % 70 %      Immature Grans % 1 %      Lymphocytes % 15 %      Monocytes % 14 %      Eosinophils Relative 0 %      Basophils Relative 0 %      Absolute Neutrophils 4.87 Thousands/µL      Absolute Immature Grans 0.04 Thousand/uL      Absolute Lymphocytes 1.04 Thousands/µL      Absolute Monocytes 0.94 Thousand/µL      Eosinophils Absolute 0.02 Thousand/µL      Basophils Absolute 0.01 Thousands/µL           XR chest 2 views   ED Interpretation by Kaiser Truong MD (06/09 1122)   No  evidence of focal consolidation, pleural effusion, osseous abnormality, or pneumothroax, as interpreted by me.          Procedures  ECG 12 Lead Documentation Only    Date/Time: 6/9/2024 10:52 AM    Performed by: Kaiser Truong MD  Authorized by: Kaiser Truong MD    Indications / Diagnosis:  Dyspnea  ECG reviewed by me, the ED Provider: yes    Patient location:  ED  Previous ECG:     Previous ECG:  Compared to current    Comparison to cardiac monitor: Yes    Comments:      Normal sinus rhythm, rate 94, normal intervals, left axis, incomplete right bundle branch block, normal QTc, no acute ST, T wave changes.  Compared to patient's prior EKG dated 10/27/2022, no significant changes are noted.      ED Course       A/P: This is a 69 y.o. male who presents to the ED for evaluation of dyspnea, cough. Suspect viral URI. CXR and Labs to rule out ACS, PNA. EKG, basic labs. Supportive care. Reeval and dispo accordingly.    HEART Risk Score      Flowsheet Row Most Recent Value   Heart Score Risk Calculator    History 0 Filed at: 06/09/2024 1238   ECG 0 Filed at: 06/09/2024 1238   Age 2 Filed at: 06/09/2024 1238   Risk Factors 2 Filed at: 06/09/2024 1238   Troponin 0 Filed at: 06/09/2024 1238   HEART Score 4 Filed at: 06/09/2024 1238          Trop at 6. No CP, do not think this is ACS. Likely viral syndrome. Stable for op follow up. I personally discussed return precautions with this patient. I provided the patient with written discharge instructions and particularly highlighted specific areas of interest to this patient, including but not limited to: medications for symptom managment, follow up recommendations, and return precautions. Patient is in agreement with this plan as outlined above.    Medical Decision Making  Amount and/or Complexity of Data Reviewed  Labs: ordered.  Radiology: ordered and independent interpretation performed.           Disposition  Final diagnoses:   Viral URI with cough     Time reflects when  diagnosis was documented in both MDM as applicable and the Disposition within this note       Time User Action Codes Description Comment    6/9/2024 12:38 PM Kaiser Truong Add [J06.9] Viral URI with cough           ED Disposition       ED Disposition   Discharge    Condition   Stable    Date/Time   Sun Jun 9, 2024 1237    Comment   Venkata Jones discharge to home/self care.                   Follow-up Information       Follow up With Specialties Details Why Contact Galina Chin MD Internal Medicine Call in 1 day discuss diabetes management. Reeval of ED complaint. [address] [address] [phone]              Patient's Medications   Discharge Prescriptions    No medications on file       No discharge procedures on file.    PDMP Review         Value Time User    PDMP Reviewed  Yes 10/27/2022 12:24 AM Bart Dinh MD            ED Provider  Electronically Signed by             Kaiser Truong MD  06/09/24 [id number]

## 2024-06-14 ENCOUNTER — OFFICE VISIT (OUTPATIENT)
Dept: PULMONOLOGY | Facility: CLINIC | Age: 69
End: 2024-06-14
Payer: COMMERCIAL

## 2024-06-14 VITALS
WEIGHT: 284 LBS | HEART RATE: 94 BPM | SYSTOLIC BLOOD PRESSURE: 108 MMHG | DIASTOLIC BLOOD PRESSURE: 66 MMHG | OXYGEN SATURATION: 95 % | HEIGHT: 70 IN | BODY MASS INDEX: 40.66 KG/M2 | TEMPERATURE: 98.4 F

## 2024-06-14 DIAGNOSIS — J45.40 MODERATE PERSISTENT ASTHMA WITHOUT COMPLICATION: Primary | ICD-10-CM

## 2024-06-14 DIAGNOSIS — G47.33 OSA (OBSTRUCTIVE SLEEP APNEA): ICD-10-CM

## 2024-06-14 DIAGNOSIS — E66.01 CLASS 2 SEVERE OBESITY DUE TO EXCESS CALORIES WITH SERIOUS COMORBIDITY AND BODY MASS INDEX (BMI) OF 36.0 TO 36.9 IN ADULT (HCC): ICD-10-CM

## 2024-06-14 DIAGNOSIS — J01.00 ACUTE MAXILLARY SINUSITIS, RECURRENCE NOT SPECIFIED: ICD-10-CM

## 2024-06-14 PROCEDURE — 99214 OFFICE O/P EST MOD 30 MIN: CPT | Performed by: INTERNAL MEDICINE

## 2024-06-14 RX ORDER — AMOXICILLIN AND CLAVULANATE POTASSIUM 875; 125 MG/1; MG/1
1 TABLET, FILM COATED ORAL EVERY 12 HOURS SCHEDULED
Qty: 10 TABLET | Refills: 0 | Status: SHIPPED | OUTPATIENT
Start: 2024-06-14 | End: 2024-06-19

## 2024-06-14 NOTE — PROGRESS NOTES
Ambulatory Visit  Name: Venkata Jones      : 1955      MRN: [id number]  Encounter Provider: Andrés Sauceda MD  Encounter Date: 2024   Encounter department: [facility] & [facility]    Assessment & Plan   1. Moderate persistent asthma without complication  Assessment & Plan:  Mr. Venkata Jones has had shortness of breath on exertion with history of cough and wheeze.  This started after he had COVID infection in 2021.  Currently his exercise tolerance is less than a block.  He has also history of allergies.  On clinical examination his chest was clear to auscultation.  His PFT showed mild airflow obstruction with reversibility and normal diffusion capacity.  He had evidence of air trapping.  His CT scan was unremarkable except for sternotomy status.  I have advised him to continue with Advair regularly and albuterol regularly. I had a long discussion with him and have answered all his questions.     2. REJI (obstructive sleep apnea)  Assessment & Plan:  He was diagnosed with obstructive sleep apnea 10 years back after a sleep study with Dr. Daniel.  He t did not want to use CPAP and never took any treatment.  He developed coronary artery disease subsequently and currently has diabetes and hypertension.  He has snoring interrupted sleep and daytime sleepiness and tiredness.  On clinical examination, he had oropharyngeal crowding.  He likely has significant obstructive sleep apnea and could benefit from CPAP therapy.    He is aware of the complications of untreated sleep apnea.  I have ordered a repeat sleep study to reconfirm his sleep apnea this is still waiting to be done.  I had a long discussion with him and answered all his questions       3. Class 2 severe obesity due to excess calories with serious comorbidity and body mass index (BMI) of 36.0 to 36.9 in adult (HCC)  Assessment & Plan:  He is very obese and understands the need for weight reduction.  He  understands that weight reduction can significantly improve his sleep apnea and other symptoms.  4. Acute maxillary sinusitis, recurrence not specified  Assessment & Plan:  He has been having greenish nasal discharge cough with occasional green phlegm shortness of breath and wheeze for the past 6 days.  He denied any fever or chills.  He had an ER visit.  His chest x-ray was reported normal.  However I am not sure whether he has a small patch of pneumonia on the left side.  On clinical examination, his chest was clear to auscultation.  He did not have any sinus tenderness.  He likely has acute maxillary sinusitis and has started him on a course of oral Augmentin for 5 days.  I had a long discussion with him and answered all his questions  Orders:  -     amoxicillin-clavulanate (AUGMENTIN) 875-125 mg per tablet; Take 1 tablet by mouth every 12 (twelve) hours for 5 days      History of Present Illness     Venkata Jones is a 69 y.o. male with past medical history of asthma on Advair, obstructive sleep apnea and obesity who has been having cough with phlegm shortness of breath and wheeze for the past about 6 days.  He also has green-colored nasal discharge.  He denied any fever or chills.  He visited [facility].  He was not COVID tested.  He had a chest x-ray which has been reported normal.  I am not sure whether he has a small patch of pneumonia on the left side.  He has not received any antibiotic.  He has been using Advair regularly and albuterol as needed.  No hoarseness of voice or dysphagia.  No fever or chills.  His appetite has been good.  Today he is actually feeling better as his cough is much improved.  He still has some occasional green phlegm.  He has been suspected to have obstructive sleep apnea and is waiting for home sleep study.  He has daytime sleepiness and tiredness.  He is obese and understands the need for weight reduction.    Review of Systems   Constitutional:  Positive for fatigue. Negative  "for appetite change, chills and fever.   HENT:  Positive for rhinorrhea, sinus pressure and sinus pain. Negative for hearing loss, sneezing and trouble swallowing.    Eyes:  Negative for visual disturbance.   Respiratory:  Positive for cough (greyish phlem), shortness of breath (50 feet) and wheezing. Negative for chest tightness.    Cardiovascular:  Negative for chest pain and palpitations.   Gastrointestinal:  Negative for abdominal pain, constipation, diarrhea, nausea and vomiting.   Genitourinary:  Negative for dysuria, frequency and urgency.   Musculoskeletal:  Negative for arthralgias, back pain and gait problem.   Skin:  Negative for rash.   Allergic/Immunologic: Negative for environmental allergies.   Neurological:  Negative for dizziness, syncope, light-headedness and headaches.   Psychiatric/Behavioral:  Positive for sleep disturbance. Negative for agitation and confusion. The patient is not nervous/anxious.        Objective     /66 (BP Location: Left arm, Patient Position: Sitting, Cuff Size: Standard)   Pulse 94   Temp 98.4 °F (36.9 °C) (Tympanic)   Ht 5' 10\" (1.778 m)   Wt 129 kg (284 lb)   SpO2 95%   BMI 40.75 kg/m²     Physical Exam  Vitals reviewed.   Constitutional:       General: He is not in acute distress.     Appearance: He is not ill-appearing, toxic-appearing or diaphoretic.      Interventions: He is not intubated.  HENT:      Head: Normocephalic.      Mouth/Throat:      Mouth: Mucous membranes are moist.      Pharynx: Oropharynx is clear.   Neck:      Vascular: No JVD.   Cardiovascular:      Rate and Rhythm: Normal rate and regular rhythm.      Heart sounds: Normal heart sounds. No murmur heard.  Pulmonary:      Effort: No tachypnea, bradypnea, accessory muscle usage or respiratory distress. He is not intubated.      Breath sounds: Normal breath sounds. No stridor. No decreased breath sounds, wheezing, rhonchi or rales.   Chest:      Chest wall: No tenderness.   Abdominal:      " General: Bowel sounds are normal.      Palpations: Abdomen is soft.      Tenderness: There is no abdominal tenderness. There is no guarding.   Musculoskeletal:      Cervical back: Neck supple.      Right lower leg: No edema.      Left lower leg: No edema.   Lymphadenopathy:      Cervical: No cervical adenopathy.   Skin:     Coloration: Skin is not cyanotic or pale.      Findings: No rash.      Nails: There is no clubbing.   Neurological:      Mental Status: He is alert and oriented to person, place, and time.   Psychiatric:         Mood and Affect: Mood normal. Mood is not anxious.         Behavior: Behavior normal. Behavior is not agitated.       Administrative Statements

## 2024-06-14 NOTE — ASSESSMENT & PLAN NOTE
He was diagnosed with obstructive sleep apnea 10 years back after a sleep study with Dr. Daniel.  He t did not want to use CPAP and never took any treatment.  He developed coronary artery disease subsequently and currently has diabetes and hypertension.  He has snoring interrupted sleep and daytime sleepiness and tiredness.  On clinical examination, he had oropharyngeal crowding.  He likely has significant obstructive sleep apnea and could benefit from CPAP therapy.    He is aware of the complications of untreated sleep apnea.  I have ordered a repeat sleep study to reconfirm his sleep apnea this is still waiting to be done.  I had a long discussion with him and answered all his questions

## 2024-06-14 NOTE — ASSESSMENT & PLAN NOTE
He is very obese and understands the need for weight reduction.  He understands that weight reduction can significantly improve his sleep apnea and other symptoms.

## 2024-06-14 NOTE — ASSESSMENT & PLAN NOTE
He has been having greenish nasal discharge cough with occasional green phlegm shortness of breath and wheeze for the past 6 days.  He denied any fever or chills.  He had an ER visit.  His chest x-ray was reported normal.  However I am not sure whether he has a small patch of pneumonia on the left side.  On clinical examination, his chest was clear to auscultation.  He did not have any sinus tenderness.  He likely has acute maxillary sinusitis and has started him on a course of oral Augmentin for 5 days.  I had a long discussion with him and answered all his questions

## 2024-06-17 DIAGNOSIS — J45.40 MODERATE PERSISTENT ASTHMA WITHOUT COMPLICATION: ICD-10-CM

## 2024-06-17 RX ORDER — FLUTICASONE PROPIONATE AND SALMETEROL 250; 50 UG/1; UG/1
POWDER RESPIRATORY (INHALATION) 2 TIMES DAILY
Qty: 180 BLISTER | Refills: 0 | Status: SHIPPED | OUTPATIENT
Start: 2024-06-17

## 2024-07-14 PROBLEM — J01.00 ACUTE MAXILLARY SINUSITIS: Status: RESOLVED | Noted: 2024-06-14 | Resolved: 2024-07-14

## 2024-08-30 ENCOUNTER — HOSPITAL ENCOUNTER (OUTPATIENT)
Dept: SLEEP CENTER | Facility: CLINIC | Age: 69
Discharge: HOME | End: 2024-08-30
Payer: COMMERCIAL

## 2024-08-30 DIAGNOSIS — G47.33 OSA (OBSTRUCTIVE SLEEP APNEA): ICD-10-CM

## 2024-08-30 PROCEDURE — G0399 HOME SLEEP TEST/TYPE 3 PORTA: HCPCS

## 2024-08-30 NOTE — PROGRESS NOTES
Home Sleep Study Documentation    HOME STUDY DEVICE: Noxturnal no                                           Benita G3 yes      Pre-Sleep Home Study:    Set-up and instructions performed by: Tye Sparrow    Technician performed demonstration for Patient: yes    Return demonstration performed by Patient: yes    Written instructions provided to Patient: yes    Patient signed consent form: yes        Post-Sleep Home Study:    Additional comments by Patient: None    Home Sleep Study Failed:no:    Failure reason: N/A    Reported or Detected: N/A    Scored by: ROSA Marquez

## 2024-09-23 DIAGNOSIS — G47.33 OSA (OBSTRUCTIVE SLEEP APNEA): Primary | ICD-10-CM

## 2024-09-24 LAB
DME PARACHUTE DELIVERY DATE REQUESTED: NORMAL
DME PARACHUTE ITEM DESCRIPTION: NORMAL
DME PARACHUTE ORDER STATUS: NORMAL
DME PARACHUTE SUPPLIER NAME: NORMAL
DME PARACHUTE SUPPLIER PHONE: NORMAL

## 2024-09-30 LAB

## 2024-10-21 ENCOUNTER — HOSPITAL ENCOUNTER (OUTPATIENT)
Dept: ULTRASOUND IMAGING | Facility: HOSPITAL | Age: 69
Discharge: HOME | End: 2024-10-21
Payer: COMMERCIAL

## 2024-10-21 DIAGNOSIS — N45.1 EPIDIDYMITIS: ICD-10-CM

## 2024-10-21 PROCEDURE — 76870 US EXAM SCROTUM: CPT

## 2024-10-24 LAB

## 2024-11-11 ENCOUNTER — TELEPHONE (OUTPATIENT)
Age: 69
End: 2024-11-11

## 2024-11-11 NOTE — TELEPHONE ENCOUNTER
Pt is scheduled for rotator cuff repair on Wednesday 11/20. He has been having episodes of SOB overnight when he wakes up to use the bathroom. He does not feel SOB any other time during the day. He is concerned about going for his surgery on 11/20 and is wondering if Dr. Sauceda would like to see him prior to him having surgery. I did schedule him for routine f/u 12/18 because he did not have any follow up scheduled. No appts available prior to pt's surgery. Please advise.

## 2024-11-18 ENCOUNTER — TELEPHONE (OUTPATIENT)
Age: 69
End: 2024-11-18

## 2024-11-18 NOTE — TELEPHONE ENCOUNTER
Patient called back stating Dr. Sauceda called him and said he is cleared for his surgery that is scheduled for this Wednesday and would like us to fax a letter clearing him for surgery to the Orthopedic office that is performing the surgery.    [facility]   Miguel Dutta,   [address], [address]  [place] [address]  Phone: [phone]  Fax: [phone]      Please advise.

## 2024-11-18 NOTE — TELEPHONE ENCOUNTER
Pt calling seeking cardiac clearance for upcoming procedure.  Pt was just notified to today that he is scheduled for L shoulder Arthroscopy on this Wednesday 11/20 By Dr Richmond.  Pt's last OV was 12/1/23.  Informed pt a message would be sent to Dr Huang to advise.  Pt had EKG done at PCP office when he was there for his pre-op clearance with PCP back in October.  Please let the pt know if he is cleared or if he needs to make an appointment to be seen again prior by Dr Huang, thank you.

## 2024-12-18 ENCOUNTER — OFFICE VISIT (OUTPATIENT)
Dept: PULMONOLOGY | Facility: CLINIC | Age: 69
End: 2024-12-18
Payer: COMMERCIAL

## 2024-12-18 VITALS
TEMPERATURE: 97.4 F | BODY MASS INDEX: 36.22 KG/M2 | WEIGHT: 253 LBS | HEART RATE: 75 BPM | OXYGEN SATURATION: 95 % | SYSTOLIC BLOOD PRESSURE: 116 MMHG | HEIGHT: 70 IN | DIASTOLIC BLOOD PRESSURE: 67 MMHG

## 2024-12-18 DIAGNOSIS — R60.0 BILATERAL LEG EDEMA: ICD-10-CM

## 2024-12-18 DIAGNOSIS — E66.01 CLASS 2 SEVERE OBESITY DUE TO EXCESS CALORIES WITH SERIOUS COMORBIDITY AND BODY MASS INDEX (BMI) OF 36.0 TO 36.9 IN ADULT (HCC): ICD-10-CM

## 2024-12-18 DIAGNOSIS — E66.812 CLASS 2 SEVERE OBESITY DUE TO EXCESS CALORIES WITH SERIOUS COMORBIDITY AND BODY MASS INDEX (BMI) OF 36.0 TO 36.9 IN ADULT (HCC): ICD-10-CM

## 2024-12-18 DIAGNOSIS — J45.40 MODERATE PERSISTENT ASTHMA WITHOUT COMPLICATION: ICD-10-CM

## 2024-12-18 DIAGNOSIS — G47.33 OSA (OBSTRUCTIVE SLEEP APNEA): Primary | ICD-10-CM

## 2024-12-18 PROCEDURE — 99214 OFFICE O/P EST MOD 30 MIN: CPT | Performed by: INTERNAL MEDICINE

## 2024-12-18 RX ORDER — FLUTICASONE PROPIONATE AND SALMETEROL 250; 50 UG/1; UG/1
1 POWDER RESPIRATORY (INHALATION) 2 TIMES DAILY
Qty: 180 BLISTER | Refills: 0 | Status: SHIPPED | OUTPATIENT
Start: 2024-12-18

## 2024-12-18 NOTE — ASSESSMENT & PLAN NOTE
He was diagnosed with obstructive sleep apnea 10 years back after a sleep study with Dr. Daniel.  He did not want to use CPAP and never took any treatment.  He developed coronary artery disease subsequently and currently has diabetes and hypertension.  He has snoring, interrupted sleep, and daytime sleepiness and tiredness.  On clinical examination, he had oropharyngeal crowding.  His overnight home sleep study showed moderate obstructive sleep apnea with oxygen desaturation.  The Auto CPAP therapy has been ordered and he  is waiting to be set up.  I have highlighted to him the need for using the CPAP regularly and adequately.  I had a long discussion with him and answered all his questions.

## 2024-12-18 NOTE — PROGRESS NOTES
Name: Venkata Jones      : 1955      MRN: [id number]  Encounter Provider: Andrés Sauceda MD  Encounter Date: 2024   Encounter department: [facility]  :  Assessment & Plan  REJI (obstructive sleep apnea)  He was diagnosed with obstructive sleep apnea 10 years back after a sleep study with Dr. Daniel.  He did not want to use CPAP and never took any treatment.  He developed coronary artery disease subsequently and currently has diabetes and hypertension.  He has snoring, interrupted sleep, and daytime sleepiness and tiredness.  On clinical examination, he had oropharyngeal crowding.  His overnight home sleep study showed moderate obstructive sleep apnea with oxygen desaturation.  The Auto CPAP therapy has been ordered and he  is waiting to be set up.  I have highlighted to him the need for using the CPAP regularly and adequately.  I had a long discussion with him and answered all his questions.          Moderate persistent asthma without complication  Mr. Venkata Jones has had shortness of breath on exertion with history of cough and wheeze.  This started after he had COVID infection in 2021.  Currently his exercise tolerance is less than a block.  He has also history of allergies.  On clinical examination his chest was clear to auscultation.  His PFT showed mild airflow obstruction with reversibility and normal diffusion capacity.  He had evidence of air trapping.  His CT scan was unremarkable except for sternotomy status.  I have advised him to continue with Advair regularly and albuterol regularly. I had a long discussion with him and have answered all his questions.     Orders:    Fluticasone-Salmeterol (Advair) 250-50 mcg/dose inhaler; Inhale 1 puff 2 (two) times a day Rinse mouth after use    Class 2 severe obesity due to excess calories with serious comorbidity and body mass index (BMI) of 36.0 to 36.9 in adult (HCC)  He is very obese and  understands the need for weight reduction. He understands that weight reduction can significantly improve his sleep apnea and other symptoms.        Bilateral leg edema  He has bilateral leg edema and he has not been ambulatory.  I have ordered a venous Doppler.  Orders:     VAS VENOUS DUPLEX - LOWER LIMB BILATERAL; Future        History of Present Illness   HPI  Venkata Jones is a 69 y.o. male past medical history of asthma and obstructive sleep apnea who has come for follow-up.  He is also obese and understands the need for weight reduction.  He has been on treatment with Advair regularly and albuterol as needed.  He has shortness of breath on exertion but it is improved with Advair.  His exercise tolerance is less than a block.  He has no significant cough or phlegm's wheezing or chest pain.  No fever or chills.  He still feels sleepy and tired during the day.  His sleep study showed moderate obstructive sleep apnea with oxygen desaturation.  He is waiting to be started on CPAP therapy.  He understands the need for using the CPAP regularly and adequately.  He denied any weight loss or anorexia or hemoptysis.  No headache or dizziness.  He had no hoarseness of voice or dysphagia.  He gargles throat after using Advair.  He mentioned swelling of both lower extremities which is not entirely new.  He has not been very ambulatory.  He is not on any systemic anticoagulation.  I have ordered a venous Doppler, left leg number: Out of bed while in the     Review of Systems   Constitutional:  Positive for fatigue. Negative for appetite change, chills and fever.   HENT:  Positive for rhinorrhea. Negative for hearing loss, sneezing, sore throat and trouble swallowing.    Respiratory:  Positive for shortness of breath. Negative for cough, chest tightness and wheezing.    Cardiovascular:  Positive for leg swelling. Negative for chest pain and palpitations.   Gastrointestinal:  Negative for abdominal pain, constipation,  "diarrhea, nausea and vomiting.   Genitourinary:  Negative for dysuria, frequency and urgency.   Musculoskeletal:  Negative for arthralgias, back pain and gait problem.   Skin:  Negative for rash.   Allergic/Immunologic: Negative for environmental allergies.   Neurological:  Negative for dizziness, syncope, light-headedness and headaches.   Psychiatric/Behavioral:  Positive for sleep disturbance. Negative for agitation and confusion. The patient is not nervous/anxious.           Objective   /67 (BP Location: Right arm, Patient Position: Sitting, Cuff Size: Standard)   Pulse 75   Temp (!) 97.4 °F (36.3 °C) (Tympanic)   Ht 5' 10\" (1.778 m)   Wt 115 kg (253 lb)   SpO2 95%   BMI 36.30 kg/m²      Physical Exam  Vitals reviewed.   Constitutional:       General: He is not in acute distress.     Appearance: He is obese. He is not ill-appearing, toxic-appearing or diaphoretic.   HENT:      Head: Normocephalic.      Mouth/Throat:      Mouth: Mucous membranes are moist.      Pharynx: Oropharynx is clear.      Comments: Oropharyngeal crowding.  Eyes:      General: No scleral icterus.     Conjunctiva/sclera: Conjunctivae normal.   Cardiovascular:      Rate and Rhythm: Normal rate and regular rhythm.      Heart sounds: Normal heart sounds. No murmur heard.  Pulmonary:      Effort: Pulmonary effort is normal. No respiratory distress.      Breath sounds: Normal breath sounds. No stridor. No wheezing, rhonchi or rales.   Abdominal:      General: Bowel sounds are normal.      Palpations: Abdomen is soft.      Tenderness: There is no abdominal tenderness. There is no guarding.   Musculoskeletal:      Cervical back: Neck supple. No rigidity.      Right lower leg: Edema present.      Left lower leg: Edema present.   Lymphadenopathy:      Cervical: No cervical adenopathy.   Skin:     Coloration: Skin is not jaundiced or pale.      Findings: No rash.   Neurological:      Mental Status: He is alert and oriented to person, place, " and time.      Gait: Gait normal.   Psychiatric:         Mood and Affect: Mood normal.         Behavior: Behavior normal.         Thought Content: Thought content normal.         Judgment: Judgment normal.

## 2024-12-18 NOTE — ASSESSMENT & PLAN NOTE
Mr. Venkata Jones has had shortness of breath on exertion with history of cough and wheeze.  This started after he had COVID infection in November 2021.  Currently his exercise tolerance is less than a block.  He has also history of allergies.  On clinical examination his chest was clear to auscultation.  His PFT showed mild airflow obstruction with reversibility and normal diffusion capacity.  He had evidence of air trapping.  His CT scan was unremarkable except for sternotomy status.  I have advised him to continue with Advair regularly and albuterol regularly. I had a long discussion with him and have answered all his questions.     Orders:    Fluticasone-Salmeterol (Advair) 250-50 mcg/dose inhaler; Inhale 1 puff 2 (two) times a day Rinse mouth after use

## 2025-01-22 LAB

## 2025-02-03 ENCOUNTER — OFFICE VISIT (OUTPATIENT)
Dept: CARDIOLOGY CLINIC | Facility: MEDICAL CENTER | Age: 70
End: 2025-02-03
Payer: COMMERCIAL

## 2025-02-03 VITALS
DIASTOLIC BLOOD PRESSURE: 64 MMHG | SYSTOLIC BLOOD PRESSURE: 102 MMHG | BODY MASS INDEX: 36.11 KG/M2 | HEART RATE: 67 BPM | HEIGHT: 70 IN | OXYGEN SATURATION: 97 % | WEIGHT: 252.2 LBS

## 2025-02-03 DIAGNOSIS — E78.2 MIXED HYPERLIPIDEMIA: ICD-10-CM

## 2025-02-03 DIAGNOSIS — I10 PRIMARY HYPERTENSION: ICD-10-CM

## 2025-02-03 DIAGNOSIS — I25.10 CORONARY ARTERY DISEASE WITHOUT ANGINA PECTORIS, UNSPECIFIED VESSEL OR LESION TYPE, UNSPECIFIED WHETHER NATIVE OR TRANSPLANTED HEART: Primary | ICD-10-CM

## 2025-02-03 DIAGNOSIS — Z95.1 S/P CABG (CORONARY ARTERY BYPASS GRAFT): ICD-10-CM

## 2025-02-03 PROCEDURE — 99214 OFFICE O/P EST MOD 30 MIN: CPT | Performed by: INTERNAL MEDICINE

## 2025-02-03 PROCEDURE — 93000 ELECTROCARDIOGRAM COMPLETE: CPT | Performed by: INTERNAL MEDICINE

## 2025-02-03 RX ORDER — FUROSEMIDE 20 MG/1
20 TABLET ORAL DAILY
Qty: 90 TABLET | Refills: 3 | Status: SHIPPED | OUTPATIENT
Start: 2025-02-03

## 2025-02-03 NOTE — PROGRESS NOTES
Cardiology   Venkata Jones 69 y.o. male MRN: [id number]        Impression:  1. CAD s/p CABG in setting of MI 7/21   2. Hypertension - controlled.  3. Dyslipidemia - on statin.   4. Post operative atrial fibrillation - no further episodes.    5. Shortness of breath - unclear etiology.  Want to evaluate for structural heart dz.      Recommendations:  1. Start furosemide 20mg daily.  2. Continue remainder of medications.   3. Check complete metabolic profile, thyroid stimulating hormone  4. Check echo to evaluate change in cardiac function.   4. Follow up in 6 weeks.         HPI: Venkata Jones is a 69 y.o. year old male with CAD s/p CABG x 3 7/21 in setting of MI, post-op PAF, hypertension, dyslipidemia, echo 7/21 EF 50% with trace to mild MRI, who presents for follow up. Had an exercise stress test 2/22 which demonstrated no ischemia. Was in the ED 2 days ago with some difficulty.  CTA chest demonstrated no PE or pulmonary issues, ECG, BNP, and cardiac enzymes were normal.  Does have dyspnea on exertion - has wheezing.  Pharmacologic nuclear stress test 1/23 demonstrated no ischemia. Still with dyspnea on exertion and wheezing.  Has swelling in legs.          Review of Systems   Constitutional: Negative.    HENT: Negative.     Eyes: Negative.    Respiratory:  Positive for shortness of breath. Negative for chest tightness.    Cardiovascular:  Positive for leg swelling. Negative for chest pain and palpitations.   Gastrointestinal: Negative.    Endocrine: Negative.    Genitourinary: Negative.    Musculoskeletal: Negative.    Skin: Negative.    Allergic/Immunologic: Negative.    Neurological: Negative.    Hematological: Negative.    Psychiatric/Behavioral: Negative.     All other systems reviewed and are negative.        Past Medical History:   Diagnosis Date    Diabetes mellitus (HCC)     History of COVID-19 11/2020    HLD (hyperlipidemia)     HTN (hypertension)      Past Surgical History:   Procedure Laterality Date     IA CORONARY ARTERY BYP W/VEIN & ARTERY GRAFT 3 VEIN N/A 7/16/2021    Procedure: CORONARY ARTERY BYPASS GRAFT (CABG) 3 VESSELS, LIMA -LAD,EVH/GSV- PDA  AND OM;  Surgeon: Eliazar Yarbrough DO;  Location: [facility];  Service: Cardiac Surgery    IA ECHO TRANSESOPHAG R-T 2D W/PRB IMG ACQUISJ I&R N/A 7/16/2021    Procedure: TRANSESOPHAGEAL ECHOCARDIOGRAM (GENNA);  Surgeon: Eliazar Yarbrough DO;  Location: [facility];  Service: Cardiac Surgery    IA NDSC SURG W/VIDEO-ASSISTED HARVEST VEIN CABG N/A 7/16/2021    Procedure: HARVEST VEIN ENDOSCOPIC (EVH)/GSV;  Surgeon: Eliazar Yarbrough DO;  Location: [facility];  Service: Cardiac Surgery     Social History     Substance and Sexual Activity   Alcohol Use Yes    Comment: 2 beers a week     Social History     Substance and Sexual Activity   Drug Use Never     Social History     Tobacco Use   Smoking Status Former    Types: Cigars   Smokeless Tobacco Never     Family History   Problem Relation Age of Onset    Leukemia Mother        Allergies:  No Known Allergies    Medications:     Current Outpatient Medications:     albuterol (Ventolin HFA) 90 mcg/act inhaler, Inhale 2 puffs every 6 (six) hours as needed for wheezing or shortness of breath, Disp: 18 g, Rfl: 1    amiodarone 200 mg tablet, Take 200 mg by mouth daily, Disp: , Rfl:     aspirin 325 mg tablet, Take 1 tablet (325 mg total) by mouth daily, Disp: 30 tablet, Rfl: 0    atorvastatin (LIPITOR) 80 mg tablet, Take 1 tablet (80 mg total) by mouth daily with dinner, Disp: 30 tablet, Rfl: 5    cholecalciferol (VITAMIN D3) 1,000 units tablet, Take 1,000 Units by mouth daily, Disp: , Rfl:     cyanocobalamin (VITAMIN B-12) 250 MCG tablet, Take 250 mcg by mouth daily, Disp: , Rfl:     Empagliflozin (Jardiance) 10 MG TABS, Take 10 mg by mouth daily, Disp: , Rfl:     ezetimibe (ZETIA) 10 mg tablet, Take 10 mg by mouth daily, Disp: , Rfl:     Fluticasone-Salmeterol (Advair) 250-50 mcg/dose inhaler, Inhale 1 puff 2 (two) times a day  Rinse mouth after use, Disp: 180 blister, Rfl: 0    insulin glargine (Lantus SoloStar) 100 units/mL injection pen, Inject 15 Units under the skin daily if Lantus solostar is not the preferred basal insulin for the patient's insurance company, please substitute with Tresiba flexpen, Basaglar Kwikpen , Levemir flexpen, Toujeo solostar pen  or equivalent insulin vials at the same dose instead., Disp: 4.5 mL, Rfl: 0    insulin lispro (HumaLOG KwikPen) 100 units/mL injection pen, Inject 6 Units under the skin 3 (three) times a day with meals if Humalog  Kwik pen is not the preferred mealtime insulin for the patient's insurance, please substitute with NovoLog flexpen, Fiasp  flextouch, Admelog solostar  or Apidra solostar pen or equivalent insulin vials at the same dose instead., Disp: 15 mL, Rfl: 0    metFORMIN (GLUCOPHAGE) 1000 MG tablet, Take 1,000 mg by mouth 2 (two) times a day with meals , Disp: , Rfl:     metoprolol tartrate (LOPRESSOR) 25 mg tablet, Take 1 tablet (25 mg total) by mouth every 12 (twelve) hours, Disp: 60 tablet, Rfl: 5    omega-3-acid ethyl esters (LOVAZA) 1 g capsule, Take 2 g by mouth 2 (two) times a day, Disp: , Rfl:     docusate sodium (COLACE) 100 mg capsule, Take 1 capsule (100 mg total) by mouth 2 (two) times a day (Patient not taking: Reported on 12/18/2024), Disp: 60 capsule, Rfl: 0    meloxicam (MOBIC) 15 mg tablet, meloxicam 15 mg tablet  Take 1 tablet every day by oral route as directed for 30 days. (Patient not taking: Reported on 12/11/2023), Disp: , Rfl:     pantoprazole (PROTONIX) 40 mg tablet, Take 1 tablet (40 mg total) by mouth daily (Patient not taking: Reported on 12/1/2023), Disp: 90 tablet, Rfl: 3      Wt Readings from Last 3 Encounters:   02/03/25 114 kg (252 lb 3.2 oz)   12/18/24 115 kg (253 lb)   06/14/24 129 kg (284 lb)     Temp Readings from Last 3 Encounters:   12/18/24 (!) 97.4 °F (36.3 °C) (Tympanic)   06/14/24 98.4 °F (36.9 °C) (Tympanic)   06/09/24 98.1 °F (36.7 °C)  "    BP Readings from Last 3 Encounters:   25 102/64   24 116/67   24 108/66     Pulse Readings from Last 3 Encounters:   25 80   24 75   24 94         Physical Exam  HENT:      Head: Atraumatic.      Mouth/Throat:      Mouth: Mucous membranes are moist.   Cardiovascular:      Rate and Rhythm: Normal rate and regular rhythm.      Heart sounds: Normal heart sounds.   Pulmonary:      Effort: Pulmonary effort is normal.      Breath sounds: Normal breath sounds.   Abdominal:      General: Abdomen is flat.   Musculoskeletal:         General: Normal range of motion.      Cervical back: Normal range of motion.      Right lower le+ Pitting Edema present.      Left lower le+ Pitting Edema present.   Skin:     General: Skin is warm.   Neurological:      General: No focal deficit present.      Mental Status: He is alert and oriented to person, place, and time.   Psychiatric:         Mood and Affect: Mood normal.         Behavior: Behavior normal.           Laboratory Studies:  CMP:  Lab Results   Component Value Date    K 4.0 2024    CL 98 2024    CO2 26 2024    BUN 13 2024    CREATININE 0.90 2024    GLUCOSE 231 (H) 2021    AST 15 2023    ALT 24 2023    EGFR 88 2024       Lipid Profile:   No results found for: \"CHOL\"  Lab Results   Component Value Date    HDL 33 (L) 2023     Lab Results   Component Value Date    LDLCALC 44 2023     Lab Results   Component Value Date    TRIG 101 2023       Cardiac testing:   EKG reviewed personally: NSR 67 LAD  Results for orders placed during the hospital encounter of 21    Echo complete with contrast if indicated    [facility]  [address] [place] [address] [phone]    Transthoracic Echocardiogram  2D, M-mode, Doppler, and Color Doppler    Study date:  2021    Patient: DENNIS BRANDT  MR number: [id number]  Account number: " [id number]  : 1955  Age: 66 years  Gender: Male  Status: Outpatient  Location: Bedside  Height: 70 in  Weight: 246 lb  BP: 110/ 66 mmHg    Indications: NSTEMI    Diagnoses: I21.4 - Non-ST elevation (NSTEMI) myocardial infarction    Sonographer:  VLADISLAV Nelson  Primary Physician:  Real Chin MD  Referring Physician:  Alton Patiño PA-C  Group:  [facility]  Interpreting Physician:  Vinay Huang MD    SUMMARY    LEFT VENTRICLE:  Size was normal.  Systolic function was at the lower limits of normal. Ejection fraction was estimated to be 50 %.  There was mild hypokinesis of the basal-mid inferior and inferolateral walls.  Doppler parameters were consistent with abnormal left ventricular relaxation (grade 1 diastolic dysfunction).    RIGHT VENTRICLE:  The size was normal.  Systolic function was normal.    LEFT ATRIUM:  The atrium was mildly dilated.    MITRAL VALVE:  There was trace to mild regurgitation.    TRICUSPID VALVE:  There was trace regurgitation.    HISTORY: PRIOR HISTORY: HTN, HLD, DM2    PROCEDURE: The procedure was performed at the bedside. This was a routine study. The transthoracic approach was used. The study included complete 2D imaging, M-mode, complete spectral Doppler, and color Doppler. The heart rate was 70 bpm,  at the start of the study. Images were obtained from the parasternal, apical, subcostal, and suprasternal notch acoustic windows. Echocardiographic views were limited due to lung interference. This was a technically difficult study.    LEFT VENTRICLE: Size was normal. Systolic function was at the lower limits of normal. Ejection fraction was estimated to be 50 %. There was mild hypokinesis of the basal-mid inferior and inferolateral walls. Wall thickness was normal.  DOPPLER: Doppler parameters were consistent with abnormal left ventricular relaxation (grade 1 diastolic dysfunction).    RIGHT VENTRICLE: The size was normal. Systolic function was  normal. Wall thickness was normal.    LEFT ATRIUM: The atrium was mildly dilated.    RIGHT ATRIUM: Size was normal.    MITRAL VALVE: Valve structure was normal. There was normal leaflet separation. DOPPLER: The transmitral velocity was within the normal range. There was no evidence for stenosis. There was trace to mild regurgitation.    AORTIC VALVE: The valve was trileaflet. Leaflets exhibited mildly increased thickness, mild calcification, normal cuspal separation, and sclerosis. DOPPLER: Transaortic velocity was within the normal range. There was no evidence for  stenosis. There was no regurgitation.    TRICUSPID VALVE: The valve structure was normal. There was normal leaflet separation. DOPPLER: The transtricuspid velocity was within the normal range. There was no evidence for stenosis. There was trace regurgitation. The tricuspid jet  envelope definition was inadequate for estimation of RV systolic pressure. There are no indirect findings suggestive of moderate or severe pulmonary hypertension.    PULMONIC VALVE: Leaflets exhibited normal thickness, no calcification, and normal cuspal separation. DOPPLER: The transpulmonic velocity was within the normal range. There was no regurgitation.    PERICARDIUM: There was no pericardial effusion. The pericardium was normal in appearance.    AORTA: The root exhibited normal size.    SYSTEMIC VEINS: IVC: The inferior vena cava was normal in size and course. Respirophasic changes were normal.    SYSTEM MEASUREMENT TABLES    2D  %FS: 24.71 %  Ao Diam: 3.41 cm  EDV(Teich): 173.64 ml  EF(Teich): 48.23 %  ESV(Teich): 89.89 ml  HR_4Ch_Q: 66.42 BPM  IVSd: 1.14 cm  LA Area: 23.21 cm2  LA Diam: 3.7 cm  LVCO_4Ch_Q: 2.84 L/min  LVEF_4Ch_Q: 41.39 %  LVIDd: 5.91 cm  LVIDs: 4.45 cm  LVLd_4Ch_Q: 8.6 cm  LVLs_4Ch_Q: 7.4 cm  LVPWd: 0.97 cm  LVSV_4Ch_Q: 42.77 ml  LVVED_4Ch_Q: 103.33 ml  LVVES_4Ch_Q: 60.56 ml  RA Area: 18.36 cm2  RVIDd: 3.33 cm  SV(Teich): 83.75 ml    MM  TAPSE: 2.59  cm    PW  E' Sept: 0.08 m/s  E/E' Sept: 9.37  MV A Abiel: 0.89 m/s  MV Dec Stafford: 3.68 m/s2  MV DecT: 197.59 ms  MV E Abiel: 0.73 m/s  MV E/A Ratio: 0.82    [facility] Accredited Echocardiography Laboratory    Prepared and electronically signed by    Vinay Huang MD  Signed 2021 14:41:28    No results found for this or any previous visit.    Results for orders placed during the hospital encounter of 21    Cardiac catheterization    [facility]  [address] [place], [place] [id number] [phone]    ([place])    Invasive Cardiovascular Lab Complete Report    Patient: DENNIS BRANDT  MR number: [id number]  Account number: [id number]  Study date: 2021  Gender: Male  : 1955  Height: 70.1 in  Weight: 246.4 lb  BSA: 2.28 mï¾²    Allergies: NO KNOWN ALLERGIES    Diagnostic Cardiologist:  Demian Daugherty MD  Primary Physician:  Real Chin MD    SUMMARY    CORONARY CIRCULATION:  Left main: Normal.  LAD: The vessel was normal sized. There was a 70% stenosis in the proximal vessel.  Circumflex: The vessel was normal sized and gave rise to one major OM branch. There was a 95% proximal stenosis of the large OM branch.  RCA: The vessel was normal sized and dominant, giving rise to the PDA and several posterolateral branches. There was a complex eccentric lesion in mid vessel with thrombus. There is MEDINA 3 flow into the distal vessel, but MEDINA 1 flow into  the PDA. There was collateral flow to the PDA from the left system.    REPORT ELEMENT SELECTION:  Right radial access.  There were no complications.    Summary:  Severe 3 vessel CAD.  Plan: transfer to [facility] for surgical consultation.    INDICATIONS:  --  Possible CAD: myocardial infarction without ST elevation (NSTEMI).    PROCEDURES PERFORMED    --  Left coronary angiography.  --  Right coronary angiography.  --  Inpatient.  --  Mod Sedation Same Physician Initial 15min.  --  Mod Sedation Same Physician Add  15min.  --  Coronary Catheterization (w/o [facility]).    PROCEDURE: The risks and alternatives of the procedures and conscious sedation were explained to the patient and informed consent was obtained. The patient was brought to the cath lab and placed on the table. The planned puncture sites  were prepped and draped in the usual sterile fashion.    --  Right radial artery access. After performing an Jarret's test to verify adequate ulnar artery supply to the hand, the radial site was prepped. The puncture site was infiltrated with local anesthetic. The vessel was accessed using the  modified Seldinger technique, a wire was advanced into the vessel, and a sheath was advanced over the wire into the vessel.    --  Left coronary artery angiography. A catheter was advanced over a guidewire into the aorta and positioned in the left coronary artery ostium under fluoroscopic guidance. Angiography was performed.    --  Right coronary artery angiography. A catheter was advanced over a guidewire into the aorta and positioned in the right coronary artery ostium under fluoroscopic guidance. Angiography was performed.    --  Inpatient.    --  Mod Sedation Same Physician Initial 15min.    --  Mod Sedation Same Physician Add 15min.    --  Coronary Catheterization (w/o [facility]).    PROCEDURE COMPLETION: The patient tolerated the procedure well and was discharged from the cath lab. TIMING: Test started at 15:17. Test concluded at 15:36. HEMOSTASIS: The sheath was removed. The site was compressed with a Hemoband  device. Hemostasis was obtained. MEDICATIONS GIVEN: Verapamil (Isoptin, Calan, Covera), 1.25 mg, IA, at 15:22. Fentanyl (1OOmcg/2 ml), 50 mcg, IV, at 15:05. Versed (2mg/2ml), 2 mg, IV, at 15:06. Fentanyl (1OOmcg/2 ml), 25 mcg, IV, at  15:17. Versed (2mg/2ml), 1 mg, IV, at 15:17. 1% Lidocaine, 0.1 ml, subcutaneously, at 15:21. Nitroglycerin (200mcg/ml), 200 mcg, at 15:21. Heparin 1000 units/ml, 4,000 units, IV, at 15:22. CONTRAST GIVEN:  75 ml Omnipaque (350mg I /ml).  RADIATION EXPOSURE: Fluoroscopy time: 2.48 min.    CORONARY VESSELS:   --  Left main: Normal.  --  LAD: The vessel was normal sized. There was a 70% stenosis in the proximal vessel.  --  Circumflex: The vessel was normal sized and gave rise to one major OM branch. There was a 95% proximal stenosis of the large OM branch.  --  RCA: The vessel was normal sized and dominant, giving rise to the PDA and several posterolateral branches. There was a complex eccentric lesion in mid vessel with thrombus. There is MEDINA 3 flow into the distal vessel, but MEDINA 1 flow  into the PDA. There was collateral flow to the PDA from the left system.    NOTE: Right radial access.  There were no complications.    Prepared and signed by    Demian Daugherty MD  Signed 2021 15:53:35    CC: Dr. JOSH Huang    Study diagram    Hemodynamic tables    Pressures:  Baseline  Pressures:  - HR: 76  Pressures:  - Rhythm:  Pressures:  -- Aortic Pressure (S/D/M): 92/59/70    Outputs:  Baseline  Outputs:  -- CALCULATIONS[age]  Outputs:  -- CALCULATIONS: Body Surface Area: 2.28  Outputs:  -- CALCULATIONS: Height in cm: 178.00  Outputs:  -- CALCULATIONS: Sex: Male  Outputs:  -- CALCULATIONS: Weight in k.00  Outputs:  -- OUTPUTS: O2 consumption: 285.59  Outputs:  -- OUTPUTS: Vo2 Indexed: 125.00    Results for orders placed during the hospital encounter of 23    NM myocardial perfusion spect (stress and/or rest)    Interpretation Summary    Stress ECG: No ST deviation is noted. The stress ECG is negative for ischemia after pharmacologic vasodilation.    Perfusion: There are no perfusion defects.    Stress Function: Left ventricular function post-stress is normal. Post-stress ejection fraction is 49 %.    Stress Combined Conclusion: The ECG and SPECT imaging portions of the stress study are concordant with no evidence of stress induced myocardial ischemia.

## 2025-02-03 NOTE — PATIENT INSTRUCTIONS
Recommendations:  1. Start furosemide 20mg daily.  2. Continue remainder of medications.   3. Check complete metabolic profile, thyroid stimulating hormone  4. Check echo to evaluate change in cardiac function.   4. Follow up in 6 weeks.

## 2025-02-07 ENCOUNTER — HOSPITAL ENCOUNTER (OUTPATIENT)
Dept: NON INVASIVE DIAGNOSTICS | Facility: CLINIC | Age: 70
Discharge: HOME | End: 2025-02-07
Payer: COMMERCIAL

## 2025-02-07 VITALS
HEART RATE: 63 BPM | SYSTOLIC BLOOD PRESSURE: 102 MMHG | DIASTOLIC BLOOD PRESSURE: 64 MMHG | HEIGHT: 70 IN | BODY MASS INDEX: 35.98 KG/M2 | WEIGHT: 251.32 LBS

## 2025-02-07 DIAGNOSIS — I25.10 CORONARY ARTERY DISEASE WITHOUT ANGINA PECTORIS, UNSPECIFIED VESSEL OR LESION TYPE, UNSPECIFIED WHETHER NATIVE OR TRANSPLANTED HEART: ICD-10-CM

## 2025-02-07 LAB
AORTIC ROOT: 2.9 CM
ASCENDING AORTA: 3.4 CM
BSA FOR ECHO PROCEDURE: 2.3 M2
E WAVE DECELERATION TIME: 249 MS
E/A RATIO: 0.89
FRACTIONAL SHORTENING: 25 (ref 28–44)
INTERVENTRICULAR SEPTUM IN DIASTOLE (PARASTERNAL SHORT AXIS VIEW): 1 CM
INTERVENTRICULAR SEPTUM: 1 CM (ref 0.6–1.1)
LAAS-AP2: 24.2 CM2
LAAS-AP4: 20.2 CM2
LEFT ATRIUM AREA SYSTOLE SINGLE PLANE A4C: 20.1 CM2
LEFT ATRIUM SIZE: 4.4 CM
LEFT ATRIUM VOLUME (MOD BIPLANE): 69 ML
LEFT ATRIUM VOLUME INDEX (MOD BIPLANE): 30 ML/M2
LEFT INTERNAL DIMENSION IN SYSTOLE: 4.1 CM (ref 2.1–4)
LEFT VENTRICULAR INTERNAL DIMENSION IN DIASTOLE: 5.5 CM (ref 3.5–6)
LEFT VENTRICULAR POSTERIOR WALL IN END DIASTOLE: 0.9 CM
LEFT VENTRICULAR STROKE VOLUME: 72 ML
LV EF US.2D.A4C+ESTIMATED: 61 %
LVSV (TEICH): 72 ML
MV E'TISSUE VEL-SEP: 8 CM/S
MV PEAK A VEL: 0.85 M/S
MV PEAK E VEL: 76 CM/S
MV STENOSIS PRESSURE HALF TIME: 72 MS
MV VALVE AREA P 1/2 METHOD: 3.06
RIGHT ATRIUM AREA SYSTOLE A4C: 14.5 CM2
RIGHT VENTRICLE ID DIMENSION: 2.8 CM
SL CV LEFT ATRIUM LENGTH A2C: 5.7 CM
SL CV LV EF: 55
SL CV PED ECHO LEFT VENTRICLE DIASTOLIC VOLUME (MOD BIPLANE) 2D: 145 ML
SL CV PED ECHO LEFT VENTRICLE SYSTOLIC VOLUME (MOD BIPLANE) 2D: 73 ML
TR MAX PG: 30 MMHG
TR PEAK VELOCITY: 2.7 M/S
TRICUSPID ANNULAR PLANE SYSTOLIC EXCURSION: 2.1 CM
TRICUSPID VALVE PEAK REGURGITATION VELOCITY: 2.72 M/S

## 2025-02-07 PROCEDURE — 93306 TTE W/DOPPLER COMPLETE: CPT | Performed by: STUDENT IN AN ORGANIZED HEALTH CARE EDUCATION/TRAINING PROGRAM

## 2025-02-07 PROCEDURE — 93306 TTE W/DOPPLER COMPLETE: CPT

## 2025-02-07 RX ADMIN — PERFLUTREN 0.4 ML/MIN: 6.52 INJECTION, SUSPENSION INTRAVENOUS at 12:15

## 2025-03-04 LAB — HBA1C MFR BLD HPLC: 11 %

## 2025-03-05 LAB — TSH SERPL-ACNC: 2.56 MIU/L (ref 0.4–4.5)

## 2025-03-18 ENCOUNTER — OFFICE VISIT (OUTPATIENT)
Dept: CARDIOLOGY CLINIC | Facility: CLINIC | Age: 70
End: 2025-03-18
Payer: COMMERCIAL

## 2025-03-18 VITALS
HEART RATE: 86 BPM | DIASTOLIC BLOOD PRESSURE: 70 MMHG | BODY MASS INDEX: 35.93 KG/M2 | HEIGHT: 70 IN | OXYGEN SATURATION: 99 % | WEIGHT: 251 LBS | SYSTOLIC BLOOD PRESSURE: 124 MMHG

## 2025-03-18 DIAGNOSIS — E66.01 CLASS 2 SEVERE OBESITY DUE TO EXCESS CALORIES WITH SERIOUS COMORBIDITY AND BODY MASS INDEX (BMI) OF 36.0 TO 36.9 IN ADULT (HCC): ICD-10-CM

## 2025-03-18 DIAGNOSIS — E78.2 MIXED HYPERLIPIDEMIA: ICD-10-CM

## 2025-03-18 DIAGNOSIS — Z95.1 S/P CABG (CORONARY ARTERY BYPASS GRAFT): ICD-10-CM

## 2025-03-18 DIAGNOSIS — E66.812 CLASS 2 SEVERE OBESITY DUE TO EXCESS CALORIES WITH SERIOUS COMORBIDITY AND BODY MASS INDEX (BMI) OF 36.0 TO 36.9 IN ADULT (HCC): ICD-10-CM

## 2025-03-18 DIAGNOSIS — E13.49 OTHER DIABETIC NEUROLOGICAL COMPLICATION ASSOCIATED WITH OTHER SPECIFIED DIABETES MELLITUS (HCC): ICD-10-CM

## 2025-03-18 DIAGNOSIS — I25.10 CORONARY ARTERY DISEASE WITHOUT ANGINA PECTORIS, UNSPECIFIED VESSEL OR LESION TYPE, UNSPECIFIED WHETHER NATIVE OR TRANSPLANTED HEART: ICD-10-CM

## 2025-03-18 DIAGNOSIS — I10 PRIMARY HYPERTENSION: Primary | ICD-10-CM

## 2025-03-18 PROCEDURE — 99214 OFFICE O/P EST MOD 30 MIN: CPT | Performed by: INTERNAL MEDICINE

## 2025-03-18 RX ORDER — FUROSEMIDE 40 MG/1
40 TABLET ORAL DAILY
Qty: 90 TABLET | Refills: 3 | Status: SHIPPED | OUTPATIENT
Start: 2025-03-18

## 2025-03-18 NOTE — PATIENT INSTRUCTIONS
Recommendations:  1. Increase furosemide 40mg daily.  2. Stop amiodarone.  3. Continue remainder of medications.   4. Follow up in 3 months.

## 2025-03-18 NOTE — PROGRESS NOTES
Cardiology   Venkata Jones 69 y.o. male MRN: [id number]        Impression:  1. CAD s/p CABG in setting of MI 7/21   2. Hypertension - controlled.  3. Dyslipidemia - on statin.   4. Post operative atrial fibrillation - no further episodes.    5. Shortness of breath - unclear etiology.  Some improvement, but still dyspneic.      Recommendations:  1. Increase furosemide 40mg daily.  2. Stop amiodarone.  3. Continue remainder of medications.   4. Follow up in 3 months.         HPI: Venkata Jones is a 69 y.o. year old male with CAD s/p CABG x 3 7/21 in setting of MI, post-op PAF, hypertension, dyslipidemia, echo 7/21 EF 50% with trace to mild MRI, who presents for follow up. Had an exercise stress test 2/22 which demonstrated no ischemia. Was in the ED 2 days ago with some difficulty.  CTA chest demonstrated no PE or pulmonary issues, ECG, BNP, and cardiac enzymes were normal.  Does have dyspnea on exertion - has wheezing.  Pharmacologic nuclear stress test 1/23 demonstrated no ischemia. Dyspnea improving a little.  Echo 2/7/25 - EF 55%, no sig valvular abnormalities.         Review of Systems   Constitutional: Negative.    HENT: Negative.     Eyes: Negative.    Respiratory:  Positive for shortness of breath. Negative for chest tightness.    Cardiovascular:  Positive for leg swelling. Negative for chest pain and palpitations.   Gastrointestinal: Negative.    Endocrine: Negative.    Genitourinary: Negative.    Musculoskeletal: Negative.    Skin: Negative.    Allergic/Immunologic: Negative.    Neurological: Negative.    Hematological: Negative.    Psychiatric/Behavioral: Negative.     All other systems reviewed and are negative.        Past Medical History:   Diagnosis Date    Diabetes mellitus (HCC)     History of COVID-19 11/2020    HLD (hyperlipidemia)     HTN (hypertension)      Past Surgical History:   Procedure Laterality Date    CO CORONARY ARTERY BYP W/VEIN & ARTERY GRAFT 3 VEIN N/A 7/16/2021    Procedure:  CORONARY ARTERY BYPASS GRAFT (CABG) 3 VESSELS, LIMA -LAD,EVH/GSV- PDA  AND OM;  Surgeon: Eliazar Yarbrough DO;  Location: [facility];  Service: Cardiac Surgery    ND ECHO TRANSESOPHAG R-T 2D W/PRB IMG ACQUISJ I&R N/A 7/16/2021    Procedure: TRANSESOPHAGEAL ECHOCARDIOGRAM (GENNA);  Surgeon: Eliazar Yarbrough DO;  Location: [facility];  Service: Cardiac Surgery    ND NDSC SURG W/VIDEO-ASSISTED HARVEST VEIN CABG N/A 7/16/2021    Procedure: HARVEST VEIN ENDOSCOPIC (EVH)/GSV;  Surgeon: Eliazar Yarbrough DO;  Location: [facility];  Service: Cardiac Surgery     Social History     Substance and Sexual Activity   Alcohol Use Yes    Comment: 2 beers a week     Social History     Substance and Sexual Activity   Drug Use Never     Social History     Tobacco Use   Smoking Status Former    Types: Cigars   Smokeless Tobacco Never     Family History   Problem Relation Age of Onset    Leukemia Mother        Allergies:  No Known Allergies    Medications:     Current Outpatient Medications:     albuterol (Ventolin HFA) 90 mcg/act inhaler, Inhale 2 puffs every 6 (six) hours as needed for wheezing or shortness of breath, Disp: 18 g, Rfl: 1    amiodarone 200 mg tablet, Take 200 mg by mouth daily, Disp: , Rfl:     aspirin 325 mg tablet, Take 1 tablet (325 mg total) by mouth daily, Disp: 30 tablet, Rfl: 0    atorvastatin (LIPITOR) 80 mg tablet, Take 1 tablet (80 mg total) by mouth daily with dinner, Disp: 30 tablet, Rfl: 5    cholecalciferol (VITAMIN D3) 1,000 units tablet, Take 1,000 Units by mouth daily, Disp: , Rfl:     cyanocobalamin (VITAMIN B-12) 250 MCG tablet, Take 250 mcg by mouth daily, Disp: , Rfl:     Empagliflozin (Jardiance) 10 MG TABS, Take 10 mg by mouth daily, Disp: , Rfl:     ezetimibe (ZETIA) 10 mg tablet, Take 10 mg by mouth daily, Disp: , Rfl:     Fluticasone-Salmeterol (Advair) 250-50 mcg/dose inhaler, Inhale 1 puff 2 (two) times a day Rinse mouth after use, Disp: 180 blister, Rfl: 0    furosemide (LASIX) 20 mg  tablet, Take 1 tablet (20 mg total) by mouth daily, Disp: 90 tablet, Rfl: 3    insulin glargine (Lantus SoloStar) 100 units/mL injection pen, Inject 15 Units under the skin daily if Lantus solostar is not the preferred basal insulin for the patient's insurance company, please substitute with Tresiba flexpen, Basaglar Kwikpen , Levemir flexpen, Toujeo solostar pen  or equivalent insulin vials at the same dose instead., Disp: 4.5 mL, Rfl: 0    insulin lispro (HumaLOG KwikPen) 100 units/mL injection pen, Inject 6 Units under the skin 3 (three) times a day with meals if Humalog  Kwik pen is not the preferred mealtime insulin for the patient's insurance, please substitute with NovoLog flexpen, Fiasp  flextouch, Admelog solostar  or Apidra solostar pen or equivalent insulin vials at the same dose instead., Disp: 15 mL, Rfl: 0    metFORMIN (GLUCOPHAGE) 1000 MG tablet, Take 1,000 mg by mouth 2 (two) times a day with meals , Disp: , Rfl:     metoprolol tartrate (LOPRESSOR) 25 mg tablet, Take 1 tablet (25 mg total) by mouth every 12 (twelve) hours, Disp: 60 tablet, Rfl: 5    omega-3-acid ethyl esters (LOVAZA) 1 g capsule, Take 2 g by mouth 2 (two) times a day, Disp: , Rfl:     docusate sodium (COLACE) 100 mg capsule, Take 1 capsule (100 mg total) by mouth 2 (two) times a day (Patient not taking: Reported on 12/18/2024), Disp: 60 capsule, Rfl: 0    meloxicam (MOBIC) 15 mg tablet, meloxicam 15 mg tablet  Take 1 tablet every day by oral route as directed for 30 days. (Patient not taking: Reported on 12/11/2023), Disp: , Rfl:     pantoprazole (PROTONIX) 40 mg tablet, Take 1 tablet (40 mg total) by mouth daily (Patient not taking: Reported on 12/1/2023), Disp: 90 tablet, Rfl: 3      Wt Readings from Last 3 Encounters:   03/18/25 114 kg (251 lb)   02/07/25 114 kg (251 lb 5.2 oz)   02/03/25 114 kg (252 lb 3.2 oz)     Temp Readings from Last 3 Encounters:   12/18/24 (!) 97.4 °F (36.3 °C) (Tympanic)   06/14/24 98.4 °F (36.9 °C)  "(Tympanic)   24 98.1 °F (36.7 °C)     BP Readings from Last 3 Encounters:   25 124/70   25 102/64   25 102/64     Pulse Readings from Last 3 Encounters:   25 86   25 63   25 67         Physical Exam  HENT:      Head: Atraumatic.      Mouth/Throat:      Mouth: Mucous membranes are moist.   Eyes:      Extraocular Movements: Extraocular movements intact.   Cardiovascular:      Rate and Rhythm: Normal rate and regular rhythm.      Heart sounds: Normal heart sounds.   Pulmonary:      Effort: Pulmonary effort is normal.      Breath sounds: Normal breath sounds.   Abdominal:      General: Abdomen is flat.   Musculoskeletal:         General: Normal range of motion.      Cervical back: Normal range of motion.      Right lower le+ Pitting Edema present.      Left lower le+ Pitting Edema present.   Skin:     General: Skin is warm.   Neurological:      General: No focal deficit present.      Mental Status: He is alert and oriented to person, place, and time.   Psychiatric:         Mood and Affect: Mood normal.         Behavior: Behavior normal.           Laboratory Studies:  CMP:  Lab Results   Component Value Date    K 4.0 2024    CL 98 2024    CO2 26 2024    BUN 13 2024    CREATININE 0.90 2024    GLUCOSE 231 (H) 2021    AST 15 2023    ALT 24 2023    EGFR 88 2024       Lipid Profile:   No results found for: \"CHOL\"  Lab Results   Component Value Date    HDL 33 (L) 2023     Lab Results   Component Value Date    LDLCALC 44 2023     Lab Results   Component Value Date    TRIG 101 2023       Cardiac testing:   EKG reviewed personally:   Results for orders placed during the hospital encounter of 21    Echo complete with contrast if indicated    [facility]  [address]  [place] [address] [phone]    Transthoracic Echocardiogram  2D, M-mode, Doppler, and Color " Doppler    Study date:  2021    Patient: DENNIS BRANDT  MR number: [id number]  Account number: [id number]  : 1955  Age: 66 years  Gender: Male  Status: Outpatient  Location: Bedside  Height: 70 in  Weight: 246 lb  BP: 110/ 66 mmHg    Indications: NSTEMI    Diagnoses: I21.4 - Non-ST elevation (NSTEMI) myocardial infarction    Sonographer:  VLADISLAV Nelson  Primary Physician:  Real Chin MD  Referring Physician:  Alton Patiño PA-C  Group:  [facility]  Interpreting Physician:  Vinay Huang MD    SUMMARY    LEFT VENTRICLE:  Size was normal.  Systolic function was at the lower limits of normal. Ejection fraction was estimated to be 50 %.  There was mild hypokinesis of the basal-mid inferior and inferolateral walls.  Doppler parameters were consistent with abnormal left ventricular relaxation (grade 1 diastolic dysfunction).    RIGHT VENTRICLE:  The size was normal.  Systolic function was normal.    LEFT ATRIUM:  The atrium was mildly dilated.    MITRAL VALVE:  There was trace to mild regurgitation.    TRICUSPID VALVE:  There was trace regurgitation.    HISTORY: PRIOR HISTORY: HTN, HLD, DM2    PROCEDURE: The procedure was performed at the bedside. This was a routine study. The transthoracic approach was used. The study included complete 2D imaging, M-mode, complete spectral Doppler, and color Doppler. The heart rate was 70 bpm,  at the start of the study. Images were obtained from the parasternal, apical, subcostal, and suprasternal notch acoustic windows. Echocardiographic views were limited due to lung interference. This was a technically difficult study.    LEFT VENTRICLE: Size was normal. Systolic function was at the lower limits of normal. Ejection fraction was estimated to be 50 %. There was mild hypokinesis of the basal-mid inferior and inferolateral walls. Wall thickness was normal.  DOPPLER: Doppler parameters were consistent with abnormal left ventricular  relaxation (grade 1 diastolic dysfunction).    RIGHT VENTRICLE: The size was normal. Systolic function was normal. Wall thickness was normal.    LEFT ATRIUM: The atrium was mildly dilated.    RIGHT ATRIUM: Size was normal.    MITRAL VALVE: Valve structure was normal. There was normal leaflet separation. DOPPLER: The transmitral velocity was within the normal range. There was no evidence for stenosis. There was trace to mild regurgitation.    AORTIC VALVE: The valve was trileaflet. Leaflets exhibited mildly increased thickness, mild calcification, normal cuspal separation, and sclerosis. DOPPLER: Transaortic velocity was within the normal range. There was no evidence for  stenosis. There was no regurgitation.    TRICUSPID VALVE: The valve structure was normal. There was normal leaflet separation. DOPPLER: The transtricuspid velocity was within the normal range. There was no evidence for stenosis. There was trace regurgitation. The tricuspid jet  envelope definition was inadequate for estimation of RV systolic pressure. There are no indirect findings suggestive of moderate or severe pulmonary hypertension.    PULMONIC VALVE: Leaflets exhibited normal thickness, no calcification, and normal cuspal separation. DOPPLER: The transpulmonic velocity was within the normal range. There was no regurgitation.    PERICARDIUM: There was no pericardial effusion. The pericardium was normal in appearance.    AORTA: The root exhibited normal size.    SYSTEMIC VEINS: IVC: The inferior vena cava was normal in size and course. Respirophasic changes were normal.    SYSTEM MEASUREMENT TABLES    2D  %FS: 24.71 %  Ao Diam: 3.41 cm  EDV(Teich): 173.64 ml  EF(Teich): 48.23 %  ESV(Teich): 89.89 ml  HR_4Ch_Q: 66.42 BPM  IVSd: 1.14 cm  LA Area: 23.21 cm2  LA Diam: 3.7 cm  LVCO_4Ch_Q: 2.84 L/min  LVEF_4Ch_Q: 41.39 %  LVIDd: 5.91 cm  LVIDs: 4.45 cm  LVLd_4Ch_Q: 8.6 cm  LVLs_4Ch_Q: 7.4 cm  LVPWd: 0.97 cm  LVSV_4Ch_Q: 42.77 ml  LVVED_4Ch_Q: 103.33  ml  LVVES_4Ch_Q: 60.56 ml  RA Area: 18.36 cm2  RVIDd: 3.33 cm  SV(Teich): 83.75 ml    MM  TAPSE: 2.59 cm    PW  E' Sept: 0.08 m/s  E/E' Sept: 9.37  MV A Abiel: 0.89 m/s  MV Dec [place]: 3.68 m/s2  MV DecT: 197.59 ms  MV E Abiel: 0.73 m/s  MV E/A Ratio: 0.82    [employer] Accredited Echocardiography Laboratory    Prepared and electronically signed by    Vinay Huang MD  Signed 2021 14:41:28    No results found for this or any previous visit.    Results for orders placed during the hospital encounter of 21    Cardiac catheterization    [address] [address] [phone]    ([place])    Invasive Cardiovascular Lab Complete Report    Patient: DENNIS BRANDT  MR number: [id number]  Account number: [id number]  Study date: 2021  Gender: Male  : 1955  Height: 70.1 in  Weight: 246.4 lb  BSA: 2.28 mï¾²    Allergies: NO KNOWN ALLERGIES    Diagnostic Cardiologist:  Demian Daugherty MD  Primary Physician:  Real Chin MD    SUMMARY    CORONARY CIRCULATION:  Left main: Normal.  LAD: The vessel was normal sized. There was a 70% stenosis in the proximal vessel.  Circumflex: The vessel was normal sized and gave rise to one major OM branch. There was a 95% proximal stenosis of the large OM branch.  RCA: The vessel was normal sized and dominant, giving rise to the PDA and several posterolateral branches. There was a complex eccentric lesion in mid vessel with thrombus. There is MEDINA 3 flow into the distal vessel, but MEDINA 1 flow into  the PDA. There was collateral flow to the PDA from the left system.    REPORT ELEMENT SELECTION:  Right radial access.  There were no complications.    Summary:  Severe 3 vessel CAD.  Plan: transfer to [facility] for surgical consultation.    INDICATIONS:  --  Possible CAD: myocardial infarction without ST elevation (NSTEMI).    PROCEDURES PERFORMED    --  Left coronary angiography.  --  Right coronary  angiography.  --  Inpatient.  --  Mod Sedation Same Physician Initial 15min.  --  Mod Sedation Same Physician Add 15min.  --  Coronary Catheterization (w/o [facility]).    PROCEDURE: The risks and alternatives of the procedures and conscious sedation were explained to the patient and informed consent was obtained. The patient was brought to the cath lab and placed on the table. The planned puncture sites  were prepped and draped in the usual sterile fashion.    --  Right radial artery access. After performing an Jarret's test to verify adequate ulnar artery supply to the hand, the radial site was prepped. The puncture site was infiltrated with local anesthetic. The vessel was accessed using the  modified Seldinger technique, a wire was advanced into the vessel, and a sheath was advanced over the wire into the vessel.    --  Left coronary artery angiography. A catheter was advanced over a guidewire into the aorta and positioned in the left coronary artery ostium under fluoroscopic guidance. Angiography was performed.    --  Right coronary artery angiography. A catheter was advanced over a guidewire into the aorta and positioned in the right coronary artery ostium under fluoroscopic guidance. Angiography was performed.    --  Inpatient.    --  Mod Sedation Same Physician Initial 15min.    --  Mod Sedation Same Physician Add 15min.    --  Coronary Catheterization (w/o [facility]).    PROCEDURE COMPLETION: The patient tolerated the procedure well and was discharged from the cath lab. TIMING: Test started at 15:17. Test concluded at 15:36. HEMOSTASIS: The sheath was removed. The site was compressed with a Hemoband  device. Hemostasis was obtained. MEDICATIONS GIVEN: Verapamil (Isoptin, Calan, Covera), 1.25 mg, IA, at 15:22. Fentanyl (1OOmcg/2 ml), 50 mcg, IV, at 15:05. Versed (2mg/2ml), 2 mg, IV, at 15:06. Fentanyl (1OOmcg/2 ml), 25 mcg, IV, at  15:17. Versed (2mg/2ml), 1 mg, IV, at 15:17. 1% Lidocaine, 0.1 ml, subcutaneously, at  15:21. Nitroglycerin (200mcg/ml), 200 mcg, at 15:21. Heparin 1000 units/ml, 4,000 units, IV, at 15:22. CONTRAST GIVEN: 75 ml Omnipaque (350mg I /ml).  RADIATION EXPOSURE: Fluoroscopy time: 2.48 min.    CORONARY VESSELS:   --  Left main: Normal.  --  LAD: The vessel was normal sized. There was a 70% stenosis in the proximal vessel.  --  Circumflex: The vessel was normal sized and gave rise to one major OM branch. There was a 95% proximal stenosis of the large OM branch.  --  RCA: The vessel was normal sized and dominant, giving rise to the PDA and several posterolateral branches. There was a complex eccentric lesion in mid vessel with thrombus. There is MEDINA 3 flow into the distal vessel, but MEDINA 1 flow  into the PDA. There was collateral flow to the PDA from the left system.    NOTE: Right radial access.  There were no complications.    Prepared and signed by    Demian Daugherty MD  Signed 2021 15:53:35    CC: Dr. JOSH Huang    Study diagram    Hemodynamic tables    Pressures:  Baseline  Pressures:  - HR: 76  Pressures:  - Rhythm:  Pressures:  -- Aortic Pressure (S/D/M): 92/59/70    Outputs:  Baseline  Outputs:  -- CALCULATIONS[age]  Outputs:  -- CALCULATIONS: Body Surface Area: 2.28  Outputs:  -- CALCULATIONS: Height in cm: 178.00  Outputs:  -- CALCULATIONS: Sex: Male  Outputs:  -- CALCULATIONS: Weight in k.00  Outputs:  -- OUTPUTS: O2 consumption: 285.59  Outputs:  -- OUTPUTS: Vo2 Indexed: 125.00    Results for orders placed during the hospital encounter of 23    NM myocardial perfusion spect (stress and/or rest)    Interpretation Summary    Stress ECG: No ST deviation is noted. The stress ECG is negative for ischemia after pharmacologic vasodilation.    Perfusion: There are no perfusion defects.    Stress Function: Left ventricular function post-stress is normal. Post-stress ejection fraction is 49 %.    Stress Combined Conclusion: The ECG and SPECT imaging portions of the stress  study are concordant with no evidence of stress induced myocardial ischemia.

## 2025-04-17 ENCOUNTER — CONSULT (OUTPATIENT)
Dept: ENDOCRINOLOGY | Facility: CLINIC | Age: 70
End: 2025-04-17
Payer: COMMERCIAL

## 2025-04-17 ENCOUNTER — TELEPHONE (OUTPATIENT)
Dept: ENDOCRINOLOGY | Facility: CLINIC | Age: 70
End: 2025-04-17

## 2025-04-17 VITALS
TEMPERATURE: 97.5 F | HEIGHT: 70 IN | HEART RATE: 67 BPM | WEIGHT: 249 LBS | RESPIRATION RATE: 16 BRPM | DIASTOLIC BLOOD PRESSURE: 62 MMHG | SYSTOLIC BLOOD PRESSURE: 98 MMHG | OXYGEN SATURATION: 96 % | BODY MASS INDEX: 35.65 KG/M2

## 2025-04-17 DIAGNOSIS — E11.65 TYPE 2 DIABETES MELLITUS WITH HYPERGLYCEMIA, WITH LONG-TERM CURRENT USE OF INSULIN (HCC): Primary | ICD-10-CM

## 2025-04-17 DIAGNOSIS — E66.812 CLASS 2 SEVERE OBESITY DUE TO EXCESS CALORIES WITH SERIOUS COMORBIDITY AND BODY MASS INDEX (BMI) OF 36.0 TO 36.9 IN ADULT (HCC): ICD-10-CM

## 2025-04-17 DIAGNOSIS — Z79.4 TYPE 2 DIABETES MELLITUS WITH HYPERGLYCEMIA, WITH LONG-TERM CURRENT USE OF INSULIN (HCC): Primary | ICD-10-CM

## 2025-04-17 DIAGNOSIS — E55.9 VITAMIN D DEFICIENCY: ICD-10-CM

## 2025-04-17 DIAGNOSIS — E66.01 CLASS 2 SEVERE OBESITY DUE TO EXCESS CALORIES WITH SERIOUS COMORBIDITY AND BODY MASS INDEX (BMI) OF 36.0 TO 36.9 IN ADULT (HCC): ICD-10-CM

## 2025-04-17 DIAGNOSIS — E78.2 MIXED HYPERLIPIDEMIA: ICD-10-CM

## 2025-04-17 PROCEDURE — 99204 OFFICE O/P NEW MOD 45 MIN: CPT | Performed by: INTERNAL MEDICINE

## 2025-04-17 RX ORDER — TIRZEPATIDE 2.5 MG/.5ML
2.5 INJECTION, SOLUTION SUBCUTANEOUS WEEKLY
Qty: 2 ML | Refills: 0 | Status: SHIPPED | OUTPATIENT
Start: 2025-04-17

## 2025-04-17 RX ORDER — HYDROCHLOROTHIAZIDE 12.5 MG/1
CAPSULE ORAL
Qty: 6 EACH | Refills: 1 | Status: SHIPPED | OUTPATIENT
Start: 2025-04-17

## 2025-04-17 RX ORDER — KETOROLAC TROMETHAMINE 30 MG/ML
1 INJECTION, SOLUTION INTRAMUSCULAR; INTRAVENOUS CONTINUOUS
Qty: 1 EACH | Refills: 0 | Status: SHIPPED | OUTPATIENT
Start: 2025-04-17

## 2025-04-17 NOTE — TELEPHONE ENCOUNTER
Patient calling as he is running a little behind for his appointment. Spoke to office clerical and made aware.

## 2025-04-17 NOTE — PROGRESS NOTES
"    New consult Note      CC: Type 2 diabetes    History of Present Illness:   69yr male with Type 2 diabetes since 1999, HTN, HLD, raji epididymal cysts, CAD s/p CABG 2021, [employer], night shift worker, moderate asthma, exertional dyspnea and vit D deficiency.    SAGM:    Current meds:  Metformin 1000mg po BID  Lantus 20u QHS  Humalog 9u BIDAC  Jardiance 10mg qdaily    Opthamology: yes. 5 months.  Podiatry:   vaccination: yes  Dental: yes  Pancreatitis: no    Ace/ARB: No  Statin: lipitor  Thyroid issues: no      Physical Exam:  Body mass index is 35.73 kg/m².  BP 98/62 (BP Location: Left arm, Patient Position: Sitting)   Pulse 67   Temp 97.5 °F (36.4 °C) (Tympanic)   Resp 16   Ht 5' 10\" (1.778 m)   Wt 113 kg (249 lb)   SpO2 96%   BMI 35.73 kg/m²    Vitals:    04/17/25 1035   Weight: 113 kg (249 lb)        Physical Exam  Constitutional:       General: He is not in acute distress.     Appearance: He is well-developed.   HENT:      Head: Normocephalic and atraumatic.      Nose: Nose normal.   Eyes:      Conjunctiva/sclera: Conjunctivae normal.   Pulmonary:      Effort: Pulmonary effort is normal.   Abdominal:      General: There is no distension.   Musculoskeletal:      Cervical back: Normal range of motion and neck supple.   Skin:     Findings: No rash.      Comments: No icterus   Neurological:      Mental Status: He is alert and oriented to person, place, and time.         Labs:   Lab Results   Component Value Date    HGBA1C 11.0 03/04/2025       Lab Results   Component Value Date    RNH6RDSENQUE 2.568 03/24/2023       Lab Results   Component Value Date    CREATININE 0.90 06/09/2024    CREATININE 0.70 03/28/2023    CREATININE 0.72 03/24/2023    BUN 13 06/09/2024    K 4.0 06/09/2024    CL 98 06/09/2024    CO2 26 06/09/2024     eGFR   Date Value Ref Range Status   06/09/2024 88 ml/min/1.73sq m Final       Lab Results   Component Value Date    ALT 24 03/28/2023    AST 15 03/28/2023    ALKPHOS 61 03/28/2023       Lab " Results   Component Value Date    CHOLESTEROL 97 03/28/2023    CHOLESTEROL 210 (H) 07/07/2021     Lab Results   Component Value Date    HDL 33 (L) 03/28/2023    HDL 44 07/07/2021     Lab Results   Component Value Date    TRIG 101 03/28/2023    TRIG 215.8 (H) 07/07/2021     Lab Results   Component Value Date    NONHDLC 64 03/28/2023    NONHDLC 166 07/07/2021         Assessment/Plan:    1. Type 2 diabetes mellitus with hyperglycemia, with long-term current use of insulin (HCC)  Assessment & Plan:  He seems quite uncontrolled but started using humalog since last A1c on 3/4/25.    Today we discussed all aspects of diabetes including pathophysiology, risk factors, complications, SAGM, CGM, diet, lifestyle modifications, medical fitness training, diabetes education, goals of therapy, follow up needs and medications including insulin, metformin, Jardiance and GLP1 agonists.  Advised to maintain goal blood sugars 70-180mg/dL.    We agreed to continue Lantus 20u QHS and Humalog 9u bidac.  Continue metformin 1000mg po BID.  Add Mounjaro 2.5mg weekly and then dose escalation in future.  Advised education and personal cgm.    In future, we will try to increase GLP 1 agonist and wean off prandial insulin.  Follow up in 6-8 weeks.        Lab Results   Component Value Date    HGBA1C 11.0 03/04/2025     Orders:  -     Continuous Glucose  (FreeStyle [place] 3 [place]) JANETT; Use 1 each continuous  -     Continuous Glucose Sensor (FreeStyle [place] 3 Plus Sensor) MISC; Use 1 each every 15 days  -     Ambulatory referral to Diabetic Education; Future  -     Mounjaro 2.5 MG/0.5ML SOAJ; Inject 2.5 mg under the skin once a week  -     Hemoglobin A1C; Future  -     Albumin / creatinine urine ratio; Future  -     Comprehensive metabolic panel; Future  2. Mixed hyperlipidemia  Assessment & Plan:  Continue statin  Orders:  -     Lipid panel; Future  3. Class 2 severe obesity due to excess calories with serious comorbidity and body mass  index (BMI) of 36.0 to 36.9 in adult (HCC)  Assessment & Plan:  Today we discussed all aspects of obesity and metabolism including pathophysiology, risk factors, complications, goal of sustaining weight loss long term, usual propensity to regain weight with short term approaches, follow up needs and medications - efficacy and limitations.   Discussed role of endocrinopathies, inflammatory conditions, sleep disorders, mental health disorders, lifestyle issues and polypharmacy and weight gain.  Briefly discussed bariatric surgery.  Diet: carb controlled diet <1500cal/day/ VLCD, 64oz fluids/day   lifestyle modifications: intermittent fasting and 10,000 steps/day  medical fitness training: muscle building  education: nutrition input  4. Vitamin D deficiency  -     Vitamin D 25 hydroxy; Future  2      I have spent a total time of  minutes on 04/17/25 in caring for this patient including greater than 50% of this time was spent in counseling/coordination of care as listed above.       Discussed with the patient and all questioned fully answered. He will contact me with concerns.    Serena Snow

## 2025-04-17 NOTE — ASSESSMENT & PLAN NOTE
He seems quite uncontrolled but started using humalog since last A1c on 3/4/25.    Today we discussed all aspects of diabetes including pathophysiology, risk factors, complications, SAGM, CGM, diet, lifestyle modifications, medical fitness training, diabetes education, goals of therapy, follow up needs and medications including insulin, metformin, Jardiance and GLP1 agonists.  Advised to maintain goal blood sugars 70-180mg/dL.    We agreed to continue Lantus 20u QHS and Humalog 9u bidac.  Continue metformin 1000mg po BID.  Add Mounjaro 2.5mg weekly and then dose escalation in future.  Advised education and personal cgm.    In future, we will try to increase GLP 1 agonist and wean off prandial insulin.  Follow up in 6-8 weeks.        Lab Results   Component Value Date    HGBA1C 11.0 03/04/2025

## 2025-04-18 ENCOUNTER — TELEPHONE (OUTPATIENT)
Dept: ENDOCRINOLOGY | Facility: CLINIC | Age: 70
End: 2025-04-18

## 2025-04-18 NOTE — TELEPHONE ENCOUNTER
PA for Mounjaro 2.5mg SUBMITTED to [employer]    via    []CMM-KEY:   []Surescripts-Case ID #   []Availity-Auth ID # NDC #   []Faxed to plan   [x]Other website [employer] EOC ID [id number]  []Phone call Case ID #     [x]PA sent as URGENT    All office notes, labs and other pertaining documents and studies sent. Clinical questions answered. Awaiting determination from insurance company.     Turnaround time for your insurance to make a decision on your Prior Authorization can take 7-21 business days.

## 2025-04-21 ENCOUNTER — HOSPITAL ENCOUNTER (OUTPATIENT)
Dept: NON INVASIVE DIAGNOSTICS | Facility: CLINIC | Age: 70
Discharge: HOME | End: 2025-04-21
Attending: INTERNAL MEDICINE
Payer: COMMERCIAL

## 2025-04-21 DIAGNOSIS — R60.0 BILATERAL LEG EDEMA: ICD-10-CM

## 2025-04-21 PROCEDURE — 93970 EXTREMITY STUDY: CPT | Performed by: SURGERY

## 2025-04-21 PROCEDURE — 93970 EXTREMITY STUDY: CPT

## 2025-06-27 ENCOUNTER — OFFICE VISIT (OUTPATIENT)
Dept: PULMONOLOGY | Facility: CLINIC | Age: 70
End: 2025-06-27
Payer: COMMERCIAL

## 2025-06-27 VITALS
DIASTOLIC BLOOD PRESSURE: 74 MMHG | OXYGEN SATURATION: 99 % | HEIGHT: 70 IN | WEIGHT: 243 LBS | TEMPERATURE: 96.9 F | HEART RATE: 71 BPM | SYSTOLIC BLOOD PRESSURE: 126 MMHG | BODY MASS INDEX: 34.79 KG/M2

## 2025-06-27 DIAGNOSIS — E66.01 CLASS 2 SEVERE OBESITY DUE TO EXCESS CALORIES WITH SERIOUS COMORBIDITY AND BODY MASS INDEX (BMI) OF 36.0 TO 36.9 IN ADULT (HCC): ICD-10-CM

## 2025-06-27 DIAGNOSIS — J45.40 MODERATE PERSISTENT ASTHMA WITHOUT COMPLICATION: ICD-10-CM

## 2025-06-27 DIAGNOSIS — G47.33 OSA (OBSTRUCTIVE SLEEP APNEA): Primary | ICD-10-CM

## 2025-06-27 DIAGNOSIS — E66.812 CLASS 2 SEVERE OBESITY DUE TO EXCESS CALORIES WITH SERIOUS COMORBIDITY AND BODY MASS INDEX (BMI) OF 36.0 TO 36.9 IN ADULT (HCC): ICD-10-CM

## 2025-06-27 PROCEDURE — 99214 OFFICE O/P EST MOD 30 MIN: CPT | Performed by: INTERNAL MEDICINE

## 2025-06-27 RX ORDER — EMPAGLIFLOZIN 25 MG/1
1 TABLET, FILM COATED ORAL DAILY
COMMUNITY
Start: 2025-06-22

## 2025-06-27 NOTE — ASSESSMENT & PLAN NOTE
He was diagnosed with obstructive sleep apnea 10 years back after a sleep study with Dr. Daniel.  He did not want to use CPAP and never took any treatment.  He developed coronary artery disease subsequently and currently has diabetes and hypertension.  He has snoring, interrupted sleep, and daytime sleepiness and tiredness.  On clinical examination, he had oropharyngeal crowding.  His overnight home sleep study showed moderate obstructive sleep apnea with oxygen desaturation.  The Auto CPAP therapy was ordered and was waiting to be set up.  He is not yet started on therapy as he did not respond to the DME call.  I have advised him to begin CPAP therapy as soon as possible.  He is aware of the complications of untreated sleep apnea. I had a long discussion with him and answered all his questions.

## 2025-06-27 NOTE — ASSESSMENT & PLAN NOTE
He is very obese and understands the need for weight reduction. He understands that weight reduction can significantly improve his sleep apnea and other symptoms.  He is on Mounjaro for weight reduction.

## 2025-06-27 NOTE — PROGRESS NOTES
Name: Venkata Jones      : 1955      MRN: [id number]  Encounter Provider: Andrés Sauceda MD  Encounter Date: 2025   Encounter department: [facility] [place]  :  Assessment & Plan  REJI (obstructive sleep apnea)  He was diagnosed with obstructive sleep apnea 10 years back after a sleep study with Dr. Daniel.  He did not want to use CPAP and never took any treatment.  He developed coronary artery disease subsequently and currently has diabetes and hypertension.  He has snoring, interrupted sleep, and daytime sleepiness and tiredness.  On clinical examination, he had oropharyngeal crowding.  His overnight home sleep study showed moderate obstructive sleep apnea with oxygen desaturation.  The Auto CPAP therapy was ordered and was waiting to be set up.  He is not yet started on therapy as he did not respond to the DME call.  I have advised him to begin CPAP therapy as soon as possible.  He is aware of the complications of untreated sleep apnea. I had a long discussion with him and answered all his questions.        Moderate persistent asthma without complication  Mr. Venkata Jones has had shortness of breath on exertion with history of cough and wheeze.  This started after he had COVID infection in 2021.  Currently his exercise tolerance is less than a block.  He has also history of allergies.  On clinical examination his chest was clear to auscultation.  His PFT showed mild airflow obstruction with reversibility and normal diffusion capacity.  He had evidence of air trapping.  His CT scan was unremarkable except for sternotomy status.  I have advised him to continue with Advair regularly and albuterol regularly. I had a long discussion with him and have answered all his questions.            Class 2 severe obesity due to excess calories with serious comorbidity and body mass index (BMI) of 36.0 to 36.9 in adult (HCC)  He is very obese and understands the need for  weight reduction. He understands that weight reduction can significantly improve his sleep apnea and other symptoms.  He is on Mounjaro for weight reduction.           History of Present Illness   HPI  Venkata Jones is a 70 y.o. male with past medical history of obstructive sleep apnea asthma diabetes and obesity who has come for follow-up.  He was diagnosed with moderate obstructive sleep apnea with significant oxygen desaturation and was ordered auto CPAP therapy 5 to 15 cm of water.  He has not yet started on CPAP therapy.  I have highlighted to him the need for being on CPAP therapy and the complications of untreated sleep apnea.  He stated that he is going to try CPAP and is going to contact the DME.  He has daytime sleepiness and tiredness.  He works night shifts.  He denied any morning headache.  He has asthma and has been using Advair regularly and albuterol as needed.  He denied any hoarseness of voice or dysphagia.  He denied any chest pain or palpitations.  He has been on Mounjaro for obesity.  He understands the need for weight reduction.  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      Review of Systems   Constitutional:  Positive for fatigue. Negative for appetite change, chills and fever.   HENT:  Positive for rhinorrhea. Negative for hearing loss, sneezing, sore throat and trouble swallowing.    Respiratory:  Positive for shortness of breath. Negative for cough and wheezing.    Cardiovascular:  Positive for leg swelling. Negative for chest pain and palpitations.   Gastrointestinal:  Negative for abdominal pain, diarrhea, nausea and vomiting.   Genitourinary:  Negative for dysuria, frequency and urgency.   Musculoskeletal:  Negative for arthralgias, back pain and gait problem.   Skin:  Negative for rash.   Allergic/Immunologic: Positive for environmental allergies.   Neurological:  Negative for dizziness, syncope, light-headedness and headaches.   Psychiatric/Behavioral:  Positive for sleep disturbance.  "Negative for agitation and confusion.           Objective   /74 (BP Location: Left arm, Patient Position: Sitting, Cuff Size: Standard)   Pulse 71   Temp (!) 96.9 °F (36.1 °C) (Temporal)   Ht 5' 10\" (1.778 m)   Wt 110 kg (243 lb)   SpO2 99%   BMI 34.87 kg/m²      Physical Exam  Vitals reviewed.   Constitutional:       General: He is not in acute distress.     Appearance: He is obese. He is not ill-appearing, toxic-appearing or diaphoretic.   HENT:      Head: Normocephalic.      Mouth/Throat:      Mouth: Mucous membranes are moist.      Comments: Oropharyngeal crowding Mallampatti Class 3    Eyes:      General: No scleral icterus.     Conjunctiva/sclera: Conjunctivae normal.       Cardiovascular:      Rate and Rhythm: Normal rate and regular rhythm.      Heart sounds: Normal heart sounds. No murmur heard.  Pulmonary:      Effort: Pulmonary effort is normal. No respiratory distress.      Breath sounds: Normal breath sounds. No stridor. No wheezing, rhonchi or rales.   Chest:      Chest wall: No tenderness.   Abdominal:      Palpations: Abdomen is soft.      Tenderness: There is no abdominal tenderness. There is no guarding.     Musculoskeletal:      Cervical back: Neck supple. No rigidity.      Right lower leg: Edema present.      Left lower leg: Edema present.   Lymphadenopathy:      Cervical: No cervical adenopathy.     Skin:     Coloration: Skin is not jaundiced or pale.      Findings: No rash.     Neurological:      Mental Status: He is alert and oriented to person, place, and time.      Gait: Gait normal.     Psychiatric:         Mood and Affect: Mood normal.         Behavior: Behavior normal.         Thought Content: Thought content normal.         Judgment: Judgment normal.     I spent 30 minutes of time today care of this patient with complex medical issues.  The majority of this time was spent directly with the patient counseling as well as coordinating care.      "

## 2025-08-05 ENCOUNTER — OFFICE VISIT (OUTPATIENT)
Dept: ENDOCRINOLOGY | Facility: CLINIC | Age: 70
End: 2025-08-05
Payer: COMMERCIAL

## 2025-08-05 VITALS
OXYGEN SATURATION: 97 % | HEART RATE: 79 BPM | WEIGHT: 248 LBS | TEMPERATURE: 97.6 F | SYSTOLIC BLOOD PRESSURE: 126 MMHG | DIASTOLIC BLOOD PRESSURE: 74 MMHG | HEIGHT: 70 IN | BODY MASS INDEX: 35.5 KG/M2 | RESPIRATION RATE: 14 BRPM

## 2025-08-05 DIAGNOSIS — R53.81 PHYSICAL DECONDITIONING: ICD-10-CM

## 2025-08-05 DIAGNOSIS — Z95.1 S/P CABG (CORONARY ARTERY BYPASS GRAFT): ICD-10-CM

## 2025-08-05 DIAGNOSIS — I25.10 CAD (CORONARY ARTERY DISEASE): ICD-10-CM

## 2025-08-05 DIAGNOSIS — Z79.4 TYPE 2 DIABETES MELLITUS WITH HYPERGLYCEMIA, WITH LONG-TERM CURRENT USE OF INSULIN (HCC): Primary | ICD-10-CM

## 2025-08-05 DIAGNOSIS — E11.65 TYPE 2 DIABETES MELLITUS WITH HYPERGLYCEMIA, WITH LONG-TERM CURRENT USE OF INSULIN (HCC): Primary | ICD-10-CM

## 2025-08-05 DIAGNOSIS — E66.01 CLASS 2 SEVERE OBESITY DUE TO EXCESS CALORIES WITH SERIOUS COMORBIDITY AND BODY MASS INDEX (BMI) OF 36.0 TO 36.9 IN ADULT (HCC): ICD-10-CM

## 2025-08-05 DIAGNOSIS — E78.2 MIXED HYPERLIPIDEMIA: ICD-10-CM

## 2025-08-05 DIAGNOSIS — E66.812 CLASS 2 SEVERE OBESITY DUE TO EXCESS CALORIES WITH SERIOUS COMORBIDITY AND BODY MASS INDEX (BMI) OF 36.0 TO 36.9 IN ADULT (HCC): ICD-10-CM

## 2025-08-05 PROBLEM — Z86.16 HISTORY OF COVID-19: Status: RESOLVED | Noted: 2020-11-01 | Resolved: 2025-08-05

## 2025-08-05 LAB — SL AMB POCT HEMOGLOBIN AIC: 8.9 (ref ?–6.5)

## 2025-08-05 PROCEDURE — 83036 HEMOGLOBIN GLYCOSYLATED A1C: CPT | Performed by: INTERNAL MEDICINE

## 2025-08-05 PROCEDURE — 99214 OFFICE O/P EST MOD 30 MIN: CPT | Performed by: INTERNAL MEDICINE

## 2025-08-05 RX ORDER — TIRZEPATIDE 5 MG/.5ML
5 INJECTION, SOLUTION SUBCUTANEOUS WEEKLY
Qty: 4 ML | Refills: 0 | Status: SHIPPED | OUTPATIENT
Start: 2025-08-05

## 2025-08-05 RX ORDER — DOCUSATE SODIUM 100 MG/1
100 CAPSULE, LIQUID FILLED ORAL 2 TIMES DAILY
Qty: 60 CAPSULE | Refills: 0 | Status: SHIPPED | OUTPATIENT
Start: 2025-08-05 | End: 2025-09-04

## (undated) DEVICE — SILVER-COATED ANTIBACTERIAL BARRIER DRESSING: Brand: ACTICOAT SURGIC 10X25CM 5PK US

## (undated) DEVICE — CATH STRAIGHT RED RUBBER 20 FR

## (undated) DEVICE — GAUZE SPONGES,16 PLY: Brand: CURITY

## (undated) DEVICE — RECIP.STERNUM SAW BLADE 34/7.5/0.7MM: Brand: AESCULAP

## (undated) DEVICE — BONE WAX WHITE: Brand: BONE WAX WHITE

## (undated) DEVICE — SUT PROLENE 7-0 BV175-8/BV175-8 24 IN EPM8747

## (undated) DEVICE — BLADE BEAVER MINI SZ 69

## (undated) DEVICE — VASOVIEW HEMOPRO 2: Brand: VASOVIEW HEMOPRO 2

## (undated) DEVICE — ALCON OPHTHALMIC KNIFE 15 °: Brand: ALCON

## (undated) DEVICE — OASIS DRAIN, DUAL, IN-LINE, ATS COMPATIBLE: Brand: OASIS

## (undated) DEVICE — PINNACLE INTRODUCER SHEATH: Brand: PINNACLE

## (undated) DEVICE — ELECTRODE BLADE E-Z CLEAN 4IN -0014A

## (undated) DEVICE — GLOVE SRG BIOGEL ECLIPSE 7

## (undated) DEVICE — HEMOCLIP CARTRIDGE LRG

## (undated) DEVICE — INTENDED FOR TISSUE SEPARATION, AND OTHER PROCEDURES THAT REQUIRE A SHARP SURGICAL BLADE TO PUNCTURE OR CUT.: Brand: BARD-PARKER ® CARBON RIB-BACK BLADES

## (undated) DEVICE — BLANKET HYPOTHERMIA ADULT GAYMAR

## (undated) DEVICE — SUT SILK 3-0 SH CR/8 18 IN C013D

## (undated) DEVICE — SUT SILK 2 60 IN SA8H

## (undated) DEVICE — ANTIBACTERIAL UNDYED BRAIDED (POLYGLACTIN 910), SYNTHETIC ABSORBABLE SUTURE: Brand: COATED VICRYL

## (undated) DEVICE — LIGHT HANDLE COVER SLEEVE DISP BLUE STELLAR

## (undated) DEVICE — SUT PROLENE 4-0 BB 36 IN 8581H

## (undated) DEVICE — SYRINGE 50ML LL

## (undated) DEVICE — ACE WRAP 6 IN UNSTERILE

## (undated) DEVICE — STERNAL WIRE

## (undated) DEVICE — AORTIC PUNCH 5.2 MM DISP

## (undated) DEVICE — SILVER-COATED ANTIBACTERIAL BARRIER DRESSING: Brand: ACTICOAT SURGIC 10X12CM 5PK US

## (undated) DEVICE — SUT PROLENE 6-0 C-1/C-1 30 IN 8307H

## (undated) DEVICE — UMBILICAL TAPE: Brand: DEROYAL

## (undated) DEVICE — PLUMEPEN PRO 10FT

## (undated) DEVICE — DRESSING ALLEVYN LIFE HEEL 25 X 25.2CM

## (undated) DEVICE — THERMOFLECT BLANKET, L, 25EA                               TS THERMOFLECT BLANKET, 48" X 84", SILVER, 5/BG, 5 BG/CS NW: Brand: THERMOFLECT

## (undated) DEVICE — 40601 PROLONGED POSITIONING SYSTEM: Brand: 40601 PROLONGED POSITIONING SYSTEM

## (undated) DEVICE — PACK CABG PBDS

## (undated) DEVICE — SUT ETHIBOND 2-0 SH-1/SH-1 30 IN X763H

## (undated) DEVICE — SUT SILK 0 CT-1 30 IN 424H

## (undated) DEVICE — 3000CC GUARDIAN II: Brand: GUARDIAN

## (undated) DEVICE — 32 FR RIGHT ANGLE – SOFT PVC CATHETER: Brand: PVC THORACIC CATHETERS

## (undated) DEVICE — PERCUTANEOUS ENTRY THINWALL NEEDLE  ONE-PART: Brand: COOK

## (undated) DEVICE — SUCTION CATH 18 FR

## (undated) DEVICE — ADHESIVE SKIN HIGH VISCOSITY EXOFIN 1ML

## (undated) DEVICE — PLEDGET CARDIO PTFE 9.5 X 4.8 SOFT LF (6EA/PK)

## (undated) DEVICE — EVERGRIP INSERT SET 61MM: Brand: FOGARTY EVERGRIP

## (undated) DEVICE — TRAY FOLEY 16FR SURESTEP TEMP SENS URIMETER STAT LOK

## (undated) DEVICE — 32 FR STRAIGHT – SOFT PVC CATHETER: Brand: PVC THORACIC CATHETERS

## (undated) DEVICE — CHLORAPREP HI-LITE 26ML ORANGE

## (undated) DEVICE — FILTER SMOKE EVAC VIROSAFE

## (undated) DEVICE — GLOVE INDICATOR PI UNDERGLOVE SZ 7 BLUE

## (undated) DEVICE — PENCIL ELECTROSURG E-Z CLEAN -0035H

## (undated) DEVICE — SUT PDS PLUS 1 CTB 36 IN PDPB359T

## (undated) DEVICE — INTENDED FOR TISSUE SEPARATION, AND OTHER PROCEDURES THAT REQUIRE A SHARP SURGICAL BLADE TO PUNCTURE OR CUT.: Brand: BARD-PARKER SAFETY BLADES SIZE 15, STERILE

## (undated) DEVICE — TUBING INSUFFLATION SET ISO CONNECTOR

## (undated) DEVICE — LIGACLIP MCA MULTIPLE CLIP APPLIERS, 20 SMALL CLIPS: Brand: LIGACLIP